# Patient Record
Sex: FEMALE | Race: WHITE | NOT HISPANIC OR LATINO | Employment: OTHER | URBAN - METROPOLITAN AREA
[De-identification: names, ages, dates, MRNs, and addresses within clinical notes are randomized per-mention and may not be internally consistent; named-entity substitution may affect disease eponyms.]

---

## 2017-01-13 ENCOUNTER — HOSPITAL ENCOUNTER (EMERGENCY)
Facility: HOSPITAL | Age: 72
Discharge: HOME | End: 2017-01-13
Attending: EMERGENCY MEDICINE
Payer: MEDICARE

## 2017-01-13 ENCOUNTER — APPOINTMENT (EMERGENCY)
Dept: RADIOLOGY | Facility: HOSPITAL | Age: 72
End: 2017-01-13
Payer: MEDICARE

## 2017-01-13 VITALS
HEART RATE: 92 BPM | OXYGEN SATURATION: 97 % | WEIGHT: 130 LBS | RESPIRATION RATE: 20 BRPM | HEIGHT: 61 IN | TEMPERATURE: 97.8 F | SYSTOLIC BLOOD PRESSURE: 152 MMHG | BODY MASS INDEX: 24.55 KG/M2 | DIASTOLIC BLOOD PRESSURE: 76 MMHG

## 2017-01-13 DIAGNOSIS — S52.502A CLOSED FRACTURE OF DISTAL END OF LEFT RADIUS, UNSPECIFIED FRACTURE MORPHOLOGY, INITIAL ENCOUNTER: Primary | ICD-10-CM

## 2017-01-13 PROCEDURE — 73110 X-RAY EXAM OF WRIST: CPT

## 2017-01-13 PROCEDURE — 99283 EMERGENCY DEPT VISIT LOW MDM: CPT

## 2017-01-13 RX ORDER — OXYCODONE HYDROCHLORIDE AND ACETAMINOPHEN 5; 325 MG/1; MG/1
1 TABLET ORAL EVERY 6 HOURS PRN
Qty: 8 TABLET | Refills: 0 | Status: SHIPPED | OUTPATIENT
Start: 2017-01-13 | End: 2018-07-22

## 2017-01-13 RX ORDER — OXYCODONE HYDROCHLORIDE AND ACETAMINOPHEN 5; 325 MG/1; MG/1
1 TABLET ORAL ONCE
Status: COMPLETED | OUTPATIENT
Start: 2017-01-13 | End: 2017-01-13

## 2017-01-13 RX ORDER — VALSARTAN AND HYDROCHLOROTHIAZIDE 80; 12.5 MG/1; MG/1
1 TABLET, FILM COATED ORAL DAILY
COMMUNITY
End: 2018-07-22

## 2017-01-13 RX ORDER — MULTIVITAMIN WITH IRON
100 TABLET ORAL DAILY
COMMUNITY
End: 2022-01-26

## 2017-01-13 RX ADMIN — OXYCODONE HYDROCHLORIDE AND ACETAMINOPHEN 1 TABLET: 5; 325 TABLET ORAL at 23:15

## 2017-01-16 ENCOUNTER — ALLSCRIPTS OFFICE VISIT (OUTPATIENT)
Dept: OTHER | Facility: OTHER | Age: 72
End: 2017-01-16

## 2017-02-06 ENCOUNTER — ALLSCRIPTS OFFICE VISIT (OUTPATIENT)
Dept: OTHER | Facility: OTHER | Age: 72
End: 2017-02-06

## 2017-02-06 ENCOUNTER — HOSPITAL ENCOUNTER (OUTPATIENT)
Dept: RADIOLOGY | Facility: CLINIC | Age: 72
Discharge: HOME | End: 2017-02-06
Payer: MEDICARE

## 2017-02-06 DIAGNOSIS — S52.502D CLOSED FRACTURE OF LOWER END OF LEFT RADIUS WITH ROUTINE HEALING: ICD-10-CM

## 2017-02-06 DIAGNOSIS — S52.572A OTHER INTRAARTICULAR FRACTURE OF LOWER END OF LEFT RADIUS, INITIAL ENCOUNTER FOR CLOSED FRACTURE: ICD-10-CM

## 2017-02-06 PROCEDURE — 73100 X-RAY EXAM OF WRIST: CPT

## 2017-02-14 ENCOUNTER — APPOINTMENT (OUTPATIENT)
Dept: OCCUPATIONAL THERAPY | Facility: CLINIC | Age: 72
End: 2017-02-14
Payer: MEDICARE

## 2017-02-14 DIAGNOSIS — S52.502D CLOSED FRACTURE OF LOWER END OF LEFT RADIUS WITH ROUTINE HEALING: ICD-10-CM

## 2017-02-14 PROCEDURE — G8985 CARRY GOAL STATUS: HCPCS

## 2017-02-14 PROCEDURE — G8984 CARRY CURRENT STATUS: HCPCS

## 2017-02-14 PROCEDURE — 97165 OT EVAL LOW COMPLEX 30 MIN: CPT

## 2017-02-16 ENCOUNTER — GENERIC CONVERSION - ENCOUNTER (OUTPATIENT)
Dept: OTHER | Facility: OTHER | Age: 72
End: 2017-02-16

## 2017-02-16 ENCOUNTER — APPOINTMENT (OUTPATIENT)
Dept: OCCUPATIONAL THERAPY | Facility: CLINIC | Age: 72
End: 2017-02-16
Payer: MEDICARE

## 2017-02-16 PROCEDURE — 97140 MANUAL THERAPY 1/> REGIONS: CPT

## 2017-02-16 PROCEDURE — 97110 THERAPEUTIC EXERCISES: CPT

## 2017-02-20 ENCOUNTER — APPOINTMENT (OUTPATIENT)
Dept: OCCUPATIONAL THERAPY | Facility: CLINIC | Age: 72
End: 2017-02-20
Payer: MEDICARE

## 2017-02-22 ENCOUNTER — APPOINTMENT (OUTPATIENT)
Dept: OCCUPATIONAL THERAPY | Facility: CLINIC | Age: 72
End: 2017-02-22
Payer: MEDICARE

## 2017-02-27 ENCOUNTER — APPOINTMENT (OUTPATIENT)
Dept: OCCUPATIONAL THERAPY | Facility: CLINIC | Age: 72
End: 2017-02-27
Payer: MEDICARE

## 2017-02-27 PROCEDURE — 97110 THERAPEUTIC EXERCISES: CPT

## 2017-02-27 PROCEDURE — 97140 MANUAL THERAPY 1/> REGIONS: CPT

## 2017-03-01 ENCOUNTER — APPOINTMENT (OUTPATIENT)
Dept: OCCUPATIONAL THERAPY | Facility: CLINIC | Age: 72
End: 2017-03-01
Payer: MEDICARE

## 2017-03-01 PROCEDURE — G8985 CARRY GOAL STATUS: HCPCS

## 2017-03-01 PROCEDURE — G8984 CARRY CURRENT STATUS: HCPCS

## 2017-03-01 PROCEDURE — 97110 THERAPEUTIC EXERCISES: CPT

## 2017-03-01 PROCEDURE — 97140 MANUAL THERAPY 1/> REGIONS: CPT

## 2017-03-06 ENCOUNTER — ALLSCRIPTS OFFICE VISIT (OUTPATIENT)
Dept: OTHER | Facility: OTHER | Age: 72
End: 2017-03-06

## 2017-03-06 ENCOUNTER — APPOINTMENT (OUTPATIENT)
Dept: OCCUPATIONAL THERAPY | Facility: CLINIC | Age: 72
End: 2017-03-06
Payer: MEDICARE

## 2017-03-06 ENCOUNTER — HOSPITAL ENCOUNTER (OUTPATIENT)
Dept: RADIOLOGY | Facility: CLINIC | Age: 72
Discharge: HOME | End: 2017-03-06
Payer: MEDICARE

## 2017-03-06 DIAGNOSIS — S52.502D CLOSED FRACTURE OF LOWER END OF LEFT RADIUS WITH ROUTINE HEALING: ICD-10-CM

## 2017-03-06 PROCEDURE — 97140 MANUAL THERAPY 1/> REGIONS: CPT

## 2017-03-06 PROCEDURE — 97110 THERAPEUTIC EXERCISES: CPT

## 2017-03-06 PROCEDURE — 73100 X-RAY EXAM OF WRIST: CPT

## 2017-03-08 ENCOUNTER — APPOINTMENT (OUTPATIENT)
Dept: OCCUPATIONAL THERAPY | Facility: CLINIC | Age: 72
End: 2017-03-08
Payer: MEDICARE

## 2017-03-08 PROCEDURE — 97110 THERAPEUTIC EXERCISES: CPT

## 2017-03-08 PROCEDURE — 97140 MANUAL THERAPY 1/> REGIONS: CPT

## 2017-03-14 ENCOUNTER — APPOINTMENT (OUTPATIENT)
Dept: OCCUPATIONAL THERAPY | Facility: CLINIC | Age: 72
End: 2017-03-14
Payer: MEDICARE

## 2017-03-16 ENCOUNTER — APPOINTMENT (OUTPATIENT)
Dept: OCCUPATIONAL THERAPY | Facility: CLINIC | Age: 72
End: 2017-03-16
Payer: MEDICARE

## 2017-03-16 PROCEDURE — 97140 MANUAL THERAPY 1/> REGIONS: CPT

## 2017-03-16 PROCEDURE — 97110 THERAPEUTIC EXERCISES: CPT

## 2017-03-20 ENCOUNTER — APPOINTMENT (OUTPATIENT)
Dept: OCCUPATIONAL THERAPY | Facility: CLINIC | Age: 72
End: 2017-03-20
Payer: MEDICARE

## 2017-03-20 PROCEDURE — 97110 THERAPEUTIC EXERCISES: CPT

## 2017-03-20 PROCEDURE — 97140 MANUAL THERAPY 1/> REGIONS: CPT

## 2017-03-22 ENCOUNTER — APPOINTMENT (OUTPATIENT)
Dept: OCCUPATIONAL THERAPY | Facility: CLINIC | Age: 72
End: 2017-03-22
Payer: MEDICARE

## 2017-03-22 PROCEDURE — 97110 THERAPEUTIC EXERCISES: CPT

## 2017-03-22 PROCEDURE — G8985 CARRY GOAL STATUS: HCPCS

## 2017-03-22 PROCEDURE — 97140 MANUAL THERAPY 1/> REGIONS: CPT

## 2017-03-22 PROCEDURE — G8986 CARRY D/C STATUS: HCPCS

## 2017-03-27 ENCOUNTER — APPOINTMENT (OUTPATIENT)
Dept: OCCUPATIONAL THERAPY | Facility: CLINIC | Age: 72
End: 2017-03-27
Payer: MEDICARE

## 2017-03-30 ENCOUNTER — APPOINTMENT (OUTPATIENT)
Dept: OCCUPATIONAL THERAPY | Facility: CLINIC | Age: 72
End: 2017-03-30
Payer: MEDICARE

## 2017-03-31 ENCOUNTER — APPOINTMENT (OUTPATIENT)
Dept: LAB | Facility: HOSPITAL | Age: 72
End: 2017-03-31
Attending: FAMILY MEDICINE
Payer: MEDICARE

## 2017-03-31 ENCOUNTER — TRANSCRIBE ORDERS (OUTPATIENT)
Dept: ADMINISTRATIVE | Facility: HOSPITAL | Age: 72
End: 2017-03-31

## 2017-03-31 DIAGNOSIS — I10 ESSENTIAL HYPERTENSION, MALIGNANT: ICD-10-CM

## 2017-03-31 DIAGNOSIS — N18.30 CHRONIC KIDNEY DISEASE, STAGE III (MODERATE) (HCC): ICD-10-CM

## 2017-03-31 DIAGNOSIS — N18.30 CHRONIC KIDNEY DISEASE, STAGE III (MODERATE) (HCC): Primary | ICD-10-CM

## 2017-03-31 LAB
25(OH)D3 SERPL-MCNC: 29.2 NG/ML (ref 30–100)
ANION GAP SERPL CALCULATED.3IONS-SCNC: 11 MMOL/L (ref 4–13)
BUN SERPL-MCNC: 15 MG/DL (ref 5–25)
CALCIUM SERPL-MCNC: 9.2 MG/DL (ref 8.3–10.1)
CHLORIDE SERPL-SCNC: 106 MMOL/L (ref 100–108)
CO2 SERPL-SCNC: 26 MMOL/L (ref 21–32)
CREAT SERPL-MCNC: 0.75 MG/DL (ref 0.6–1.3)
GFR SERPL CREATININE-BSD FRML MDRD: >60 ML/MIN/1.73SQ M
GLUCOSE P FAST SERPL-MCNC: 98 MG/DL (ref 65–99)
POTASSIUM SERPL-SCNC: 4.1 MMOL/L (ref 3.5–5.3)
SODIUM SERPL-SCNC: 143 MMOL/L (ref 136–145)

## 2017-03-31 PROCEDURE — 36415 COLL VENOUS BLD VENIPUNCTURE: CPT | Performed by: FAMILY MEDICINE

## 2017-03-31 PROCEDURE — 82306 VITAMIN D 25 HYDROXY: CPT

## 2017-03-31 PROCEDURE — 80048 BASIC METABOLIC PNL TOTAL CA: CPT | Performed by: FAMILY MEDICINE

## 2017-04-03 ENCOUNTER — TRANSCRIBE ORDERS (OUTPATIENT)
Dept: ADMINISTRATIVE | Facility: HOSPITAL | Age: 72
End: 2017-04-03

## 2017-04-03 DIAGNOSIS — M80.80XA OTHER OSTEOPOROSIS WITH CURRENT PATHOLOGICAL FRACTURE, INITIAL ENCOUNTER: Primary | ICD-10-CM

## 2017-04-12 ENCOUNTER — HOSPITAL ENCOUNTER (OUTPATIENT)
Dept: RADIOLOGY | Facility: HOSPITAL | Age: 72
Discharge: HOME | End: 2017-04-12
Attending: FAMILY MEDICINE
Payer: MEDICARE

## 2017-04-12 DIAGNOSIS — M80.80XA OTHER OSTEOPOROSIS WITH CURRENT PATHOLOGICAL FRACTURE, INITIAL ENCOUNTER: ICD-10-CM

## 2017-04-12 PROCEDURE — 77080 DXA BONE DENSITY AXIAL: CPT

## 2017-08-02 ENCOUNTER — TRANSCRIBE ORDERS (OUTPATIENT)
Dept: ADMINISTRATIVE | Facility: HOSPITAL | Age: 72
End: 2017-08-02

## 2017-08-02 ENCOUNTER — HOSPITAL ENCOUNTER (OUTPATIENT)
Dept: RADIOLOGY | Facility: HOSPITAL | Age: 72
Discharge: HOME | End: 2017-08-02
Attending: UROLOGY
Payer: MEDICARE

## 2017-08-02 DIAGNOSIS — Z87.442 PERSONAL HISTORY OF URINARY CALCULI: ICD-10-CM

## 2017-08-02 DIAGNOSIS — Z87.442 PERSONAL HISTORY OF URINARY CALCULI: Primary | ICD-10-CM

## 2017-08-02 PROCEDURE — 74000 HB X-RAY EXAM OF ABDOMEN (SINGLE ANTEROPOSTERIOR VIEW): CPT

## 2017-08-16 ENCOUNTER — APPOINTMENT (OUTPATIENT)
Dept: LAB | Facility: HOSPITAL | Age: 72
End: 2017-08-16
Attending: FAMILY MEDICINE
Payer: MEDICARE

## 2017-08-16 ENCOUNTER — TRANSCRIBE ORDERS (OUTPATIENT)
Dept: ADMINISTRATIVE | Facility: HOSPITAL | Age: 72
End: 2017-08-16

## 2017-08-16 DIAGNOSIS — E55.9 VITAMIN D INSUFFICIENCY: ICD-10-CM

## 2017-08-16 DIAGNOSIS — I10 ESSENTIAL HYPERTENSION, MALIGNANT: ICD-10-CM

## 2017-08-16 DIAGNOSIS — E78.00 PURE HYPERCHOLESTEROLEMIA: ICD-10-CM

## 2017-08-16 DIAGNOSIS — E55.9 VITAMIN D INSUFFICIENCY: Primary | ICD-10-CM

## 2017-08-16 LAB
25(OH)D3 SERPL-MCNC: 29.7 NG/ML (ref 30–100)
ALBUMIN SERPL BCP-MCNC: 3.7 G/DL (ref 3.5–5)
ALP SERPL-CCNC: 82 U/L (ref 46–116)
ALT SERPL W P-5'-P-CCNC: 25 U/L (ref 12–78)
ANION GAP SERPL CALCULATED.3IONS-SCNC: 10 MMOL/L (ref 4–13)
AST SERPL W P-5'-P-CCNC: 12 U/L (ref 5–45)
BASOPHILS # BLD AUTO: 0.1 THOUSANDS/ΜL (ref 0–0.1)
BASOPHILS NFR BLD AUTO: 1 % (ref 0–1)
BILIRUB SERPL-MCNC: 0.4 MG/DL (ref 0.2–1)
BUN SERPL-MCNC: 11 MG/DL (ref 5–25)
CALCIUM SERPL-MCNC: 9.3 MG/DL (ref 8.3–10.1)
CHLORIDE SERPL-SCNC: 104 MMOL/L (ref 100–108)
CHOLEST SERPL-MCNC: 342 MG/DL (ref 50–200)
CO2 SERPL-SCNC: 27 MMOL/L (ref 21–32)
CREAT SERPL-MCNC: 0.72 MG/DL (ref 0.6–1.3)
EOSINOPHIL # BLD AUTO: 0.2 THOUSAND/ΜL (ref 0–0.61)
EOSINOPHIL NFR BLD AUTO: 4 % (ref 0–6)
ERYTHROCYTE [DISTWIDTH] IN BLOOD BY AUTOMATED COUNT: 14.3 % (ref 11.6–15.1)
GFR SERPL CREATININE-BSD FRML MDRD: 84 ML/MIN/1.73SQ M
GLUCOSE P FAST SERPL-MCNC: 94 MG/DL (ref 65–99)
HCT VFR BLD AUTO: 43.4 % (ref 37–47)
HDLC SERPL-MCNC: 50 MG/DL (ref 40–60)
HGB BLD-MCNC: 14.4 G/DL (ref 12–16)
LYMPHOCYTES # BLD AUTO: 1.7 THOUSANDS/ΜL (ref 0.6–4.47)
LYMPHOCYTES NFR BLD AUTO: 28 % (ref 14–44)
MCH RBC QN AUTO: 29.9 PG (ref 27–31)
MCHC RBC AUTO-ENTMCNC: 33.1 G/DL (ref 31.4–37.4)
MCV RBC AUTO: 90 FL (ref 82–98)
MONOCYTES # BLD AUTO: 0.4 THOUSAND/ΜL (ref 0.17–1.22)
MONOCYTES NFR BLD AUTO: 7 % (ref 4–12)
NEUTROPHILS # BLD AUTO: 3.5 THOUSANDS/ΜL (ref 1.85–7.62)
NEUTS SEG NFR BLD AUTO: 59 % (ref 43–75)
NRBC BLD AUTO-RTO: 0 /100 WBCS
PLATELET # BLD AUTO: 187 THOUSANDS/UL (ref 130–400)
PMV BLD AUTO: 11.2 FL (ref 8.9–12.7)
POTASSIUM SERPL-SCNC: 4 MMOL/L (ref 3.5–5.3)
PROT SERPL-MCNC: 6.9 G/DL (ref 6.4–8.2)
RBC # BLD AUTO: 4.8 MILLION/UL (ref 4.2–5.4)
SODIUM SERPL-SCNC: 141 MMOL/L (ref 136–145)
TRIGL SERPL-MCNC: 403 MG/DL
WBC # BLD AUTO: 5.9 THOUSAND/UL (ref 4.8–10.8)

## 2017-08-16 PROCEDURE — 36415 COLL VENOUS BLD VENIPUNCTURE: CPT | Performed by: FAMILY MEDICINE

## 2017-08-16 PROCEDURE — 85025 COMPLETE CBC W/AUTO DIFF WBC: CPT | Performed by: FAMILY MEDICINE

## 2017-08-16 PROCEDURE — 80053 COMPREHEN METABOLIC PANEL: CPT | Performed by: FAMILY MEDICINE

## 2017-08-16 PROCEDURE — 82306 VITAMIN D 25 HYDROXY: CPT

## 2017-08-16 PROCEDURE — 80061 LIPID PANEL: CPT

## 2018-01-12 VITALS
SYSTOLIC BLOOD PRESSURE: 114 MMHG | HEIGHT: 61 IN | DIASTOLIC BLOOD PRESSURE: 74 MMHG | HEART RATE: 102 BPM | WEIGHT: 132.5 LBS | BODY MASS INDEX: 25.02 KG/M2

## 2018-07-20 ENCOUNTER — TRANSCRIBE ORDERS (OUTPATIENT)
Dept: ADMINISTRATIVE | Facility: HOSPITAL | Age: 73
End: 2018-07-20

## 2018-07-20 ENCOUNTER — HOSPITAL ENCOUNTER (OUTPATIENT)
Dept: RADIOLOGY | Facility: HOSPITAL | Age: 73
Discharge: HOME | End: 2018-07-20
Attending: UROLOGY
Payer: MEDICARE

## 2018-07-20 DIAGNOSIS — N13.2 HYDRONEPHROSIS WITH RENAL AND URETERAL CALCULOUS OBSTRUCTION: ICD-10-CM

## 2018-07-20 DIAGNOSIS — N20.0 URIC ACID NEPHROLITHIASIS: Primary | ICD-10-CM

## 2018-07-20 DIAGNOSIS — N20.0 URIC ACID NEPHROLITHIASIS: ICD-10-CM

## 2018-07-20 PROCEDURE — 74018 RADEX ABDOMEN 1 VIEW: CPT

## 2018-07-22 ENCOUNTER — HOSPITAL ENCOUNTER (EMERGENCY)
Facility: HOSPITAL | Age: 73
Discharge: HOME | End: 2018-07-22
Attending: EMERGENCY MEDICINE
Payer: MEDICARE

## 2018-07-22 ENCOUNTER — APPOINTMENT (EMERGENCY)
Dept: RADIOLOGY | Facility: HOSPITAL | Age: 73
End: 2018-07-22
Payer: MEDICARE

## 2018-07-22 VITALS
BODY MASS INDEX: 25.7 KG/M2 | OXYGEN SATURATION: 98 % | WEIGHT: 136 LBS | SYSTOLIC BLOOD PRESSURE: 118 MMHG | DIASTOLIC BLOOD PRESSURE: 62 MMHG | HEART RATE: 64 BPM | TEMPERATURE: 98 F | RESPIRATION RATE: 23 BRPM

## 2018-07-22 DIAGNOSIS — R07.9 CHEST PAIN: Primary | ICD-10-CM

## 2018-07-22 LAB
ALBUMIN SERPL BCP-MCNC: 3.9 G/DL (ref 3.5–5)
ALP SERPL-CCNC: 63 U/L (ref 46–116)
ALT SERPL W P-5'-P-CCNC: 36 U/L (ref 12–78)
ANION GAP SERPL CALCULATED.3IONS-SCNC: 8 MMOL/L (ref 4–13)
APTT PPP: 22 SECONDS (ref 24–33)
AST SERPL W P-5'-P-CCNC: 53 U/L (ref 5–45)
ATRIAL RATE: 77 BPM
BASOPHILS # BLD AUTO: 0.11 THOUSANDS/ΜL (ref 0–0.1)
BASOPHILS NFR BLD AUTO: 2 % (ref 0–1)
BILIRUB SERPL-MCNC: 0.4 MG/DL (ref 0.2–1)
BUN SERPL-MCNC: 15 MG/DL (ref 5–25)
CALCIUM SERPL-MCNC: 9.2 MG/DL (ref 8.3–10.1)
CHLORIDE SERPL-SCNC: 103 MMOL/L (ref 100–108)
CO2 SERPL-SCNC: 26 MMOL/L (ref 21–32)
CREAT SERPL-MCNC: 0.92 MG/DL (ref 0.6–1.3)
EOSINOPHIL # BLD AUTO: 0.23 THOUSAND/ΜL (ref 0–0.61)
EOSINOPHIL NFR BLD AUTO: 3 % (ref 0–6)
ERYTHROCYTE [DISTWIDTH] IN BLOOD BY AUTOMATED COUNT: 13.4 % (ref 11.6–15.1)
GFR SERPL CREATININE-BSD FRML MDRD: 62 ML/MIN/1.73SQ M
GLUCOSE SERPL-MCNC: 139 MG/DL (ref 65–140)
HCT VFR BLD AUTO: 43.5 % (ref 34.8–46.1)
HGB BLD-MCNC: 14.2 G/DL (ref 11.5–15.4)
IMM GRANULOCYTES # BLD AUTO: 0.01 THOUSAND/UL (ref 0–0.2)
IMM GRANULOCYTES NFR BLD AUTO: 0 % (ref 0–2)
INR PPP: 0.93 (ref 0.86–1.16)
LYMPHOCYTES # BLD AUTO: 2.25 THOUSANDS/ΜL (ref 0.6–4.47)
LYMPHOCYTES NFR BLD AUTO: 31 % (ref 14–44)
MCH RBC QN AUTO: 30.1 PG (ref 26.8–34.3)
MCHC RBC AUTO-ENTMCNC: 32.6 G/DL (ref 31.4–37.4)
MCV RBC AUTO: 92 FL (ref 82–98)
MONOCYTES # BLD AUTO: 0.54 THOUSAND/ΜL (ref 0.17–1.22)
MONOCYTES NFR BLD AUTO: 7 % (ref 4–12)
NEUTROPHILS # BLD AUTO: 4.2 THOUSANDS/ΜL (ref 1.85–7.62)
NEUTS SEG NFR BLD AUTO: 57 % (ref 43–75)
NRBC BLD AUTO-RTO: 0 /100 WBCS
P AXIS: 58 DEGREES
PLATELET # BLD AUTO: 206 THOUSANDS/UL (ref 149–390)
PMV BLD AUTO: 12.6 FL (ref 8.9–12.7)
POTASSIUM SERPL-SCNC: 4.6 MMOL/L (ref 3.5–5.3)
PR INTERVAL: 172 MS
PROT SERPL-MCNC: 7.5 G/DL (ref 6.4–8.2)
PROTHROMBIN TIME: 9.8 SECONDS (ref 9.4–11.7)
QRS AXIS: 22 DEGREES
QRSD INTERVAL: 78 MS
QT INTERVAL: 406 MS
QTC INTERVAL: 459 MS
RBC # BLD AUTO: 4.72 MILLION/UL (ref 3.81–5.12)
SODIUM SERPL-SCNC: 137 MMOL/L (ref 136–145)
T WAVE AXIS: 57 DEGREES
TROPONIN I SERPL-MCNC: <0.02 NG/ML
TROPONIN I SERPL-MCNC: <0.02 NG/ML
VENTRICULAR RATE: 77 BPM
WBC # BLD AUTO: 7.34 THOUSAND/UL (ref 4.31–10.16)

## 2018-07-22 PROCEDURE — 96374 THER/PROPH/DIAG INJ IV PUSH: CPT

## 2018-07-22 PROCEDURE — 71045 X-RAY EXAM CHEST 1 VIEW: CPT

## 2018-07-22 PROCEDURE — 80053 COMPREHEN METABOLIC PANEL: CPT | Performed by: EMERGENCY MEDICINE

## 2018-07-22 PROCEDURE — 93010 ELECTROCARDIOGRAM REPORT: CPT | Performed by: INTERNAL MEDICINE

## 2018-07-22 PROCEDURE — 84484 ASSAY OF TROPONIN QUANT: CPT | Performed by: EMERGENCY MEDICINE

## 2018-07-22 PROCEDURE — 85025 COMPLETE CBC W/AUTO DIFF WBC: CPT | Performed by: EMERGENCY MEDICINE

## 2018-07-22 PROCEDURE — 99285 EMERGENCY DEPT VISIT HI MDM: CPT

## 2018-07-22 PROCEDURE — 36415 COLL VENOUS BLD VENIPUNCTURE: CPT | Performed by: EMERGENCY MEDICINE

## 2018-07-22 PROCEDURE — 85730 THROMBOPLASTIN TIME PARTIAL: CPT | Performed by: EMERGENCY MEDICINE

## 2018-07-22 PROCEDURE — 85610 PROTHROMBIN TIME: CPT | Performed by: EMERGENCY MEDICINE

## 2018-07-22 PROCEDURE — 93005 ELECTROCARDIOGRAM TRACING: CPT

## 2018-07-22 RX ORDER — SUCRALFATE ORAL 1 G/10ML
1000 SUSPENSION ORAL ONCE
Status: COMPLETED | OUTPATIENT
Start: 2018-07-22 | End: 2018-07-22

## 2018-07-22 RX ORDER — MAGNESIUM HYDROXIDE/ALUMINUM HYDROXICE/SIMETHICONE 120; 1200; 1200 MG/30ML; MG/30ML; MG/30ML
20 SUSPENSION ORAL ONCE
Status: COMPLETED | OUTPATIENT
Start: 2018-07-22 | End: 2018-07-22

## 2018-07-22 RX ADMIN — ALUMINUM HYDROXIDE, MAGNESIUM HYDROXIDE, AND SIMETHICONE 20 ML: 200; 200; 20 SUSPENSION ORAL at 21:13

## 2018-07-22 RX ADMIN — SUCRALFATE 1000 MG: 1 SUSPENSION ORAL at 22:02

## 2018-07-22 RX ADMIN — FAMOTIDINE 20 MG: 10 INJECTION, SOLUTION INTRAVENOUS at 22:02

## 2018-07-22 RX ADMIN — LIDOCAINE HYDROCHLORIDE 15 ML: 20 SOLUTION ORAL; TOPICAL at 21:13

## 2018-07-23 NOTE — ED NOTES
Patient resting in room at this time,with no complaints of pain or discomfort noted  Patient is aware of second troponin drawing to be done  side rails are up  Call bell is in reach       Ryan Ladd RN  07/22/18 1547

## 2018-07-23 NOTE — ED PROVIDER NOTES
History  Chief Complaint   Patient presents with    Chest Pain     States she was seen at [facility] for chest pain and had EKG done and sent to ED for cardiac enzymes  States intermittent chest pain for 4 days, but pain started constantly this afternoon for 5 hours  No pain at present  Pain epigastric and went up left side of neck      Patient sent from urgent care for evaluation of chest pain  Patient has had intermitent pain for 4 days  States epigastric discomfort lasting about a minute going away with swallowing  No dyspnea  Patient states today the epigastric discomfort radiated up the chest to the neck as well  Currently just has epigastric discomfort  History provided by:  Patient   used: No    Chest Pain       Prior to Admission Medications   Prescriptions Last Dose Informant Patient Reported? Taking? ALPRAZolam (XANAX) 0 25 mg tablet Past Week at Unknown time  Yes Yes   Sig: Take 0 5 mg by mouth daily at bedtime as needed for anxiety     Cholecalciferol (VITAMIN D) 2000 UNITS tablet 7/22/2018 at Unknown time  Yes Yes   Sig: Take 2,000 Units by mouth every morning  amLODIPine (NORVASC) 5 mg tablet 7/22/2018 at Unknown time  Yes Yes   Sig: Take 5 mg by mouth every morning  aspirin 81 MG tablet 7/22/2018 at Unknown time  Yes Yes   Sig: Take 81 mg by mouth every morning  fenofibrate (TRIGLIDE) 160 MG tablet 7/22/2018 at Unknown time  Yes Yes   Sig: Take 160 mg by mouth every morning    multivitamin (THERAGRAN) TABS 7/22/2018 at Unknown time  Yes Yes   Sig: Take 1 tablet by mouth every morning  omeprazole (PriLOSEC) 40 MG capsule 7/22/2018 at Unknown time  Yes Yes   Sig: Take 40 mg by mouth daily     pyridoxine (VITAMIN B6) 100 mg tablet Unknown at Unknown time  Yes No   Sig: Take 100 mg by mouth daily      Facility-Administered Medications: None       Past Medical History:   Diagnosis Date    Acid reflux     Anxiety     Chronic kidney disease     chronic stones    H/O hiatal hernia     Hyperlipidemia     Hypertension        Past Surgical History:   Procedure Laterality Date    APPENDECTOMY      BREAST SURGERY Left     excision cyst    CYSTOSCOPY      with stone extraction x 2    CYSTOSCOPY W/ RETROGRADES Left 09/17/2012    stent removal,     CYSTOSCOPY W/ URETEROSCOPY W/ LITHOTRIPSY Left 12/11/2006    Ca Phos 98%    CYSTOSCOPY W/ URETEROSCOPY W/ LITHOTRIPSY Left 08/12/2012    Ca ox di 60%, Ca ox mono 20%    ESOPHAGOGASTRODUODENOSCOPY      EXTRACORPOREAL SHOCK WAVE LITHOTRIPSY Left 4/13/2016    Procedure: LITHROTRIPSY EXTRACORPORAL SHOCKWAVE (ESWL); Surgeon: Lewis Marshall MD;  Location: [facility] MAIN OR;  Service:     IL CYSTO/URETERO W/LITHOTRIPSY &INDWELL STENT INSRT Left 9/1/2016    Procedure: CYSTOSCOPY URETEROSCOPY WITH STONE EXTRACTION , RETROGRADE PYELOGRAM AND INSERTION STENT URETERAL;  Surgeon: Lewis Marshall MD;  Location: [address] [place] [address];  Service: Urology    IL CYSTO/URETERO W/LITHOTRIPSY &INDWELL STENT INSRT Left 8/12/2016    Procedure: CYSTOSCOPY URETEROSCOPY WITH LITHOTRIPSY HOLMIUM LASER, RETROGRADE PYELOGRAM AND INSERTION STENT URETERAL;  Surgeon: Lewis Marshall MD;  Location: [address] [place] [address];  Service: Urology    TUBAL LIGATION      URETERAL STENT PLACEMENT Left 08/31/2012    Narrowing of the left distal ureter for 3cm  Family History   Problem Relation Age of Onset    Stroke Father         TIA    Parkinsonism Father     Cancer Maternal Aunt     Cancer Maternal Uncle     Heart disease Maternal Uncle         Massive MI     I have reviewed and agree with the history as documented  Social History   Substance Use Topics    Smoking status: Never Smoker    Smokeless tobacco: Never Used    Alcohol use Yes      Comment: occassional        Review of Systems   Cardiovascular: Positive for chest pain  All other systems reviewed and are negative        Physical Exam  Physical Exam   Constitutional: She is oriented to person, place, and time  No distress  HENT:   Mouth/Throat: Oropharynx is clear and moist    Eyes: Pupils are equal, round, and reactive to light  Neck: Normal range of motion  Cardiovascular: Normal rate, regular rhythm and intact distal pulses  Pulmonary/Chest: Effort normal and breath sounds normal  No respiratory distress  Abdominal: Soft  There is tenderness  Mild epigastric tenderness   Musculoskeletal: Normal range of motion  Neurological: She is alert and oriented to person, place, and time  Skin: Capillary refill takes less than 2 seconds  She is not diaphoretic  Nursing note and vitals reviewed        Vital Signs  ED Triage Vitals [07/22/18 2048]   Temperature Pulse Respirations Blood Pressure SpO2   98 °F (36 7 °C) 83 20 (!) 187/74 95 %      Temp Source Heart Rate Source Patient Position - Orthostatic VS BP Location FiO2 (%)   Oral Monitor Lying Right arm --      Pain Score       No Pain           Vitals:    07/22/18 2300 07/22/18 2311 07/22/18 2315 07/22/18 2345   BP: 113/61 113/61 117/59 118/62   Pulse: 64 62 66 64   Patient Position - Orthostatic VS:  Lying         Visual Acuity      ED Medications  Medications   aluminum-magnesium hydroxide-simethicone (MYLANTA) 200-200-20 mg/5 mL oral suspension 20 mL (20 mL Oral Given 7/22/18 2113)   lidocaine viscous (XYLOCAINE) 2 % mucosal solution 15 mL (15 mL Swish & Swallow Given 7/22/18 2113)   sucralfate (CARAFATE) oral suspension 1,000 mg (1,000 mg Oral Given 7/22/18 2202)   famotidine (PEPCID) injection 20 mg (20 mg Intravenous Given 7/22/18 2202)       Diagnostic Studies  Results Reviewed     Procedure Component Value Units Date/Time    Troponin I [[id number]]  (Normal) Collected:  07/22/18 2309    Lab Status:  Final result Specimen:  Blood from Arm, Left Updated:  07/22/18 2335     Troponin I <0 02 ng/mL     APTT [[id number]]  (Abnormal) Collected:  07/22/18 2104    Lab Status:  Final result Specimen:  Blood from Arm, Right Updated:  07/22/18 2151     PTT 22 (L) seconds     Protime-INR [[id number]]  (Normal) Collected:  07/22/18 2104    Lab Status:  Final result Specimen:  Blood from Arm, Right Updated:  07/22/18 2151     Protime 9 8 seconds      INR 0 93    Comprehensive metabolic panel [[id number]]  (Abnormal) Collected:  07/22/18 2104    Lab Status:  Final result Specimen:  Blood from Arm, Right Updated:  07/22/18 2143     Sodium 137 mmol/L      Potassium 4 6 mmol/L      Chloride 103 mmol/L      CO2 26 mmol/L      Anion Gap 8 mmol/L      BUN 15 mg/dL      Creatinine 0 92 mg/dL      Glucose 139 mg/dL      Calcium 9 2 mg/dL      AST 53 (H) U/L      ALT 36 U/L      Alkaline Phosphatase 63 U/L      Total Protein 7 5 g/dL      Albumin 3 9 g/dL      Total Bilirubin 0 40 mg/dL      eGFR 62 ml/min/1 73sq m     Narrative:         National Kidney Disease Education Program recommendations are as follows:  GFR calculation is accurate only with a steady state creatinine  Chronic Kidney disease less than 60 ml/min/1 73 sq  meters  Kidney failure less than 15 ml/min/1 73 sq  meters      Troponin I [[id number]]  (Normal) Collected:  07/22/18 2104    Lab Status:  Final result Specimen:  Blood from Arm, Right Updated:  07/22/18 2139     Troponin I <0 02 ng/mL     CBC and differential [[id number]]  (Abnormal) Collected:  07/22/18 2104    Lab Status:  Final result Specimen:  Blood from Arm, Right Updated:  07/22/18 2112     WBC 7 34 Thousand/uL      RBC 4 72 Million/uL      Hemoglobin 14 2 g/dL      Hematocrit 43 5 %      MCV 92 fL      MCH 30 1 pg      MCHC 32 6 g/dL      RDW 13 4 %      MPV 12 6 fL      Platelets 851 Thousands/uL      nRBC 0 /100 WBCs      Neutrophils Relative 57 %      Immat GRANS % 0 %      Lymphocytes Relative 31 %      Monocytes Relative 7 %      Eosinophils Relative 3 %      Basophils Relative 2 (H) %      Neutrophils Absolute 4 20 Thousands/µL      Immature Grans Absolute 0 01 Thousand/uL      Lymphocytes Absolute 2 25 Thousands/µL      Monocytes Absolute 0 54 Thousand/µL      Eosinophils Absolute 0 23 Thousand/µL      Basophils Absolute 0 11 (H) Thousands/µL                  X-ray chest 1 view portable    (Results Pending)              Procedures  Procedures       Phone Contacts  ED Phone Contact    ED Course         HEART Risk Score      Most Recent Value   History  0 Filed at: 07/22/2018 2147   ECG  0 Filed at: 07/22/2018 2147   Age  2 Filed at: 07/22/2018 2147   Risk Factors  1 Filed at: 07/22/2018 2147   Troponin  0 Filed at: 07/22/2018 2147   Heart Score Risk Calculator   History  0 Filed at: 07/22/2018 2147   ECG  0 Filed at: 07/22/2018 2147   Age  2 Filed at: 07/22/2018 2147   Risk Factors  1 Filed at: 07/22/2018 2147   Troponin  0 Filed at: 07/22/2018 2147   HEART Score  3 Filed at: 07/22/2018 2147   HEART Score  3 Filed at: 07/22/2018 2147                            [facility]  Number of Diagnoses or Management Options  Chest pain:   Diagnosis management comments: Pulse ox 98% on RA indicating adequate oxygenation    Patient did not want to stay for further testing but agree to second troponin with outpatient follow up  Chest pain/epigastric resolve din the ER         Amount and/or Complexity of Data Reviewed  Clinical lab tests: ordered and reviewed  Tests in the radiology section of CPT®: ordered and reviewed  Decide to obtain previous medical records or to obtain history from someone other than the patient: yes  Review and summarize past medical records: yes  Independent visualization of images, tracings, or specimens: yes    Patient Progress  Patient progress: improved    CritCare Time    Disposition  Final diagnoses:   Chest pain     Time reflects when diagnosis was documented in both MDM as applicable and the Disposition within this note     Time User Action Codes Description Comment    7/22/2018 11:42 PM Christie Bragg Add [R07 9] Chest pain       ED Disposition     ED Disposition Condition Comment    Discharge  Denis Graver Sine discharge to home/self care     Condition at discharge:  stable        Follow-up Information     Follow up With Specialties Details Why Contact Info Additional [address], [address] [place] [address] In 2 days  [address] [facility]  [address]  [place] [facility]       Darrius Grey MD Cardiology In 3 days  [facility] Emergency Medicine  If symptoms worsen [address] [address]  [phone] [facility], Ryan Roberto, [address] [facility], [address]          Discharge Medication List as of 7/22/2018 11:43 PM      CONTINUE these medications which have NOT CHANGED    Details   ALPRAZolam (XANAX) 0 25 mg tablet Take 0 5 mg by mouth daily at bedtime as needed for anxiety  , Until Discontinued, Historical Med      amLODIPine (NORVASC) 5 mg tablet Take 5 mg by mouth every morning , Until Discontinued, Historical Med      aspirin 81 MG tablet Take 81 mg by mouth every morning , Until Discontinued, Historical Med      Cholecalciferol (VITAMIN D) 2000 UNITS tablet Take 2,000 Units by mouth every morning , Until Discontinued, Historical Med      fenofibrate (TRIGLIDE) 160 MG tablet Take 160 mg by mouth every morning , Until Discontinued, Historical Med      multivitamin (THERAGRAN) TABS Take 1 tablet by mouth every morning , Until Discontinued, Historical Med      omeprazole (PriLOSEC) 40 MG capsule Take 40 mg by mouth daily  , Until Discontinued, Historical Med      pyridoxine (VITAMIN B6) 100 mg tablet Take 100 mg by mouth daily, Until Discontinued, Historical Med           No discharge procedures on file      ED Provider  Electronically Signed by           Jayshree Lui DO  07/23/18 3034

## 2018-07-23 NOTE — DISCHARGE INSTRUCTIONS
Chest Pain   WHAT YOU NEED TO KNOW:   Chest pain can be caused by a range of conditions, from not serious to life-threatening  Chest pain can be a symptom of a digestive problem, such as acid reflux or a stomach ulcer  An anxiety attack or a strong emotion, such as anger, can also cause chest pain  Infection, inflammation, or a fracture in the bones or cartilage in your chest can cause pain or discomfort  Sometimes chest pain or pressure is caused by poor blood flow to your heart (angina)  Chest pain may also be caused by life-threatening conditions such as a heart attack or blood clot in your lungs  DISCHARGE INSTRUCTIONS:   Call 911 if:   · You have any of the following signs of a heart attack:      ¨ Squeezing, pressure, or pain in your chest that lasts longer than 5 minutes or returns    ¨ Discomfort or pain in your back, neck, jaw, stomach, or arm     ¨ Trouble breathing    ¨ Nausea or vomiting    ¨ Lightheadedness or a sudden cold sweat, especially with chest pain or trouble breathing    Seek care immediately if:   · You have chest discomfort that gets worse, even with medicine  · You cough or vomit blood  · Your bowel movements are black or bloody  · You cannot stop vomiting, or it hurts to swallow  Contact your healthcare provider if:   · You have questions or concerns about your condition or care  Medicines:   · Medicines  may be given to treat the cause of your chest pain  Examples include pain medicine, anxiety medicine, or medicines to increase blood flow to your heart  · Do not take certain medicines without asking your healthcare provider first   These include NSAIDs, herbal or vitamin supplements, or hormones (estrogen or progestin)  · Take your medicine as directed  Contact your healthcare provider if you think your medicine is not helping or if you have side effects  Tell him or her if you are allergic to any medicine   Keep a list of the medicines, vitamins, and herbs you take  Include the amounts, and when and why you take them  Bring the list or the pill bottles to follow-up visits  Carry your medicine list with you in case of an emergency  Follow up with your healthcare provider within 72 hours, or as directed: You may need to return for more tests to find the cause of your chest pain  You may be referred to a specialist, such as a cardiologist or gastroenterologist  Write down your questions so you remember to ask them during your visits  Healthy living tips: The following are general healthy guidelines  If your chest pain is caused by a heart problem, your healthcare provider will give you specific guidelines to follow  · Do not smoke  Nicotine and other chemicals in cigarettes and cigars can cause lung and heart damage  Ask your healthcare provider for information if you currently smoke and need help to quit  E-cigarettes or smokeless tobacco still contain nicotine  Talk to your healthcare provider before you use these products  · Eat a variety of healthy, low-fat foods  Healthy foods include fruits, vegetables, whole-grain breads, low-fat dairy products, beans, lean meats, and fish  Ask for more information about a heart healthy diet  · Ask about activity  Your healthcare provider will tell you which activities to limit or avoid  Ask when you can drive, return to work, and have sex  Ask about the best exercise plan for you  · Maintain a healthy weight  Ask your healthcare provider how much you should weigh  Ask him or her to help you create a weight loss plan if you are overweight  © 2017 [employer] Information is for End User's use only and may not be sold, redistributed or otherwise used for commercial purposes  All illustrations and images included in CareNotes® are the copyrighted property of [employer]  or Farhan Velasquez  The above information is an  only   It is not intended as medical advice for individual conditions or treatments  Talk to your doctor, nurse or pharmacist before following any medical regimen to see if it is safe and effective for you

## 2018-07-23 NOTE — ED PROCEDURE NOTE
PROCEDURE  ECG 12 Lead Documentation  Date/Time: 7/22/2018 8:59 PM  Performed by: Naomi Barrios  Authorized by: Naomi Barrios     ECG reviewed by me, the ED Provider: yes    Patient location:  ED  Interpretation:     Interpretation: normal    Rate:     ECG rate:  77    ECG rate assessment: normal    Rhythm:     Rhythm: sinus rhythm    Ectopy:     Ectopy: none    QRS:     QRS axis:  Normal  Conduction:     Conduction: normal    ST segments:     ST segments:  Normal  T waves:     T waves: normal           Clare Rivera DO  07/22/18 2100

## 2018-08-14 ENCOUNTER — APPOINTMENT (EMERGENCY)
Dept: RADIOLOGY | Facility: HOSPITAL | Age: 73
End: 2018-08-14
Payer: MEDICARE

## 2018-08-14 ENCOUNTER — HOSPITAL ENCOUNTER (EMERGENCY)
Facility: HOSPITAL | Age: 73
Discharge: HOME | End: 2018-08-14
Attending: EMERGENCY MEDICINE | Admitting: EMERGENCY MEDICINE
Payer: MEDICARE

## 2018-08-14 VITALS
DIASTOLIC BLOOD PRESSURE: 74 MMHG | OXYGEN SATURATION: 98 % | TEMPERATURE: 97.5 F | SYSTOLIC BLOOD PRESSURE: 156 MMHG | HEART RATE: 82 BPM | RESPIRATION RATE: 16 BRPM

## 2018-08-14 DIAGNOSIS — N20.1 LEFT URETERAL STONE: Primary | ICD-10-CM

## 2018-08-14 LAB
ALBUMIN SERPL BCP-MCNC: 3.9 G/DL (ref 3.5–5)
ALP SERPL-CCNC: 66 U/L (ref 46–116)
ALT SERPL W P-5'-P-CCNC: 30 U/L (ref 12–78)
ANION GAP SERPL CALCULATED.3IONS-SCNC: 7 MMOL/L (ref 4–13)
AST SERPL W P-5'-P-CCNC: 21 U/L (ref 5–45)
BASOPHILS # BLD AUTO: 0.09 THOUSANDS/ΜL (ref 0–0.1)
BASOPHILS NFR BLD AUTO: 1 % (ref 0–1)
BILIRUB SERPL-MCNC: 0.3 MG/DL (ref 0.2–1)
BILIRUB UR QL STRIP: NEGATIVE
BUN SERPL-MCNC: 10 MG/DL (ref 5–25)
CALCIUM SERPL-MCNC: 9.4 MG/DL (ref 8.3–10.1)
CHLORIDE SERPL-SCNC: 105 MMOL/L (ref 100–108)
CLARITY UR: CLEAR
CO2 SERPL-SCNC: 29 MMOL/L (ref 21–32)
COLOR UR: YELLOW
CREAT SERPL-MCNC: 0.83 MG/DL (ref 0.6–1.3)
EOSINOPHIL # BLD AUTO: 0.16 THOUSAND/ΜL (ref 0–0.61)
EOSINOPHIL NFR BLD AUTO: 2 % (ref 0–6)
ERYTHROCYTE [DISTWIDTH] IN BLOOD BY AUTOMATED COUNT: 13.2 % (ref 11.6–15.1)
GFR SERPL CREATININE-BSD FRML MDRD: 70 ML/MIN/1.73SQ M
GLUCOSE SERPL-MCNC: 112 MG/DL (ref 65–140)
GLUCOSE UR STRIP-MCNC: NEGATIVE MG/DL
HCT VFR BLD AUTO: 46 % (ref 34.8–46.1)
HGB BLD-MCNC: 14.9 G/DL (ref 11.5–15.4)
HGB UR QL STRIP.AUTO: NEGATIVE
IMM GRANULOCYTES # BLD AUTO: 0.02 THOUSAND/UL (ref 0–0.2)
IMM GRANULOCYTES NFR BLD AUTO: 0 % (ref 0–2)
KETONES UR STRIP-MCNC: NEGATIVE MG/DL
LEUKOCYTE ESTERASE UR QL STRIP: NEGATIVE
LYMPHOCYTES # BLD AUTO: 1.77 THOUSANDS/ΜL (ref 0.6–4.47)
LYMPHOCYTES NFR BLD AUTO: 24 % (ref 14–44)
MCH RBC QN AUTO: 29.9 PG (ref 26.8–34.3)
MCHC RBC AUTO-ENTMCNC: 32.4 G/DL (ref 31.4–37.4)
MCV RBC AUTO: 92 FL (ref 82–98)
MONOCYTES # BLD AUTO: 0.45 THOUSAND/ΜL (ref 0.17–1.22)
MONOCYTES NFR BLD AUTO: 6 % (ref 4–12)
NEUTROPHILS # BLD AUTO: 4.95 THOUSANDS/ΜL (ref 1.85–7.62)
NEUTS SEG NFR BLD AUTO: 67 % (ref 43–75)
NITRITE UR QL STRIP: NEGATIVE
NRBC BLD AUTO-RTO: 0 /100 WBCS
PH UR STRIP.AUTO: 7.5 [PH] (ref 5–9)
PLATELET # BLD AUTO: 206 THOUSANDS/UL (ref 149–390)
PMV BLD AUTO: 12.4 FL (ref 8.9–12.7)
POTASSIUM SERPL-SCNC: 4.1 MMOL/L (ref 3.5–5.3)
PROT SERPL-MCNC: 7.1 G/DL (ref 6.4–8.2)
PROT UR STRIP-MCNC: NEGATIVE MG/DL
RBC # BLD AUTO: 4.98 MILLION/UL (ref 3.81–5.12)
SODIUM SERPL-SCNC: 141 MMOL/L (ref 136–145)
SP GR UR STRIP.AUTO: 1.01 (ref 1–1.03)
UROBILINOGEN UR QL STRIP.AUTO: 0.2 E.U./DL
WBC # BLD AUTO: 7.44 THOUSAND/UL (ref 4.31–10.16)

## 2018-08-14 PROCEDURE — 99284 EMERGENCY DEPT VISIT MOD MDM: CPT

## 2018-08-14 PROCEDURE — 87086 URINE CULTURE/COLONY COUNT: CPT | Performed by: EMERGENCY MEDICINE

## 2018-08-14 PROCEDURE — 96374 THER/PROPH/DIAG INJ IV PUSH: CPT

## 2018-08-14 PROCEDURE — 85025 COMPLETE CBC W/AUTO DIFF WBC: CPT | Performed by: EMERGENCY MEDICINE

## 2018-08-14 PROCEDURE — 36415 COLL VENOUS BLD VENIPUNCTURE: CPT | Performed by: EMERGENCY MEDICINE

## 2018-08-14 PROCEDURE — 81003 URINALYSIS AUTO W/O SCOPE: CPT | Performed by: EMERGENCY MEDICINE

## 2018-08-14 PROCEDURE — 80053 COMPREHEN METABOLIC PANEL: CPT | Performed by: EMERGENCY MEDICINE

## 2018-08-14 PROCEDURE — 96361 HYDRATE IV INFUSION ADD-ON: CPT

## 2018-08-14 PROCEDURE — 74176 CT ABD & PELVIS W/O CONTRAST: CPT

## 2018-08-14 RX ORDER — NAPROXEN 500 MG/1
500 TABLET ORAL 2 TIMES DAILY WITH MEALS
Qty: 10 TABLET | Refills: 0 | Status: SHIPPED | OUTPATIENT
Start: 2018-08-14 | End: 2019-07-12 | Stop reason: DRUGHIGH

## 2018-08-14 RX ORDER — METOCLOPRAMIDE 10 MG/1
10 TABLET ORAL 4 TIMES DAILY
COMMUNITY
End: 2022-01-26

## 2018-08-14 RX ORDER — KETOROLAC TROMETHAMINE 30 MG/ML
15 INJECTION, SOLUTION INTRAMUSCULAR; INTRAVENOUS ONCE
Status: COMPLETED | OUTPATIENT
Start: 2018-08-14 | End: 2018-08-14

## 2018-08-14 RX ORDER — ATORVASTATIN CALCIUM 20 MG/1
20 TABLET, FILM COATED ORAL
COMMUNITY

## 2018-08-14 RX ADMIN — KETOROLAC TROMETHAMINE 15 MG: 30 INJECTION, SOLUTION INTRAMUSCULAR at 12:31

## 2018-08-14 RX ADMIN — SODIUM CHLORIDE 1000 ML: 0.9 INJECTION, SOLUTION INTRAVENOUS at 11:39

## 2018-08-14 NOTE — DISCHARGE INSTRUCTIONS
Ureteral Stones   WHAT YOU NEED TO KNOW:   A ureteral stone is a stone that forms in the kidney and moves down the ureter and gets stuck there  The ureter is the tube that takes urine from the kidney to the bladder  Stones can form in the urinary system when your urine has high levels of minerals and salts  Urinary stones can be made of uric acid, calcium, phosphate, or oxalate crystals  DISCHARGE INSTRUCTIONS:   Return to the emergency department if:   · You have severe pain that does not improve, even after you take medicine  · You have vomiting that is not relieved by medicine  · You develop a fever  Contact your healthcare provider if:   · You develop a fever  · You have any questions or concerns about your condition or care  Follow up with your healthcare provider as directed: You may need to return for more tests  Write down your questions so you remember to ask them during your visits  Medicines: You may need any of the following:  · NSAIDs , such as ibuprofen, help decrease swelling, pain, and fever  This medicine is available with or without a doctor's order  NSAIDs can cause stomach bleeding or kidney problems in certain people  If you take blood thinner medicine, always ask your healthcare provider if NSAIDs are safe for you  Always read the medicine label and follow directions  · Prescription pain medicine  may help decrease pain or help your ureteral stone pass  Do not wait until the pain is severe before you take pain medicine  · Nausea medicine  may help calm your stomach and prevent vomiting  · Take your medicine as directed  Contact your healthcare provider if you think your medicine is not helping or if you have side effects  Tell him or her if you are allergic to any medicine  Keep a list of the medicines, vitamins, and herbs you take  Include the amounts, and when and why you take them  Bring the list or the pill bottles to follow-up visits   Carry your medicine list with you in case of an emergency  Self-care:   · Drink plenty of liquids  Your healthcare provider may tell you to drink at least 8 to 12 (eight-ounce) cups of liquids each day  This helps flush out the ureteral stones when you urinate  Water is the best liquid to drink  · Strain your urine every time you go to the bathroom  Urinate through a strainer or a piece of thin cloth to catch the stones  Take the stones to your healthcare provider so they can be sent to the lab for tests  This will help your healthcare providers plan the best treatment for you  · Ask your healthcare provider about any nutrition changes you need to make  You may need to limit certain foods such as foods high in sodium (salt), certain protein foods, or foods high in oxalate  After you pass your ureteral stone: Once you have passed your ureteral stone, you may need to do a 24-hour urine test  You may need to save all of your urine for 24 hours  Each time you go to the bathroom, you will urinate into a container  Then you will pour your urine into a larger container that is kept cold  You may be told to write down the time and amount of urine you passed  At the end of 24 hours, the urine is sent to a lab for tests  Results from the test will help your healthcare provider plan ways to prevent more stones from forming  © 2017 [employer] Information is for End User's use only and may not be sold, redistributed or otherwise used for commercial purposes  All illustrations and images included in CareNotes® are the copyrighted property of [employer]  or Farhan Velasquez  The above information is an  only  It is not intended as medical advice for individual conditions or treatments  Talk to your doctor, nurse or pharmacist before following any medical regimen to see if it is safe and effective for you

## 2018-08-14 NOTE — ED PROVIDER NOTES
History  Chief Complaint   Patient presents with    Flank Pain     pt presents to the ed with left sided flank pain, pt states that she has a hx of kidney stones      68 y/o female presents with left sided flank pain non radiating, started yesterday getting progressively worse, no associated diarrhea,nauea,vomitng,dysuria, urgency, frequency, abdominal pain or other symptoms  History of ureteral stones in the past s/p lithotripsy and stent placemet in the past  Dr Javi Thomas is her urologist  The pain is sharp continuous, non radiating currently 5/10 and nothing makes it better or worse  History provided by:  Patient   used: No        Prior to Admission Medications   Prescriptions Last Dose Informant Patient Reported? Taking? ALPRAZolam (XANAX) 0 25 mg tablet   Yes No   Sig: Take 0 5 mg by mouth daily at bedtime as needed for anxiety     Cholecalciferol (VITAMIN D) 2000 UNITS tablet   Yes No   Sig: Take 2,000 Units by mouth every morning  amLODIPine (NORVASC) 5 mg tablet   Yes No   Sig: Take 5 mg by mouth every morning  aspirin 81 MG tablet   Yes No   Sig: Take 81 mg by mouth every morning  atorvastatin (LIPITOR) 20 mg tablet   Yes Yes   Sig: Take 20 mg by mouth daily   co-enzyme Q-10 30 MG capsule   Yes Yes   Sig: Take 30 mg by mouth 3 (three) times a day   fenofibrate (TRIGLIDE) 160 MG tablet   Yes No   Sig: Take 160 mg by mouth every morning  metoclopramide (REGLAN) 10 mg tablet   Yes Yes   Sig: Take 10 mg by mouth 4 (four) times a day   multivitamin (THERAGRAN) TABS   Yes No   Sig: Take 1 tablet by mouth every morning  omeprazole (PriLOSEC) 40 MG capsule   Yes No   Sig: Take 40 mg by mouth daily     pyridoxine (VITAMIN B6) 100 mg tablet   Yes No   Sig: Take 100 mg by mouth daily      Facility-Administered Medications: None       Past Medical History:   Diagnosis Date    Acid reflux     Anxiety     Chronic kidney disease     chronic stones    H/O hiatal hernia     Hyperlipidemia     Hypertension        Past Surgical History:   Procedure Laterality Date    APPENDECTOMY      BREAST SURGERY Left     excision cyst    CYSTOSCOPY      with stone extraction x 2    CYSTOSCOPY W/ RETROGRADES Left 09/17/2012    stent removal,     CYSTOSCOPY W/ URETEROSCOPY W/ LITHOTRIPSY Left 12/11/2006    Ca Phos 98%    CYSTOSCOPY W/ URETEROSCOPY W/ LITHOTRIPSY Left 08/12/2012    Ca ox di 60%, Ca ox mono 20%    ESOPHAGOGASTRODUODENOSCOPY      EXTRACORPOREAL SHOCK WAVE LITHOTRIPSY Left 4/13/2016    Procedure: LITHROTRIPSY EXTRACORPORAL SHOCKWAVE (ESWL); Surgeon: Chandan Hendricks MD;  Location: [facility] MAIN OR;  Service:     NV CYSTO/URETERO W/LITHOTRIPSY &INDWELL STENT INSRT Left 9/1/2016    Procedure: CYSTOSCOPY URETEROSCOPY WITH STONE EXTRACTION , RETROGRADE PYELOGRAM AND INSERTION STENT URETERAL;  Surgeon: Chandan Hendricks MD;  Location: [address] [place] [address];  Service: Urology    NV CYSTO/URETERO W/LITHOTRIPSY &INDWELL STENT INSRT Left 8/12/2016    Procedure: CYSTOSCOPY URETEROSCOPY WITH LITHOTRIPSY HOLMIUM LASER, RETROGRADE PYELOGRAM AND INSERTION STENT URETERAL;  Surgeon: Chandan Hendricks MD;  Location: [address] [place] [address];  Service: Urology    TUBAL LIGATION      URETERAL STENT PLACEMENT Left 08/31/2012    Narrowing of the left distal ureter for 3cm  Family History   Problem Relation Age of Onset    Stroke Father         TIA    Parkinsonism Father     Cancer Maternal Aunt     Cancer Maternal Uncle     Heart disease Maternal Uncle         Massive MI     I have reviewed and agree with the history as documented  Social History   Substance Use Topics    Smoking status: Never Smoker    Smokeless tobacco: Never Used    Alcohol use Yes      Comment: occassional        Review of Systems   All other systems reviewed and are negative  Physical Exam  Physical Exam   Constitutional: She is oriented to person, place, and time  She appears well-developed and well-nourished     HENT: Head: Normocephalic and atraumatic  Eyes: EOM are normal  Pupils are equal, round, and reactive to light  Neck: Normal range of motion  Neck supple  Cardiovascular: Normal rate and regular rhythm  Pulmonary/Chest: Effort normal and breath sounds normal    Abdominal: Soft  Bowel sounds are normal  She exhibits no distension and no mass  There is no tenderness  There is no rebound and no guarding  No hernia  Left sided CVA tenderness,    Musculoskeletal: Normal range of motion  Neurological: She is alert and oriented to person, place, and time  Skin: Skin is warm and dry  Psychiatric: She has a normal mood and affect  Nursing note and vitals reviewed        Vital Signs  ED Triage Vitals   Temperature Pulse Respirations Blood Pressure SpO2   08/14/18 1054 08/14/18 1054 08/14/18 1054 08/14/18 1054 08/14/18 1054   97 5 °F (36 4 °C) 85 18 156/74 97 %      Temp Source Heart Rate Source Patient Position - Orthostatic VS BP Location FiO2 (%)   08/14/18 1054 08/14/18 1054 -- -- --   Tympanic Monitor         Pain Score       08/14/18 1231       1           Vitals:    08/14/18 1054 08/14/18 1355   BP: 156/74    Pulse: 85 82       Visual Acuity      ED Medications  Medications   sodium chloride 0 9 % bolus 1,000 mL (0 mL Intravenous Stopped 8/14/18 1355)   ketorolac (TORADOL) injection 15 mg (15 mg Intravenous Given 8/14/18 1231)       Diagnostic Studies  Results Reviewed     Procedure Component Value Units Date/Time    UA w Reflex to Microscopic [[id number]] Collected:  08/14/18 1138    Lab Status:  Final result Specimen:  Urine from Urine, Clean Catch Updated:  08/14/18 1152     Color, UA Yellow     Clarity, UA Clear     Specific Gravity, UA 1 010     pH, UA 7 5     Leukocytes, UA Negative     Nitrite, UA Negative     Protein, UA Negative mg/dl      Glucose, UA Negative mg/dl      Ketones, UA Negative mg/dl      Urobilinogen, UA 0 2 E U /dl      Bilirubin, UA Negative     Blood, UA Negative    Comprehensive metabolic panel [[id number]] Collected:  08/14/18 1129    Lab Status:  Final result Specimen:  Blood from Arm, Left Updated:  08/14/18 1149     Sodium 141 mmol/L      Potassium 4 1 mmol/L      Chloride 105 mmol/L      CO2 29 mmol/L      Anion Gap 7 mmol/L      BUN 10 mg/dL      Creatinine 0 83 mg/dL      Glucose 112 mg/dL      Calcium 9 4 mg/dL      AST 21 U/L      ALT 30 U/L      Alkaline Phosphatase 66 U/L      Total Protein 7 1 g/dL      Albumin 3 9 g/dL      Total Bilirubin 0 30 mg/dL      eGFR 70 ml/min/1 73sq m     Narrative:         National Kidney Disease Education Program recommendations are as follows:  GFR calculation is accurate only with a steady state creatinine  Chronic Kidney disease less than 60 ml/min/1 73 sq  meters  Kidney failure less than 15 ml/min/1 73 sq  meters  Urine culture [[id number]] Collected:  08/14/18 1138    Lab Status:   In process Specimen:  Urine from Urine, Clean Catch Updated:  08/14/18 1148    CBC and differential [[id number]] Collected:  08/14/18 1129    Lab Status:  Final result Specimen:  Blood from Arm, Left Updated:  08/14/18 1138     WBC 7 44 Thousand/uL      RBC 4 98 Million/uL      Hemoglobin 14 9 g/dL      Hematocrit 46 0 %      MCV 92 fL      MCH 29 9 pg      MCHC 32 4 g/dL      RDW 13 2 %      MPV 12 4 fL      Platelets 649 Thousands/uL      nRBC 0 /100 WBCs      Neutrophils Relative 67 %      Immat GRANS % 0 %      Lymphocytes Relative 24 %      Monocytes Relative 6 %      Eosinophils Relative 2 %      Basophils Relative 1 %      Neutrophils Absolute 4 95 Thousands/µL      Immature Grans Absolute 0 02 Thousand/uL      Lymphocytes Absolute 1 77 Thousands/µL      Monocytes Absolute 0 45 Thousand/µL      Eosinophils Absolute 0 16 Thousand/µL      Basophils Absolute 0 09 Thousands/µL                  CT renal stone study abdomen pelvis without contrast   Final Result by Shon Buerger, MD (08/14 1209)      There is mild left hydroureteronephrosis without any obstructing stones seen at the transition from dilated to nondilated portion of the ureter in the pelvic area  This could be residual hydronephrosis from a recently passed stone or might indicate the    presence of a stricture or other type of obstructing lesion  There is a punctate calculus at the lower pole of the left kidney  Large hiatal hernia  Minimal colonic diverticulosis  Osteoarthritis of the hips  Grossly stable small lung nodule right base             Workstation performed: [id number]                    Procedures  Procedures       Phone Contacts  ED Phone Contact    ED Course                               MDM  Number of Diagnoses or Management Options  Left ureteral stone:   Diagnosis management comments: Patient evaluated with labs, CT abdomen/pelvis and urinalysis results reviewed and discussed with patient  Urology Dr Yasmeen Gomez evaluated patient in the ED  Will set up outpatient US renal and appointment follow up with Urology in clinic  Patient verbalized understanding of plan and instructions had no further questions at the time of discharge         Amount and/or Complexity of Data Reviewed  Clinical lab tests: ordered and reviewed  Tests in the radiology section of CPT®: ordered and reviewed  Tests in the medicine section of CPT®: ordered and reviewed    Patient Progress  Patient progress: stable    CritCare Time    Disposition  Final diagnoses:   Left ureteral stone     Time reflects when diagnosis was documented in both MDM as applicable and the Disposition within this note     Time User Action Codes Description Comment    8/14/2018 12:53 PM Anepu, [place] Jodie Add [N13 2] Ureteral stone with hydronephrosis     8/14/2018 12:53 PM Anepu, Analy Remove [N13 2] Ureteral stone with hydronephrosis     8/14/2018 12:53 PM Anepu, Kee Jodie Add [N20 1] Left ureteral stone       ED Disposition     ED Disposition Condition Comment    Discharge  Fidel Brandenburg Sine discharge to home/self care     Condition at discharge: Stable        Follow-up Information     Follow up With Specialties Details Why Contact Info Additional Information    Myah Levine DO Family Medicine Schedule an appointment as soon as possible for a visit  [address] [facility]  [phone] [address] [facility] Emergency Department Emergency Medicine  If symptoms worsen [address] [facility] ED, [facility], [address]    Callie Almendarez MD Urology Schedule an appointment as soon as possible for a visit  [address]  [facility]  [phone]             Discharge Medication List as of 8/14/2018  1:45 PM      START taking these medications    Details   naproxen (NAPROSYN) 500 mg tablet Take 1 tablet (500 mg total) by mouth 2 (two) times a day with meals for 5 days, Starting Tue 8/14/2018, Until Sun 8/19/2018, Print         CONTINUE these medications which have NOT CHANGED    Details   atorvastatin (LIPITOR) 20 mg tablet Take 20 mg by mouth daily, Historical Med      co-enzyme Q-10 30 MG capsule Take 30 mg by mouth 3 (three) times a day, Historical Med      metoclopramide (REGLAN) 10 mg tablet Take 10 mg by mouth 4 (four) times a day, Historical Med      ALPRAZolam (XANAX) 0 25 mg tablet Take 0 5 mg by mouth daily at bedtime as needed for anxiety  , Until Discontinued, Historical Med      amLODIPine (NORVASC) 5 mg tablet Take 5 mg by mouth every morning , Until Discontinued, Historical Med      aspirin 81 MG tablet Take 81 mg by mouth every morning , Until Discontinued, Historical Med      Cholecalciferol (VITAMIN D) 2000 UNITS tablet Take 2,000 Units by mouth every morning , Until Discontinued, Historical Med      fenofibrate (TRIGLIDE) 160 MG tablet Take 160 mg by mouth every morning , Until Discontinued, Historical Med      multivitamin (THERAGRAN) TABS Take 1 tablet by mouth every morning , Until Discontinued, Historical Med      omeprazole (PriLOSEC) 40 MG capsule Take 40 mg by mouth daily  , Until Discontinued, Historical Med      pyridoxine (VITAMIN B6) 100 mg tablet Take 100 mg by mouth daily, Until Discontinued, Historical Med           No discharge procedures on file      ED Provider  Electronically Signed by           Savana Xie DO  08/14/18 6928

## 2018-08-14 NOTE — CONSULTS
Consultation - [facility]  Chaitanya Michele 67 y o  female MRN: [id number]  Unit/Bed#: ED 03 Encounter: [id number]    Requesting Physician: Niya Gonzales DO    Assessment/Plan:    Hydronephrosis  Left hydroureteronephrosis without obstructing stone noted on CT 8/14/18  Prior left ureteroscopy and stone basketing of two distal calculi 9/1/16 and holmium laser of stone on 8/12/16  Prior narrowing of the left distal ureter for 3 cm noted on stent placement 8/31/12  Hx of multiple left sided stones and urologic procedures  Creatinine 0 83 and UA neg for blood or infection  Currently left-sided pain improved and patient wanting to be discharged home  · Okay for discharge home  · Will arrange for renal ultrasound tomorrow and follow up on 08/16/18 for resolution of hydronephrosis and discomfort  · If hydronephrosis and discomfort persists may need cystoscopy, retrograde, ureteroscopy, and stent placement on 8/17/18  · Case dw PA and images reviewed  HISTORY OF PRESENT ILLNESS:    Chaitanya Michele is a 67y o  year old female who we are asked to see for left sided pain  Pt developed non radiating left sided flank pain that started around 5:00 a m  this morning patient notes the pain at one point was very intense causing her to have slight nauseousness but did not vomit  She took of codeine pain pill at home for improvement in her discomfort  Upon presentation to the emergency department a CT was performed indicating left-sided hydroureteronephrosis without stone being present  Patient currently lying in bed in no acute discomfort  She notes her pain is still slightly there but much improved  Denies any urinary issues, or signs of infection  Patient is very anxious about wanting to be discharged home  PAST UROLOGIC HISTORY:  Left renal stones on potassium citrate  Multiple left ureteral procedures and stents  Prior left ureteral narrowing       PAST MEDICAL HISTORY:  Past Medical History:   Diagnosis Date    Acid reflux     Anxiety     Chronic kidney disease     chronic stones    H/O hiatal hernia     Hyperlipidemia     Hypertension        PAST SURGICAL HISTORY:  Past Surgical History:   Procedure Laterality Date    APPENDECTOMY      BREAST SURGERY Left     excision cyst    CYSTOSCOPY      with stone extraction x 2    CYSTOSCOPY W/ RETROGRADES Left 09/17/2012    stent removal,     CYSTOSCOPY W/ URETEROSCOPY W/ LITHOTRIPSY Left 12/11/2006    Ca Phos 98%    CYSTOSCOPY W/ URETEROSCOPY W/ LITHOTRIPSY Left 08/12/2012    Ca ox di 60%, Ca ox mono 20%    ESOPHAGOGASTRODUODENOSCOPY      EXTRACORPOREAL SHOCK WAVE LITHOTRIPSY Left 4/13/2016    Procedure: LITHROTRIPSY EXTRACORPORAL SHOCKWAVE (ESWL); Surgeon: Aurelia Mak MD;  Location: [facility] MAIN OR;  Service:     GA CYSTO/URETERO W/LITHOTRIPSY &INDWELL STENT INSRT Left 9/1/2016    Procedure: CYSTOSCOPY URETEROSCOPY WITH STONE EXTRACTION , RETROGRADE PYELOGRAM AND INSERTION STENT URETERAL;  Surgeon: Aurelia Mak MD;  Location: [address] [place] [address];  Service: Urology    GA CYSTO/URETERO W/LITHOTRIPSY &INDWELL STENT INSRT Left 8/12/2016    Procedure: CYSTOSCOPY URETEROSCOPY WITH LITHOTRIPSY HOLMIUM LASER, RETROGRADE PYELOGRAM AND INSERTION STENT URETERAL;  Surgeon: Aurelia Mak MD;  Location: [address] [place] [address];  Service: Urology    TUBAL LIGATION      URETERAL STENT PLACEMENT Left 08/31/2012    Narrowing of the left distal ureter for 3cm  ALLERGIES:  No Known Allergies    SOCIAL HISTORY:  History   Alcohol Use    Yes     Comment: occassional     History   Drug Use No     History   Smoking Status    Never Smoker   Smokeless Tobacco    Never Used       FAMILY HISTORY:  Family History   Problem Relation Age of Onset    Stroke Father         TIA    Parkinsonism Father     Cancer Maternal Aunt     Cancer Maternal Uncle     Heart disease Maternal Uncle         Massive MI       MEDICATIONS:  No current facility-administered medications for this encounter  Current Outpatient Prescriptions:     atorvastatin (LIPITOR) 20 mg tablet, Take 20 mg by mouth daily, Disp: , Rfl:     co-enzyme Q-10 30 MG capsule, Take 30 mg by mouth 3 (three) times a day, Disp: , Rfl:     metoclopramide (REGLAN) 10 mg tablet, Take 10 mg by mouth 4 (four) times a day, Disp: , Rfl:     ALPRAZolam (XANAX) 0 25 mg tablet, Take 0 5 mg by mouth daily at bedtime as needed for anxiety  , Disp: , Rfl:     amLODIPine (NORVASC) 5 mg tablet, Take 5 mg by mouth every morning , Disp: , Rfl:     aspirin 81 MG tablet, Take 81 mg by mouth every morning , Disp: , Rfl:     Cholecalciferol (VITAMIN D) 2000 UNITS tablet, Take 2,000 Units by mouth every morning , Disp: , Rfl:     fenofibrate (TRIGLIDE) 160 MG tablet, Take 160 mg by mouth every morning , Disp: , Rfl:     multivitamin (THERAGRAN) TABS, Take 1 tablet by mouth every morning , Disp: , Rfl:     omeprazole (PriLOSEC) 40 MG capsule, Take 40 mg by mouth daily  , Disp: , Rfl:     pyridoxine (VITAMIN B6) 100 mg tablet, Take 100 mg by mouth daily, Disp: , Rfl:     REVIEW OF SYMPTOMS:  Review of Systems   Constitutional: Negative for chills and fever  Gastrointestinal: Positive for nausea (Slight nausea with intense pain currently resolved)  Genitourinary: Positive for flank pain ( left flank pain or nonradiating currently improved)  Negative for difficulty urinating, dysuria, hematuria and urgency  PHYSICAL EXAM:  Vitals:    08/14/18 1054   BP: 156/74   Pulse: 85   Resp: 18   Temp: 97 5 °F (36 4 °C)   SpO2: 97%     There is no height or weight on file to calculate BMI  Physical Exam   Constitutional: She appears well-developed  Pulmonary/Chest: Effort normal    Abdominal: Soft  She exhibits no distension  There is no tenderness  Musculoskeletal: She exhibits tenderness (Some left-sided tenderness with percussion)  Neurological: She is alert  Skin: Skin is warm and dry  Psychiatric: She has a normal mood and affect     Vitals reviewed  No intake or output data in the 24 hours ending 08/14/18 3869    LAB RESULTS:  Lab Results   Component Value Date    WBC 7 44 08/14/2018    HGB 14 9 08/14/2018    HCT 46 0 08/14/2018    MCV 92 08/14/2018     08/14/2018     Lab Results   Component Value Date    GLUCOSE 112 08/14/2018    CALCIUM 9 4 08/14/2018     08/14/2018    K 4 1 08/14/2018    CO2 29 08/14/2018     08/14/2018    BUN 10 08/14/2018    CREATININE 0 83 08/14/2018     Lab Results   Component Value Date    CALCIUM 9 4 08/14/2018    PHOS 2 3 08/16/2016       OTHER STUDIES:  CT abd/pelvis without CTS 8/14/18  IMPRESSION:     There is mild left hydroureteronephrosis without any obstructing stones seen at the transition from dilated to non dilated portion of the ureter in the pelvic area  This could be residual hydronephrosis from a recently passed stone or might indicate the   presence of a stricture or other type of obstructing lesion      There is a punctate calculus at the lower pole of the left kidney      Large hiatal hernia      Minimal colonic diverticulosis      Osteoarthritis of the hips      Grossly stable small lung nodule right base     KUB 7/20/18  IMPRESSION:     No radiopaque urinary tract calculi  KUB 8/2/17  IMPRESSION:     No renal calculi  Hiatal hernia  Moderate amount of feces in the colon  CT abd/pelvis without CTS 9/28/16  1  There is prominence of the right renal pelvis, more so compared to the prior studies  No obvious calculus is seen on the right  Findings could be related to a recently passed stone, non visualized stone or infection  Depending on the clinical   situation, retrograde study should be considered if clinically appropriate  There also may be some thickening of the walls of the urinary bladder versus under distention  Correlation with urinalysis recommended       2   Large hiatal hernia      Retrograde 9/1/16  Retrograde opacification of the left ureter demonstrates diffuse dilatation without definite filling defects  Mild/moderate hydronephrosis is also noted  A guidewire is advanced into the renal pelvis over which a ureteral stent is placed   successfully  Malena Stanton PA-C  08/14/18    Portions of the record may have been created with voice recognition software   Occasional wrong word or "sound alike" substitutions may have occurred due to the inherent limitations of voice recognition software   Read the chart carefully and recognize, using context, where substitutions have occurred

## 2018-08-15 ENCOUNTER — HOSPITAL ENCOUNTER (EMERGENCY)
Facility: HOSPITAL | Age: 73
Discharge: HOME | End: 2018-08-15
Attending: EMERGENCY MEDICINE | Admitting: EMERGENCY MEDICINE
Payer: MEDICARE

## 2018-08-15 ENCOUNTER — APPOINTMENT (OUTPATIENT)
Dept: RADIOLOGY | Facility: HOSPITAL | Age: 73
End: 2018-08-15
Attending: UROLOGY
Payer: MEDICARE

## 2018-08-15 ENCOUNTER — APPOINTMENT (EMERGENCY)
Dept: RADIOLOGY | Facility: HOSPITAL | Age: 73
End: 2018-08-15
Payer: MEDICARE

## 2018-08-15 VITALS
BODY MASS INDEX: 25.7 KG/M2 | TEMPERATURE: 98.2 F | SYSTOLIC BLOOD PRESSURE: 122 MMHG | RESPIRATION RATE: 16 BRPM | DIASTOLIC BLOOD PRESSURE: 60 MMHG | HEART RATE: 78 BPM | WEIGHT: 136 LBS | OXYGEN SATURATION: 99 %

## 2018-08-15 DIAGNOSIS — R10.13 EPIGASTRIC ABDOMINAL PAIN: Primary | ICD-10-CM

## 2018-08-15 DIAGNOSIS — K44.9 HIATAL HERNIA: ICD-10-CM

## 2018-08-15 LAB
ALBUMIN SERPL BCP-MCNC: 4.2 G/DL (ref 3.5–5)
ALP SERPL-CCNC: 62 U/L (ref 46–116)
ALT SERPL W P-5'-P-CCNC: 28 U/L (ref 12–78)
ANION GAP SERPL CALCULATED.3IONS-SCNC: 9 MMOL/L (ref 4–13)
AST SERPL W P-5'-P-CCNC: 25 U/L (ref 5–45)
BACTERIA UR CULT: NORMAL
BASOPHILS # BLD AUTO: 0.06 THOUSANDS/ΜL (ref 0–0.1)
BASOPHILS NFR BLD AUTO: 1 % (ref 0–1)
BILIRUB SERPL-MCNC: 0.5 MG/DL (ref 0.2–1)
BUN SERPL-MCNC: 17 MG/DL (ref 5–25)
CALCIUM SERPL-MCNC: 9.1 MG/DL (ref 8.3–10.1)
CHLORIDE SERPL-SCNC: 104 MMOL/L (ref 100–108)
CO2 SERPL-SCNC: 25 MMOL/L (ref 21–32)
CREAT SERPL-MCNC: 0.93 MG/DL (ref 0.6–1.3)
EOSINOPHIL # BLD AUTO: 0.06 THOUSAND/ΜL (ref 0–0.61)
EOSINOPHIL NFR BLD AUTO: 1 % (ref 0–6)
ERYTHROCYTE [DISTWIDTH] IN BLOOD BY AUTOMATED COUNT: 13.2 % (ref 11.6–15.1)
GFR SERPL CREATININE-BSD FRML MDRD: 61 ML/MIN/1.73SQ M
GLUCOSE SERPL-MCNC: 156 MG/DL (ref 65–140)
HCT VFR BLD AUTO: 41.9 % (ref 34.8–46.1)
HGB BLD-MCNC: 13.5 G/DL (ref 11.5–15.4)
IMM GRANULOCYTES # BLD AUTO: 0.03 THOUSAND/UL (ref 0–0.2)
IMM GRANULOCYTES NFR BLD AUTO: 0 % (ref 0–2)
LIPASE SERPL-CCNC: 226 U/L (ref 73–393)
LYMPHOCYTES # BLD AUTO: 1.27 THOUSANDS/ΜL (ref 0.6–4.47)
LYMPHOCYTES NFR BLD AUTO: 12 % (ref 14–44)
MCH RBC QN AUTO: 29.9 PG (ref 26.8–34.3)
MCHC RBC AUTO-ENTMCNC: 32.2 G/DL (ref 31.4–37.4)
MCV RBC AUTO: 93 FL (ref 82–98)
MONOCYTES # BLD AUTO: 0.5 THOUSAND/ΜL (ref 0.17–1.22)
MONOCYTES NFR BLD AUTO: 5 % (ref 4–12)
NEUTROPHILS # BLD AUTO: 8.77 THOUSANDS/ΜL (ref 1.85–7.62)
NEUTS SEG NFR BLD AUTO: 81 % (ref 43–75)
NRBC BLD AUTO-RTO: 0 /100 WBCS
PLATELET # BLD AUTO: 213 THOUSANDS/UL (ref 149–390)
PMV BLD AUTO: 12.5 FL (ref 8.9–12.7)
POTASSIUM SERPL-SCNC: 4.1 MMOL/L (ref 3.5–5.3)
PROT SERPL-MCNC: 7 G/DL (ref 6.4–8.2)
RBC # BLD AUTO: 4.51 MILLION/UL (ref 3.81–5.12)
SODIUM SERPL-SCNC: 138 MMOL/L (ref 136–145)
TROPONIN I SERPL-MCNC: <0.02 NG/ML
TROPONIN I SERPL-MCNC: <0.02 NG/ML
WBC # BLD AUTO: 10.69 THOUSAND/UL (ref 4.31–10.16)

## 2018-08-15 PROCEDURE — 93005 ELECTROCARDIOGRAM TRACING: CPT

## 2018-08-15 PROCEDURE — 99285 EMERGENCY DEPT VISIT HI MDM: CPT

## 2018-08-15 PROCEDURE — 96374 THER/PROPH/DIAG INJ IV PUSH: CPT

## 2018-08-15 PROCEDURE — 74177 CT ABD & PELVIS W/CONTRAST: CPT

## 2018-08-15 PROCEDURE — 96361 HYDRATE IV INFUSION ADD-ON: CPT

## 2018-08-15 PROCEDURE — 96375 TX/PRO/DX INJ NEW DRUG ADDON: CPT

## 2018-08-15 PROCEDURE — 84484 ASSAY OF TROPONIN QUANT: CPT | Performed by: EMERGENCY MEDICINE

## 2018-08-15 PROCEDURE — 36415 COLL VENOUS BLD VENIPUNCTURE: CPT

## 2018-08-15 PROCEDURE — 80053 COMPREHEN METABOLIC PANEL: CPT | Performed by: EMERGENCY MEDICINE

## 2018-08-15 PROCEDURE — 85025 COMPLETE CBC W/AUTO DIFF WBC: CPT | Performed by: EMERGENCY MEDICINE

## 2018-08-15 PROCEDURE — 83690 ASSAY OF LIPASE: CPT | Performed by: EMERGENCY MEDICINE

## 2018-08-15 RX ORDER — ONDANSETRON 2 MG/ML
4 INJECTION INTRAMUSCULAR; INTRAVENOUS ONCE
Status: COMPLETED | OUTPATIENT
Start: 2018-08-15 | End: 2018-08-15

## 2018-08-15 RX ADMIN — ONDANSETRON 4 MG: 2 INJECTION INTRAMUSCULAR; INTRAVENOUS at 01:33

## 2018-08-15 RX ADMIN — IOHEXOL 100 ML: 350 INJECTION, SOLUTION INTRAVENOUS at 04:17

## 2018-08-15 RX ADMIN — FAMOTIDINE 20 MG: 10 INJECTION, SOLUTION INTRAVENOUS at 03:19

## 2018-08-15 RX ADMIN — SODIUM CHLORIDE 500 ML: 0.9 INJECTION, SOLUTION INTRAVENOUS at 01:33

## 2018-08-15 NOTE — ED PROVIDER NOTES
History  Chief Complaint   Patient presents with    Abdominal Pain     Started at 7:30 tonight,  states the pain usually comes and goes but it hasnt gone since it started today, states the pain does not radiate    Nausea     started about 7:30 last night    Diarrhea     small amount     50-year-old white female seen earlier today and evaluated for potential kidney stones returns tonight with complaints of epigastric abdominal pain  Patient has a history of hiatal hernia and recently had a medication change  No chest pain, no shortness of breath, some nausea but no vomiting or diarrhea  Pain is intermittent, nonradiating and non reproducible  No history of gallbladder disease or pancreatitis  Patient states she has no pain at this time  Patient is scheduled to see Cardiology and GI in the next couple weeks  History provided by:  Patient  Abdominal Pain   Pain location:  Epigastric  Pain quality: burning and gnawing    Pain radiates to:  Does not radiate  Pain severity:  Moderate  Onset quality:  Gradual  Duration:  5 hours  Timing:  Intermittent  Progression:  Resolved  Chronicity:  Recurrent  Context: previous surgery    Relieved by:  None tried  Worsened by:  Nothing  Ineffective treatments:  None tried  Associated symptoms: diarrhea and nausea    Associated symptoms: no chills, no cough, no fever, no hematuria, no shortness of breath and no vomiting    Risk factors: no alcohol abuse and not obese    Nausea   The primary symptoms include abdominal pain, nausea and diarrhea  Primary symptoms do not include fever or vomiting  The illness does not include chills  Diarrhea   Associated symptoms: abdominal pain    Associated symptoms: no chills, no fever and no vomiting        Prior to Admission Medications   Prescriptions Last Dose Informant Patient Reported? Taking?    ALPRAZolam (XANAX) 0 25 mg tablet   Yes Yes   Sig: Take 0 5 mg by mouth daily at bedtime as needed for anxiety     Cholecalciferol (VITAMIN D) 2000 UNITS tablet   Yes Yes   Sig: Take 2,000 Units by mouth every morning  amLODIPine (NORVASC) 5 mg tablet   Yes Yes   Sig: Take 5 mg by mouth every morning  aspirin 81 MG tablet   Yes Yes   Sig: Take 81 mg by mouth every morning  atorvastatin (LIPITOR) 20 mg tablet   Yes Yes   Sig: Take 20 mg by mouth daily   co-enzyme Q-10 30 MG capsule   Yes Yes   Sig: Take 30 mg by mouth 3 (three) times a day   fenofibrate (TRIGLIDE) 160 MG tablet   Yes Yes   Sig: Take 160 mg by mouth every morning  metoclopramide (REGLAN) 10 mg tablet   Yes Yes   Sig: Take 10 mg by mouth 4 (four) times a day   multivitamin (THERAGRAN) TABS   Yes Yes   Sig: Take 1 tablet by mouth every morning  naproxen (NAPROSYN) 500 mg tablet   No Yes   Sig: Take 1 tablet (500 mg total) by mouth 2 (two) times a day with meals for 5 days   omeprazole (PriLOSEC) 40 MG capsule   Yes Yes   Sig: Take 40 mg by mouth daily  pyridoxine (VITAMIN B6) 100 mg tablet   Yes Yes   Sig: Take 100 mg by mouth daily      Facility-Administered Medications: None       Past Medical History:   Diagnosis Date    Acid reflux     Anxiety     Chronic kidney disease     chronic stones    H/O hiatal hernia     Hyperlipidemia     Hypertension        Past Surgical History:   Procedure Laterality Date    APPENDECTOMY      BREAST SURGERY Left     excision cyst    CYSTOSCOPY      with stone extraction x 2    CYSTOSCOPY W/ RETROGRADES Left 09/17/2012    stent removal,     CYSTOSCOPY W/ URETEROSCOPY W/ LITHOTRIPSY Left 12/11/2006    Ca Phos 98%    CYSTOSCOPY W/ URETEROSCOPY W/ LITHOTRIPSY Left 08/12/2012    Ca ox di 60%, Ca ox mono 20%    ESOPHAGOGASTRODUODENOSCOPY      EXTRACORPOREAL SHOCK WAVE LITHOTRIPSY Left 4/13/2016    Procedure: José Manuel Cook EXTRACORPORAL SHOCKWAVE (ESWL);   Surgeon: Constance Key MD;  Location: [facility] MAIN OR;  Service:     GA CYSTO/URETERO W/LITHOTRIPSY &INDWELL STENT INSRT Left 9/1/2016    Procedure: CYSTOSCOPY URETEROSCOPY WITH STONE EXTRACTION , RETROGRADE PYELOGRAM AND INSERTION STENT URETERAL;  Surgeon: Castillo Branch MD;  Location: [address] [place] [address];  Service: Urology    NH CYSTO/URETERO W/LITHOTRIPSY &INDWELL STENT INSRT Left 8/12/2016    Procedure: CYSTOSCOPY URETEROSCOPY WITH LITHOTRIPSY HOLMIUM LASER, RETROGRADE PYELOGRAM AND INSERTION STENT URETERAL;  Surgeon: Castillo Branch MD;  Location: [address] [place] [address];  Service: Urology    TUBAL LIGATION      URETERAL STENT PLACEMENT Left 08/31/2012    Narrowing of the left distal ureter for 3cm  Family History   Problem Relation Age of Onset    Stroke Father         TIA    Parkinsonism Father     Cancer Maternal Aunt     Cancer Maternal Uncle     Heart disease Maternal Uncle         Massive MI     I have reviewed and agree with the history as documented  Social History   Substance Use Topics    Smoking status: Never Smoker    Smokeless tobacco: Never Used    Alcohol use Yes      Comment: occassional        Review of Systems   Constitutional: Negative  Negative for chills and fever  HENT: Negative  Respiratory: Negative for cough, shortness of breath, wheezing and stridor  Cardiovascular: Negative  Gastrointestinal: Positive for abdominal pain, diarrhea and nausea  Negative for vomiting  Genitourinary: Negative  Negative for hematuria  Musculoskeletal: Negative  Skin: Negative  Neurological: Negative  Hematological: Negative  Psychiatric/Behavioral: Negative  All other systems reviewed and are negative  Physical Exam  Physical Exam   Constitutional: She is oriented to person, place, and time  She appears well-developed and well-nourished  HENT:   Head: Normocephalic and atraumatic  Right Ear: External ear normal    Left Ear: External ear normal    Nose: Nose normal    Mouth/Throat: Oropharynx is clear and moist    Eyes: Conjunctivae and EOM are normal    Neck: Normal range of motion  Neck supple     Cardiovascular: Normal rate, regular rhythm, normal heart sounds and intact distal pulses  Pulmonary/Chest: Effort normal and breath sounds normal    Abdominal: Soft  Bowel sounds are normal    Musculoskeletal: Normal range of motion  Neurological: She is alert and oriented to person, place, and time  Skin: Skin is warm and dry  Capillary refill takes less than 2 seconds  Psychiatric: She has a normal mood and affect  Her behavior is normal  Judgment and thought content normal    Nursing note and vitals reviewed        Vital Signs  ED Triage Vitals [08/15/18 0126]   Temperature Pulse Respirations Blood Pressure SpO2   97 5 °F (36 4 °C) 73 16 106/55 95 %      Temp Source Heart Rate Source Patient Position - Orthostatic VS BP Location FiO2 (%)   Tympanic Monitor Lying Left arm --      Pain Score       8           Vitals:    08/15/18 0126 08/15/18 0451   BP: 106/55 122/60   Pulse: 73 78   Patient Position - Orthostatic VS: Lying Lying       Visual Acuity      ED Medications  Medications   ondansetron (ZOFRAN) injection 4 mg (4 mg Intravenous Given 8/15/18 0133)   sodium chloride 0 9 % bolus 500 mL (0 mL Intravenous Stopped 8/15/18 0245)   famotidine (PEPCID) injection 20 mg (20 mg Intravenous Given 8/15/18 0319)   iohexol (OMNIPAQUE) 350 MG/ML injection (SINGLE-DOSE) 100 mL (100 mL Intravenous Given 8/15/18 0417)       Diagnostic Studies  Results Reviewed     Procedure Component Value Units Date/Time    Troponin I [[id number]]  (Normal) Collected:  08/15/18 0440    Lab Status:  Final result Specimen:  Blood from Arm, Left Updated:  08/15/18 0505     Troponin I <0 02 ng/mL     Troponin I [[id number]]  (Normal) Collected:  08/15/18 0132    Lab Status:  Final result Specimen:  Blood from Arm, Left Updated:  08/15/18 0201     Troponin I <0 02 ng/mL     Comprehensive metabolic panel [[id number]]  (Abnormal) Collected:  08/15/18 0132    Lab Status:  Final result Specimen:  Blood from Arm, Left Updated:  08/15/18 0158     Sodium 138 mmol/L Potassium 4 1 mmol/L      Chloride 104 mmol/L      CO2 25 mmol/L      Anion Gap 9 mmol/L      BUN 17 mg/dL      Creatinine 0 93 mg/dL      Glucose 156 (H) mg/dL      Calcium 9 1 mg/dL      AST 25 U/L      ALT 28 U/L      Alkaline Phosphatase 62 U/L      Total Protein 7 0 g/dL      Albumin 4 2 g/dL      Total Bilirubin 0 50 mg/dL      eGFR 61 ml/min/1 73sq m     Narrative:         National Kidney Disease Education Program recommendations are as follows:  GFR calculation is accurate only with a steady state creatinine  Chronic Kidney disease less than 60 ml/min/1 73 sq  meters  Kidney failure less than 15 ml/min/1 73 sq  meters  Lipase [[id number]]  (Normal) Collected:  08/15/18 0132    Lab Status:  Final result Specimen:  Blood from Arm, Left Updated:  08/15/18 0158     Lipase 226 u/L     CBC and differential [[id number]]  (Abnormal) Collected:  08/15/18 0132    Lab Status:  Final result Specimen:  Blood from Arm, Left Updated:  08/15/18 0141     WBC 10 69 (H) Thousand/uL      RBC 4 51 Million/uL      Hemoglobin 13 5 g/dL      Hematocrit 41 9 %      MCV 93 fL      MCH 29 9 pg      MCHC 32 2 g/dL      RDW 13 2 %      MPV 12 5 fL      Platelets 968 Thousands/uL      nRBC 0 /100 WBCs      Neutrophils Relative 81 (H) %      Immat GRANS % 0 %      Lymphocytes Relative 12 (L) %      Monocytes Relative 5 %      Eosinophils Relative 1 %      Basophils Relative 1 %      Neutrophils Absolute 8 77 (H) Thousands/µL      Immature Grans Absolute 0 03 Thousand/uL      Lymphocytes Absolute 1 27 Thousands/µL      Monocytes Absolute 0 50 Thousand/µL      Eosinophils Absolute 0 06 Thousand/µL      Basophils Absolute 0 06 Thousands/µL                  CT abdomen pelvis with contrast   Final Result by Jacoby Webb MD (08/15 0451)      1  Low-density area within the uterus measuring 3 3 x 2 3 x 2 8 cm which may reflect an abnormally thickened endometrium  Endometrial carcinoma is not excluded  Correlate with pelvic ultrasound     2   No significant change in mild dilatation of the left renal pelvis and mild hydroureter  3   Large hiatal hernia  4   Diverticulosis without evidence of diverticulitis  Workstation performed: [id number]                    Procedures  ECG 12 Lead Documentation  Date/Time: 8/15/2018 1:33 AM  Performed by: Raymond Barton  Authorized by: Raymond Barton     ECG reviewed by me, the ED Provider: yes    Patient location:  ED  Previous ECG:     Comparison to cardiac monitor: Yes (SR 70)    Interpretation:     Interpretation: normal    Rate:     ECG rate:  70    ECG rate assessment: normal    Rhythm:     Rhythm: sinus rhythm    Ectopy:     Ectopy: none    QRS:     QRS axis:  Normal    QRS intervals:  Normal  Conduction:     Conduction: normal    ST segments:     ST segments:  Normal  T waves:     T waves: normal             Phone Contacts  ED Phone Contact    ED Course                               MDM  CritCare Time    Disposition  Final diagnoses:   Epigastric abdominal pain   Hiatal hernia     Time reflects when diagnosis was documented in both MDM as applicable and the Disposition within this note     Time User Action Codes Description Comment    8/15/2018  5:04 AM Yvonnbernabe Alexandres Add [R10 13] Epigastric abdominal pain     8/15/2018  5:05 AM Ernesto Brownlee Add [K44 9] Hiatal hernia       ED Disposition     ED Disposition Condition Comment    Discharge  Latoya Mins Sine discharge to home/self care  Condition at discharge: Stable        Follow-up Information     Follow up With Specialties Details Why Contact Wilder Sheth DO Family Medicine Schedule an appointment as soon as possible for a visit in 2 days  [address]  [address]  [phone]            Patient's Medications   Discharge Prescriptions    No medications on file     No discharge procedures on file      ED Provider  Electronically Signed by           Mary Jo Chong MD  08/15/18 9272

## 2018-08-15 NOTE — ED NOTES
Client finished drinking     [facility], [address] [facility]  08/15/18 8443

## 2018-08-15 NOTE — ED NOTES
Client cleared for discharge  MD at bedside  Client currently denies pain and nausea     Selma Pérez RN  08/15/18 1310

## 2018-08-15 NOTE — DISCHARGE INSTRUCTIONS
Abdominal Pain   WHAT YOU NEED TO KNOW:   Abdominal pain can be dull, achy, or sharp  You may have pain in one area of your abdomen, or in your entire abdomen  Your pain may be caused by a condition such as constipation, food sensitivity or poisoning, infection, or a blockage  Abdominal pain can also be from a hernia, appendicitis, or an ulcer  Liver, gallbladder, or kidney conditions can also cause abdominal pain  The cause of your abdominal pain may be unknown  DISCHARGE INSTRUCTIONS:   Return to the emergency department if:   · You have new chest pain or shortness of breath  · You have pulsing pain in your upper abdomen or lower back that suddenly becomes constant  · Your pain is in the right lower abdominal area and worsens with movement  · You have a fever over 100 4°F (38°C) or shaking chills  · You are vomiting and cannot keep food or liquids down  · Your pain does not improve or gets worse over the next 8 to 12 hours  · You see blood in your vomit or bowel movements, or they look black and tarry  · Your skin or the whites of your eyes turn yellow  · You are a woman and have a large amount of vaginal bleeding that is not your monthly period  Contact your healthcare provider if:   · You have pain in your lower back  · You are a man and have pain in your testicles  · You have pain when you urinate  · You have questions or concerns about your condition or care  Follow up with your healthcare provider within 24 hours or as directed:  Write down your questions so you remember to ask them during your visits  Medicines:   · Medicines  may be given to calm your stomach and prevent vomiting or to decrease pain  Ask how to take pain medicine safely  · Take your medicine as directed  Contact your healthcare provider if you think your medicine is not helping or if you have side effects  Tell him of her if you are allergic to any medicine   Keep a list of the medicines, vitamins, and herbs you take  Include the amounts, and when and why you take them  Bring the list or the pill bottles to follow-up visits  Carry your medicine list with you in case of an emergency  © 2017 [employer]  Information is for End User's use only and may not be sold, redistributed or otherwise used for commercial purposes  All illustrations and images included in CareNotes® are the copyrighted property of A D A M , Inc  or Farhan Velasquez  The above information is an  only  It is not intended as medical advice for individual conditions or treatments  Talk to your doctor, nurse or pharmacist before following any medical regimen to see if it is safe and effective for you  Epigastric Pain   WHAT YOU NEED TO KNOW:   Epigastric pain is felt in the middle of the upper abdomen, between the ribs and the bellybutton  The pain may be mild or severe  Pain may spread from or to another part of your body  Epigastric pain may be a sign of a serious health problem that needs to be treated  DISCHARGE INSTRUCTIONS:   Call 911 for any of the following:   · You have any of the following signs of a heart attack:      ¨ Squeezing, pressure, or pain in your chest that lasts longer than 5 minutes or returns    ¨ Discomfort or pain in your back, neck, jaw, stomach, or arm     ¨ Trouble breathing    ¨ Nausea or vomiting    ¨ Lightheadedness or a sudden cold sweat, especially with chest pain or trouble breathing    · You have severe pain that radiates to your jaw or back  Return to the emergency department if:   · You have severe pain that starts suddenly and quickly gets worse  · You cannot have a bowel movement and are vomiting  · You vomit or cough up blood  · You see blood in your urine or bowel movement  · You feel drowsy and your breathing is slower than usual   Contact your healthcare provider if:   · You have a fever or chills      · You have yellowing of your skin or the whites of your eyes  · You vomit often or several times in a row  · You lose weight without trying  · You have symptoms for longer than 2 weeks  · You have questions or concerns about your condition or care  Medicines:   · Medicines  may be given to treat pain or stop vomiting  You may also need medicines to reduce or control stomach acid, or treat an infection  · Take your medicine as directed  Contact your healthcare provider if you think your medicine is not helping or if you have side effects  Tell him of her if you are allergic to any medicine  Keep a list of the medicines, vitamins, and herbs you take  Include the amounts, and when and why you take them  Bring the list or the pill bottles to follow-up visits  Carry your medicine list with you in case of an emergency  Follow up with your healthcare provider as directed:  Write down your questions so you remember to ask them during your visits  Manage your symptoms:   · Keep a record of your symptoms  Include when the pain starts, how long it lasts, and if it is sharp or dull  Also include any foods you ate or activities you did before the pain started  Keep track of anything that helped the pain  · Eat a variety of healthy foods  Healthy foods include fruits, vegetables, whole-grain breads, low-fat dairy products, beans, lean meats, and fish  Ask if you need to be on a special diet  Certain foods may cause your pain, such as alcohol or foods that are high in fat  You may need to eat smaller meals and to eat more often than usual     · Drink liquids as directed  Ask how much liquid to drink each day and which liquids are best for you  Do not have drinks that contain alcohol or caffeine  © 2017 [address] [facility] Information is for End User's use only and may not be sold, redistributed or otherwise used for commercial purposes   All illustrations and images included in CareNotes® are the copyrighted property of ANDREW CURRY Mag[employer]  or Farhan Velasquez  The above information is an  only  It is not intended as medical advice for individual conditions or treatments  Talk to your doctor, nurse or pharmacist before following any medical regimen to see if it is safe and effective for you  Hiatal Hernia   WHAT YOU NEED TO KNOW:   A hiatal hernia is a condition that causes part of your stomach to bulge through the hiatus (small opening) in your diaphragm  The part of the stomach may move up and down, or it may get trapped above the diaphragm  DISCHARGE INSTRUCTIONS:   Return to the emergency department if:   · You have severe abdominal pain  · You try to vomit but nothing comes out (retching)  · You have severe chest pain and sudden trouble breathing  · Your bowel movements are black or bloody  · Your vomit looks like coffee grounds or has blood in it  Contact your healthcare provider if:   · Your symptoms are getting worse  · You have nausea, and you are vomiting  · You are losing weight without trying  · You have questions or concerns about your condition or care  Medicines:   · Medicines  may be given to relieve heartburn symptoms  These medicines help to decrease or block stomach acid  You may also be given medicines that help to tighten the esophageal sphincter  · Take your medicine as directed  Contact your healthcare provider if you think your medicine is not helping or if you have side effects  Tell him or her if you are allergic to any medicine  Keep a list of the medicines, vitamins, and herbs you take  Include the amounts, and when and why you take them  Bring the list or the pill bottles to follow-up visits  Carry your medicine list with you in case of an emergency  Follow up with your healthcare provider as directed:  Write down your questions so you remember to ask them during your visits  Self care:   · Avoid foods that make your symptoms worse    These may include spicy foods, fruit juices, alcohol, caffeine, chocolate, and mint  · Eat several small meals during the day  Small meals give your stomach less food to digest     · Avoid lying down and bending forward after you eat  Do not eat meals 2 to 3 hours before bedtime  This decreases your risk for reflux  · Maintain a healthy weight  If you are overweight, weight loss may help relieve your symptoms  · Sleep with your head elevated  at least 6 inches  · Do not smoke  Smoking can increase your symptoms of heartburn  © 2017 26[address] [facility] Information is for End User's use only and may not be sold, redistributed or otherwise used for commercial purposes  All illustrations and images included in CareNotes® are the copyrighted property of A D A M , Inc  or Farhan Velasquez  The above information is an  only  It is not intended as medical advice for individual conditions or treatments  Talk to your doctor, nurse or pharmacist before following any medical regimen to see if it is safe and effective for you  Abdominal Pain   WHAT YOU NEED TO KNOW:   Abdominal pain can be dull, achy, or sharp  You may have pain in one area of your abdomen, or in your entire abdomen  Your pain may be caused by a condition such as constipation, food sensitivity or poisoning, infection, or a blockage  Abdominal pain can also be from a hernia, appendicitis, or an ulcer  Liver, gallbladder, or kidney conditions can also cause abdominal pain  The cause of your abdominal pain may be unknown  DISCHARGE INSTRUCTIONS:   Return to the emergency department if:   · You have new chest pain or shortness of breath  · You have pulsing pain in your upper abdomen or lower back that suddenly becomes constant  · Your pain is in the right lower abdominal area and worsens with movement  · You have a fever over 100 4°F (38°C) or shaking chills       · You are vomiting and cannot keep food or liquids down      · Your pain does not improve or gets worse over the next 8 to 12 hours  · You see blood in your vomit or bowel movements, or they look black and tarry  · Your skin or the whites of your eyes turn yellow  · You are a woman and have a large amount of vaginal bleeding that is not your monthly period  Contact your healthcare provider if:   · You have pain in your lower back  · You are a man and have pain in your testicles  · You have pain when you urinate  · You have questions or concerns about your condition or care  Follow up with your healthcare provider within 24 hours or as directed:  Write down your questions so you remember to ask them during your visits  Medicines:   · Medicines  may be given to calm your stomach and prevent vomiting or to decrease pain  Ask how to take pain medicine safely  · Take your medicine as directed  Contact your healthcare provider if you think your medicine is not helping or if you have side effects  Tell him of her if you are allergic to any medicine  Keep a list of the medicines, vitamins, and herbs you take  Include the amounts, and when and why you take them  Bring the list or the pill bottles to follow-up visits  Carry your medicine list with you in case of an emergency  © 2017 [address] [employer]  Information is for End User's use only and may not be sold, redistributed or otherwise used for commercial purposes  All illustrations and images included in CareNotes® are the copyrighted property of [employer]  or Farhan Velasquez  The above information is an  only  It is not intended as medical advice for individual conditions or treatments  Talk to your doctor, nurse or pharmacist before following any medical regimen to see if it is safe and effective for you            Gastroesophageal Reflux Disease   WHAT YOU NEED TO KNOW:   Gastroesophageal reflux occurs when acid and food in the stomach back up into the esophagus  Gastroesophageal reflux disease (GERD) is reflux that occurs more than twice a week for a few weeks  It usually causes heartburn and other symptoms  GERD can cause other health problems over time if it is not treated  DISCHARGE INSTRUCTIONS:   Return to the emergency department if:   · You feel full and cannot burp or vomit  · You have severe chest pain and sudden trouble breathing  · Your bowel movements are black, bloody, or tarry-looking  · Your vomit looks like coffee grounds or has blood in it  Contact your healthcare provider if:   · You vomit large amounts, or you vomit often  · You have trouble breathing after you vomit  · You have trouble swallowing, or pain with swallowing  · You are losing weight without trying  · Your symptoms get worse or do not improve with treatment  · You have questions or concerns about your condition or care  Medicines:   · Medicines  are used to decrease stomach acid  Medicine may also be used to help your lower esophageal sphincter and stomach contract (tighten) more  · Take your medicine as directed  Contact your healthcare provider if you think your medicine is not helping or if you have side effects  Tell him of her if you are allergic to any medicine  Keep a list of the medicines, vitamins, and herbs you take  Include the amounts, and when and why you take them  Bring the list or the pill bottles to follow-up visits  Carry your medicine list with you in case of an emergency  Manage GERD:   · Do not have foods or drinks that may increase heartburn  These include chocolate, peppermint, fried or fatty foods, drinks that contain caffeine, or carbonated drinks (soda)  Other foods include spicy foods, onions, tomatoes, and tomato-based foods  Do not have foods or drinks that can irritate your esophagus, such as citrus fruits, juices, and alcohol  · Do not eat large meals    When you eat a lot of food at one time, your stomach needs more acid to digest it  Eat 6 small meals each day instead of 3 large ones, and eat slowly  Do not eat meals 2 to 3 hours before bedtime  · Elevate the head of your bed  Place 6-inch blocks under the head of your bed frame  You may also use more than one pillow under your head and shoulders while you sleep  · Maintain a healthy weight  If you are overweight, weight loss may help relieve symptoms of GERD  · Do not smoke  Smoking weakens the lower esophageal sphincter and increases the risk of GERD  Ask your healthcare provider for information if you currently smoke and need help to quit  E-cigarettes or smokeless tobacco still contain nicotine  Talk to your healthcare provider before you use these products  · Do not wear clothing that is tight around your waist   Tight clothing can put pressure on your stomach and cause or worsen GERD symptoms  Follow up with your healthcare provider as directed:  Write down your questions so you remember to ask them during your visits  © 2017 [address] [employer]  Information is for End User's use only and may not be sold, redistributed or otherwise used for commercial purposes  All illustrations and images included in CareNotes® are the copyrighted property of [employer]  or Farhan Velasquez  The above information is an  only  It is not intended as medical advice for individual conditions or treatments  Talk to your doctor, nurse or pharmacist before following any medical regimen to see if it is safe and effective for you

## 2018-08-16 LAB
ATRIAL RATE: 70 BPM
P AXIS: 43 DEGREES
PR INTERVAL: 162 MS
QRS AXIS: 36 DEGREES
QRSD INTERVAL: 80 MS
QT INTERVAL: 412 MS
QTC INTERVAL: 444 MS
T WAVE AXIS: 94 DEGREES
VENTRICULAR RATE: 70 BPM

## 2018-08-16 PROCEDURE — 93010 ELECTROCARDIOGRAM REPORT: CPT | Performed by: INTERNAL MEDICINE

## 2018-08-21 ENCOUNTER — HOSPITAL ENCOUNTER (OUTPATIENT)
Dept: RADIOLOGY | Facility: HOSPITAL | Age: 73
Discharge: HOME | End: 2018-08-21
Attending: UROLOGY
Payer: MEDICARE

## 2018-08-21 DIAGNOSIS — N13.2 HYDRONEPHROSIS WITH RENAL AND URETERAL CALCULOUS OBSTRUCTION: ICD-10-CM

## 2018-08-21 PROCEDURE — 76770 US EXAM ABDO BACK WALL COMP: CPT

## 2018-09-27 ENCOUNTER — TRANSCRIBE ORDERS (OUTPATIENT)
Dept: ADMINISTRATIVE | Facility: HOSPITAL | Age: 73
End: 2018-09-27

## 2018-09-27 ENCOUNTER — APPOINTMENT (OUTPATIENT)
Dept: LAB | Facility: HOSPITAL | Age: 73
End: 2018-09-27
Attending: FAMILY MEDICINE
Payer: MEDICARE

## 2018-09-27 DIAGNOSIS — Z78.9 AMERICAN DIABETES ASSOCIATION (ADA) 1100 CALORIE DIET: ICD-10-CM

## 2018-09-27 DIAGNOSIS — N18.30 CHRONIC KIDNEY DISEASE, STAGE III (MODERATE) (HCC): ICD-10-CM

## 2018-09-27 DIAGNOSIS — E55.9 AVITAMINOSIS D: ICD-10-CM

## 2018-09-27 DIAGNOSIS — E55.9 AVITAMINOSIS D: Primary | ICD-10-CM

## 2018-09-27 LAB
25(OH)D3 SERPL-MCNC: 34.2 NG/ML (ref 30–100)
ALT SERPL W P-5'-P-CCNC: 74 U/L (ref 12–78)
ANION GAP SERPL CALCULATED.3IONS-SCNC: 9 MMOL/L (ref 4–13)
BUN SERPL-MCNC: 12 MG/DL (ref 5–25)
CALCIUM SERPL-MCNC: 9 MG/DL (ref 8.3–10.1)
CHLORIDE SERPL-SCNC: 103 MMOL/L (ref 100–108)
CHOLEST SERPL-MCNC: 253 MG/DL (ref 50–200)
CO2 SERPL-SCNC: 26 MMOL/L (ref 21–32)
CREAT SERPL-MCNC: 0.76 MG/DL (ref 0.6–1.3)
GFR SERPL CREATININE-BSD FRML MDRD: 78 ML/MIN/1.73SQ M
GLUCOSE P FAST SERPL-MCNC: 105 MG/DL (ref 65–99)
HDLC SERPL-MCNC: 68 MG/DL (ref 40–60)
LDLC SERPL CALC-MCNC: 157 MG/DL (ref 0–100)
NONHDLC SERPL-MCNC: 185 MG/DL
POTASSIUM SERPL-SCNC: 4.1 MMOL/L (ref 3.5–5.3)
SODIUM SERPL-SCNC: 138 MMOL/L (ref 136–145)
TRIGL SERPL-MCNC: 139 MG/DL

## 2018-09-27 PROCEDURE — 80061 LIPID PANEL: CPT | Performed by: FAMILY MEDICINE

## 2018-09-27 PROCEDURE — 80048 BASIC METABOLIC PNL TOTAL CA: CPT

## 2018-09-27 PROCEDURE — 82306 VITAMIN D 25 HYDROXY: CPT

## 2018-09-27 PROCEDURE — 84460 ALANINE AMINO (ALT) (SGPT): CPT

## 2018-09-27 PROCEDURE — 36415 COLL VENOUS BLD VENIPUNCTURE: CPT

## 2019-02-26 ENCOUNTER — TRANSCRIBE ORDERS (OUTPATIENT)
Dept: ADMINISTRATIVE | Facility: HOSPITAL | Age: 74
End: 2019-02-26

## 2019-02-26 DIAGNOSIS — N20.0 KIDNEY STONE: Primary | ICD-10-CM

## 2019-02-27 ENCOUNTER — HOSPITAL ENCOUNTER (OUTPATIENT)
Dept: RADIOLOGY | Facility: HOSPITAL | Age: 74
Discharge: HOME | End: 2019-02-27
Attending: UROLOGY
Payer: MEDICARE

## 2019-02-27 DIAGNOSIS — N20.0 KIDNEY STONE: ICD-10-CM

## 2019-02-27 PROCEDURE — 76770 US EXAM ABDO BACK WALL COMP: CPT

## 2019-04-24 ENCOUNTER — TRANSCRIBE ORDERS (OUTPATIENT)
Dept: ADMINISTRATIVE | Facility: HOSPITAL | Age: 74
End: 2019-04-24

## 2019-04-24 ENCOUNTER — APPOINTMENT (OUTPATIENT)
Dept: LAB | Facility: HOSPITAL | Age: 74
End: 2019-04-24
Attending: FAMILY MEDICINE
Payer: MEDICARE

## 2019-04-24 DIAGNOSIS — Z13.820 OSTEOPOROSIS SCREENING: Primary | ICD-10-CM

## 2019-04-24 DIAGNOSIS — R73.01 IMPAIRED FASTING GLUCOSE: ICD-10-CM

## 2019-04-24 DIAGNOSIS — I10 ESSENTIAL HYPERTENSION, MALIGNANT: Primary | ICD-10-CM

## 2019-04-24 LAB
ALBUMIN SERPL BCP-MCNC: 3.7 G/DL (ref 3.5–5)
ALP SERPL-CCNC: 75 U/L (ref 46–116)
ALT SERPL W P-5'-P-CCNC: 38 U/L (ref 12–78)
ANION GAP SERPL CALCULATED.3IONS-SCNC: 10 MMOL/L (ref 4–13)
AST SERPL W P-5'-P-CCNC: 25 U/L (ref 5–45)
BILIRUB SERPL-MCNC: 0.4 MG/DL (ref 0.2–1)
BUN SERPL-MCNC: 12 MG/DL (ref 5–25)
CALCIUM SERPL-MCNC: 9 MG/DL (ref 8.3–10.1)
CHLORIDE SERPL-SCNC: 104 MMOL/L (ref 100–108)
CO2 SERPL-SCNC: 26 MMOL/L (ref 21–32)
CREAT SERPL-MCNC: 0.81 MG/DL (ref 0.6–1.3)
EST. AVERAGE GLUCOSE BLD GHB EST-MCNC: 117 MG/DL
GFR SERPL CREATININE-BSD FRML MDRD: 72 ML/MIN/1.73SQ M
GLUCOSE P FAST SERPL-MCNC: 98 MG/DL (ref 65–99)
HBA1C MFR BLD: 5.7 % (ref 4.2–6.3)
POTASSIUM SERPL-SCNC: 3.9 MMOL/L (ref 3.5–5.3)
PROT SERPL-MCNC: 6.9 G/DL (ref 6.4–8.2)
SODIUM SERPL-SCNC: 140 MMOL/L (ref 136–145)

## 2019-04-24 PROCEDURE — 80053 COMPREHEN METABOLIC PANEL: CPT | Performed by: FAMILY MEDICINE

## 2019-04-24 PROCEDURE — 83036 HEMOGLOBIN GLYCOSYLATED A1C: CPT | Performed by: FAMILY MEDICINE

## 2019-04-24 PROCEDURE — 36415 COLL VENOUS BLD VENIPUNCTURE: CPT | Performed by: FAMILY MEDICINE

## 2019-05-20 ENCOUNTER — HOSPITAL ENCOUNTER (OUTPATIENT)
Dept: RADIOLOGY | Facility: HOSPITAL | Age: 74
Discharge: HOME | End: 2019-05-20
Attending: FAMILY MEDICINE
Payer: MEDICARE

## 2019-05-20 DIAGNOSIS — Z13.820 OSTEOPOROSIS SCREENING: ICD-10-CM

## 2019-05-20 PROCEDURE — 77080 DXA BONE DENSITY AXIAL: CPT

## 2019-07-12 ENCOUNTER — OFFICE VISIT (OUTPATIENT)
Dept: OBGYN CLINIC | Facility: CLINIC | Age: 74
End: 2019-07-12
Payer: MEDICARE

## 2019-07-12 ENCOUNTER — APPOINTMENT (OUTPATIENT)
Dept: RADIOLOGY | Facility: CLINIC | Age: 74
End: 2019-07-12
Payer: MEDICARE

## 2019-07-12 VITALS
HEART RATE: 65 BPM | HEIGHT: 61 IN | WEIGHT: 136.8 LBS | DIASTOLIC BLOOD PRESSURE: 73 MMHG | SYSTOLIC BLOOD PRESSURE: 134 MMHG | BODY MASS INDEX: 25.83 KG/M2

## 2019-07-12 DIAGNOSIS — M75.52 ACUTE BURSITIS OF LEFT SHOULDER: Primary | ICD-10-CM

## 2019-07-12 DIAGNOSIS — M25.512 LEFT SHOULDER PAIN, UNSPECIFIED CHRONICITY: ICD-10-CM

## 2019-07-12 DIAGNOSIS — M75.22 BICEPS TENDONITIS ON LEFT: ICD-10-CM

## 2019-07-12 PROBLEM — S52.502A CLOSED FRACTURE OF LEFT DISTAL RADIUS: Status: ACTIVE | Noted: 2017-01-16

## 2019-07-12 PROBLEM — I10 BENIGN ESSENTIAL HYPERTENSION: Status: ACTIVE | Noted: 2017-01-16

## 2019-07-12 PROCEDURE — 73030 X-RAY EXAM OF SHOULDER: CPT

## 2019-07-12 PROCEDURE — 99214 OFFICE O/P EST MOD 30 MIN: CPT | Performed by: ORTHOPAEDIC SURGERY

## 2019-07-12 RX ORDER — NAPROXEN 500 MG/1
500 TABLET ORAL 2 TIMES DAILY WITH MEALS
Qty: 30 TABLET | Refills: 0 | Status: SHIPPED | OUTPATIENT
Start: 2019-07-12 | End: 2022-01-26

## 2019-07-12 NOTE — PROGRESS NOTES
Assessment/Plan:  1  Acute bursitis of left shoulder  Ambulatory referral to Physical Therapy    naproxen (NAPROSYN) 500 mg tablet   2  Biceps tendonitis on left  Ambulatory referral to Physical Therapy    naproxen (NAPROSYN) 500 mg tablet   3  Left shoulder pain, unspecified chronicity  XR shoulder 2+ vw left    Ambulatory referral to Physical Therapy    naproxen (NAPROSYN) 500 mg tablet     Loetta Aschoff is a pleasant 27-year-old female presenting today for 1 week of atraumatic left shoulder pain  After reviewing her images, history, and physical exam, we believe that she symptomatic of subacromial bursitis and bicipital tendinitis  We explained these findings to her  Since she is getting better, we will continue with conservative treatment  We have given her a 2 week course of Naprosyn to take twice a day with food  Additionally, we have referred her to physical therapy for range of motion and strengthening exercises  We will plan to see her back in 2-3 months for clinical re-evaluation  All of her questions were addressed today  Subjective:  Left shoulder pain    Patient ID: Olivia Tavera is a 75 y o  female  Loetta Aschoff is a very pleasant 27-year-old right-hand-dominant female presenting today for evaluation of 1 week of atraumatic left shoulder pain  She reports that she had difficulty reaching overhead, reaching behind her back, and reaching to the side due to pain in the left arm  She locates the pain just lateral and distal to the shoulder  She is also significantly tender over this area  She denies any history of injury or surgery to the left shoulder  She denies any neck pain or paresthesias in the left upper extremity  She has tried Advil which has helped slightly  She has never had any cortisone injections or done physical therapy for the left shoulder  Review of Systems   Constitutional: Negative  HENT: Negative  Negative for trouble swallowing  Eyes: Negative  Respiratory: Negative  Cardiovascular: Negative  Gastrointestinal: Negative  Endocrine: Negative  Genitourinary: Negative  Musculoskeletal: Positive for myalgias  Skin: Negative  Allergic/Immunologic: Negative  Neurological: Negative  Hematological: Negative  Psychiatric/Behavioral: Negative  Past Medical History:   Diagnosis Date    Acid reflux     Anxiety     Chronic kidney disease     chronic stones    H/O hiatal hernia     Hyperlipidemia     Hypertension        Past Surgical History:   Procedure Laterality Date    APPENDECTOMY      BREAST SURGERY Left     excision cyst    CYSTOSCOPY      with stone extraction x 2    CYSTOSCOPY W/ RETROGRADES Left 09/17/2012    stent removal,     CYSTOSCOPY W/ URETEROSCOPY W/ LITHOTRIPSY Left 12/11/2006    Ca Phos 98%    CYSTOSCOPY W/ URETEROSCOPY W/ LITHOTRIPSY Left 08/12/2012    Ca ox di 60%, Ca ox mono 20%    ESOPHAGOGASTRODUODENOSCOPY      EXTRACORPOREAL SHOCK WAVE LITHOTRIPSY Left 4/13/2016    Procedure: LITHROTRIPSY EXTRACORPORAL SHOCKWAVE (ESWL); Surgeon: Lee Estrella MD;  Location: [facility] MAIN OR;  Service:     AR CYSTO/URETERO W/LITHOTRIPSY &INDWELL STENT INSRT Left 9/1/2016    Procedure: CYSTOSCOPY URETEROSCOPY WITH STONE EXTRACTION , RETROGRADE PYELOGRAM AND INSERTION STENT URETERAL;  Surgeon: Lee Estrella MD;  Location: [address] [place] [address];  Service: Urology    AR CYSTO/URETERO W/LITHOTRIPSY &INDWELL STENT INSRT Left 8/12/2016    Procedure: CYSTOSCOPY URETEROSCOPY WITH LITHOTRIPSY HOLMIUM LASER, RETROGRADE PYELOGRAM AND INSERTION STENT URETERAL;  Surgeon: Lee Estrella MD;  Location: [address] [place] [address];  Service: Urology    TUBAL LIGATION      URETERAL STENT PLACEMENT Left 08/31/2012    Narrowing of the left distal ureter for 3cm           Family History   Problem Relation Age of Onset    Stroke Father         TIA    Parkinsonism Father     Cancer Maternal Aunt     Cancer Maternal Uncle     Heart disease Maternal Uncle Massive MI       Social History     Occupational History    Not on file   Tobacco Use    Smoking status: Never Smoker    Smokeless tobacco: Never Used   Substance and Sexual Activity    Alcohol use: Yes     Comment: occassional    Drug use: No    Sexual activity: Not on file         Current Outpatient Medications:     ALPRAZolam (XANAX) 0 25 mg tablet, Take 0 5 mg by mouth daily at bedtime as needed for anxiety  , Disp: , Rfl:     amLODIPine (NORVASC) 5 mg tablet, Take 5 mg by mouth every morning , Disp: , Rfl:     aspirin 81 MG tablet, Take 81 mg by mouth every morning , Disp: , Rfl:     atorvastatin (LIPITOR) 20 mg tablet, Take 20 mg by mouth daily, Disp: , Rfl:     Cholecalciferol (VITAMIN D) 2000 UNITS tablet, Take 2,000 Units by mouth every morning , Disp: , Rfl:     co-enzyme Q-10 30 MG capsule, Take 30 mg by mouth 3 (three) times a day, Disp: , Rfl:     fenofibrate (TRIGLIDE) 160 MG tablet, Take 160 mg by mouth every morning , Disp: , Rfl:     metoclopramide (REGLAN) 10 mg tablet, Take 10 mg by mouth 4 (four) times a day, Disp: , Rfl:     multivitamin (THERAGRAN) TABS, Take 1 tablet by mouth every morning , Disp: , Rfl:     omeprazole (PriLOSEC) 40 MG capsule, Take 40 mg by mouth daily  , Disp: , Rfl:     pyridoxine (VITAMIN B6) 100 mg tablet, Take 100 mg by mouth daily, Disp: , Rfl:     naproxen (NAPROSYN) 500 mg tablet, Take 1 tablet (500 mg total) by mouth 2 (two) times a day with meals, Disp: 30 tablet, Rfl: 0    No Known Allergies    Objective:  Vitals:    07/12/19 1348   BP: 134/73   Pulse: 65       Body mass index is 25 85 kg/m²  Left Shoulder Exam     Tenderness   The patient is experiencing tenderness in the biceps tendon (Tender deltoid)      Range of Motion   Active abduction: 150   Passive abduction: 160   External rotation: 90   Forward flexion: 170   Internal rotation 0 degrees: Lumbar   Internal rotation 90 degrees: 90     Muscle Strength   Abduction: 5/5   Internal rotation: 5/5   External rotation: 5/5   Supraspinatus: 5/5   Subscapularis: 5/5   Biceps: 5/5     Tests   Apprehension: negative  Brower test: positive  Cross arm: negative  Impingement: positive  Drop arm: negative  Sulcus: absent    Other   Erythema: absent  Scars: absent (Birth todd left shoulder)  Sensation: normal  Pulse: present     Comments:  Positive Neer's and Brower impingement sign  Scapulothoracic compensation with range of motion exercises  5/5 strength with rotator cuff testing, but with discomfort  Tender bicipital tendon  Positive speed's and Yergason's tests  Negative empty can and subscapularis lift-off  Normal sensation light touch left upper extremity  2+ radial pulse            Physical Exam   Constitutional: She is oriented to person, place, and time  She appears well-developed and well-nourished  Body mass index is 25 85 kg/m²  HENT:   Head: Normocephalic and atraumatic  Eyes: EOM are normal    Neck: Normal range of motion  Cardiovascular: Intact distal pulses  Pulmonary/Chest: Effort normal    Musculoskeletal:   See ortho exam   Neurological: She is alert and oriented to person, place, and time  Skin: Skin is warm and dry  Psychiatric: She has a normal mood and affect  Her behavior is normal  Judgment and thought content normal    Nursing note and vitals reviewed  I have personally reviewed pertinent films in PACS of the x-rays taken today of the left shoulder which demonstrate no significant degenerative changes of the [facility] or glenohumeral joint  There is a small bone island in the head of the humerus    There is no fracture, dislocation, calcification, or lytic or blastic lesion

## 2019-10-14 ENCOUNTER — OFFICE VISIT (OUTPATIENT)
Dept: OBGYN CLINIC | Facility: CLINIC | Age: 74
End: 2019-10-14
Payer: MEDICARE

## 2019-10-14 VITALS
SYSTOLIC BLOOD PRESSURE: 127 MMHG | DIASTOLIC BLOOD PRESSURE: 80 MMHG | HEART RATE: 87 BPM | HEIGHT: 61 IN | WEIGHT: 139.2 LBS | BODY MASS INDEX: 26.28 KG/M2

## 2019-10-14 DIAGNOSIS — M25.512 LEFT SHOULDER PAIN, UNSPECIFIED CHRONICITY: ICD-10-CM

## 2019-10-14 DIAGNOSIS — M75.42 IMPINGEMENT SYNDROME OF LEFT SHOULDER: Primary | ICD-10-CM

## 2019-10-14 DIAGNOSIS — M75.82 ROTATOR CUFF TENDONITIS, LEFT: ICD-10-CM

## 2019-10-14 PROCEDURE — 99214 OFFICE O/P EST MOD 30 MIN: CPT | Performed by: ORTHOPAEDIC SURGERY

## 2019-10-14 PROCEDURE — 20610 DRAIN/INJ JOINT/BURSA W/O US: CPT | Performed by: ORTHOPAEDIC SURGERY

## 2019-10-14 RX ORDER — DEXAMETHASONE SODIUM PHOSPHATE 100 MG/10ML
40 INJECTION INTRAMUSCULAR; INTRAVENOUS
Status: COMPLETED | OUTPATIENT
Start: 2019-10-14 | End: 2019-10-14

## 2019-10-14 RX ORDER — OLMESARTAN MEDOXOMIL AND HYDROCHLOROTHIAZIDE 40/12.5 40; 12.5 MG/1; MG/1
1 TABLET ORAL EVERY MORNING
Refills: 0 | COMMUNITY
Start: 2019-10-11 | End: 2022-02-01 | Stop reason: ALTCHOICE

## 2019-10-14 RX ORDER — LIDOCAINE HYDROCHLORIDE 10 MG/ML
4 INJECTION, SOLUTION INFILTRATION; PERINEURAL
Status: COMPLETED | OUTPATIENT
Start: 2019-10-14 | End: 2019-10-14

## 2019-10-14 RX ORDER — POTASSIUM CITRATE 10 MEQ/1
10 TABLET, EXTENDED RELEASE ORAL 2 TIMES DAILY
Refills: 11 | COMMUNITY
Start: 2019-09-27 | End: 2022-02-01 | Stop reason: ALTCHOICE

## 2019-10-14 RX ORDER — ALENDRONATE SODIUM 70 MG/1
70 TABLET ORAL WEEKLY
Refills: 0 | COMMUNITY
Start: 2019-10-09 | End: 2022-04-05

## 2019-10-14 RX ADMIN — LIDOCAINE HYDROCHLORIDE 4 ML: 10 INJECTION, SOLUTION INFILTRATION; PERINEURAL at 11:16

## 2019-10-14 RX ADMIN — DEXAMETHASONE SODIUM PHOSPHATE 40 MG: 100 INJECTION INTRAMUSCULAR; INTRAVENOUS at 11:16

## 2019-10-14 NOTE — PATIENT INSTRUCTIONS
Exercises for Shoulder Flexion and Extension   AMBULATORY CARE:   Shoulder flexion and extension exercises  work the muscles in your upper back  Contact your healthcare provider if:   · You have sharp or worsening pain during exercise or at rest     · You have questions or concerns about your shoulder exercises  Before you exercise:  Warm up and stretch before you exercise  Walk or ride a stationary bike for 5 to 10 minutes to help you warm up  Stretching helps increase range of motion  It may also decrease muscle soreness and help prevent another injury  Your healthcare provider will tell you which of the following stretches to do:  · Crossover arm stretch:  Relax your shoulders  Hold your upper arm with the opposite hand  Pull your arm across your chest until you feel a stretch  Hold the stretch for 30 seconds  Return to the starting position  · Shoulder flexion stretch:  Stand facing a wall  Slowly walk your fingers up the wall until you feel a stretch  Hold for 30 seconds  Return to the starting position  · Sleeper stretch:  Lie on your side on a firm, flat surface with your injured arm tucked under you  Place your head on a pillow for comfort  Bend the elbow of your injured arm 90°  Use your arm that is not injured to slowly push your injured arm down  Stop when you feel a stretch at the back of your injured shoulder  Hold for 30 seconds  Slowly return to the starting position  How to exercise with a weight:  Your healthcare provider will tell you how much weight to exercise with  · Shoulder extension:  Lie on a hard table on your stomach  Let your arms hang off the side  Hold a weight in both hands with your palms facing toward your body  Keep your arms straight and slowly raise your arms parallel to the floor in a "Y" shape  Stop when your arms are level with your body  Hold for as long as directed  Slowly return to the starting position       · Shoulder flexion:  Stand and hold a weight in the hand of your injured shoulder  Keep your arm straight and slowly raise your arm over your head as far as you can without pain  Do not raise your arm over your head unless your healthcare provider says it is okay  Do not let your shoulder shrug  Hold this position for as many seconds as directed  Slowly return to the starting position  How to exercise with an exercise band:   · Shoulder extension:  Wrap the exercise band around a heavy, stable object  The band should be level with your chest  Stand and hold each end of the band in both hands  Step back and extend your arms straight  Squeeze your shoulder blades together and pull your arms back and down  Hold for as long as directed  Slowly return to the starting position  · Shoulder flexion:  Wrap the exercise band around a heavy, stable object near your foot  Grab the band with the hand of your injured shoulder  Keep your arm straight  Slowly pull the band up and past your head as far as you can without pain  Do not raise your arm over your head unless your healthcare provider says it is okay  Do not let your shoulder shrug  Hold this position for as many seconds as directed  Slowly return to the starting position  Follow up with your physical therapist as directed:  Write down your questions so you remember to ask them during your visits  © 2017 [address] [facility] Information is for End User's use only and may not be sold, redistributed or otherwise used for commercial purposes  All illustrations and images included in CareNotes® are the copyrighted property of A D A M [employer]  or Farhan Velasquez  The above information is an  only  It is not intended as medical advice for individual conditions or treatments  Talk to your doctor, nurse or pharmacist before following any medical regimen to see if it is safe and effective for you    Exercises for Shoulder Abduction and Adduction   AMBULATORY CARE:   Shoulder abduction and adduction exercises  work the muscles at the back of your shoulder and your upper back  Before you exercise:  Warm up and stretch before you exercise  Walk or ride a stationary bike for 5 to 10 minutes to help you warm up  Stretching helps increase range of motion  It may also decrease muscle soreness and help prevent another injury  Your healthcare provider will tell you which of the following stretches to do:  · Crossover arm stretch:  Relax your shoulders  Hold your upper arm with the opposite hand  Pull your arm across your chest until you feel a stretch  Hold the stretch for 30 seconds  Return to the starting position  · Shoulder flexion stretch:  Stand facing a wall  Slowly walk your fingers up the wall until you feel a stretch  Hold the stretch for 30 seconds  Return to the starting position  · Sleeper stretch:  Lie on your injured side on a firm, flat surface  Bend the elbow of your injured arm 90° with your hand facing up  Use your arm that is not injured to slowly push your injured arm down  Stop when you feel a stretch at the back of your injured shoulder  Hold the stretch for 30 seconds  Slowly return to the starting position  Contact your healthcare provider if:   · You have sharp or worsening pain during exercise or at rest     · You have questions or concerns about your shoulder exercises  How to exercise with a weight:  Your healthcare provider will tell you how much weight to use  · Shoulder abduction:  Stand and hold a weight in your hand with your palm facing your body  Slowly raise your arm to the side with your thumb pointing up  Then raise your arm over your head as far as you can without pain  Hold this position for as long as directed  Do not raise your arm over your head unless your healthcare provider says it is okay  · Shoulder adduction:  Lie on your back on a firm surface  Extend your arm out to a "T " Bend your elbow so your forearm in the air   Hold a weight in your hand  Slowly raise your arm toward the ceiling and straighten your elbow  Hold this position for as long as directed  Slowly return to the starting position  How to exercise with an exercise band:   · Shoulder abduction:  Wrap the exercise band around a heavy, stable object near your foot  Grab the band with the hand of your injured shoulder  Keep your arm straight  Slowly raise your arm to the side with your thumb pointing up  Then, slowly pull the band over your head as far as you can without pain  Do not raise your arm over your head unless your healthcare provider says it is okay  Do not let your shoulder shrug  Hold this position for as long as directed  Slowly return to the starting position  · Shoulder adduction:  Wrap the exercise band around a heavy, stable object  Stand and face away from where the band is anchored  Hold each end of the band in both hands with your elbows bent  Your elbows should not be behind your body  Keep your arms parallel to the floor and slowly straighten your elbows  Hold this position for as long as directed  Slowly return to the starting position  Follow up with your physical therapist as directed:  Write down your questions so you remember to ask them at your visits  © 2017 [address] [facility] Information is for End User's use only and may not be sold, redistributed or otherwise used for commercial purposes  All illustrations and images included in CareNotes® are the copyrighted property of [employer]  or Farhan Velasquez  The above information is an  only  It is not intended as medical advice for individual conditions or treatments  Talk to your doctor, nurse or pharmacist before following any medical regimen to see if it is safe and effective for you    Exercises for Internal and External Shoulder Rotation   AMBULATORY CARE:   Muscles worked during internal and external shoulder exercises:  Exercises for internal shoulder rotation work the muscles in your chest and front of your shoulder  Exercises for external shoulder rotation work the muscles in the back of your shoulder and upper back  Contact your healthcare provider if:   · You have sharp or worsening pain during exercise or at rest     · You have questions or concerns about your shoulder exercises  Before you exercise:  Warm up and stretch before you exercise  Walk or ride a stationary bike for 5 to 10 minutes to help you warm up  Stretching helps increase range of motion  It may also decrease muscle soreness and help prevent another injury  Your healthcare provider will tell you which of the following stretches to do:  · Crossover arm stretch:  Relax your shoulders  Hold your upper arm with the opposite hand  Pull your arm across your chest until you feel a stretch  Hold the stretch for 30 seconds  Return to the starting position  · Shoulder flexion stretch:  Stand facing a wall  Slowly walk your fingers up the wall until you feel a stretch  Hold the stretch for 30 seconds  Return to the starting position  · Sleeper stretch:  Lie on your injured side on a firm, flat surface  Bend the elbow of your injured arm 90° with your hand facing up  Use your arm that is not injured to slowly push your injured arm down  Stop when you feel a stretch at the back of your injured shoulder  Hold the stretch for 30 seconds  Slowly return to the starting position  How to exercise with a weight:  Your healthcare provider will tell you how much weight to exercise with  · Shoulder internal rotation:  Sit in a chair  Place a rolled up towel between your elbow and your side  Bend your elbow to 90°  Gently squeeze the towel with your elbow to prevent it from falling out  Hold the weight with your thumb pointing up  Slowly move the weight across your chest  Stop when your hand reaches your opposite arm  Hold this position for as many seconds as directed  Slowly return to the starting position  · Shoulder external rotation:  Lie on your side with your injured shoulder facing up  Bend your elbow 90°  Place a rolled up towel between your elbow and your side  Hold a weight in your hand  Gently squeeze the towel with your elbow to prevent it from falling out  Slowly rotate your arm outward, but keep your elbow bent  Stop when you feel a stretch  Hold this position for 30 seconds or as directed  Slowly return to the starting position  How to exercise with an exercise band:   · Shoulder internal rotation:  Tie one end of the exercise band to a heavy, secure object  Sit in a chair  Place a rolled up towel between your elbow and your side  Bend your elbow to 90°  Gently squeeze the towel with your elbow to prevent it from falling out  Slowly pull the band across your chest  Stop when your hand reaches your opposite arm  Hold this position for as many seconds as directed  Slowly return to the starting position  · Shoulder external rotation:  Hold one end of the exercise band on the side that is not injured  Place a rolled up towel between your elbow and your side  Bend your elbow 90°  Squeeze the towel with your elbow  Grab the end of the band and slowly turn your arm outward, but keep your elbow bent  Stop when you feel a stretch  Hold this position for 30 seconds or as directed  Slowly return to the starting position  Follow up with your physical therapist as directed:  Write down your questions so you remember to ask them at your visits  © 2017 [address] [employer] Information is for End User's use only and may not be sold, redistributed or otherwise used for commercial purposes  All illustrations and images included in CareNotes® are the copyrighted property of A [employer] M [employer]  or Farhan Velasquez  The above information is an  only  It is not intended as medical advice for individual conditions or treatments   Talk to your doctor, nurse or pharmacist before following any medical regimen to see if it is safe and effective for you

## 2019-10-14 NOTE — PROGRESS NOTES
Assessment/Plan:  1  Impingement syndrome of left shoulder     2  Rotator cuff tendonitis, left     3  Left shoulder pain, unspecified chronicity         Scribe Attestation    I,:   Antonina Price am acting as a scribe while in the presence of the attending physician :        I,:   Jaswinder Jones MD personally performed the services described in this documentation    as scribed in my presence :          Анна's physical examination today demonstrates signs and symptoms consistent with rotator cuff tendinitis and impingement syndrome her left shoulder  As she has failed to find lasting relief with anti-inflammatories and a home exercise program in the past, I found it reasonable to offer her a corticosteroid injection for pain relief  The injection was administered in the office today and tolerated well  Post injection instructions were provided  I did also provide her with a home exercise program and my  did provide instruction for the exercises here in the office today  Should she fail to find improvement from these interventions over the next six weeks, she will present to the office and we will consider ordering an MRI to evaluate for rotator cuff tear  Otherwise, we will see her back on an as-needed basis      Large joint arthrocentesis: L subacromial bursa  Date/Time: 10/14/2019 11:16 AM  Consent given by: patient  Site marked: site marked  Timeout: Immediately prior to procedure a time out was called to verify the correct patient, procedure, equipment, support staff and site/side marked as required   Supporting Documentation  Indications: pain   Procedure Details  Location: shoulder - L subacromial bursa  Preparation: Patient was prepped and draped in the usual sterile fashion  Needle size: 22 G  Ultrasound guidance: no  Approach: posterior  Medications administered: 4 mL lidocaine 1 %; 40 mg dexamethasone 100 mg/10 mL    Patient tolerance: patient tolerated the procedure well with no immediate complications  Dressing:  Sterile dressing applied            Subjective:   Nick Dumont is a 75 y o  female who presents to the office today for follow-up evaluation for activity related left shoulder pain  She was evaluated and treated for this by Dr Ulises Penny in July of 2019  He prescribed her a course of naproxen  She does state that she finished course of medication without relief of her symptoms  She also states that she did participate in three weeks of a self-directed home exercise program without relief of her symptoms  She has continued to suffer with a persistent and activity related aching pain about the lateral aspect of her left shoulder  She rates her pain as a 10/10 at worst   She denies any significant pain at rest but does experience pain with any movement of her arm  She finds getting dressed and driving particularly troublesome  She denies any acute injury or trauma  She denies any distal paresthesias of the upper extremity  She does state that she is interested in an injection today in the office  Review of Systems   Constitutional: Positive for activity change  Negative for chills, fever and unexpected weight change  HENT: Negative for hearing loss, nosebleeds and sore throat  Eyes: Negative for pain, redness and visual disturbance  Respiratory: Negative for cough, shortness of breath and wheezing  Cardiovascular: Negative for chest pain, palpitations and leg swelling  Gastrointestinal: Negative for abdominal pain, nausea and vomiting  Endocrine: Negative for polydipsia and polyuria  Genitourinary: Negative for dysuria and hematuria  Musculoskeletal: Positive for myalgias  Negative for arthralgias and joint swelling  See HPI   Skin: Negative for rash and wound  Neurological: Negative for dizziness, numbness and headaches  Psychiatric/Behavioral: Negative for decreased concentration and suicidal ideas  The patient is not nervous/anxious  Past Medical History:   Diagnosis Date    Acid reflux     Anxiety     Chronic kidney disease     chronic stones    Fractures     H/O hiatal hernia     Hyperlipidemia     Hypertension        Past Surgical History:   Procedure Laterality Date    APPENDECTOMY      BREAST SURGERY Left     excision cyst    CYSTOSCOPY      with stone extraction x 2    CYSTOSCOPY W/ RETROGRADES Left 09/17/2012    stent removal,     CYSTOSCOPY W/ URETEROSCOPY W/ LITHOTRIPSY Left 12/11/2006    Ca Phos 98%    CYSTOSCOPY W/ URETEROSCOPY W/ LITHOTRIPSY Left 08/12/2012    Ca ox di 60%, Ca ox mono 20%    ESOPHAGOGASTRODUODENOSCOPY      EXTRACORPOREAL SHOCK WAVE LITHOTRIPSY Left 4/13/2016    Procedure: LITHROTRIPSY EXTRACORPORAL SHOCKWAVE (ESWL); Surgeon: Marisela Dominique MD;  Location: [facility] MAIN OR;  Service:     IN CYSTO/URETERO W/LITHOTRIPSY &INDWELL STENT INSRT Left 9/1/2016    Procedure: CYSTOSCOPY URETEROSCOPY WITH STONE EXTRACTION , RETROGRADE PYELOGRAM AND INSERTION STENT URETERAL;  Surgeon: Marisela Dominique MD;  Location: [address] [place] [address];  Service: Urology    IN CYSTO/URETERO W/LITHOTRIPSY &INDWELL STENT INSRT Left 8/12/2016    Procedure: CYSTOSCOPY URETEROSCOPY WITH LITHOTRIPSY HOLMIUM LASER, RETROGRADE PYELOGRAM AND INSERTION STENT URETERAL;  Surgeon: Marisela Dominique MD;  Location: [address] [place] [address];  Service: Urology    TUBAL LIGATION      URETERAL STENT PLACEMENT Left 08/31/2012    Narrowing of the left distal ureter for 3cm           Family History   Problem Relation Age of Onset    Stroke Father         TIA    Parkinsonism Father     Cancer Maternal Aunt     Cancer Maternal Uncle     Heart disease Maternal Uncle         Massive MI    No Known Problems Mother     No Known Problems Sister     No Known Problems Brother     No Known Problems Paternal Aunt     No Known Problems Paternal Uncle     No Known Problems Maternal Grandmother     No Known Problems Maternal Grandfather     No Known Problems Paternal Grandmother     No Known Problems Paternal Grandfather        Social History     Occupational History    Not on file   Tobacco Use    Smoking status: Never Smoker    Smokeless tobacco: Never Used   Substance and Sexual Activity    Alcohol use: Yes     Comment: occassional    Drug use: No    Sexual activity: Not on file         Current Outpatient Medications:     ALPRAZolam (XANAX) 0 25 mg tablet, Take 0 5 mg by mouth daily at bedtime as needed for anxiety  , Disp: , Rfl:     amLODIPine (NORVASC) 5 mg tablet, Take 5 mg by mouth every morning , Disp: , Rfl:     aspirin 81 MG tablet, Take 81 mg by mouth every morning , Disp: , Rfl:     atorvastatin (LIPITOR) 20 mg tablet, Take 20 mg by mouth daily, Disp: , Rfl:     Cholecalciferol (VITAMIN D) 2000 UNITS tablet, Take 2,000 Units by mouth every morning , Disp: , Rfl:     co-enzyme Q-10 30 MG capsule, Take 30 mg by mouth 3 (three) times a day, Disp: , Rfl:     metoclopramide (REGLAN) 10 mg tablet, Take 10 mg by mouth 4 (four) times a day, Disp: , Rfl:     multivitamin (THERAGRAN) TABS, Take 1 tablet by mouth every morning , Disp: , Rfl:     omeprazole (PriLOSEC) 40 MG capsule, Take 40 mg by mouth daily  , Disp: , Rfl:     pyridoxine (VITAMIN B6) 100 mg tablet, Take 100 mg by mouth daily, Disp: , Rfl:     alendronate (FOSAMAX) 70 mg tablet, Take 70 mg by mouth once a week, Disp: , Rfl: 0    fenofibrate (TRIGLIDE) 160 MG tablet, Take 160 mg by mouth every morning , Disp: , Rfl:     naproxen (NAPROSYN) 500 mg tablet, Take 1 tablet (500 mg total) by mouth 2 (two) times a day with meals (Patient not taking: Reported on 10/14/2019), Disp: 30 tablet, Rfl: 0    olmesartan-hydrochlorothiazide (BENICAR HCT) 40-12 5 MG per tablet, Take 1 tablet by mouth every morning, Disp: , Rfl: 0    potassium citrate (UROCIT-K) 10 mEq, Take 10 mEq by mouth 2 (two) times a day, Disp: , Rfl: 11    No Known Allergies    Objective:  Vitals:    10/14/19 1029   BP: 127/80 Pulse: 87       Left Shoulder Exam     Tenderness   The patient is experiencing no tenderness  Range of Motion   Active abduction: 160   External rotation: 50   Forward flexion: 160   Internal rotation 0 degrees: L3     Muscle Strength   Abduction: 5/5   Internal rotation: 5/5   External rotation: 5/5   Supraspinatus: 4/5   Subscapularis: 5/5     Tests   Brower test: positive  Impingement: negative  Drop arm: positive    Other   Erythema: absent  Scars: absent  Sensation: normal  Pulse: present     Comments:  Empty Can: Positive  Lateral Lazaro: Positive  Speed's: Positive            Physical Exam   Constitutional: She is oriented to person, place, and time  She appears well-developed and well-nourished  HENT:   Head: Normocephalic and atraumatic  Eyes: Pupils are equal, round, and reactive to light  Conjunctivae are normal    Neck: Normal range of motion  Neck supple  Cardiovascular: Normal rate and intact distal pulses  Pulmonary/Chest: Effort normal  No respiratory distress  Musculoskeletal:   As noted in HPI   Neurological: She is alert and oriented to person, place, and time  Skin: Skin is warm and dry  Psychiatric: She has a normal mood and affect  Her behavior is normal    Nursing note and vitals reviewed  I have personally reviewed pertinent films in PACS and my interpretation is as follows:  X-ray of the left shoulder obtained on 07/12/2019 shows no acute fracture, dislocation, lytic or blastic lesion  There is mild arthritic change noted to the acromioclavicular joint  There is a bone island noted in the humeral head

## 2020-02-20 ENCOUNTER — HOSPITAL ENCOUNTER (OUTPATIENT)
Dept: RADIOLOGY | Facility: HOSPITAL | Age: 75
Discharge: HOME | End: 2020-02-20
Attending: UROLOGY
Payer: MEDICARE

## 2020-02-20 ENCOUNTER — APPOINTMENT (OUTPATIENT)
Dept: LAB | Facility: HOSPITAL | Age: 75
End: 2020-02-20
Attending: FAMILY MEDICINE
Payer: MEDICARE

## 2020-02-20 ENCOUNTER — TRANSCRIBE ORDERS (OUTPATIENT)
Dept: ADMINISTRATIVE | Facility: HOSPITAL | Age: 75
End: 2020-02-20

## 2020-02-20 DIAGNOSIS — N20.0 RENAL CALCULUS: ICD-10-CM

## 2020-02-20 DIAGNOSIS — I10 ESSENTIAL HYPERTENSION, MALIGNANT: Primary | ICD-10-CM

## 2020-02-20 DIAGNOSIS — E78.00 PURE HYPERCHOLESTEROLEMIA: ICD-10-CM

## 2020-02-20 DIAGNOSIS — N20.0 RENAL CALCULUS: Primary | ICD-10-CM

## 2020-02-20 DIAGNOSIS — I10 ESSENTIAL HYPERTENSION, MALIGNANT: ICD-10-CM

## 2020-02-20 LAB
ALBUMIN SERPL BCP-MCNC: 4 G/DL (ref 3.5–5)
ALP SERPL-CCNC: 74 U/L (ref 46–116)
ALT SERPL W P-5'-P-CCNC: 40 U/L (ref 12–78)
ANION GAP SERPL CALCULATED.3IONS-SCNC: 7 MMOL/L (ref 4–13)
AST SERPL W P-5'-P-CCNC: 24 U/L (ref 5–45)
BILIRUB SERPL-MCNC: 0.6 MG/DL (ref 0.2–1)
BUN SERPL-MCNC: 8 MG/DL (ref 5–25)
CALCIUM SERPL-MCNC: 9.2 MG/DL (ref 8.3–10.1)
CHLORIDE SERPL-SCNC: 106 MMOL/L (ref 100–108)
CHOLEST SERPL-MCNC: 202 MG/DL (ref 50–200)
CO2 SERPL-SCNC: 27 MMOL/L (ref 21–32)
CREAT SERPL-MCNC: 0.78 MG/DL (ref 0.6–1.3)
GFR SERPL CREATININE-BSD FRML MDRD: 75 ML/MIN/1.73SQ M
GLUCOSE P FAST SERPL-MCNC: 111 MG/DL (ref 65–99)
HDLC SERPL-MCNC: 69 MG/DL
LDLC SERPL CALC-MCNC: 98 MG/DL (ref 0–100)
NONHDLC SERPL-MCNC: 133 MG/DL
POTASSIUM SERPL-SCNC: 3.9 MMOL/L (ref 3.5–5.3)
PROT SERPL-MCNC: 7.1 G/DL (ref 6.4–8.2)
SODIUM SERPL-SCNC: 140 MMOL/L (ref 136–145)
TRIGL SERPL-MCNC: 175 MG/DL

## 2020-02-20 PROCEDURE — 80061 LIPID PANEL: CPT

## 2020-02-20 PROCEDURE — 74018 RADEX ABDOMEN 1 VIEW: CPT

## 2020-02-20 PROCEDURE — 36415 COLL VENOUS BLD VENIPUNCTURE: CPT | Performed by: FAMILY MEDICINE

## 2020-02-20 PROCEDURE — 80053 COMPREHEN METABOLIC PANEL: CPT | Performed by: FAMILY MEDICINE

## 2020-08-04 ENCOUNTER — TRANSCRIBE ORDERS (OUTPATIENT)
Dept: ADMINISTRATIVE | Facility: HOSPITAL | Age: 75
End: 2020-08-04

## 2020-08-04 DIAGNOSIS — Z12.31 SCREENING MAMMOGRAM, ENCOUNTER FOR: Primary | ICD-10-CM

## 2020-08-04 DIAGNOSIS — I10 ESSENTIAL HYPERTENSION, MALIGNANT: ICD-10-CM

## 2020-08-04 DIAGNOSIS — Z11.59 SCREENING EXAMINATION FOR POLIOMYELITIS: ICD-10-CM

## 2020-08-04 DIAGNOSIS — Z13.9 SCREENING FOR UNSPECIFIED CONDITION: ICD-10-CM

## 2020-08-04 DIAGNOSIS — E55.9 VITAMIN D DEFICIENCY: ICD-10-CM

## 2020-08-04 DIAGNOSIS — N18.2 CHRONIC KIDNEY DISEASE, STAGE II (MILD): ICD-10-CM

## 2020-08-17 ENCOUNTER — APPOINTMENT (OUTPATIENT)
Dept: LAB | Facility: HOSPITAL | Age: 75
End: 2020-08-17
Attending: FAMILY MEDICINE
Payer: MEDICARE

## 2020-08-17 DIAGNOSIS — Z13.9 SCREENING FOR UNSPECIFIED CONDITION: ICD-10-CM

## 2020-08-17 DIAGNOSIS — Z11.59 SCREENING EXAMINATION FOR POLIOMYELITIS: ICD-10-CM

## 2020-08-17 DIAGNOSIS — N18.2 CHRONIC KIDNEY DISEASE, STAGE II (MILD): ICD-10-CM

## 2020-08-17 DIAGNOSIS — E55.9 VITAMIN D DEFICIENCY: ICD-10-CM

## 2020-08-17 DIAGNOSIS — I10 ESSENTIAL HYPERTENSION, MALIGNANT: ICD-10-CM

## 2020-08-17 DIAGNOSIS — Z12.31 SCREENING MAMMOGRAM, ENCOUNTER FOR: ICD-10-CM

## 2020-08-17 LAB
25(OH)D3 SERPL-MCNC: 50.6 NG/ML (ref 30–100)
ALBUMIN SERPL BCP-MCNC: 3.9 G/DL (ref 3.5–5)
ALP SERPL-CCNC: 75 U/L (ref 46–116)
ALT SERPL W P-5'-P-CCNC: 38 U/L (ref 12–78)
ANION GAP SERPL CALCULATED.3IONS-SCNC: 7 MMOL/L (ref 4–13)
AST SERPL W P-5'-P-CCNC: 25 U/L (ref 5–45)
BASOPHILS # BLD AUTO: 0.09 THOUSANDS/ΜL (ref 0–0.1)
BASOPHILS NFR BLD AUTO: 2 % (ref 0–1)
BILIRUB SERPL-MCNC: 0.7 MG/DL (ref 0.2–1)
BUN SERPL-MCNC: 9 MG/DL (ref 5–25)
CALCIUM SERPL-MCNC: 8.8 MG/DL (ref 8.3–10.1)
CHLORIDE SERPL-SCNC: 105 MMOL/L (ref 100–108)
CO2 SERPL-SCNC: 30 MMOL/L (ref 21–32)
CREAT SERPL-MCNC: 0.97 MG/DL (ref 0.6–1.3)
EOSINOPHIL # BLD AUTO: 0.19 THOUSAND/ΜL (ref 0–0.61)
EOSINOPHIL NFR BLD AUTO: 4 % (ref 0–6)
ERYTHROCYTE [DISTWIDTH] IN BLOOD BY AUTOMATED COUNT: 13.2 % (ref 11.6–15.1)
GFR SERPL CREATININE-BSD FRML MDRD: 57 ML/MIN/1.73SQ M
GLUCOSE P FAST SERPL-MCNC: 105 MG/DL (ref 65–99)
HCT VFR BLD AUTO: 46.6 % (ref 34.8–46.1)
HCV AB SER QL: NORMAL
HGB BLD-MCNC: 15 G/DL (ref 11.5–15.4)
IMM GRANULOCYTES # BLD AUTO: 0.01 THOUSAND/UL (ref 0–0.2)
IMM GRANULOCYTES NFR BLD AUTO: 0 % (ref 0–2)
LYMPHOCYTES # BLD AUTO: 1.45 THOUSANDS/ΜL (ref 0.6–4.47)
LYMPHOCYTES NFR BLD AUTO: 27 % (ref 14–44)
MCH RBC QN AUTO: 30.9 PG (ref 26.8–34.3)
MCHC RBC AUTO-ENTMCNC: 32.2 G/DL (ref 31.4–37.4)
MCV RBC AUTO: 96 FL (ref 82–98)
MONOCYTES # BLD AUTO: 0.51 THOUSAND/ΜL (ref 0.17–1.22)
MONOCYTES NFR BLD AUTO: 10 % (ref 4–12)
NEUTROPHILS # BLD AUTO: 3.05 THOUSANDS/ΜL (ref 1.85–7.62)
NEUTS SEG NFR BLD AUTO: 57 % (ref 43–75)
NRBC BLD AUTO-RTO: 0 /100 WBCS
PLATELET # BLD AUTO: 177 THOUSANDS/UL (ref 149–390)
PMV BLD AUTO: 12.8 FL (ref 8.9–12.7)
POTASSIUM SERPL-SCNC: 4.2 MMOL/L (ref 3.5–5.3)
PROT SERPL-MCNC: 6.9 G/DL (ref 6.4–8.2)
RBC # BLD AUTO: 4.85 MILLION/UL (ref 3.81–5.12)
SODIUM SERPL-SCNC: 142 MMOL/L (ref 136–145)
WBC # BLD AUTO: 5.3 THOUSAND/UL (ref 4.31–10.16)

## 2020-08-17 PROCEDURE — 80053 COMPREHEN METABOLIC PANEL: CPT

## 2020-08-17 PROCEDURE — 36415 COLL VENOUS BLD VENIPUNCTURE: CPT

## 2020-08-17 PROCEDURE — 85025 COMPLETE CBC W/AUTO DIFF WBC: CPT

## 2020-08-17 PROCEDURE — 86803 HEPATITIS C AB TEST: CPT

## 2020-08-17 PROCEDURE — 82306 VITAMIN D 25 HYDROXY: CPT

## 2020-09-25 ENCOUNTER — HOSPITAL ENCOUNTER (EMERGENCY)
Facility: HOSPITAL | Age: 75
Discharge: HOME | End: 2020-09-25
Attending: EMERGENCY MEDICINE | Admitting: EMERGENCY MEDICINE
Payer: MEDICARE

## 2020-09-25 ENCOUNTER — APPOINTMENT (EMERGENCY)
Dept: RADIOLOGY | Facility: HOSPITAL | Age: 75
End: 2020-09-25
Payer: MEDICARE

## 2020-09-25 VITALS
DIASTOLIC BLOOD PRESSURE: 59 MMHG | SYSTOLIC BLOOD PRESSURE: 120 MMHG | RESPIRATION RATE: 18 BRPM | BODY MASS INDEX: 24.37 KG/M2 | HEART RATE: 71 BPM | WEIGHT: 129 LBS | TEMPERATURE: 98 F | OXYGEN SATURATION: 97 %

## 2020-09-25 DIAGNOSIS — N23 RENAL COLIC: Primary | ICD-10-CM

## 2020-09-25 LAB
ALBUMIN SERPL BCP-MCNC: 4 G/DL (ref 3.5–5)
ALP SERPL-CCNC: 78 U/L (ref 46–116)
ALT SERPL W P-5'-P-CCNC: 45 U/L (ref 12–78)
ANION GAP SERPL CALCULATED.3IONS-SCNC: 8 MMOL/L (ref 4–13)
AST SERPL W P-5'-P-CCNC: 28 U/L (ref 5–45)
BACTERIA UR QL AUTO: ABNORMAL /HPF
BASOPHILS # BLD AUTO: 0.1 THOUSANDS/ΜL (ref 0–0.1)
BASOPHILS NFR BLD AUTO: 1 % (ref 0–1)
BILIRUB SERPL-MCNC: 0.4 MG/DL (ref 0.2–1)
BILIRUB UR QL STRIP: NEGATIVE
BUN SERPL-MCNC: 12 MG/DL (ref 5–25)
CALCIUM SERPL-MCNC: 9.3 MG/DL (ref 8.3–10.1)
CHLORIDE SERPL-SCNC: 105 MMOL/L (ref 100–108)
CLARITY UR: CLEAR
CO2 SERPL-SCNC: 27 MMOL/L (ref 21–32)
COLOR UR: YELLOW
CREAT SERPL-MCNC: 0.94 MG/DL (ref 0.6–1.3)
EOSINOPHIL # BLD AUTO: 0.03 THOUSAND/ΜL (ref 0–0.61)
EOSINOPHIL NFR BLD AUTO: 0 % (ref 0–6)
ERYTHROCYTE [DISTWIDTH] IN BLOOD BY AUTOMATED COUNT: 13 % (ref 11.6–15.1)
GFR SERPL CREATININE-BSD FRML MDRD: 59 ML/MIN/1.73SQ M
GLUCOSE SERPL-MCNC: 122 MG/DL (ref 65–140)
GLUCOSE UR STRIP-MCNC: NEGATIVE MG/DL
HCT VFR BLD AUTO: 42.6 % (ref 34.8–46.1)
HGB BLD-MCNC: 13.9 G/DL (ref 11.5–15.4)
HGB UR QL STRIP.AUTO: NEGATIVE
HYALINE CASTS #/AREA URNS LPF: ABNORMAL /LPF
IMM GRANULOCYTES # BLD AUTO: 0.02 THOUSAND/UL (ref 0–0.2)
IMM GRANULOCYTES NFR BLD AUTO: 0 % (ref 0–2)
KETONES UR STRIP-MCNC: ABNORMAL MG/DL
LEUKOCYTE ESTERASE UR QL STRIP: ABNORMAL
LYMPHOCYTES # BLD AUTO: 1.14 THOUSANDS/ΜL (ref 0.6–4.47)
LYMPHOCYTES NFR BLD AUTO: 13 % (ref 14–44)
MCH RBC QN AUTO: 31.2 PG (ref 26.8–34.3)
MCHC RBC AUTO-ENTMCNC: 32.6 G/DL (ref 31.4–37.4)
MCV RBC AUTO: 96 FL (ref 82–98)
MONOCYTES # BLD AUTO: 0.6 THOUSAND/ΜL (ref 0.17–1.22)
MONOCYTES NFR BLD AUTO: 7 % (ref 4–12)
MUCOUS THREADS UR QL AUTO: ABNORMAL
NEUTROPHILS # BLD AUTO: 7.05 THOUSANDS/ΜL (ref 1.85–7.62)
NEUTS SEG NFR BLD AUTO: 79 % (ref 43–75)
NITRITE UR QL STRIP: NEGATIVE
NON-SQ EPI CELLS URNS QL MICRO: ABNORMAL /HPF
NRBC BLD AUTO-RTO: 0 /100 WBCS
PH UR STRIP.AUTO: 7.5 [PH]
PLATELET # BLD AUTO: 174 THOUSANDS/UL (ref 149–390)
PMV BLD AUTO: 13.1 FL (ref 8.9–12.7)
POTASSIUM SERPL-SCNC: 4.2 MMOL/L (ref 3.5–5.3)
PROT SERPL-MCNC: 6.9 G/DL (ref 6.4–8.2)
PROT UR STRIP-MCNC: ABNORMAL MG/DL
RBC # BLD AUTO: 4.46 MILLION/UL (ref 3.81–5.12)
RBC #/AREA URNS AUTO: ABNORMAL /HPF
SODIUM SERPL-SCNC: 140 MMOL/L (ref 136–145)
SP GR UR STRIP.AUTO: 1.01 (ref 1–1.03)
UROBILINOGEN UR QL STRIP.AUTO: 0.2 E.U./DL
WBC # BLD AUTO: 8.94 THOUSAND/UL (ref 4.31–10.16)
WBC #/AREA URNS AUTO: ABNORMAL /HPF

## 2020-09-25 PROCEDURE — 87086 URINE CULTURE/COLONY COUNT: CPT | Performed by: PHYSICIAN ASSISTANT

## 2020-09-25 PROCEDURE — 99284 EMERGENCY DEPT VISIT MOD MDM: CPT

## 2020-09-25 PROCEDURE — 81001 URINALYSIS AUTO W/SCOPE: CPT | Performed by: PHYSICIAN ASSISTANT

## 2020-09-25 PROCEDURE — 96376 TX/PRO/DX INJ SAME DRUG ADON: CPT

## 2020-09-25 PROCEDURE — 74176 CT ABD & PELVIS W/O CONTRAST: CPT

## 2020-09-25 PROCEDURE — 96361 HYDRATE IV INFUSION ADD-ON: CPT

## 2020-09-25 PROCEDURE — 85025 COMPLETE CBC W/AUTO DIFF WBC: CPT | Performed by: PHYSICIAN ASSISTANT

## 2020-09-25 PROCEDURE — 99285 EMERGENCY DEPT VISIT HI MDM: CPT | Performed by: PHYSICIAN ASSISTANT

## 2020-09-25 PROCEDURE — 96374 THER/PROPH/DIAG INJ IV PUSH: CPT

## 2020-09-25 PROCEDURE — 80053 COMPREHEN METABOLIC PANEL: CPT | Performed by: PHYSICIAN ASSISTANT

## 2020-09-25 PROCEDURE — 96375 TX/PRO/DX INJ NEW DRUG ADDON: CPT

## 2020-09-25 PROCEDURE — 36415 COLL VENOUS BLD VENIPUNCTURE: CPT | Performed by: PHYSICIAN ASSISTANT

## 2020-09-25 PROCEDURE — G1004 CDSM NDSC: HCPCS

## 2020-09-25 RX ORDER — CHLORAL HYDRATE 500 MG
1000 CAPSULE ORAL 3 TIMES DAILY
COMMUNITY
End: 2022-01-26

## 2020-09-25 RX ORDER — ONDANSETRON 2 MG/ML
4 INJECTION INTRAMUSCULAR; INTRAVENOUS ONCE
Status: COMPLETED | OUTPATIENT
Start: 2020-09-25 | End: 2020-09-25

## 2020-09-25 RX ORDER — OXYCODONE HYDROCHLORIDE AND ACETAMINOPHEN 5; 325 MG/1; MG/1
1 TABLET ORAL EVERY 8 HOURS PRN
Qty: 10 TABLET | Refills: 0 | Status: SHIPPED | OUTPATIENT
Start: 2020-09-25 | End: 2020-09-28

## 2020-09-25 RX ADMIN — SODIUM CHLORIDE 1000 ML: 0.9 INJECTION, SOLUTION INTRAVENOUS at 15:52

## 2020-09-25 RX ADMIN — MORPHINE SULFATE 2 MG: 2 INJECTION, SOLUTION INTRAMUSCULAR; INTRAVENOUS at 16:56

## 2020-09-25 RX ADMIN — ONDANSETRON 4 MG: 2 INJECTION INTRAMUSCULAR; INTRAVENOUS at 16:04

## 2020-09-25 RX ADMIN — MORPHINE SULFATE 2 MG: 2 INJECTION, SOLUTION INTRAMUSCULAR; INTRAVENOUS at 15:52

## 2020-09-27 LAB — BACTERIA UR CULT: NORMAL

## 2020-10-12 ENCOUNTER — TRANSCRIBE ORDERS (OUTPATIENT)
Dept: ADMINISTRATIVE | Facility: HOSPITAL | Age: 75
End: 2020-10-12

## 2020-10-12 DIAGNOSIS — N20.1 URETERAL STONE: Primary | ICD-10-CM

## 2020-10-15 ENCOUNTER — HOSPITAL ENCOUNTER (OUTPATIENT)
Dept: RADIOLOGY | Facility: HOSPITAL | Age: 75
Discharge: HOME | End: 2020-10-15
Attending: UROLOGY
Payer: MEDICARE

## 2020-10-15 DIAGNOSIS — N20.1 URETERAL STONE: ICD-10-CM

## 2020-10-15 PROCEDURE — G1004 CDSM NDSC: HCPCS

## 2020-10-15 PROCEDURE — 72192 CT PELVIS W/O DYE: CPT

## 2021-02-25 ENCOUNTER — TRANSCRIBE ORDERS (OUTPATIENT)
Dept: ADMINISTRATIVE | Facility: HOSPITAL | Age: 76
End: 2021-02-25

## 2021-02-25 ENCOUNTER — LAB (OUTPATIENT)
Dept: LAB | Facility: HOSPITAL | Age: 76
End: 2021-02-25
Attending: FAMILY MEDICINE
Payer: MEDICARE

## 2021-02-25 DIAGNOSIS — D50.9 IRON DEFICIENCY ANEMIA, UNSPECIFIED IRON DEFICIENCY ANEMIA TYPE: ICD-10-CM

## 2021-02-25 DIAGNOSIS — N18.30 STAGE 3 CHRONIC KIDNEY DISEASE, UNSPECIFIED WHETHER STAGE 3A OR 3B CKD (HCC): ICD-10-CM

## 2021-02-25 DIAGNOSIS — R73.01 IMPAIRED FASTING GLUCOSE: ICD-10-CM

## 2021-02-25 DIAGNOSIS — Z13.29 SCREENING FOR THYROID DISORDER: ICD-10-CM

## 2021-02-25 DIAGNOSIS — Z13.29 SCREENING FOR THYROID DISORDER: Primary | ICD-10-CM

## 2021-02-25 LAB
25(OH)D3 SERPL-MCNC: 51 NG/ML (ref 30–100)
ALBUMIN SERPL BCP-MCNC: 3.7 G/DL (ref 3.5–5)
ALP SERPL-CCNC: 70 U/L (ref 46–116)
ALT SERPL W P-5'-P-CCNC: 41 U/L (ref 12–78)
ANION GAP SERPL CALCULATED.3IONS-SCNC: 7 MMOL/L (ref 4–13)
AST SERPL W P-5'-P-CCNC: 24 U/L (ref 5–45)
BILIRUB SERPL-MCNC: 0.6 MG/DL (ref 0.2–1)
BUN SERPL-MCNC: 10 MG/DL (ref 5–25)
CALCIUM SERPL-MCNC: 9.1 MG/DL (ref 8.3–10.1)
CHLORIDE SERPL-SCNC: 106 MMOL/L (ref 100–108)
CHOLEST SERPL-MCNC: 174 MG/DL (ref 50–200)
CO2 SERPL-SCNC: 29 MMOL/L (ref 21–32)
CREAT SERPL-MCNC: 0.88 MG/DL (ref 0.6–1.3)
ERYTHROCYTE [DISTWIDTH] IN BLOOD BY AUTOMATED COUNT: 12.9 % (ref 11.6–15.1)
EST. AVERAGE GLUCOSE BLD GHB EST-MCNC: 108 MG/DL
GFR SERPL CREATININE-BSD FRML MDRD: 64 ML/MIN/1.73SQ M
GLUCOSE P FAST SERPL-MCNC: 102 MG/DL (ref 65–99)
HBA1C MFR BLD: 5.4 %
HCT VFR BLD AUTO: 46.7 % (ref 34.8–46.1)
HDLC SERPL-MCNC: 62 MG/DL
HGB BLD-MCNC: 14.4 G/DL (ref 11.5–15.4)
IRON SERPL-MCNC: 137 UG/DL (ref 50–170)
LDLC SERPL CALC-MCNC: 79 MG/DL (ref 0–100)
MCH RBC QN AUTO: 29.6 PG (ref 26.8–34.3)
MCHC RBC AUTO-ENTMCNC: 30.8 G/DL (ref 31.4–37.4)
MCV RBC AUTO: 96 FL (ref 82–98)
NONHDLC SERPL-MCNC: 112 MG/DL
PLATELET # BLD AUTO: 189 THOUSANDS/UL (ref 149–390)
PMV BLD AUTO: 12.4 FL (ref 8.9–12.7)
POTASSIUM SERPL-SCNC: 4.2 MMOL/L (ref 3.5–5.3)
PROT SERPL-MCNC: 7 G/DL (ref 6.4–8.2)
RBC # BLD AUTO: 4.87 MILLION/UL (ref 3.81–5.12)
SODIUM SERPL-SCNC: 142 MMOL/L (ref 136–145)
TRIGL SERPL-MCNC: 167 MG/DL
TSH SERPL DL<=0.05 MIU/L-ACNC: 2.22 UIU/ML (ref 0.36–3.74)
WBC # BLD AUTO: 5.02 THOUSAND/UL (ref 4.31–10.16)

## 2021-02-25 PROCEDURE — 83036 HEMOGLOBIN GLYCOSYLATED A1C: CPT | Performed by: FAMILY MEDICINE

## 2021-02-25 PROCEDURE — 80053 COMPREHEN METABOLIC PANEL: CPT | Performed by: FAMILY MEDICINE

## 2021-02-25 PROCEDURE — 36415 COLL VENOUS BLD VENIPUNCTURE: CPT | Performed by: FAMILY MEDICINE

## 2021-02-25 PROCEDURE — 82306 VITAMIN D 25 HYDROXY: CPT

## 2021-02-25 PROCEDURE — 83540 ASSAY OF IRON: CPT

## 2021-02-25 PROCEDURE — 80061 LIPID PANEL: CPT

## 2021-02-25 PROCEDURE — 84443 ASSAY THYROID STIM HORMONE: CPT

## 2021-02-25 PROCEDURE — 85027 COMPLETE CBC AUTOMATED: CPT

## 2021-06-01 ENCOUNTER — TRANSCRIBE ORDERS (OUTPATIENT)
Dept: ADMINISTRATIVE | Facility: HOSPITAL | Age: 76
End: 2021-06-01

## 2021-06-01 DIAGNOSIS — Z13.820 SPECIAL SCREENING FOR OSTEOPOROSIS: Primary | ICD-10-CM

## 2021-06-03 ENCOUNTER — HOSPITAL ENCOUNTER (OUTPATIENT)
Dept: RADIOLOGY | Facility: HOSPITAL | Age: 76
Discharge: HOME | End: 2021-06-03
Attending: FAMILY MEDICINE
Payer: MEDICARE

## 2021-06-03 DIAGNOSIS — Z13.820 SPECIAL SCREENING FOR OSTEOPOROSIS: ICD-10-CM

## 2021-06-03 PROCEDURE — 77080 DXA BONE DENSITY AXIAL: CPT

## 2021-07-23 ENCOUNTER — APPOINTMENT (EMERGENCY)
Dept: RADIOLOGY | Facility: HOSPITAL | Age: 76
End: 2021-07-23
Payer: MEDICARE

## 2021-07-23 ENCOUNTER — HOSPITAL ENCOUNTER (EMERGENCY)
Facility: HOSPITAL | Age: 76
Discharge: HOME | End: 2021-07-23
Attending: EMERGENCY MEDICINE | Admitting: EMERGENCY MEDICINE
Payer: MEDICARE

## 2021-07-23 VITALS
TEMPERATURE: 97.3 F | RESPIRATION RATE: 18 BRPM | SYSTOLIC BLOOD PRESSURE: 141 MMHG | WEIGHT: 126.2 LBS | BODY MASS INDEX: 23.85 KG/M2 | OXYGEN SATURATION: 95 % | DIASTOLIC BLOOD PRESSURE: 78 MMHG | HEART RATE: 78 BPM

## 2021-07-23 DIAGNOSIS — R10.9 FLANK PAIN: Primary | ICD-10-CM

## 2021-07-23 LAB
ALBUMIN SERPL BCP-MCNC: 4.1 G/DL (ref 3.5–5)
ALP SERPL-CCNC: 77 U/L (ref 46–116)
ALT SERPL W P-5'-P-CCNC: 32 U/L (ref 12–78)
ANION GAP SERPL CALCULATED.3IONS-SCNC: 11 MMOL/L (ref 4–13)
AST SERPL W P-5'-P-CCNC: 23 U/L (ref 5–45)
BACTERIA UR QL AUTO: ABNORMAL /HPF
BASOPHILS # BLD AUTO: 0.06 THOUSANDS/ΜL (ref 0–0.1)
BASOPHILS NFR BLD AUTO: 1 % (ref 0–1)
BILIRUB SERPL-MCNC: 0.54 MG/DL (ref 0.2–1)
BILIRUB UR QL STRIP: NEGATIVE
BUN SERPL-MCNC: 10 MG/DL (ref 5–25)
CALCIUM SERPL-MCNC: 9.1 MG/DL (ref 8.3–10.1)
CHLORIDE SERPL-SCNC: 105 MMOL/L (ref 100–108)
CLARITY UR: ABNORMAL
CO2 SERPL-SCNC: 26 MMOL/L (ref 21–32)
COLOR UR: YELLOW
CREAT SERPL-MCNC: 0.99 MG/DL (ref 0.6–1.3)
EOSINOPHIL # BLD AUTO: 0.04 THOUSAND/ΜL (ref 0–0.61)
EOSINOPHIL NFR BLD AUTO: 1 % (ref 0–6)
ERYTHROCYTE [DISTWIDTH] IN BLOOD BY AUTOMATED COUNT: 13 % (ref 11.6–15.1)
GFR SERPL CREATININE-BSD FRML MDRD: 56 ML/MIN/1.73SQ M
GLUCOSE SERPL-MCNC: 113 MG/DL (ref 65–140)
GLUCOSE UR STRIP-MCNC: NEGATIVE MG/DL
HCT VFR BLD AUTO: 47.3 % (ref 34.8–46.1)
HGB BLD-MCNC: 15.2 G/DL (ref 11.5–15.4)
HGB UR QL STRIP.AUTO: NEGATIVE
IMM GRANULOCYTES # BLD AUTO: 0.02 THOUSAND/UL (ref 0–0.2)
IMM GRANULOCYTES NFR BLD AUTO: 0 % (ref 0–2)
KETONES UR STRIP-MCNC: ABNORMAL MG/DL
LEUKOCYTE ESTERASE UR QL STRIP: ABNORMAL
LYMPHOCYTES # BLD AUTO: 1.32 THOUSANDS/ΜL (ref 0.6–4.47)
LYMPHOCYTES NFR BLD AUTO: 16 % (ref 14–44)
MAGNESIUM SERPL-MCNC: 2.2 MG/DL (ref 1.6–2.6)
MCH RBC QN AUTO: 30.1 PG (ref 26.8–34.3)
MCHC RBC AUTO-ENTMCNC: 32.1 G/DL (ref 31.4–37.4)
MCV RBC AUTO: 94 FL (ref 82–98)
MONOCYTES # BLD AUTO: 0.56 THOUSAND/ΜL (ref 0.17–1.22)
MONOCYTES NFR BLD AUTO: 7 % (ref 4–12)
NEUTROPHILS # BLD AUTO: 6.28 THOUSANDS/ΜL (ref 1.85–7.62)
NEUTS SEG NFR BLD AUTO: 75 % (ref 43–75)
NITRITE UR QL STRIP: NEGATIVE
NON-SQ EPI CELLS URNS QL MICRO: ABNORMAL /HPF
NRBC BLD AUTO-RTO: 0 /100 WBCS
PH UR STRIP.AUTO: 7.5 [PH]
PLATELET # BLD AUTO: 204 THOUSANDS/UL (ref 149–390)
PMV BLD AUTO: 12 FL (ref 8.9–12.7)
POTASSIUM SERPL-SCNC: 4.4 MMOL/L (ref 3.5–5.3)
PROT SERPL-MCNC: 7.3 G/DL (ref 6.4–8.2)
PROT UR STRIP-MCNC: NEGATIVE MG/DL
RBC # BLD AUTO: 5.05 MILLION/UL (ref 3.81–5.12)
RBC #/AREA URNS AUTO: ABNORMAL /HPF
SODIUM SERPL-SCNC: 142 MMOL/L (ref 136–145)
SP GR UR STRIP.AUTO: 1.02 (ref 1–1.03)
UROBILINOGEN UR QL STRIP.AUTO: 0.2 E.U./DL
WBC # BLD AUTO: 8.28 THOUSAND/UL (ref 4.31–10.16)
WBC #/AREA URNS AUTO: ABNORMAL /HPF

## 2021-07-23 PROCEDURE — 81001 URINALYSIS AUTO W/SCOPE: CPT | Performed by: EMERGENCY MEDICINE

## 2021-07-23 PROCEDURE — 99284 EMERGENCY DEPT VISIT MOD MDM: CPT | Performed by: EMERGENCY MEDICINE

## 2021-07-23 PROCEDURE — 80053 COMPREHEN METABOLIC PANEL: CPT | Performed by: EMERGENCY MEDICINE

## 2021-07-23 PROCEDURE — 83735 ASSAY OF MAGNESIUM: CPT | Performed by: EMERGENCY MEDICINE

## 2021-07-23 PROCEDURE — 74176 CT ABD & PELVIS W/O CONTRAST: CPT

## 2021-07-23 PROCEDURE — 85025 COMPLETE CBC W/AUTO DIFF WBC: CPT | Performed by: EMERGENCY MEDICINE

## 2021-07-23 PROCEDURE — 96360 HYDRATION IV INFUSION INIT: CPT

## 2021-07-23 PROCEDURE — 36415 COLL VENOUS BLD VENIPUNCTURE: CPT | Performed by: EMERGENCY MEDICINE

## 2021-07-23 PROCEDURE — G1004 CDSM NDSC: HCPCS

## 2021-07-23 PROCEDURE — 99284 EMERGENCY DEPT VISIT MOD MDM: CPT

## 2021-07-23 PROCEDURE — 96361 HYDRATE IV INFUSION ADD-ON: CPT

## 2021-07-23 RX ORDER — PREDNISONE 20 MG/1
40 TABLET ORAL DAILY
Qty: 10 TABLET | Refills: 0 | Status: SHIPPED | OUTPATIENT
Start: 2021-07-23 | End: 2021-07-28

## 2021-07-23 RX ADMIN — SODIUM CHLORIDE 1000 ML: 0.9 INJECTION, SOLUTION INTRAVENOUS at 11:50

## 2021-07-23 NOTE — ED PROVIDER NOTES
History  Chief Complaint   Patient presents with    Flank Pain     Co L flank pain foe few days, hx of kidney stone  Called Dr Liliam Barraza office, adviced to come to ED  History of ureteral stones  no trauma  History provided by:  Patient   used: No    Flank Pain  Pain location:  L flank  Pain quality: sharp    Pain radiates to:  Does not radiate  Pain severity:  Mild  Onset quality:  Sudden  Duration:  2 days  Timing:  Constant  Chronicity:  New  Relieved by:  Nothing  Worsened by:  Nothing  Ineffective treatments:  None tried  Associated symptoms: no chest pain, no constipation, no cough, no diarrhea, no dysuria, no hematuria, no nausea and no vomiting        Prior to Admission Medications   Prescriptions Last Dose Informant Patient Reported? Taking? ALPRAZolam (XANAX) 0 25 mg tablet   Yes No   Sig: Take by mouth daily at bedtime as needed for anxiety    Cholecalciferol (VITAMIN D) 2000 UNITS tablet   Yes No   Sig: Take 3,000 Units by mouth every morning    Famotidine (PEPCID PO) 7/23/2021 at Unknown time  Yes Yes   Sig: Take by mouth every other day   MELATONIN PO 7/22/2021 at Unknown time  Yes Yes   Sig: Take by mouth as needed   Multiple Vitamins-Minerals (CENTRUM SILVER PO) 7/23/2021 at Unknown time  Yes Yes   Sig: Take by mouth daily   Omega-3 Fatty Acids (fish oil) 1,000 mg   Yes No   Sig: Take 1,000 mg by mouth 3 (three) times a day   alendronate (FOSAMAX) 70 mg tablet   Yes No   Sig: Take 70 mg by mouth once a week   amLODIPine (NORVASC) 5 mg tablet 7/23/2021 at Unknown time  Yes Yes   Sig: Take 5 mg by mouth every morning  aspirin 81 MG tablet 7/23/2021 at Unknown time  Yes Yes   Sig: Take 81 mg by mouth every morning     atorvastatin (LIPITOR) 20 mg tablet 7/22/2021 at Unknown time  Yes Yes   Sig: Take 20 mg by mouth daily   co-enzyme Q-10 30 MG capsule   Yes No   Sig: Take 200 mg by mouth daily    fenofibrate (TRIGLIDE) 160 MG tablet   Yes No   Sig: Take 160 mg by mouth every morning  metoclopramide (REGLAN) 10 mg tablet   Yes No   Sig: Take 10 mg by mouth 4 (four) times a day   multivitamin (THERAGRAN) TABS   Yes No   Sig: Take 1 tablet by mouth every morning  naproxen (NAPROSYN) 500 mg tablet   No No   Sig: Take 1 tablet (500 mg total) by mouth 2 (two) times a day with meals   Patient not taking: Reported on 10/14/2019   olmesartan-hydrochlorothiazide (BENICAR HCT) 40-12 5 MG per tablet   Yes No   Sig: Take 1 tablet by mouth every morning   omeprazole (PriLOSEC) 40 MG capsule 7/23/2021 at Unknown time  Yes Yes   Sig: Take 40 mg by mouth every other day    potassium citrate (UROCIT-K) 10 mEq   Yes No   Sig: Take 10 mEq by mouth 2 (two) times a day   pyridoxine (VITAMIN B6) 100 mg tablet   Yes No   Sig: Take 100 mg by mouth daily      Facility-Administered Medications: None       Past Medical History:   Diagnosis Date    Acid reflux     Anxiety     Chronic kidney disease     chronic stones    Fractures     H/O hiatal hernia     Hyperlipidemia     Hypertension        Past Surgical History:   Procedure Laterality Date    APPENDECTOMY      BREAST SURGERY Left     excision cyst    CYSTOSCOPY      with stone extraction x 2    CYSTOSCOPY W/ RETROGRADES Left 09/17/2012    stent removal,     CYSTOSCOPY W/ URETEROSCOPY W/ LITHOTRIPSY Left 12/11/2006    Ca Phos 98%    CYSTOSCOPY W/ URETEROSCOPY W/ LITHOTRIPSY Left 08/12/2012    Ca ox di 60%, Ca ox mono 20%    ESOPHAGOGASTRODUODENOSCOPY      EXTRACORPOREAL SHOCK WAVE LITHOTRIPSY Left 4/13/2016    Procedure: Terrie Tyler EXTRACORPORAL SHOCKWAVE (ESWL);   Surgeon: Akash Aranda MD;  Location: [facility] MAIN OR;  Service:     PA CYSTO/URETERO W/LITHOTRIPSY &INDWELL STENT INSRT Left 9/1/2016    Procedure: CYSTOSCOPY URETEROSCOPY WITH STONE EXTRACTION , RETROGRADE PYELOGRAM AND INSERTION STENT URETERAL;  Surgeon: Akash Aranda MD;  Location: [address] [place] [address];  Service: Urology    PA CYSTO/URETERO W/LITHOTRIPSY &INDWELL [place] [address] INSRT Left 8/12/2016    Procedure: CYSTOSCOPY URETEROSCOPY WITH LITHOTRIPSY HOLMIUM LASER, RETROGRADE PYELOGRAM AND INSERTION STENT URETERAL;  Surgeon: Lewis Marshall MD;  Location: [address] [place] [address];  Service: Urology    TUBAL LIGATION      URETERAL STENT PLACEMENT Left 08/31/2012    Narrowing of the left distal ureter for 3cm  Family History   Problem Relation Age of Onset    Stroke Father         TIA    Parkinsonism Father     Cancer Maternal Aunt     Cancer Maternal Uncle     Heart disease Maternal Uncle         Massive MI    No Known Problems Mother     No Known Problems Sister     No Known Problems Brother     No Known Problems Paternal Aunt     No Known Problems Paternal Uncle     No Known Problems Maternal Grandmother     No Known Problems Maternal Grandfather     No Known Problems Paternal Grandmother     No Known Problems Paternal Grandfather      I have reviewed and agree with the history as documented  E-Cigarette/Vaping    E-Cigarette Use Never User      E-Cigarette/Vaping Substances     Social History     Tobacco Use    Smoking status: Never Smoker    Smokeless tobacco: Never Used   Vaping Use    Vaping Use: Never used   Substance Use Topics    Alcohol use: Yes     Comment: occassional    Drug use: No       Review of Systems   Constitutional: Negative  HENT: Negative  Eyes: Negative  Respiratory: Negative  Negative for cough  Cardiovascular: Negative  Negative for chest pain  Gastrointestinal: Negative  Negative for constipation, diarrhea, nausea and vomiting  Endocrine: Negative  Genitourinary: Positive for flank pain  Negative for dysuria and hematuria  Skin: Negative  Allergic/Immunologic: Negative  Neurological: Negative  Hematological: Negative  Psychiatric/Behavioral: Negative  All other systems reviewed and are negative  Physical Exam  Physical Exam  Constitutional:       Appearance: Normal appearance     HENT:      Head: Normocephalic and atraumatic  Nose: Nose normal       Mouth/Throat:      Mouth: Mucous membranes are moist    Eyes:      Extraocular Movements: Extraocular movements intact  Pupils: Pupils are equal, round, and reactive to light  Cardiovascular:      Rate and Rhythm: Normal rate and regular rhythm  Pulmonary:      Effort: Pulmonary effort is normal       Breath sounds: Normal breath sounds  Abdominal:      General: Abdomen is flat  Bowel sounds are normal  There is no distension  Palpations: Abdomen is soft  There is no mass  Tenderness: There is no abdominal tenderness  There is no right CVA tenderness, left CVA tenderness, guarding or rebound  Hernia: No hernia is present  Comments: No CVA tenderness noted  Musculoskeletal:         General: Normal range of motion  Cervical back: Normal range of motion and neck supple  Skin:     General: Skin is warm  Capillary Refill: Capillary refill takes less than 2 seconds  Neurological:      General: No focal deficit present  Mental Status: She is alert and oriented to person, place, and time  Mental status is at baseline  Psychiatric:         Mood and Affect: Mood normal          Thought Content:  Thought content normal          Vital Signs  ED Triage Vitals [07/23/21 1138]   Temperature Pulse Respirations Blood Pressure SpO2   (!) 97 3 °F (36 3 °C) 98 18 155/92 97 %      Temp Source Heart Rate Source Patient Position - Orthostatic VS BP Location FiO2 (%)   Tympanic Monitor Sitting Right arm --      Pain Score       3           Vitals:    07/23/21 1138 07/23/21 1415   BP: 155/92 141/78   Pulse: 98 78   Patient Position - Orthostatic VS: Sitting Sitting         Visual Acuity      ED Medications  Medications   sodium chloride 0 9 % bolus 1,000 mL (0 mL Intravenous Stopped 7/23/21 1403)       Diagnostic Studies  Results Reviewed     Procedure Component Value Units Date/Time    Urine Microscopic [[id number]]  (Abnormal) Collected: 07/23/21 1343    Lab Status: Final result Specimen: Urine, Clean Catch Updated: 07/23/21 1355     RBC, UA None Seen /hpf      WBC, UA 10-20 /hpf      Epithelial Cells None Seen /hpf      Bacteria, UA Moderate /hpf     UA (URINE) with reflex to Scope [[id number]]  (Abnormal) Collected: 07/23/21 1343    Lab Status: Final result Specimen: Urine, Clean Catch Updated: 07/23/21 1347     Color, UA Yellow     Clarity, UA Slightly Cloudy     Specific [place], UA 1 020     pH, UA 7 5     Leukocytes, UA Small     Nitrite, UA Negative     Protein, UA Negative mg/dl      Glucose, UA Negative mg/dl      Ketones, UA 15 (1+) mg/dl      Urobilinogen, UA 0 2 E U /dl      Bilirubin, UA Negative     Blood, UA Negative    Comprehensive metabolic panel [[id number]] Collected: 07/23/21 1149    Lab Status: Final result Specimen: Blood from Arm, Left Updated: 07/23/21 1215     Sodium 142 mmol/L      Potassium 4 4 mmol/L      Chloride 105 mmol/L      CO2 26 mmol/L      ANION GAP 11 mmol/L      BUN 10 mg/dL      Creatinine 0 99 mg/dL      Glucose 113 mg/dL      Calcium 9 1 mg/dL      AST 23 U/L      ALT 32 U/L      Alkaline Phosphatase 77 U/L      Total Protein 7 3 g/dL      Albumin 4 1 g/dL      Total Bilirubin 0 54 mg/dL      eGFR 56 ml/min/1 73sq m     Narrative:      Roxanna guidelines for Chronic Kidney Disease (CKD):     Stage 1 with normal or high GFR (GFR > 90 mL/min/1 73 square meters)    Stage 2 Mild CKD (GFR = 60-89 mL/min/1 73 square meters)    Stage 3A Moderate CKD (GFR = 45-59 mL/min/1 73 square meters)    Stage 3B Moderate CKD (GFR = 30-44 mL/min/1 73 square meters)    Stage 4 Severe CKD (GFR = 15-29 mL/min/1 73 square meters)    Stage 5 End Stage CKD (GFR <15 mL/min/1 73 square meters)  Note: GFR calculation is accurate only with a steady state creatinine    Magnesium [[id number]]  (Normal) Collected: 07/23/21 1149    Lab Status: Final result Specimen: Blood from Arm, Left Updated: 07/23/21 1215     Magnesium 2 2 mg/dL     CBC and differential [[id number]]  (Abnormal) Collected: 07/23/21 1149    Lab Status: Final result Specimen: Blood from Arm, Left Updated: 07/23/21 1200     WBC 8 28 Thousand/uL      RBC 5 05 Million/uL      Hemoglobin 15 2 g/dL      Hematocrit 47 3 %      MCV 94 fL      MCH 30 1 pg      MCHC 32 1 g/dL      RDW 13 0 %      MPV 12 0 fL      Platelets 903 Thousands/uL      nRBC 0 /100 WBCs      Neutrophils Relative 75 %      Immat GRANS % 0 %      Lymphocytes Relative 16 %      Monocytes Relative 7 %      Eosinophils Relative 1 %      Basophils Relative 1 %      Neutrophils Absolute 6 28 Thousands/µL      Immature Grans Absolute 0 02 Thousand/uL      Lymphocytes Absolute 1 32 Thousands/µL      Monocytes Absolute 0 56 Thousand/µL      Eosinophils Absolute 0 04 Thousand/µL      Basophils Absolute 0 06 Thousands/µL                  CT renal stone study abdomen pelvis wo contrast   Final Result by Sarah Montejo DO (07/23 1239)   1  Nonobstructing 1 mm bilateral renal calyceal calculi  No obstructing renal calyceal or ureteric calculi are identified  2   Partially intrathoracic stomach, incompletely evaluated  3   Colonic diverticulosis        Workstation performed: [id number]                    Procedures  Procedures         ED Course                                           MDM  Number of Diagnoses or Management Options     Amount and/or Complexity of Data Reviewed  Clinical lab tests: ordered and reviewed  Tests in the radiology section of CPT®: ordered and reviewed  Tests in the medicine section of CPT®: ordered and reviewed    Patient Progress  Patient progress: stable      Disposition  Final diagnoses:   Flank pain     Time reflects when diagnosis was documented in both MDM as applicable and the Disposition within this note     Time User Action Codes Description Comment    7/23/2021  1:55 PM Madeline Wei Add [R10 9] Flank pain       ED Disposition     ED Disposition Condition Date/Time Comment    Discharge Stable Fri Jul 23, 2021  1:55 PM Latoya Mins Sine discharge to home/self care              Follow-up Information     Follow up With Specialties Details Why Contact Info Additional Information    Tosha Sheth DO Family Medicine Schedule an appointment as soon as possible for a visit   [address] [facility]  [phone]       [address] [facility] Emergency Department Emergency Medicine  If symptoms worsen [address] [facility] [address]  [address] [place] [address] Emergency Department, [place], [place], [address]          Discharge Medication List as of 7/23/2021  1:56 PM      START taking these medications    Details   predniSONE 20 mg tablet Take 2 tablets (40 mg total) by mouth daily for 5 days, Starting Fri 7/23/2021, Until Wed 7/28/2021, Print         CONTINUE these medications which have NOT CHANGED    Details   amLODIPine (NORVASC) 5 mg tablet Take 5 mg by mouth every morning , Until Discontinued, Historical Med      aspirin 81 MG tablet Take 81 mg by mouth every morning , Until Discontinued, Historical Med      atorvastatin (LIPITOR) 20 mg tablet Take 20 mg by mouth daily, Historical Med      Famotidine (PEPCID PO) Take by mouth every other day, Historical Med      MELATONIN PO Take by mouth as needed, Historical Med      Multiple Vitamins-Minerals (CENTRUM SILVER PO) Take by mouth daily, Historical Med      omeprazole (PriLOSEC) 40 MG capsule Take 40 mg by mouth every other day , Historical Med      alendronate (FOSAMAX) 70 mg tablet Take 70 mg by mouth once a week, Starting Wed 10/9/2019, Historical Med      ALPRAZolam (XANAX) 0 25 mg tablet Take by mouth daily at bedtime as needed for anxiety , Historical Med      Cholecalciferol (VITAMIN D) 2000 UNITS tablet Take 3,000 Units by mouth every morning , Historical Med      co-enzyme Q-10 30 MG capsule Take 200 mg by mouth daily , Historical Med      fenofibrate (TRIGLIDE) 160 MG tablet Take 160 mg by mouth every morning , Until Discontinued, Historical Med      metoclopramide (REGLAN) 10 mg tablet Take 10 mg by mouth 4 (four) times a day, Historical Med      multivitamin (THERAGRAN) TABS Take 1 tablet by mouth every morning , Until Discontinued, Historical Med      naproxen (NAPROSYN) 500 mg tablet Take 1 tablet (500 mg total) by mouth 2 (two) times a day with meals, Starting Fri 7/12/2019, Normal      olmesartan-hydrochlorothiazide (BENICAR HCT) 40-12 5 MG per tablet Take 1 tablet by mouth every morning, Starting Fri 10/11/2019, Historical Med      Omega-3 Fatty Acids (fish oil) 1,000 mg Take 1,000 mg by mouth 3 (three) times a day, Historical Med      potassium citrate (UROCIT-K) 10 mEq Take 10 mEq by mouth 2 (two) times a day, Starting Fri 9/27/2019, Historical Med      pyridoxine (VITAMIN B6) 100 mg tablet Take 100 mg by mouth daily, Until Discontinued, Historical Med           No discharge procedures on file      PDMP Review     None          ED Provider  Electronically Signed by           Kwasi Bone,   07/23/21 2482

## 2021-08-13 ENCOUNTER — APPOINTMENT (OUTPATIENT)
Dept: LAB | Facility: HOSPITAL | Age: 76
End: 2021-08-13
Attending: FAMILY MEDICINE
Payer: MEDICARE

## 2021-11-10 ENCOUNTER — OFFICE VISIT (OUTPATIENT)
Dept: OBGYN CLINIC | Facility: CLINIC | Age: 76
End: 2021-11-10
Payer: MEDICARE

## 2021-11-10 VITALS
DIASTOLIC BLOOD PRESSURE: 82 MMHG | TEMPERATURE: 98.6 F | BODY MASS INDEX: 23.81 KG/M2 | WEIGHT: 126 LBS | HEART RATE: 94 BPM | SYSTOLIC BLOOD PRESSURE: 145 MMHG

## 2021-11-10 DIAGNOSIS — M16.12 PRIMARY OSTEOARTHRITIS OF ONE HIP, LEFT: ICD-10-CM

## 2021-11-10 DIAGNOSIS — M87.052 AVASCULAR NECROSIS OF BONE OF LEFT HIP (HCC): Primary | ICD-10-CM

## 2021-11-10 PROCEDURE — 99214 OFFICE O/P EST MOD 30 MIN: CPT | Performed by: ORTHOPAEDIC SURGERY

## 2021-11-10 RX ORDER — OLMESARTAN MEDOXOMIL 40 MG/1
40 TABLET ORAL DAILY
COMMUNITY
Start: 2021-08-04 | End: 2022-04-05

## 2021-11-18 ENCOUNTER — APPOINTMENT (EMERGENCY)
Dept: RADIOLOGY | Facility: HOSPITAL | Age: 76
End: 2021-11-18
Payer: MEDICARE

## 2021-11-18 ENCOUNTER — HOSPITAL ENCOUNTER (EMERGENCY)
Facility: HOSPITAL | Age: 76
Discharge: HOME | End: 2021-11-18
Attending: EMERGENCY MEDICINE
Payer: MEDICARE

## 2021-11-18 VITALS
DIASTOLIC BLOOD PRESSURE: 64 MMHG | SYSTOLIC BLOOD PRESSURE: 130 MMHG | HEART RATE: 75 BPM | TEMPERATURE: 98 F | RESPIRATION RATE: 18 BRPM | OXYGEN SATURATION: 98 %

## 2021-11-18 DIAGNOSIS — N12 PYELONEPHRITIS: Primary | ICD-10-CM

## 2021-11-18 DIAGNOSIS — R10.9 LEFT FLANK PAIN: ICD-10-CM

## 2021-11-18 LAB
ALBUMIN SERPL BCP-MCNC: 4.1 G/DL (ref 3.5–5)
ALP SERPL-CCNC: 74 U/L (ref 46–116)
ALT SERPL W P-5'-P-CCNC: 33 U/L (ref 12–78)
ANION GAP SERPL CALCULATED.3IONS-SCNC: 11 MMOL/L (ref 4–13)
AST SERPL W P-5'-P-CCNC: 28 U/L (ref 5–45)
BACTERIA UR QL AUTO: ABNORMAL /HPF
BASOPHILS # BLD AUTO: 0.07 THOUSANDS/ΜL (ref 0–0.1)
BASOPHILS NFR BLD AUTO: 1 % (ref 0–1)
BILIRUB SERPL-MCNC: 0.62 MG/DL (ref 0.2–1)
BILIRUB UR QL STRIP: NEGATIVE
BUN SERPL-MCNC: 9 MG/DL (ref 5–25)
CALCIUM SERPL-MCNC: 9.3 MG/DL (ref 8.3–10.1)
CHLORIDE SERPL-SCNC: 107 MMOL/L (ref 100–108)
CLARITY UR: ABNORMAL
CO2 SERPL-SCNC: 26 MMOL/L (ref 21–32)
COLOR UR: YELLOW
CREAT SERPL-MCNC: 0.86 MG/DL (ref 0.6–1.3)
EOSINOPHIL # BLD AUTO: 0.03 THOUSAND/ΜL (ref 0–0.61)
EOSINOPHIL NFR BLD AUTO: 0 % (ref 0–6)
ERYTHROCYTE [DISTWIDTH] IN BLOOD BY AUTOMATED COUNT: 12.9 % (ref 11.6–15.1)
GFR SERPL CREATININE-BSD FRML MDRD: 65 ML/MIN/1.73SQ M
GLUCOSE SERPL-MCNC: 125 MG/DL (ref 65–140)
GLUCOSE UR STRIP-MCNC: NEGATIVE MG/DL
HCT VFR BLD AUTO: 48.1 % (ref 34.8–46.1)
HGB BLD-MCNC: 15 G/DL (ref 11.5–15.4)
HGB UR QL STRIP.AUTO: NEGATIVE
IMM GRANULOCYTES # BLD AUTO: 0.02 THOUSAND/UL (ref 0–0.2)
IMM GRANULOCYTES NFR BLD AUTO: 0 % (ref 0–2)
KETONES UR STRIP-MCNC: ABNORMAL MG/DL
LEUKOCYTE ESTERASE UR QL STRIP: ABNORMAL
LYMPHOCYTES # BLD AUTO: 0.95 THOUSANDS/ΜL (ref 0.6–4.47)
LYMPHOCYTES NFR BLD AUTO: 9 % (ref 14–44)
MCH RBC QN AUTO: 29.8 PG (ref 26.8–34.3)
MCHC RBC AUTO-ENTMCNC: 31.2 G/DL (ref 31.4–37.4)
MCV RBC AUTO: 96 FL (ref 82–98)
MONOCYTES # BLD AUTO: 0.51 THOUSAND/ΜL (ref 0.17–1.22)
MONOCYTES NFR BLD AUTO: 5 % (ref 4–12)
NEUTROPHILS # BLD AUTO: 8.53 THOUSANDS/ΜL (ref 1.85–7.62)
NEUTS SEG NFR BLD AUTO: 85 % (ref 43–75)
NITRITE UR QL STRIP: NEGATIVE
NON-SQ EPI CELLS URNS QL MICRO: ABNORMAL /HPF
NRBC BLD AUTO-RTO: 0 /100 WBCS
PH UR STRIP.AUTO: 7 [PH]
PLATELET # BLD AUTO: 213 THOUSANDS/UL (ref 149–390)
PMV BLD AUTO: 13 FL (ref 8.9–12.7)
POTASSIUM SERPL-SCNC: 4.4 MMOL/L (ref 3.5–5.3)
PROT SERPL-MCNC: 6.9 G/DL (ref 6.4–8.2)
PROT UR STRIP-MCNC: ABNORMAL MG/DL
RBC # BLD AUTO: 5.03 MILLION/UL (ref 3.81–5.12)
RBC #/AREA URNS AUTO: ABNORMAL /HPF
SODIUM SERPL-SCNC: 144 MMOL/L (ref 136–145)
SP GR UR STRIP.AUTO: 1.01 (ref 1–1.03)
UROBILINOGEN UR QL STRIP.AUTO: 0.2 E.U./DL
WBC # BLD AUTO: 10.11 THOUSAND/UL (ref 4.31–10.16)
WBC #/AREA URNS AUTO: ABNORMAL /HPF

## 2021-11-18 PROCEDURE — 99284 EMERGENCY DEPT VISIT MOD MDM: CPT

## 2021-11-18 PROCEDURE — 81001 URINALYSIS AUTO W/SCOPE: CPT | Performed by: EMERGENCY MEDICINE

## 2021-11-18 PROCEDURE — 85025 COMPLETE CBC W/AUTO DIFF WBC: CPT | Performed by: EMERGENCY MEDICINE

## 2021-11-18 PROCEDURE — 74176 CT ABD & PELVIS W/O CONTRAST: CPT

## 2021-11-18 PROCEDURE — 96375 TX/PRO/DX INJ NEW DRUG ADDON: CPT

## 2021-11-18 PROCEDURE — 36415 COLL VENOUS BLD VENIPUNCTURE: CPT | Performed by: EMERGENCY MEDICINE

## 2021-11-18 PROCEDURE — 99284 EMERGENCY DEPT VISIT MOD MDM: CPT | Performed by: EMERGENCY MEDICINE

## 2021-11-18 PROCEDURE — 80053 COMPREHEN METABOLIC PANEL: CPT | Performed by: EMERGENCY MEDICINE

## 2021-11-18 PROCEDURE — G1004 CDSM NDSC: HCPCS

## 2021-11-18 PROCEDURE — 96374 THER/PROPH/DIAG INJ IV PUSH: CPT

## 2021-11-18 RX ORDER — CEPHALEXIN 500 MG/1
500 CAPSULE ORAL ONCE
Status: COMPLETED | OUTPATIENT
Start: 2021-11-18 | End: 2021-11-18

## 2021-11-18 RX ORDER — ONDANSETRON 2 MG/ML
4 INJECTION INTRAMUSCULAR; INTRAVENOUS ONCE
Status: COMPLETED | OUTPATIENT
Start: 2021-11-18 | End: 2021-11-18

## 2021-11-18 RX ORDER — KETOROLAC TROMETHAMINE 30 MG/ML
15 INJECTION, SOLUTION INTRAMUSCULAR; INTRAVENOUS ONCE
Status: COMPLETED | OUTPATIENT
Start: 2021-11-18 | End: 2021-11-18

## 2021-11-18 RX ORDER — CEPHALEXIN 500 MG/1
500 CAPSULE ORAL EVERY 12 HOURS SCHEDULED
Qty: 20 CAPSULE | Refills: 0 | Status: SHIPPED | OUTPATIENT
Start: 2021-11-18 | End: 2021-11-28

## 2021-11-18 RX ADMIN — CEPHALEXIN 500 MG: 500 CAPSULE ORAL at 11:31

## 2021-11-18 RX ADMIN — ONDANSETRON 4 MG: 2 INJECTION INTRAMUSCULAR; INTRAVENOUS at 09:22

## 2021-11-18 RX ADMIN — KETOROLAC TROMETHAMINE 15 MG: 30 INJECTION, SOLUTION INTRAMUSCULAR at 09:22

## 2021-12-09 ENCOUNTER — OFFICE VISIT (OUTPATIENT)
Dept: OBGYN CLINIC | Facility: CLINIC | Age: 76
End: 2021-12-09
Payer: MEDICARE

## 2021-12-09 ENCOUNTER — APPOINTMENT (OUTPATIENT)
Dept: LAB | Facility: HOSPITAL | Age: 76
End: 2021-12-09
Attending: FAMILY MEDICINE
Payer: MEDICARE

## 2021-12-09 VITALS
SYSTOLIC BLOOD PRESSURE: 130 MMHG | BODY MASS INDEX: 23.03 KG/M2 | HEIGHT: 61 IN | WEIGHT: 122 LBS | HEART RATE: 90 BPM | DIASTOLIC BLOOD PRESSURE: 82 MMHG

## 2021-12-09 DIAGNOSIS — I10 ESSENTIAL HYPERTENSION, MALIGNANT: ICD-10-CM

## 2021-12-09 DIAGNOSIS — M70.62 GREATER TROCHANTERIC BURSITIS OF LEFT HIP: Primary | ICD-10-CM

## 2021-12-09 DIAGNOSIS — Z13.29 SCREENING FOR THYROID DISORDER: ICD-10-CM

## 2021-12-09 DIAGNOSIS — E55.9 AVITAMINOSIS D: ICD-10-CM

## 2021-12-09 DIAGNOSIS — M87.052 AVASCULAR NECROSIS OF BONE OF LEFT HIP (HCC): ICD-10-CM

## 2021-12-09 DIAGNOSIS — M25.559 HIP PAIN: ICD-10-CM

## 2021-12-09 DIAGNOSIS — M16.12 PRIMARY OSTEOARTHRITIS OF ONE HIP, LEFT: ICD-10-CM

## 2021-12-09 LAB
25(OH)D3 SERPL-MCNC: 48.6 NG/ML (ref 30–100)
ALBUMIN SERPL BCP-MCNC: 4 G/DL (ref 3.5–5)
ALP SERPL-CCNC: 67 U/L (ref 46–116)
ALT SERPL W P-5'-P-CCNC: 42 U/L (ref 12–78)
ANION GAP SERPL CALCULATED.3IONS-SCNC: 8 MMOL/L (ref 4–13)
AST SERPL W P-5'-P-CCNC: 27 U/L (ref 5–45)
BILIRUB SERPL-MCNC: 0.45 MG/DL (ref 0.2–1)
BUN SERPL-MCNC: 10 MG/DL (ref 5–25)
CALCIUM SERPL-MCNC: 9.3 MG/DL (ref 8.3–10.1)
CHLORIDE SERPL-SCNC: 107 MMOL/L (ref 100–108)
CHOLEST SERPL-MCNC: 187 MG/DL
CO2 SERPL-SCNC: 27 MMOL/L (ref 21–32)
CREAT SERPL-MCNC: 1.02 MG/DL (ref 0.6–1.3)
GFR SERPL CREATININE-BSD FRML MDRD: 53 ML/MIN/1.73SQ M
GLUCOSE SERPL-MCNC: 146 MG/DL (ref 65–140)
HDLC SERPL-MCNC: 73 MG/DL
LDLC SERPL CALC-MCNC: 83 MG/DL (ref 0–100)
NONHDLC SERPL-MCNC: 114 MG/DL
POTASSIUM SERPL-SCNC: 4.1 MMOL/L (ref 3.5–5.3)
PROT SERPL-MCNC: 6.7 G/DL (ref 6.4–8.2)
SODIUM SERPL-SCNC: 142 MMOL/L (ref 136–145)
TRIGL SERPL-MCNC: 157 MG/DL
TSH SERPL DL<=0.05 MIU/L-ACNC: 1.46 UIU/ML (ref 0.36–3.74)

## 2021-12-09 PROCEDURE — 82306 VITAMIN D 25 HYDROXY: CPT

## 2021-12-09 PROCEDURE — 80053 COMPREHEN METABOLIC PANEL: CPT

## 2021-12-09 PROCEDURE — 80061 LIPID PANEL: CPT

## 2021-12-09 PROCEDURE — 99214 OFFICE O/P EST MOD 30 MIN: CPT | Performed by: ORTHOPAEDIC SURGERY

## 2021-12-09 PROCEDURE — 84443 ASSAY THYROID STIM HORMONE: CPT

## 2021-12-09 PROCEDURE — 36415 COLL VENOUS BLD VENIPUNCTURE: CPT

## 2021-12-14 ENCOUNTER — EVALUATION (OUTPATIENT)
Dept: PHYSICAL THERAPY | Facility: CLINIC | Age: 76
End: 2021-12-14
Payer: MEDICARE

## 2021-12-14 DIAGNOSIS — M70.62 GREATER TROCHANTERIC BURSITIS OF LEFT HIP: ICD-10-CM

## 2021-12-14 DIAGNOSIS — M54.16 LUMBAR RADICULOPATHY: ICD-10-CM

## 2021-12-14 DIAGNOSIS — M25.559 HIP PAIN: Primary | ICD-10-CM

## 2021-12-14 PROCEDURE — 97161 PT EVAL LOW COMPLEX 20 MIN: CPT

## 2021-12-16 ENCOUNTER — OFFICE VISIT (OUTPATIENT)
Dept: PHYSICAL THERAPY | Facility: CLINIC | Age: 76
End: 2021-12-16
Payer: MEDICARE

## 2021-12-16 DIAGNOSIS — M54.16 LUMBAR RADICULOPATHY: ICD-10-CM

## 2021-12-16 DIAGNOSIS — M70.62 GREATER TROCHANTERIC BURSITIS OF LEFT HIP: Primary | ICD-10-CM

## 2021-12-16 DIAGNOSIS — M25.559 HIP PAIN: ICD-10-CM

## 2021-12-16 PROCEDURE — 97112 NEUROMUSCULAR REEDUCATION: CPT

## 2021-12-16 PROCEDURE — 97110 THERAPEUTIC EXERCISES: CPT

## 2021-12-19 ENCOUNTER — HOSPITAL ENCOUNTER (EMERGENCY)
Facility: HOSPITAL | Age: 76
Discharge: HOME | End: 2021-12-19
Attending: EMERGENCY MEDICINE | Admitting: EMERGENCY MEDICINE
Payer: MEDICARE

## 2021-12-19 ENCOUNTER — APPOINTMENT (EMERGENCY)
Dept: RADIOLOGY | Facility: HOSPITAL | Age: 76
End: 2021-12-19
Payer: MEDICARE

## 2021-12-19 VITALS
OXYGEN SATURATION: 93 % | RESPIRATION RATE: 18 BRPM | WEIGHT: 122.8 LBS | HEART RATE: 90 BPM | SYSTOLIC BLOOD PRESSURE: 135 MMHG | BODY MASS INDEX: 23.2 KG/M2 | TEMPERATURE: 97.4 F | DIASTOLIC BLOOD PRESSURE: 70 MMHG

## 2021-12-19 DIAGNOSIS — R87.9 ABNORMAL TISSUE IN UTERUS: ICD-10-CM

## 2021-12-19 DIAGNOSIS — M54.50 ACUTE LOW BACK PAIN: Primary | ICD-10-CM

## 2021-12-19 DIAGNOSIS — M51.36 DEGENERATIVE DISC DISEASE, LUMBAR: ICD-10-CM

## 2021-12-19 LAB
ALBUMIN SERPL BCP-MCNC: 4 G/DL (ref 3.5–5)
ALP SERPL-CCNC: 67 U/L (ref 46–116)
ALT SERPL W P-5'-P-CCNC: 39 U/L (ref 12–78)
ANION GAP SERPL CALCULATED.3IONS-SCNC: 10 MMOL/L (ref 4–13)
AST SERPL W P-5'-P-CCNC: 37 U/L (ref 5–45)
BASOPHILS # BLD AUTO: 0.08 THOUSANDS/ΜL (ref 0–0.1)
BASOPHILS NFR BLD AUTO: 1 % (ref 0–1)
BILIRUB SERPL-MCNC: 0.41 MG/DL (ref 0.2–1)
BILIRUB UR QL STRIP: NEGATIVE
BUN SERPL-MCNC: 14 MG/DL (ref 5–25)
CALCIUM SERPL-MCNC: 8.8 MG/DL (ref 8.3–10.1)
CHLORIDE SERPL-SCNC: 103 MMOL/L (ref 100–108)
CLARITY UR: CLEAR
CO2 SERPL-SCNC: 27 MMOL/L (ref 21–32)
COLOR UR: NORMAL
CREAT SERPL-MCNC: 0.84 MG/DL (ref 0.6–1.3)
EOSINOPHIL # BLD AUTO: 0.11 THOUSAND/ΜL (ref 0–0.61)
EOSINOPHIL NFR BLD AUTO: 2 % (ref 0–6)
ERYTHROCYTE [DISTWIDTH] IN BLOOD BY AUTOMATED COUNT: 13 % (ref 11.6–15.1)
GFR SERPL CREATININE-BSD FRML MDRD: 67 ML/MIN/1.73SQ M
GLUCOSE SERPL-MCNC: 117 MG/DL (ref 65–140)
GLUCOSE UR STRIP-MCNC: NEGATIVE MG/DL
HCT VFR BLD AUTO: 44.5 % (ref 34.8–46.1)
HGB BLD-MCNC: 14.1 G/DL (ref 11.5–15.4)
HGB UR QL STRIP.AUTO: NEGATIVE
IMM GRANULOCYTES # BLD AUTO: 0.01 THOUSAND/UL (ref 0–0.2)
IMM GRANULOCYTES NFR BLD AUTO: 0 % (ref 0–2)
KETONES UR STRIP-MCNC: NEGATIVE MG/DL
LEUKOCYTE ESTERASE UR QL STRIP: NEGATIVE
LYMPHOCYTES # BLD AUTO: 1.15 THOUSANDS/ΜL (ref 0.6–4.47)
LYMPHOCYTES NFR BLD AUTO: 20 % (ref 14–44)
MAGNESIUM SERPL-MCNC: 2.3 MG/DL (ref 1.6–2.6)
MCH RBC QN AUTO: 29.9 PG (ref 26.8–34.3)
MCHC RBC AUTO-ENTMCNC: 31.7 G/DL (ref 31.4–37.4)
MCV RBC AUTO: 95 FL (ref 82–98)
MONOCYTES # BLD AUTO: 0.54 THOUSAND/ΜL (ref 0.17–1.22)
MONOCYTES NFR BLD AUTO: 9 % (ref 4–12)
NEUTROPHILS # BLD AUTO: 3.95 THOUSANDS/ΜL (ref 1.85–7.62)
NEUTS SEG NFR BLD AUTO: 68 % (ref 43–75)
NITRITE UR QL STRIP: NEGATIVE
NRBC BLD AUTO-RTO: 0 /100 WBCS
PH UR STRIP.AUTO: 7 [PH]
PLATELET # BLD AUTO: 174 THOUSANDS/UL (ref 149–390)
PMV BLD AUTO: 12.1 FL (ref 8.9–12.7)
POTASSIUM SERPL-SCNC: 4.2 MMOL/L (ref 3.5–5.3)
PROT SERPL-MCNC: 6.9 G/DL (ref 6.4–8.2)
PROT UR STRIP-MCNC: NEGATIVE MG/DL
RBC # BLD AUTO: 4.71 MILLION/UL (ref 3.81–5.12)
SODIUM SERPL-SCNC: 140 MMOL/L (ref 136–145)
SP GR UR STRIP.AUTO: 1.01 (ref 1–1.03)
UROBILINOGEN UR QL STRIP.AUTO: 0.2 E.U./DL
WBC # BLD AUTO: 5.84 THOUSAND/UL (ref 4.31–10.16)

## 2021-12-19 PROCEDURE — 74177 CT ABD & PELVIS W/CONTRAST: CPT

## 2021-12-19 PROCEDURE — 36415 COLL VENOUS BLD VENIPUNCTURE: CPT | Performed by: EMERGENCY MEDICINE

## 2021-12-19 PROCEDURE — 80053 COMPREHEN METABOLIC PANEL: CPT | Performed by: EMERGENCY MEDICINE

## 2021-12-19 PROCEDURE — 99285 EMERGENCY DEPT VISIT HI MDM: CPT | Performed by: EMERGENCY MEDICINE

## 2021-12-19 PROCEDURE — 96374 THER/PROPH/DIAG INJ IV PUSH: CPT

## 2021-12-19 PROCEDURE — 85025 COMPLETE CBC W/AUTO DIFF WBC: CPT | Performed by: EMERGENCY MEDICINE

## 2021-12-19 PROCEDURE — 83735 ASSAY OF MAGNESIUM: CPT | Performed by: EMERGENCY MEDICINE

## 2021-12-19 PROCEDURE — 99284 EMERGENCY DEPT VISIT MOD MDM: CPT

## 2021-12-19 PROCEDURE — 96375 TX/PRO/DX INJ NEW DRUG ADDON: CPT

## 2021-12-19 PROCEDURE — 81003 URINALYSIS AUTO W/O SCOPE: CPT | Performed by: EMERGENCY MEDICINE

## 2021-12-19 PROCEDURE — G1004 CDSM NDSC: HCPCS

## 2021-12-19 PROCEDURE — 96361 HYDRATE IV INFUSION ADD-ON: CPT

## 2021-12-19 RX ORDER — ACETAMINOPHEN 325 MG/1
650 TABLET ORAL ONCE
Status: COMPLETED | OUTPATIENT
Start: 2021-12-19 | End: 2021-12-19

## 2021-12-19 RX ORDER — MORPHINE SULFATE 4 MG/ML
4 INJECTION, SOLUTION INTRAMUSCULAR; INTRAVENOUS ONCE
Status: COMPLETED | OUTPATIENT
Start: 2021-12-19 | End: 2021-12-19

## 2021-12-19 RX ORDER — ONDANSETRON 2 MG/ML
4 INJECTION INTRAMUSCULAR; INTRAVENOUS ONCE
Status: COMPLETED | OUTPATIENT
Start: 2021-12-19 | End: 2021-12-19

## 2021-12-19 RX ORDER — TRAMADOL HYDROCHLORIDE 50 MG/1
50 TABLET ORAL ONCE
Status: COMPLETED | OUTPATIENT
Start: 2021-12-19 | End: 2021-12-19

## 2021-12-19 RX ORDER — PREDNISONE 50 MG/1
50 TABLET ORAL DAILY
Qty: 5 TABLET | Refills: 0 | Status: SHIPPED | OUTPATIENT
Start: 2021-12-19 | End: 2021-12-24

## 2021-12-19 RX ORDER — TRAMADOL HYDROCHLORIDE 50 MG/1
50 TABLET ORAL EVERY 8 HOURS PRN
Qty: 15 TABLET | Refills: 0 | Status: SHIPPED | OUTPATIENT
Start: 2021-12-19 | End: 2021-12-29

## 2021-12-19 RX ORDER — SENNOSIDES 8.6 MG
650 CAPSULE ORAL EVERY 8 HOURS PRN
Qty: 30 TABLET | Refills: 0 | Status: SHIPPED | OUTPATIENT
Start: 2021-12-19 | End: 2022-05-03 | Stop reason: HOSPADM

## 2021-12-19 RX ADMIN — ACETAMINOPHEN 650 MG: 325 TABLET, FILM COATED ORAL at 22:40

## 2021-12-19 RX ADMIN — PREDNISONE 50 MG: 20 TABLET ORAL at 22:40

## 2021-12-19 RX ADMIN — MORPHINE SULFATE 4 MG: 4 INJECTION INTRAVENOUS at 19:06

## 2021-12-19 RX ADMIN — SODIUM CHLORIDE 500 ML: 0.9 INJECTION, SOLUTION INTRAVENOUS at 19:10

## 2021-12-19 RX ADMIN — ONDANSETRON 4 MG: 2 INJECTION INTRAMUSCULAR; INTRAVENOUS at 19:04

## 2021-12-19 RX ADMIN — IOHEXOL 100 ML: 350 INJECTION, SOLUTION INTRAVENOUS at 20:26

## 2021-12-19 RX ADMIN — TRAMADOL HYDROCHLORIDE 50 MG: 50 TABLET, FILM COATED ORAL at 22:40

## 2021-12-20 ENCOUNTER — TELEPHONE (OUTPATIENT)
Dept: OBGYN CLINIC | Facility: HOSPITAL | Age: 76
End: 2021-12-20

## 2021-12-21 ENCOUNTER — APPOINTMENT (OUTPATIENT)
Dept: PHYSICAL THERAPY | Facility: CLINIC | Age: 76
End: 2021-12-21
Payer: MEDICARE

## 2021-12-21 ENCOUNTER — TELEPHONE (OUTPATIENT)
Dept: PHYSICAL THERAPY | Facility: OTHER | Age: 76
End: 2021-12-21

## 2021-12-23 ENCOUNTER — APPOINTMENT (OUTPATIENT)
Dept: PHYSICAL THERAPY | Facility: CLINIC | Age: 76
End: 2021-12-23
Payer: MEDICARE

## 2021-12-27 ENCOUNTER — TELEPHONE (OUTPATIENT)
Dept: OBGYN CLINIC | Facility: MEDICAL CENTER | Age: 76
End: 2021-12-27

## 2021-12-27 ENCOUNTER — APPOINTMENT (OUTPATIENT)
Dept: PHYSICAL THERAPY | Facility: CLINIC | Age: 76
End: 2021-12-27
Payer: MEDICARE

## 2021-12-27 DIAGNOSIS — M54.16 RADICULOPATHY, LUMBAR REGION: Primary | ICD-10-CM

## 2021-12-27 DIAGNOSIS — G89.29 CHRONIC BILATERAL LOW BACK PAIN WITHOUT SCIATICA: ICD-10-CM

## 2021-12-27 DIAGNOSIS — M54.50 CHRONIC BILATERAL LOW BACK PAIN WITHOUT SCIATICA: ICD-10-CM

## 2021-12-27 DIAGNOSIS — M46.1 SACROILIITIS (HCC): ICD-10-CM

## 2021-12-27 RX ORDER — TRAMADOL HYDROCHLORIDE 50 MG/1
50 TABLET ORAL EVERY 6 HOURS PRN
Qty: 20 TABLET | Refills: 0 | Status: SHIPPED | OUTPATIENT
Start: 2021-12-27 | End: 2022-03-03

## 2021-12-27 RX ORDER — NALOXONE HYDROCHLORIDE 4 MG/.1ML
SPRAY NASAL
Qty: 1 EACH | Refills: 1 | Status: SHIPPED | OUTPATIENT
Start: 2021-12-27 | End: 2022-01-26

## 2021-12-28 ENCOUNTER — APPOINTMENT (OUTPATIENT)
Dept: PHYSICAL THERAPY | Facility: CLINIC | Age: 76
End: 2021-12-28
Payer: MEDICARE

## 2021-12-29 ENCOUNTER — TELEPHONE (OUTPATIENT)
Dept: PHYSICAL THERAPY | Facility: OTHER | Age: 76
End: 2021-12-29

## 2021-12-30 ENCOUNTER — APPOINTMENT (OUTPATIENT)
Dept: PHYSICAL THERAPY | Facility: CLINIC | Age: 76
End: 2021-12-30
Payer: MEDICARE

## 2021-12-30 ENCOUNTER — OFFICE VISIT (OUTPATIENT)
Dept: OBGYN CLINIC | Facility: CLINIC | Age: 76
End: 2021-12-30
Payer: MEDICARE

## 2021-12-30 VITALS — BODY MASS INDEX: 22.28 KG/M2 | WEIGHT: 118 LBS | HEIGHT: 61 IN

## 2021-12-30 DIAGNOSIS — M87.052 AVASCULAR NECROSIS OF BONE OF LEFT HIP (HCC): ICD-10-CM

## 2021-12-30 DIAGNOSIS — M46.1 SACROILIITIS (HCC): Primary | ICD-10-CM

## 2021-12-30 PROCEDURE — 99213 OFFICE O/P EST LOW 20 MIN: CPT | Performed by: ORTHOPAEDIC SURGERY

## 2022-01-05 ENCOUNTER — TELEPHONE (OUTPATIENT)
Dept: OBGYN CLINIC | Facility: HOSPITAL | Age: 77
End: 2022-01-05

## 2022-01-05 NOTE — TELEPHONE ENCOUNTER
I called and LMOM to advise that I faxed in her pain management script for her to number provided  It did confirm that it went through successfully

## 2022-01-05 NOTE — TELEPHONE ENCOUNTER
Patient is calling to request that the referral to pain management be sent to Dr Uma Nation at [facility] at [phone]

## 2022-01-08 ENCOUNTER — HOSPITAL ENCOUNTER (EMERGENCY)
Facility: HOSPITAL | Age: 77
Discharge: HOME | End: 2022-01-08
Attending: EMERGENCY MEDICINE
Payer: MEDICARE

## 2022-01-08 ENCOUNTER — APPOINTMENT (EMERGENCY)
Dept: RADIOLOGY | Facility: HOSPITAL | Age: 77
End: 2022-01-08
Payer: MEDICARE

## 2022-01-08 VITALS
TEMPERATURE: 97.3 F | DIASTOLIC BLOOD PRESSURE: 71 MMHG | RESPIRATION RATE: 20 BRPM | SYSTOLIC BLOOD PRESSURE: 123 MMHG | HEART RATE: 84 BPM | OXYGEN SATURATION: 96 %

## 2022-01-08 DIAGNOSIS — R10.13 EPIGASTRIC PAIN: Primary | ICD-10-CM

## 2022-01-08 DIAGNOSIS — K21.9 GERD (GASTROESOPHAGEAL REFLUX DISEASE): ICD-10-CM

## 2022-01-08 LAB
ALBUMIN SERPL BCP-MCNC: 4.3 G/DL (ref 3.5–5)
ALP SERPL-CCNC: 69 U/L (ref 46–116)
ALT SERPL W P-5'-P-CCNC: 34 U/L (ref 12–78)
ANION GAP SERPL CALCULATED.3IONS-SCNC: 10 MMOL/L (ref 4–13)
AST SERPL W P-5'-P-CCNC: 19 U/L (ref 5–45)
BASOPHILS # BLD AUTO: 0.07 THOUSANDS/ΜL (ref 0–0.1)
BASOPHILS NFR BLD AUTO: 1 % (ref 0–1)
BILIRUB SERPL-MCNC: 0.58 MG/DL (ref 0.2–1)
BUN SERPL-MCNC: 15 MG/DL (ref 5–25)
CALCIUM SERPL-MCNC: 9 MG/DL (ref 8.3–10.1)
CARDIAC TROPONIN I PNL SERPL HS: 4 NG/L
CHLORIDE SERPL-SCNC: 106 MMOL/L (ref 100–108)
CO2 SERPL-SCNC: 28 MMOL/L (ref 21–32)
CREAT SERPL-MCNC: 0.82 MG/DL (ref 0.6–1.3)
EOSINOPHIL # BLD AUTO: 0.11 THOUSAND/ΜL (ref 0–0.61)
EOSINOPHIL NFR BLD AUTO: 2 % (ref 0–6)
ERYTHROCYTE [DISTWIDTH] IN BLOOD BY AUTOMATED COUNT: 13.3 % (ref 11.6–15.1)
GFR SERPL CREATININE-BSD FRML MDRD: 69 ML/MIN/1.73SQ M
GLUCOSE SERPL-MCNC: 118 MG/DL (ref 65–140)
HCT VFR BLD AUTO: 52 % (ref 34.8–46.1)
HGB BLD-MCNC: 16.2 G/DL (ref 11.5–15.4)
IMM GRANULOCYTES # BLD AUTO: 0.01 THOUSAND/UL (ref 0–0.2)
IMM GRANULOCYTES NFR BLD AUTO: 0 % (ref 0–2)
LIPASE SERPL-CCNC: 110 U/L (ref 73–393)
LYMPHOCYTES # BLD AUTO: 0.92 THOUSANDS/ΜL (ref 0.6–4.47)
LYMPHOCYTES NFR BLD AUTO: 15 % (ref 14–44)
MCH RBC QN AUTO: 29.7 PG (ref 26.8–34.3)
MCHC RBC AUTO-ENTMCNC: 31.2 G/DL (ref 31.4–37.4)
MCV RBC AUTO: 95 FL (ref 82–98)
MONOCYTES # BLD AUTO: 0.42 THOUSAND/ΜL (ref 0.17–1.22)
MONOCYTES NFR BLD AUTO: 7 % (ref 4–12)
NEUTROPHILS # BLD AUTO: 4.77 THOUSANDS/ΜL (ref 1.85–7.62)
NEUTS SEG NFR BLD AUTO: 75 % (ref 43–75)
NRBC BLD AUTO-RTO: 0 /100 WBCS
PLATELET # BLD AUTO: 195 THOUSANDS/UL (ref 149–390)
PMV BLD AUTO: 12.9 FL (ref 8.9–12.7)
POTASSIUM SERPL-SCNC: 4 MMOL/L (ref 3.5–5.3)
PROT SERPL-MCNC: 7.2 G/DL (ref 6.4–8.2)
RBC # BLD AUTO: 5.45 MILLION/UL (ref 3.81–5.12)
SODIUM SERPL-SCNC: 144 MMOL/L (ref 136–145)
WBC # BLD AUTO: 6.3 THOUSAND/UL (ref 4.31–10.16)

## 2022-01-08 PROCEDURE — 96375 TX/PRO/DX INJ NEW DRUG ADDON: CPT

## 2022-01-08 PROCEDURE — 36415 COLL VENOUS BLD VENIPUNCTURE: CPT | Performed by: EMERGENCY MEDICINE

## 2022-01-08 PROCEDURE — 71045 X-RAY EXAM CHEST 1 VIEW: CPT

## 2022-01-08 PROCEDURE — 93005 ELECTROCARDIOGRAM TRACING: CPT

## 2022-01-08 PROCEDURE — 80053 COMPREHEN METABOLIC PANEL: CPT | Performed by: EMERGENCY MEDICINE

## 2022-01-08 PROCEDURE — 99285 EMERGENCY DEPT VISIT HI MDM: CPT | Performed by: EMERGENCY MEDICINE

## 2022-01-08 PROCEDURE — 83690 ASSAY OF LIPASE: CPT | Performed by: EMERGENCY MEDICINE

## 2022-01-08 PROCEDURE — 84484 ASSAY OF TROPONIN QUANT: CPT | Performed by: EMERGENCY MEDICINE

## 2022-01-08 PROCEDURE — 99285 EMERGENCY DEPT VISIT HI MDM: CPT

## 2022-01-08 PROCEDURE — 96374 THER/PROPH/DIAG INJ IV PUSH: CPT

## 2022-01-08 PROCEDURE — 85025 COMPLETE CBC W/AUTO DIFF WBC: CPT | Performed by: EMERGENCY MEDICINE

## 2022-01-08 RX ORDER — OMEPRAZOLE 20 MG/1
20 CAPSULE, DELAYED RELEASE ORAL DAILY
Qty: 30 CAPSULE | Refills: 0 | Status: SHIPPED | OUTPATIENT
Start: 2022-01-08 | End: 2022-02-10 | Stop reason: SDUPTHER

## 2022-01-08 RX ORDER — ONDANSETRON 2 MG/ML
4 INJECTION INTRAMUSCULAR; INTRAVENOUS ONCE
Status: COMPLETED | OUTPATIENT
Start: 2022-01-08 | End: 2022-01-08

## 2022-01-08 RX ADMIN — FAMOTIDINE 20 MG: 10 INJECTION INTRAVENOUS at 12:33

## 2022-01-08 RX ADMIN — ONDANSETRON 4 MG: 2 INJECTION INTRAMUSCULAR; INTRAVENOUS at 12:33

## 2022-01-08 NOTE — ED PROVIDER NOTES
History  Chief Complaint   Patient presents with    Chest Pain     started with epigasric pain this am, no shortness of breath     Patient in ER with complaint of epigastric pain that began this morning upon waking up at 7a  She denies vomiting  She describes abdominal pain as a mild discomfort which has since resolved  She has not taken any medication for pain relief  Patient with anorexia and nausea for greater than 2 weeks  At her last ER visit, patient's CT scan showed some endometrial abnormalities and she was advised to get an outpatient ultrasound which she is yet to schedule  She has a prior medical history of hypertension, hyperlipidemia, CKD, anxiety, acid reflux  History provided by:  Patient   used: No    Abdominal Pain  Pain location:  Epigastric  Pain radiates to:  Does not radiate  Pain severity:  Mild  Onset quality:  Sudden  Timing:  Intermittent  Progression:  Partially resolved  Chronicity:  New  Relieved by:  None tried  Worsened by:  Nothing  Ineffective treatments:  None tried  Associated symptoms: anorexia and nausea    Associated symptoms: no chills, no cough, no diarrhea, no dysuria, no fever, no hematuria, no shortness of breath and no vomiting    Nausea:     Severity:  Moderate    Onset quality:  Sudden    Timing:  Intermittent    Progression:  Waxing and waning      Prior to Admission Medications   Prescriptions Last Dose Informant Patient Reported? Taking?    ALPRAZolam (XANAX) 0 25 mg tablet   Yes No   Sig: Take by mouth daily at bedtime as needed for anxiety    Cholecalciferol (VITAMIN D) 2000 UNITS tablet   Yes No   Sig: Take 3,000 Units by mouth every morning    Famotidine (PEPCID PO)   Yes No   Sig: Take by mouth every other day   MELATONIN PO   Yes No   Sig: Take by mouth as needed   Multiple Vitamins-Minerals (CENTRUM SILVER PO)   Yes No   Sig: Take by mouth daily   Omega-3 Fatty Acids (fish oil) 1,000 mg   Yes No   Sig: Take 1,000 mg by mouth 3 (three) times a day   acetaminophen (TYLENOL) 650 mg CR tablet Past Week at Unknown time  No Yes   Sig: Take 1 tablet (650 mg total) by mouth every 8 (eight) hours as needed for mild pain   alendronate (FOSAMAX) 70 mg tablet   Yes No   Sig: Take 70 mg by mouth once a week   amLODIPine (NORVASC) 5 mg tablet   Yes No   Sig: Take 5 mg by mouth every morning  aspirin 81 MG tablet   Yes No   Sig: Take 81 mg by mouth every morning  atorvastatin (LIPITOR) 20 mg tablet   Yes No   Sig: Take 20 mg by mouth daily   co-enzyme Q-10 30 MG capsule   Yes No   Sig: Take 200 mg by mouth daily    fenofibrate (TRIGLIDE) 160 MG tablet   Yes No   Sig: Take 160 mg by mouth every morning  metoclopramide (REGLAN) 10 mg tablet   Yes No   Sig: Take 10 mg by mouth 4 (four) times a day   multivitamin (THERAGRAN) TABS   Yes No   Sig: Take 1 tablet by mouth every morning  naloxone (NARCAN) 4 mg/0 1 mL nasal spray   No No   Sig: Administer 1 spray into a nostril  If no response after 2-3 minutes, give another dose in the other nostril using a new spray     naproxen (NAPROSYN) 500 mg tablet   No No   Sig: Take 1 tablet (500 mg total) by mouth 2 (two) times a day with meals   Patient not taking: Reported on 10/14/2019   olmesartan (BENICAR) 40 mg tablet   Yes No   Sig: Take 40 mg by mouth daily   olmesartan-hydrochlorothiazide (BENICAR HCT) 40-12 5 MG per tablet   Yes No   Sig: Take 1 tablet by mouth every morning   omeprazole (PriLOSEC) 40 MG capsule   Yes No   Sig: Take 40 mg by mouth every other day    potassium citrate (UROCIT-K) 10 mEq   Yes No   Sig: Take 10 mEq by mouth 2 (two) times a day   pyridoxine (VITAMIN B6) 100 mg tablet   Yes No   Sig: Take 100 mg by mouth daily   traMADol (Ultram) 50 mg tablet   No No   Sig: Take 1 tablet (50 mg total) by mouth every 6 (six) hours as needed for moderate pain      Facility-Administered Medications: None       Past Medical History:   Diagnosis Date    Acid reflux     Anxiety     Chronic kidney disease     chronic stones    Fractures     H/O hiatal hernia     Hyperlipidemia     Hypertension        Past Surgical History:   Procedure Laterality Date    APPENDECTOMY      BREAST SURGERY Left     excision cyst    CYSTOSCOPY      with stone extraction x 2    CYSTOSCOPY W/ RETROGRADES Left 09/17/2012    stent removal,     CYSTOSCOPY W/ URETEROSCOPY W/ LITHOTRIPSY Left 12/11/2006    Ca Phos 98%    CYSTOSCOPY W/ URETEROSCOPY W/ LITHOTRIPSY Left 08/12/2012    Ca ox di 60%, Ca ox mono 20%    ESOPHAGOGASTRODUODENOSCOPY      EXTRACORPOREAL SHOCK WAVE LITHOTRIPSY Left 4/13/2016    Procedure: LITHROTRIPSY EXTRACORPORAL SHOCKWAVE (ESWL); Surgeon: Anna Skaggs MD;  Location: [facility] MAIN OR;  Service:     SC CYSTO/URETERO W/LITHOTRIPSY &INDWELL STENT INSRT Left 9/1/2016    Procedure: CYSTOSCOPY URETEROSCOPY WITH STONE EXTRACTION , RETROGRADE PYELOGRAM AND INSERTION STENT URETERAL;  Surgeon: Anna Skaggs MD;  Location: [address] [place] [address];  Service: Urology    SC CYSTO/URETERO W/LITHOTRIPSY &INDWELL STENT INSRT Left 8/12/2016    Procedure: CYSTOSCOPY URETEROSCOPY WITH LITHOTRIPSY HOLMIUM LASER, RETROGRADE PYELOGRAM AND INSERTION STENT URETERAL;  Surgeon: Anna Skaggs MD;  Location: [address] [place] [address];  Service: Urology    TUBAL LIGATION      URETERAL STENT PLACEMENT Left 08/31/2012    Narrowing of the left distal ureter for 3cm           Family History   Problem Relation Age of Onset    Stroke Father         TIA    Parkinsonism Father     Cancer Maternal Aunt     Cancer Maternal Uncle     Heart disease Maternal Uncle         Massive MI    No Known Problems Mother     No Known Problems Sister     No Known Problems Brother     No Known Problems Paternal Aunt     No Known Problems Paternal Uncle     No Known Problems Maternal Grandmother     No Known Problems Maternal Grandfather     No Known Problems Paternal Grandmother     No Known Problems Paternal Grandfather      I have reviewed and agree with the history as documented  E-Cigarette/Vaping    E-Cigarette Use Never User      E-Cigarette/Vaping Substances     Social History     Tobacco Use    Smoking status: Never Smoker    Smokeless tobacco: Never Used   Vaping Use    Vaping Use: Never used   Substance Use Topics    Alcohol use: Yes     Comment: occassional    Drug use: No       Review of Systems   Constitutional: Negative for chills and fever  Respiratory: Negative for cough, chest tightness and shortness of breath  Gastrointestinal: Positive for abdominal pain, anorexia and nausea  Negative for diarrhea and vomiting  Genitourinary: Negative for dysuria, frequency, hematuria and urgency  Musculoskeletal: Negative for back pain, neck pain and neck stiffness  All other systems reviewed and are negative  Physical Exam  Physical Exam  Vitals and nursing note reviewed  Constitutional:       General: She is not in acute distress  Appearance: She is well-developed  She is not diaphoretic  HENT:      Head: Normocephalic and atraumatic  Eyes:      Conjunctiva/sclera: Conjunctivae normal       Pupils: Pupils are equal, round, and reactive to light  Cardiovascular:      Rate and Rhythm: Normal rate and regular rhythm  Heart sounds: Normal heart sounds  No murmur heard  Pulmonary:      Effort: Pulmonary effort is normal  No respiratory distress  Breath sounds: Normal breath sounds  Abdominal:      General: Bowel sounds are normal  There is no distension  Palpations: Abdomen is soft  Tenderness: There is no abdominal tenderness  Musculoskeletal:         General: No deformity  Normal range of motion  Cervical back: Normal range of motion and neck supple  Skin:     General: Skin is warm and dry  Capillary Refill: Capillary refill takes less than 2 seconds  Coloration: Skin is not pale  Findings: No rash  Neurological:      General: No focal deficit present        Mental Status: She is alert and oriented to person, place, and time  Cranial Nerves: No cranial nerve deficit  Psychiatric:         Mood and Affect: Mood is anxious           Behavior: Behavior normal          Vital Signs  ED Triage Vitals   Temperature Pulse Respirations Blood Pressure SpO2   01/08/22 1049 01/08/22 1049 01/08/22 1049 01/08/22 1049 01/08/22 1049   (!) 97 °F (36 1 °C) 86 18 135/74 98 %      Temp Source Heart Rate Source Patient Position - Orthostatic VS BP Location FiO2 (%)   01/08/22 1049 01/08/22 1049 01/08/22 1049 01/08/22 1049 --   Tympanic Monitor Lying Right arm       Pain Score       01/08/22 1111       3           Vitals:    01/08/22 1111 01/08/22 1130 01/08/22 1330 01/08/22 1400   BP: 124/78 142/77 124/76 123/71   Pulse: 89 80 74 84   Patient Position - Orthostatic VS: Sitting Sitting Sitting          Visual Acuity      ED Medications  Medications   famotidine (PEPCID) injection 20 mg (20 mg Intravenous Given 1/8/22 1233)   ondansetron (ZOFRAN) injection 4 mg (4 mg Intravenous Given 1/8/22 1233)       Diagnostic Studies  Results Reviewed     Procedure Component Value Units Date/Time    HS Troponin 0hr (reflex protocol) [[id number]]  (Normal) Collected: 01/08/22 1219    Lab Status: Final result Specimen: Blood from Arm, Left Updated: 01/08/22 1257     hs TnI 0hr 4 ng/L     Comprehensive metabolic panel [[id number]] Collected: 01/08/22 1219    Lab Status: Final result Specimen: Blood from Arm, Left Updated: 01/08/22 1246     Sodium 144 mmol/L      Potassium 4 0 mmol/L      Chloride 106 mmol/L      CO2 28 mmol/L      ANION GAP 10 mmol/L      BUN 15 mg/dL      Creatinine 0 82 mg/dL      Glucose 118 mg/dL      Calcium 9 0 mg/dL      AST 19 U/L      ALT 34 U/L      Alkaline Phosphatase 69 U/L      Total Protein 7 2 g/dL      Albumin 4 3 g/dL      Total Bilirubin 0 58 mg/dL      eGFR 69 ml/min/1 73sq m     Narrative:      [place] guidelines for Chronic Kidney Disease (CKD):   Stage 1 with normal or high GFR (GFR > 90 mL/min/1 73 square meters)    Stage 2 Mild CKD (GFR = 60-89 mL/min/1 73 square meters)    Stage 3A Moderate CKD (GFR = 45-59 mL/min/1 73 square meters)    Stage 3B Moderate CKD (GFR = 30-44 mL/min/1 73 square meters)    Stage 4 Severe CKD (GFR = 15-29 mL/min/1 73 square meters)    Stage 5 End Stage CKD (GFR <15 mL/min/1 73 square meters)  Note: GFR calculation is accurate only with a steady state creatinine    Lipase [[id number]]  (Normal) Collected: 01/08/22 1219    Lab Status: Final result Specimen: Blood from Arm, Left Updated: 01/08/22 1246     Lipase 110 u/L     CBC and differential [[id number]]  (Abnormal) Collected: 01/08/22 1219    Lab Status: Final result Specimen: Blood from Arm, Left Updated: 01/08/22 1224     WBC 6 30 Thousand/uL      RBC 5 45 Million/uL      Hemoglobin 16 2 g/dL      Hematocrit 52 0 %      MCV 95 fL      MCH 29 7 pg      MCHC 31 2 g/dL      RDW 13 3 %      MPV 12 9 fL      Platelets 651 Thousands/uL      nRBC 0 /100 WBCs      Neutrophils Relative 75 %      Immat GRANS % 0 %      Lymphocytes Relative 15 %      Monocytes Relative 7 %      Eosinophils Relative 2 %      Basophils Relative 1 %      Neutrophils Absolute 4 77 Thousands/µL      Immature Grans Absolute 0 01 Thousand/uL      Lymphocytes Absolute 0 92 Thousands/µL      Monocytes Absolute 0 42 Thousand/µL      Eosinophils Absolute 0 11 Thousand/µL      Basophils Absolute 0 07 Thousands/µL                  XR chest 1 view portable   ED Interpretation by Siri Rhoades DO (01/08 2711)   nad      Final Result by Renea Primrose, MD (01/08 8929)      No acute cardiopulmonary disease  Hiatal hernia                    Workstation performed: [id number]                    Procedures  ECG 12 Lead Documentation Only    Date/Time: 1/8/2022 10:46 AM  Performed by: Siri Rhoades DO  Authorized by: Siri Rhoades DO     Indications / Diagnosis:  Epigastric pain  ECG reviewed by me, the ED Provider: yes    Patient location:  ED  Previous ECG:     Previous ECG:  Compared to current    Similarity:  No change    Comparison to cardiac monitor: Yes    Interpretation:     Interpretation: non-specific    Rate:     ECG rate:  93bpm    ECG rate assessment: normal    Rhythm:     Rhythm: sinus rhythm    Ectopy:     Ectopy: none    QRS:     QRS axis:  Normal  Conduction:     Conduction: normal    ST segments:     ST segments:  Non-specific  T waves:     T waves: non-specific               ED Course             HEART Risk Score      Most Recent Value   Heart Score Risk Calculator    History 0 Filed at: 01/08/2022 1350   ECG 0 Filed at: 01/08/2022 1350   Age 2 Filed at: 01/08/2022 1350   Risk Factors 1 Filed at: 01/08/2022 1350   Troponin 0 Filed at: 01/08/2022 1350   HEART Score 3 Filed at: 01/08/2022 1350                        SBIRT 20yo+      Most Recent Value   SBIRT (23 yo +)    In order to provide better care to our patients, we are screening all of our patients for alcohol and drug use  Would it be okay to ask you these screening questions? Yes Filed at: 01/08/2022 1133   Initial Alcohol Screen: US AUDIT-C     1  How often do you have a drink containing alcohol? 3 Filed at: 01/08/2022 1133   2  How many drinks containing alcohol do you have on a typical day you are drinking? 0 Filed at: 01/08/2022 1133   3a  Male UNDER 65: How often do you have five or more drinks on one occasion? 0 Filed at: 01/08/2022 1133   3b  FEMALE Any Age, or MALE 65+: How often do you have 4 or more drinks on one occassion? 0 Filed at: 01/08/2022 1133   Audit-C Score 3 Filed at: 01/08/2022 1133   KEYANA: How many times in the past year have you    Used an illegal drug or used a prescription medication for non-medical reasons?  Never Filed at: 01/08/2022 1133                    MDM  Number of Diagnoses or Management Options  Epigastric pain: new and requires workup  GERD (gastroesophageal reflux disease): new and requires workup  Diagnosis management comments: Patient re-evaluated  Symptoms have completely resolved  I suspect the patient's symptoms are secondary to GERD  HEART score is 3  She admits that anorexia is secondary to anxiety, as she is concerned about her chronic back pain and is yet to get an appointment with pain management  I reviewed patient's recent CT  Patient will follow-up with gastroenterology for endoscopy (hx of hiatal hernia) and gynecology of for further evaluation of endometrial abnormalities  Patient will also schedule ultrasound as ordered  Patient return to emergency room for worsening symptoms  Will discharge to home with a script for Prilosec as she is currently taking Pepcid  Amount and/or Complexity of Data Reviewed  Clinical lab tests: ordered and reviewed  Tests in the radiology section of CPT®: reviewed    Risk of Complications, Morbidity, and/or Mortality  Presenting problems: high  Diagnostic procedures: high  Management options: high    Patient Progress  Patient progress: improved      Disposition  Final diagnoses:   Epigastric pain   GERD (gastroesophageal reflux disease)     Time reflects when diagnosis was documented in both MDM as applicable and the Disposition within this note     Time User Action Codes Description Comment    1/8/2022  1:53 PM Mamadou Pro [R10 13] Epigastric pain     1/8/2022  1:53 PM Mamadou Pro [K21 9] GERD (gastroesophageal reflux disease)       ED Disposition     ED Disposition Condition Date/Time Comment    Discharge Stable Sat Jan 8, 2022  1:53 PM Rickey Leahy discharge to home/self care              Follow-up Information     Follow up With Specialties Details Why Contact Info Additional Information    Sophy Gold DO Family Medicine Schedule an appointment as soon as possible for a visit in 2 days for follow up [address] [address]  [phone]       [facility] Gastroenterology Schedule an appointment as soon as possible for a visit in 2 days for follow up for epigastric pain [address] [email or web address] [address]  [address] [place] [phone] [address] [facility] [address] [place], [address] [employer] [address] [facility] [place], [address]    Janee Pollack MD Obstetrics and Gynecology, Obstetrics, Gynecology Schedule an appointment as soon as possible for a visit in 2 days for follow up [address] [facility]  [phone]             Discharge Medication List as of 1/8/2022  1:56 PM      START taking these medications    Details   !! omeprazole (PriLOSEC) 20 mg delayed release capsule Take 1 capsule (20 mg total) by mouth daily, Starting Sat 1/8/2022, Normal       !! - Potential duplicate medications found  Please discuss with provider        CONTINUE these medications which have NOT CHANGED    Details   acetaminophen (TYLENOL) 650 mg CR tablet Take 1 tablet (650 mg total) by mouth every 8 (eight) hours as needed for mild pain, Starting Sun 12/19/2021, Normal      alendronate (FOSAMAX) 70 mg tablet Take 70 mg by mouth once a week, Starting Wed 10/9/2019, Historical Med      ALPRAZolam (XANAX) 0 25 mg tablet Take by mouth daily at bedtime as needed for anxiety , Historical Med      amLODIPine (NORVASC) 5 mg tablet Take 5 mg by mouth every morning , Until Discontinued, Historical Med      aspirin 81 MG tablet Take 81 mg by mouth every morning , Until Discontinued, Historical Med      atorvastatin (LIPITOR) 20 mg tablet Take 20 mg by mouth daily, Historical Med      Cholecalciferol (VITAMIN D) 2000 UNITS tablet Take 3,000 Units by mouth every morning , Historical Med      co-enzyme Q-10 30 MG capsule Take 200 mg by mouth daily , Historical Med      Famotidine (PEPCID PO) Take by mouth every other day, Historical Med      fenofibrate (TRIGLIDE) 160 MG tablet Take 160 mg by mouth every morning , Until Discontinued, Historical Med      MELATONIN PO Take by mouth as needed, Historical Med      metoclopramide (REGLAN) 10 mg tablet Take 10 mg by mouth 4 (four) times a day, Historical Med      Multiple Vitamins-Minerals (CENTRUM SILVER PO) Take by mouth daily, Historical Med      multivitamin (THERAGRAN) TABS Take 1 tablet by mouth every morning , Until Discontinued, Historical Med      naloxone (NARCAN) 4 mg/0 1 mL nasal spray Administer 1 spray into a nostril  If no response after 2-3 minutes, give another dose in the other nostril using a new spray , Normal      naproxen (NAPROSYN) 500 mg tablet Take 1 tablet (500 mg total) by mouth 2 (two) times a day with meals, Starting Fri 7/12/2019, Normal      olmesartan (BENICAR) 40 mg tablet Take 40 mg by mouth daily, Starting Wed 8/4/2021, Historical Med      olmesartan-hydrochlorothiazide (BENICAR HCT) 40-12 5 MG per tablet Take 1 tablet by mouth every morning, Starting Fri 10/11/2019, Historical Med      Omega-3 Fatty Acids (fish oil) 1,000 mg Take 1,000 mg by mouth 3 (three) times a day, Historical Med      !! omeprazole (PriLOSEC) 40 MG capsule Take 40 mg by mouth every other day , Historical Med      potassium citrate (UROCIT-K) 10 mEq Take 10 mEq by mouth 2 (two) times a day, Starting Fri 9/27/2019, Historical Med      pyridoxine (VITAMIN B6) 100 mg tablet Take 100 mg by mouth daily, Until Discontinued, Historical Med      traMADol (Ultram) 50 mg tablet Take 1 tablet (50 mg total) by mouth every 6 (six) hours as needed for moderate pain, Starting Mon 12/27/2021, Normal       !! - Potential duplicate medications found  Please discuss with provider  No discharge procedures on file      PDMP Review       Value Time User    PDMP Reviewed  Yes 12/27/2021  4:04 PM Madelaine Wright PA-C          ED Provider  Electronically Signed by           Corazon Reyes DO  01/10/22 0308

## 2022-01-10 LAB
ATRIAL RATE: 93 BPM
P AXIS: 27 DEGREES
PR INTERVAL: 182 MS
QRS AXIS: 36 DEGREES
QRSD INTERVAL: 72 MS
QT INTERVAL: 378 MS
QTC INTERVAL: 469 MS
T WAVE AXIS: 120 DEGREES
VENTRICULAR RATE: 93 BPM

## 2022-01-10 PROCEDURE — 93010 ELECTROCARDIOGRAM REPORT: CPT | Performed by: INTERNAL MEDICINE

## 2022-01-20 ENCOUNTER — TELEPHONE (OUTPATIENT)
Dept: OBGYN CLINIC | Facility: CLINIC | Age: 77
End: 2022-01-20

## 2022-01-24 ENCOUNTER — APPOINTMENT (EMERGENCY)
Dept: RADIOLOGY | Facility: HOSPITAL | Age: 77
End: 2022-01-24
Payer: MEDICARE

## 2022-01-24 ENCOUNTER — TELEPHONE (OUTPATIENT)
Dept: EMERGENCY DEPT | Facility: HOSPITAL | Age: 77
End: 2022-01-24

## 2022-01-24 ENCOUNTER — HOSPITAL ENCOUNTER (OUTPATIENT)
Dept: RADIOLOGY | Facility: HOSPITAL | Age: 77
Discharge: HOME | End: 2022-01-24
Payer: MEDICARE

## 2022-01-24 ENCOUNTER — HOSPITAL ENCOUNTER (EMERGENCY)
Facility: HOSPITAL | Age: 77
Discharge: HOME | End: 2022-01-24
Attending: EMERGENCY MEDICINE | Admitting: EMERGENCY MEDICINE
Payer: MEDICARE

## 2022-01-24 VITALS
OXYGEN SATURATION: 96 % | RESPIRATION RATE: 18 BRPM | SYSTOLIC BLOOD PRESSURE: 143 MMHG | WEIGHT: 113 LBS | HEART RATE: 78 BPM | DIASTOLIC BLOOD PRESSURE: 81 MMHG | TEMPERATURE: 98 F | BODY MASS INDEX: 21.34 KG/M2 | HEIGHT: 61 IN

## 2022-01-24 DIAGNOSIS — R11.2 NAUSEA AND VOMITING: Primary | ICD-10-CM

## 2022-01-24 DIAGNOSIS — R93.89 THICKENED ENDOMETRIUM: ICD-10-CM

## 2022-01-24 DIAGNOSIS — Z78.0 POST-MENOPAUSAL: ICD-10-CM

## 2022-01-24 LAB
ALBUMIN SERPL BCP-MCNC: 3.5 G/DL (ref 3.5–5)
ALP SERPL-CCNC: 63 U/L (ref 46–116)
ALT SERPL W P-5'-P-CCNC: 28 U/L (ref 12–78)
ANION GAP SERPL CALCULATED.3IONS-SCNC: 10 MMOL/L (ref 4–13)
AST SERPL W P-5'-P-CCNC: 22 U/L (ref 5–45)
BASOPHILS # BLD AUTO: 0.07 THOUSANDS/ΜL (ref 0–0.1)
BASOPHILS NFR BLD AUTO: 1 % (ref 0–1)
BILIRUB SERPL-MCNC: 0.47 MG/DL (ref 0.2–1)
BUN SERPL-MCNC: 10 MG/DL (ref 5–25)
CALCIUM SERPL-MCNC: 8.6 MG/DL (ref 8.3–10.1)
CHLORIDE SERPL-SCNC: 108 MMOL/L (ref 100–108)
CO2 SERPL-SCNC: 26 MMOL/L (ref 21–32)
CREAT SERPL-MCNC: 0.81 MG/DL (ref 0.6–1.3)
EOSINOPHIL # BLD AUTO: 0.09 THOUSAND/ΜL (ref 0–0.61)
EOSINOPHIL NFR BLD AUTO: 2 % (ref 0–6)
ERYTHROCYTE [DISTWIDTH] IN BLOOD BY AUTOMATED COUNT: 13.4 % (ref 11.6–15.1)
GFR SERPL CREATININE-BSD FRML MDRD: 70 ML/MIN/1.73SQ M
GLUCOSE SERPL-MCNC: 117 MG/DL (ref 65–140)
HCT VFR BLD AUTO: 45.4 % (ref 34.8–46.1)
HGB BLD-MCNC: 14.6 G/DL (ref 11.5–15.4)
IMM GRANULOCYTES # BLD AUTO: 0.03 THOUSAND/UL (ref 0–0.2)
IMM GRANULOCYTES NFR BLD AUTO: 1 % (ref 0–2)
LIPASE SERPL-CCNC: 83 U/L (ref 73–393)
LYMPHOCYTES # BLD AUTO: 0.98 THOUSANDS/ΜL (ref 0.6–4.47)
LYMPHOCYTES NFR BLD AUTO: 19 % (ref 14–44)
MCH RBC QN AUTO: 30.3 PG (ref 26.8–34.3)
MCHC RBC AUTO-ENTMCNC: 32.2 G/DL (ref 31.4–37.4)
MCV RBC AUTO: 94 FL (ref 82–98)
MONOCYTES # BLD AUTO: 0.56 THOUSAND/ΜL (ref 0.17–1.22)
MONOCYTES NFR BLD AUTO: 11 % (ref 4–12)
NEUTROPHILS # BLD AUTO: 3.48 THOUSANDS/ΜL (ref 1.85–7.62)
NEUTS SEG NFR BLD AUTO: 66 % (ref 43–75)
NRBC BLD AUTO-RTO: 0 /100 WBCS
PLATELET # BLD AUTO: 178 THOUSANDS/UL (ref 149–390)
PMV BLD AUTO: 12.4 FL (ref 8.9–12.7)
POTASSIUM SERPL-SCNC: 4.3 MMOL/L (ref 3.5–5.3)
PROT SERPL-MCNC: 6.3 G/DL (ref 6.4–8.2)
RBC # BLD AUTO: 4.82 MILLION/UL (ref 3.81–5.12)
SARS-COV-2 RNA RESP QL NAA+PROBE: NEGATIVE
SODIUM SERPL-SCNC: 144 MMOL/L (ref 136–145)
WBC # BLD AUTO: 5.21 THOUSAND/UL (ref 4.31–10.16)

## 2022-01-24 PROCEDURE — U0005 INFEC AGEN DETEC AMPLI PROBE: HCPCS | Performed by: EMERGENCY MEDICINE

## 2022-01-24 PROCEDURE — 96375 TX/PRO/DX INJ NEW DRUG ADDON: CPT

## 2022-01-24 PROCEDURE — 99284 EMERGENCY DEPT VISIT MOD MDM: CPT | Performed by: EMERGENCY MEDICINE

## 2022-01-24 PROCEDURE — 74177 CT ABD & PELVIS W/CONTRAST: CPT

## 2022-01-24 PROCEDURE — 96374 THER/PROPH/DIAG INJ IV PUSH: CPT

## 2022-01-24 PROCEDURE — 36415 COLL VENOUS BLD VENIPUNCTURE: CPT | Performed by: EMERGENCY MEDICINE

## 2022-01-24 PROCEDURE — 83690 ASSAY OF LIPASE: CPT | Performed by: EMERGENCY MEDICINE

## 2022-01-24 PROCEDURE — 96361 HYDRATE IV INFUSION ADD-ON: CPT

## 2022-01-24 PROCEDURE — 85025 COMPLETE CBC W/AUTO DIFF WBC: CPT | Performed by: EMERGENCY MEDICINE

## 2022-01-24 PROCEDURE — 76856 US EXAM PELVIC COMPLETE: CPT

## 2022-01-24 PROCEDURE — U0003 INFECTIOUS AGENT DETECTION BY NUCLEIC ACID (DNA OR RNA); SEVERE ACUTE RESPIRATORY SYNDROME CORONAVIRUS 2 (SARS-COV-2) (CORONAVIRUS DISEASE [COVID-19]), AMPLIFIED PROBE TECHNIQUE, MAKING USE OF HIGH THROUGHPUT TECHNOLOGIES AS DESCRIBED BY CMS-2020-01-R: HCPCS | Performed by: EMERGENCY MEDICINE

## 2022-01-24 PROCEDURE — 80053 COMPREHEN METABOLIC PANEL: CPT | Performed by: EMERGENCY MEDICINE

## 2022-01-24 PROCEDURE — G1004 CDSM NDSC: HCPCS

## 2022-01-24 PROCEDURE — 99285 EMERGENCY DEPT VISIT HI MDM: CPT

## 2022-01-24 RX ORDER — ONDANSETRON 4 MG/1
4 TABLET, ORALLY DISINTEGRATING ORAL EVERY 6 HOURS PRN
Qty: 20 TABLET | Refills: 0 | Status: SHIPPED | OUTPATIENT
Start: 2022-01-24 | End: 2022-01-26

## 2022-01-24 RX ORDER — ONDANSETRON 2 MG/ML
4 INJECTION INTRAMUSCULAR; INTRAVENOUS ONCE
Status: COMPLETED | OUTPATIENT
Start: 2022-01-24 | End: 2022-01-24

## 2022-01-24 RX ORDER — FAMOTIDINE 20 MG/1
20 TABLET, FILM COATED ORAL DAILY
Qty: 30 TABLET | Refills: 0 | Status: SHIPPED | OUTPATIENT
Start: 2022-01-24 | End: 2022-03-25

## 2022-01-24 RX ADMIN — FAMOTIDINE 20 MG: 10 INJECTION INTRAVENOUS at 09:07

## 2022-01-24 RX ADMIN — ONDANSETRON 4 MG: 2 INJECTION INTRAMUSCULAR; INTRAVENOUS at 09:06

## 2022-01-24 RX ADMIN — IOHEXOL 100 ML: 350 INJECTION, SOLUTION INTRAVENOUS at 10:23

## 2022-01-24 RX ADMIN — SODIUM CHLORIDE 1000 ML: 0.9 INJECTION, SOLUTION INTRAVENOUS at 09:04

## 2022-01-24 NOTE — LETTER
[address] [place] [address]  [address] [facility] [address]      February 7, 2022    MRN: [id number]     Phone: [phone]     Dear Ms Leahy,    You recently had a(n) Ultrasound performed on 1/24/2022 at  [address] [place] [address] that was requested by Cristiano Weeks Jr  The study was reviewed by a radiologist, which is a physician who specializes in medical imaging  The radiologist issued a report describing his or her findings  In that report there was a finding that the radiologist felt warranted further discussion with your health care provider and that discussion would be beneficial to you  The results were sent to AMY Weeks Jr on 01/26/2022  9:45 AM  We recommend that you contact AMY Weeks Jr at [phone] or set up an appointment to discuss the results of the imaging test  If you have already heard from Cristiano Weeks Jr regarding the results of your study, you can disregard this letter  This letter is not meant to alarm you, but intended to encourage you to follow-up on your results with the provider that sent you for the imaging study  In addition, we have enclosed answers to frequently asked questions by other patients who have also received a letter to review results with their health care provider (see page two)  Thank you for choosing [address] [place] [address] for your medical imaging needs  FREQUENTLY ASKED QUESTIONS    1  Why am I receiving this letter? [address] [place] [address] requires us to notify patients who have findings on imaging exams that may require more testing or follow-up with a health professional within the next 3 months          2  How serious is the finding on the imaging test?  This letter is sent to all patients who may need follow-up or more testing within the next 3 months  Receiving this letter does not necessarily mean you have a life-threatening imaging finding or disease  Recommendations in the radiologists imaging report are general in nature and it is up to your healthcare provider to say whether those recommendations make sense for your situation  You are strongly encouraged to talk to your health care provider about the results and ask whether additional steps need to be taken  3  Where can I get a copy of the final report for my recent radiology exam?  To get a full copy of the report you can access your records online at [email or web address] or please contact [address] [place] [address] Records Department at [phone] Monday through Friday between 8 am and 6 pm          4  What do I need to do now? Please contact your health care provider who requested the imaging study to discuss what further actions (if any) are needed  You may have already heard from (your ordering provider) in regard to this test in which case you can disregard this letter  NOTICE IN ACCORDANCE WITH THE [facility] PATIENT TEST RESULT INFORMATION ACT OF 2018    You are receiving this notice as a result of a determination by your diagnostic imaging service that further discussions of your test results are warranted and would be beneficial to you  The complete results of your test or tests have been or will be sent to the health care practitioner that ordered the test or tests  It is recommended that you contact your health care practitioner to discuss your results as soon as possible

## 2022-01-24 NOTE — DISCHARGE INSTRUCTIONS
Use the prescribed Zofran for nausea, Pepcid for reflux  Continue good oral hydration  Return to emergency department further evaluation symptoms worsen  Follow-up later today for ultrasound of endometrial thickening

## 2022-01-24 NOTE — ED PROVIDER NOTES
History  Chief Complaint   Patient presents with    Weakness - Generalized     2 months, generalized weakness unable to tolerate food    Constipation     unable to eat normally for past two months, bm 3 days unable to describe, no abd pain, hiatal hernia no f/u with surgery or gi     HPI  Patient is a 40-year-old female history of GERD, CKD, hypertension, hyperlipidemia, hiatal hernia presenting for evaluation of epigastric and left upper quadrant pain, generalized weakness, poor p o  Intake  Patient states she has been having symptoms for roughly the past 2 months and that it has worsened for the past 2 weeks  Patient states that she has had no appetite, states component of nausea and epigastric pain after she tries to eat  Patient states that she has been having decreased stool production, most recently had a bowel movement 2 days ago, nonbloody, nonmelanotic  Patient has known history of hiatal hernia and believes that her symptoms are complication of this  Patient was most recently evaluated for similar complaint 12/19 at which point CT was performed demonstrating fluid filled endometrium recommending follow-up pelvic ultrasound  Patient is scheduled to have this performed later in the afternoon today  Patient denies vaginal bleeding, vaginal discharge, urinary symptoms  Patient denies fevers, chills, cough, sore throat, headache, myalgia  Patient states she is fully vaccinated against COVID-19 including a booster  Prior to Admission Medications   Prescriptions Last Dose Informant Patient Reported? Taking?    ALPRAZolam (XANAX) 0 25 mg tablet 1/23/2022 at Unknown time  Yes Yes   Sig: Take by mouth daily at bedtime as needed for anxiety    Cholecalciferol (VITAMIN D) 2000 UNITS tablet   Yes No   Sig: Take 3,000 Units by mouth every morning    Famotidine (PEPCID PO)   Yes No   Sig: Take by mouth every other day   MELATONIN PO   Yes No   Sig: Take by mouth as needed   Multiple Vitamins-Minerals (CENTRUM SILVER PO)   Yes No   Sig: Take by mouth daily   Omega-3 Fatty Acids (fish oil) 1,000 mg   Yes No   Sig: Take 1,000 mg by mouth 3 (three) times a day   acetaminophen (TYLENOL) 650 mg CR tablet   No No   Sig: Take 1 tablet (650 mg total) by mouth every 8 (eight) hours as needed for mild pain   alendronate (FOSAMAX) 70 mg tablet   Yes No   Sig: Take 70 mg by mouth once a week   amLODIPine (NORVASC) 5 mg tablet   Yes No   Sig: Take 5 mg by mouth every morning  aspirin 81 MG tablet   Yes No   Sig: Take 81 mg by mouth every morning  atorvastatin (LIPITOR) 20 mg tablet   Yes No   Sig: Take 20 mg by mouth daily   co-enzyme Q-10 30 MG capsule   Yes No   Sig: Take 200 mg by mouth daily    fenofibrate (TRIGLIDE) 160 MG tablet   Yes No   Sig: Take 160 mg by mouth every morning  metoclopramide (REGLAN) 10 mg tablet   Yes No   Sig: Take 10 mg by mouth 4 (four) times a day   multivitamin (THERAGRAN) TABS   Yes No   Sig: Take 1 tablet by mouth every morning  naloxone (NARCAN) 4 mg/0 1 mL nasal spray   No No   Sig: Administer 1 spray into a nostril  If no response after 2-3 minutes, give another dose in the other nostril using a new spray     naproxen (NAPROSYN) 500 mg tablet   No No   Sig: Take 1 tablet (500 mg total) by mouth 2 (two) times a day with meals   Patient not taking: Reported on 10/14/2019   olmesartan (BENICAR) 40 mg tablet   Yes No   Sig: Take 40 mg by mouth daily   olmesartan-hydrochlorothiazide (BENICAR HCT) 40-12 5 MG per tablet   Yes No   Sig: Take 1 tablet by mouth every morning   omeprazole (PriLOSEC) 20 mg delayed release capsule   No No   Sig: Take 1 capsule (20 mg total) by mouth daily   omeprazole (PriLOSEC) 40 MG capsule   Yes No   Sig: Take 40 mg by mouth every other day    potassium citrate (UROCIT-K) 10 mEq   Yes No   Sig: Take 10 mEq by mouth 2 (two) times a day   pyridoxine (VITAMIN B6) 100 mg tablet   Yes No   Sig: Take 100 mg by mouth daily   traMADol (Ultram) 50 mg tablet 1/22/2022 at Unknown time  No Yes   Sig: Take 1 tablet (50 mg total) by mouth every 6 (six) hours as needed for moderate pain      Facility-Administered Medications: None       Past Medical History:   Diagnosis Date    Acid reflux     Anxiety     Chronic kidney disease     chronic stones    Fractures     H/O hiatal hernia     Hyperlipidemia     Hypertension     Kidney stones        Past Surgical History:   Procedure Laterality Date    APPENDECTOMY      BREAST SURGERY Left     excision cyst    CYSTOSCOPY      with stone extraction x 2    CYSTOSCOPY W/ RETROGRADES Left 09/17/2012    stent removal,     CYSTOSCOPY W/ URETEROSCOPY W/ LITHOTRIPSY Left 12/11/2006    Ca Phos 98%    CYSTOSCOPY W/ URETEROSCOPY W/ LITHOTRIPSY Left 08/12/2012    Ca ox di 60%, Ca ox mono 20%    ESOPHAGOGASTRODUODENOSCOPY      EXTRACORPOREAL SHOCK WAVE LITHOTRIPSY Left 4/13/2016    Procedure: Sal Guerrero EXTRACORPORAL SHOCKWAVE (ESWL); Surgeon: Adriano Branch MD;  Location: [facility] MAIN OR;  Service:     [place] CYSTO/URETERO W/LITHOTRIPSY &INDWELL STENT INSRT Left 9/1/2016    Procedure: CYSTOSCOPY URETEROSCOPY WITH STONE EXTRACTION , RETROGRADE PYELOGRAM AND INSERTION STENT URETERAL;  Surgeon: Adriano Branch MD;  Location: [address] [place] [address];  Service: Urology    [place] CYSTO/URETERO W/LITHOTRIPSY &INDWELL STENT INSRT Left 8/12/2016    Procedure: CYSTOSCOPY URETEROSCOPY WITH LITHOTRIPSY HOLMIUM LASER, RETROGRADE PYELOGRAM AND INSERTION STENT URETERAL;  Surgeon: Adriano Branch MD;  Location: [address] [place] [address];  Service: Urology    TUBAL LIGATION      URETERAL STENT PLACEMENT Left 08/31/2012    Narrowing of the left distal ureter for 3cm           Family History   Problem Relation Age of Onset    Stroke Father         TIA    Parkinsonism Father     Cancer Maternal Aunt     Cancer Maternal Uncle     Heart disease Maternal Uncle         Massive MI    No Known Problems Mother     No Known Problems Sister     No Known Problems Brother     No Known Problems Paternal Aunt     No Known Problems Paternal Uncle     No Known Problems Maternal Grandmother     No Known Problems Maternal Grandfather     No Known Problems Paternal Grandmother     No Known Problems Paternal Grandfather      I have reviewed and agree with the history as documented  E-Cigarette/Vaping    E-Cigarette Use Never User      E-Cigarette/Vaping Substances     Social History     Tobacco Use    Smoking status: Never Smoker    Smokeless tobacco: Never Used   Vaping Use    Vaping Use: Never used   Substance Use Topics    Alcohol use: Yes     Comment: wine almost every night to sleep    Drug use: No       Review of Systems   Constitutional: Positive for appetite change  Negative for chills, fatigue and fever  HENT: Negative for sore throat  Eyes: Negative for visual disturbance  Respiratory: Negative for cough, chest tightness and shortness of breath  Cardiovascular: Negative for chest pain  Gastrointestinal: Positive for abdominal pain, constipation, nausea and vomiting  Negative for abdominal distention and diarrhea  Endocrine: Negative for polydipsia and polyuria  Genitourinary: Negative for dysuria and hematuria  Musculoskeletal: Negative for arthralgias and myalgias  Skin: Negative for color change and rash  Neurological: Negative for dizziness and headaches  Psychiatric/Behavioral: Negative for confusion  All other systems reviewed and are negative  Physical Exam  Physical Exam  Vitals reviewed  Constitutional:       General: She is not in acute distress  Appearance: She is well-developed  She is not diaphoretic  Comments: Well-appearing   HENT:      Head: Normocephalic and atraumatic  Comments: Moist mucous membranes     Right Ear: External ear normal       Left Ear: External ear normal       Nose: Nose normal       Mouth/Throat:      Pharynx: No oropharyngeal exudate     Eyes:      Pupils: Pupils are equal, round, and reactive to light  Cardiovascular:      Rate and Rhythm: Normal rate and regular rhythm  Heart sounds: Normal heart sounds  No murmur heard  No friction rub  No gallop  Comments: Regular rate and rhythm, no murmurs rubs or gallops  Pulmonary:      Effort: Pulmonary effort is normal  No respiratory distress  Breath sounds: Normal breath sounds  No wheezing  Comments: No increased work of breathing  Satting 97% room air indicating adequate oxygenation  Lungs clear to auscultation without wheezes, rales, rhonchi  Chest:      Chest wall: No tenderness  Abdominal:      General: Bowel sounds are normal  There is no distension  Palpations: Abdomen is soft  There is no mass  Tenderness: There is abdominal tenderness  There is no guarding  Comments: Minor epigastric and left upper quadrant tenderness without palpable mass, no rigidity, rebound, guarding   Musculoskeletal:         General: No deformity  Normal range of motion  Cervical back: Normal range of motion  Lymphadenopathy:      Cervical: No cervical adenopathy  Skin:     General: Skin is warm and dry  Capillary Refill: Capillary refill takes less than 2 seconds  Neurological:      Mental Status: She is alert and oriented to person, place, and time           Vital Signs  ED Triage Vitals   Temperature Pulse Respirations Blood Pressure SpO2   01/24/22 0820 01/24/22 0815 01/24/22 0815 01/24/22 0815 01/24/22 0815   98 °F (36 7 °C) 77 18 138/79 97 %      Temp Source Heart Rate Source Patient Position - Orthostatic VS BP Location FiO2 (%)   01/24/22 0820 01/24/22 0815 01/24/22 0815 01/24/22 0815 --   Oral Monitor Lying Left arm       Pain Score       01/24/22 0815       No Pain           Vitals:    01/24/22 0815 01/24/22 1115   BP: 138/79 143/81   Pulse: 77 78   Patient Position - Orthostatic VS: Lying          Visual Acuity      ED Medications  Medications   ondansetron (ZOFRAN) injection 4 mg (4 mg Intravenous Given 1/24/22 3462)   sodium chloride 0 9 % bolus 1,000 mL (0 mL Intravenous Stopped 1/24/22 1141)   famotidine (PEPCID) injection 20 mg (20 mg Intravenous Given 1/24/22 0907)   iohexol (OMNIPAQUE) 350 MG/ML injection (SINGLE-DOSE) 100 mL (100 mL Intravenous Given 1/24/22 1023)       Diagnostic Studies  Results Reviewed     Procedure Component Value Units Date/Time    COVID only - 48 hour TAT [[id number]]  (Normal) Collected: 01/24/22 0915    Lab Status: Final result Specimen: Nares from Nose Updated: 01/24/22 1613     SARS-CoV-2 Negative    Narrative:      FOR PEDIATRIC PATIENTS - copy/paste COVID Guidelines URL to browser: [email or web address]  [email or web address]    SARS-CoV-2 assay is a Nucleic Acid Amplification assay intended for the  qualitative detection of nucleic acid from SARS-CoV-2 in nasopharyngeal  swabs  Results are for the presumptive identification of SARS-CoV-2 RNA  Positive results are indicative of infection with SARS-CoV-2, the virus  causing COVID-19, but do not rule out bacterial infection or co-infection  with other viruses  Laboratories within the [place] and its  territories are required to report all positive results to the appropriate  public health authorities  Negative results do not preclude SARS-CoV-2  infection and should not be used as the sole basis for treatment or other  patient management decisions  Negative results must be combined with  clinical observations, patient history, and epidemiological information  This test has not been FDA cleared or approved  This test has been authorized by FDA under an Emergency Use Authorization  (EUA)  This test is only authorized for the duration of time the  declaration that circumstances exist justifying the authorization of the  emergency use of an in vitro diagnostic tests for detection of SARS-CoV-2  virus and/or diagnosis of COVID-19 infection under section 564(b)(1) of  the Act, 21 U  S C  549TPH-3(G)(6), unless the authorization is terminated  or revoked sooner  The test has been validated but independent review by FDA  and CLIA is pending  Test performed using the [employer] COVID-19 assay: This RT-PCR assay targets three regions of the SARS-CoV-2 genome, ORF1ab, N gene and S gene  Detection of at least two targets is interpreted as SARS-CoV-2 Detected      Comprehensive metabolic panel [[id number]]  (Abnormal) Collected: 01/24/22 0904    Lab Status: Final result Specimen: Blood from Arm, Left Updated: 01/24/22 0953     Sodium 144 mmol/L      Potassium 4 3 mmol/L      Chloride 108 mmol/L      CO2 26 mmol/L      ANION GAP 10 mmol/L      BUN 10 mg/dL      Creatinine 0 81 mg/dL      Glucose 117 mg/dL      Calcium 8 6 mg/dL      AST 22 U/L      ALT 28 U/L      Alkaline Phosphatase 63 U/L      Total Protein 6 3 g/dL      Albumin 3 5 g/dL      Total Bilirubin 0 47 mg/dL      eGFR 70 ml/min/1 73sq m     Narrative:      National Kidney Disease Foundation guidelines for Chronic Kidney Disease (CKD):     Stage 1 with normal or high GFR (GFR > 90 mL/min/1 73 square meters)    Stage 2 Mild CKD (GFR = 60-89 mL/min/1 73 square meters)    Stage 3A Moderate CKD (GFR = 45-59 mL/min/1 73 square meters)    Stage 3B Moderate CKD (GFR = 30-44 mL/min/1 73 square meters)    Stage 4 Severe CKD (GFR = 15-29 mL/min/1 73 square meters)    Stage 5 End Stage CKD (GFR <15 mL/min/1 73 square meters)  Note: GFR calculation is accurate only with a steady state creatinine    Lipase [[id number]]  (Normal) Collected: 01/24/22 0904    Lab Status: Final result Specimen: Blood from Arm, Left Updated: 01/24/22 0953     Lipase 83 u/L     CBC and differential [[id number]] Collected: 01/24/22 0904    Lab Status: Final result Specimen: Blood from Arm, Left Updated: 01/24/22 0917     WBC 5 21 Thousand/uL      RBC 4 82 Million/uL      Hemoglobin 14 6 g/dL      Hematocrit 45 4 %      MCV 94 fL      MCH 30 3 pg      MCHC 32 2 g/dL      RDW 13 4 % MPV 12 4 fL      Platelets 758 Thousands/uL      nRBC 0 /100 WBCs      Neutrophils Relative 66 %      Immat GRANS % 1 %      Lymphocytes Relative 19 %      Monocytes Relative 11 %      Eosinophils Relative 2 %      Basophils Relative 1 %      Neutrophils Absolute 3 48 Thousands/µL      Immature Grans Absolute 0 03 Thousand/uL      Lymphocytes Absolute 0 98 Thousands/µL      Monocytes Absolute 0 56 Thousand/µL      Eosinophils Absolute 0 09 Thousand/µL      Basophils Absolute 0 07 Thousands/µL                  CT abdomen pelvis with contrast   Final Result by Magy Magallon MD (01/24 1053)      1  No acute inflammatory process in the abdomen or pelvis  Stable large hiatal hernia with intrathoracic stomach  2   Stable fluid distention of the endometrium with enhancing tissue along the left margin  Endometrial neoplasm is not excluded  The patient is scheduled for pelvic ultrasound later today  3   New calcified disc extrusion at L1-2 with moderate thecal sac compression  Workstation performed: [id number]                    Procedures  Procedures         ED Course                               SBIRT 22yo+      Most Recent Value   SBIRT (25 yo +)    In order to provide better care to our patients, we are screening all of our patients for alcohol and drug use  Would it be okay to ask you these screening questions? No Filed at: 01/24/2022 0908                    [facility]  Number of Diagnoses or Management Options  Nausea and vomiting  Diagnosis management comments: Belly labs checked and unremarkable  Repeat CT abd/pelv redemonstrating endometrial pathology without significant changes and once again f/u ultrasound, planned for later in the day, recommended  Patient tolerating PO, discussed gastroenterology and surgical follow up for symptomatic relief of large hiatal hernia  Patient discharged with return precautions         Disposition  Final diagnoses:   Nausea and vomiting     Time reflects when diagnosis was documented in both MDM as applicable and the Disposition within this note     Time User Action Codes Description Comment    1/24/2022 11:06 AM Mimi Burkett Add [R11 2] Nausea and vomiting       ED Disposition     ED Disposition Condition Date/Time Comment    Discharge Stable Mon Jan 24, 2022 11:06 AM Dennis Leahy discharge to home/self care  Follow-up Information     Follow up With Specialties Details Why Contact Info Additional Information    [address] [facility] Emergency Department Emergency Medicine  If symptoms worsen [address] [facility]  [phone] [address] [facility] Emergency Department, [facility], [address] [facility] Gastroenterology   [address]  [place] [address] [facility]  [address] [phone] Marjorie [facility], [address] [facility], [address]          Discharge Medication List as of 1/24/2022 11:25 AM      START taking these medications    Details   !! famotidine (PEPCID) 20 mg tablet Take 1 tablet (20 mg total) by mouth daily, Starting Mon 1/24/2022, Normal      ondansetron (Zofran ODT) 4 mg disintegrating tablet Take 1 tablet (4 mg total) by mouth every 6 (six) hours as needed for nausea or vomiting, Starting Mon 1/24/2022, Normal       !! - Potential duplicate medications found  Please discuss with provider        CONTINUE these medications which have NOT CHANGED    Details   acetaminophen (TYLENOL) 650 mg CR tablet Take 1 tablet (650 mg total) by mouth every 8 (eight) hours as needed for mild pain, Starting Sun 12/19/2021, Normal      alendronate (FOSAMAX) 70 mg tablet Take 70 mg by mouth once a week, Starting Wed 10/9/2019, Historical Med      ALPRAZolam (XANAX) 0 25 mg tablet Take by mouth daily at bedtime as needed for anxiety , Historical Med      amLODIPine (NORVASC) 5 mg tablet Take 5 mg by mouth every morning , Until Discontinued, Historical Med      aspirin 81 MG tablet Take 81 mg by mouth every morning , Until Discontinued, Historical Med      atorvastatin (LIPITOR) 20 mg tablet Take 20 mg by mouth daily, Historical Med      Cholecalciferol (VITAMIN D) 2000 UNITS tablet Take 3,000 Units by mouth every morning , Historical Med      co-enzyme Q-10 30 MG capsule Take 200 mg by mouth daily , Historical Med      !! Famotidine (PEPCID PO) Take by mouth every other day, Historical Med      fenofibrate (TRIGLIDE) 160 MG tablet Take 160 mg by mouth every morning , Until Discontinued, Historical Med      MELATONIN PO Take by mouth as needed, Historical Med      metoclopramide (REGLAN) 10 mg tablet Take 10 mg by mouth 4 (four) times a day, Historical Med      Multiple Vitamins-Minerals (CENTRUM SILVER PO) Take by mouth daily, Historical Med      multivitamin (THERAGRAN) TABS Take 1 tablet by mouth every morning , Until Discontinued, Historical Med      naloxone (NARCAN) 4 mg/0 1 mL nasal spray Administer 1 spray into a nostril   If no response after 2-3 minutes, give another dose in the other nostril using a new spray , Normal      naproxen (NAPROSYN) 500 mg tablet Take 1 tablet (500 mg total) by mouth 2 (two) times a day with meals, Starting Fri 7/12/2019, Normal      olmesartan (BENICAR) 40 mg tablet Take 40 mg by mouth daily, Starting Wed 8/4/2021, Historical Med      olmesartan-hydrochlorothiazide (BENICAR HCT) 40-12 5 MG per tablet Take 1 tablet by mouth every morning, Starting Fri 10/11/2019, Historical Med      Omega-3 Fatty Acids (fish oil) 1,000 mg Take 1,000 mg by mouth 3 (three) times a day, Historical Med      !! omeprazole (PriLOSEC) 20 mg delayed release capsule Take 1 capsule (20 mg total) by mouth daily, Starting Sat 1/8/2022, Normal      !! omeprazole (PriLOSEC) 40 MG capsule Take 40 mg by mouth every other day , Historical Med      potassium citrate (UROCIT-K) 10 mEq Take 10 mEq by mouth 2 (two) times a day, Starting Fri 9/27/2019, Historical Med      pyridoxine (VITAMIN B6) 100 mg tablet Take 100 mg by mouth daily, Until Discontinued, Historical Med      traMADol (Ultram) 50 mg tablet Take 1 tablet (50 mg total) by mouth every 6 (six) hours as needed for moderate pain, Starting Mon 12/27/2021, Normal       !! - Potential duplicate medications found  Please discuss with provider  No discharge procedures on file      PDMP Review       Value Time User    PDMP Reviewed  Yes 12/27/2021  4:04 PM Ángela Jackson PA-C          ED Provider  Electronically Signed by           Luis Miguel Seals MD  01/25/22 6456

## 2022-01-26 ENCOUNTER — OFFICE VISIT (OUTPATIENT)
Dept: GASTROENTEROLOGY | Facility: CLINIC | Age: 77
End: 2022-01-26
Payer: MEDICARE

## 2022-01-26 ENCOUNTER — TELEPHONE (OUTPATIENT)
Dept: GASTROENTEROLOGY | Facility: CLINIC | Age: 77
End: 2022-01-26

## 2022-01-26 VITALS
WEIGHT: 115 LBS | SYSTOLIC BLOOD PRESSURE: 156 MMHG | BODY MASS INDEX: 21.71 KG/M2 | DIASTOLIC BLOOD PRESSURE: 87 MMHG | HEART RATE: 103 BPM | HEIGHT: 61 IN

## 2022-01-26 DIAGNOSIS — R68.89 UNINTENTIONAL WEIGHT CHANGE: Primary | ICD-10-CM

## 2022-01-26 DIAGNOSIS — R93.5 ABNORMAL CT SCAN, PELVIS: ICD-10-CM

## 2022-01-26 DIAGNOSIS — R11.0 NAUSEA: ICD-10-CM

## 2022-01-26 DIAGNOSIS — Z12.11 COLON CANCER SCREENING: ICD-10-CM

## 2022-01-26 DIAGNOSIS — R10.13 EPIGASTRIC DISCOMFORT: ICD-10-CM

## 2022-01-26 PROCEDURE — 99204 OFFICE O/P NEW MOD 45 MIN: CPT | Performed by: NURSE PRACTITIONER

## 2022-01-26 NOTE — PATIENT INSTRUCTIONS
Follow anti-reflux diet and measures  Continue boost supplements 3 times a day    Scheduled date of EGD/colonoscopy (as of today): 2/1/22  Physician performing EGD/colonoscopy: Dr Eryn Hui  Location of EGD/colonoscopy: [facility]  Desired bowel prep reviewed with patient: Miralax/Dulcolax  Instructions reviewed with patient by: Maritza  Clearances:  N/A

## 2022-01-26 NOTE — H&P (VIEW-ONLY)
[facility] - Outpatient Consultation  Ana Verdin 67 y o  female MRN: [id number]  Encounter: [id number]          ASSESSMENT AND PLAN:      1  Unintentional weight change  2  Colon cancer screening  Unintentional weight loss may be secondary to malignancy  Patient reports 20 pound weight loss over the last 2 months  Patient also reports decreased appetite  Patient never had a colonoscopy for colon screening     -colonoscopy  Schedule for colonoscopy  Prep and procedure explained to patient in detail  Further recommendations pending results of colonoscopy   -Schedule EGD  Prep and procedure explained to patient in detail  Further recommendations pending results of EGD  -Continue boost supplements 3 times a day  3  Epigastric discomfort  4  Nausea  Patient reports epigastric discomfort associated with nausea  Epigastric discomfort associated with nausea may be secondary to large hiatal hernia, gastritis, esophagitis, GERD, or malignancy  Patient recently discontinued taking a lot of her medication because she thought it would be contributing towards her nausea and epigastric pain  Patient was on Zofran as needed  Patient never had EGD   -May take Zofran as needed for nausea  - EGD;Schedule EGD  Prep and procedure explained to patient in detail  Further recommendations pending results of EGD  -Continue taking Pepcid 20 milligrams every other day alternating with Prilosec 20 milligram     5  Abnormal CT pelvis  CT of abdomen/pelvis showed distention of endometrium with enhancing tissue along the left margin  Endometrial neoplasm is not excluded  -Patient has follow-up appointment with gynecologist next week for further evaluation  Follow-up after EGD and colonoscopy    Patient informed to call office if symptoms get worse   ______________________________________________________________________    HPI:  José Machado is a 71-year-old female who presented to the office today due to nausea associated with epigastric discomfort and decreased appetite, unintentional weight loss and weakness  Patient reported she went to the emergency room several times due to weakness, decreased appetite, and epigastric discomfort  Patient also reported  20 pound weight loss in 2 months  Results of 2 previous CTs reviewed  CT abdomen and pelvis showed no acute inflammatory process in abdomen or pelvis  Stable large hiatal hernia with intrathoracic stomach  Stable fluid distention of endometrium with enhancing tissue along the left margin  Endometrial neoplasm is not excluded  Patient reports she has nausea and epigastric pain she is currently taking Pepcid 20 milligrams every other day alternating with Prilosec 20 milligrams  Patient denies acid reflux, heartburn, vomiting, dysphagia, or lower abdominal pain  Epigastric area is tender with palpation  Patient denies blood in stool, blood from rectal area, or black tarry stool  Patient reports last bowel movement today  Prior to that she went 3 days ago  Patient reports she was able to eat this morning and she felt a little hungrier  Patient is taking boost supplements  Patient reports she recently stopped taking multiple medication thinking that may improve her symptoms  Patient does have a prescription for Zofran as needed for nausea but is currently not taking  No previous EGD or colonoscopy  Patient does not smoke  Patient drinks 1 glass of wine occasionally at night  No family history of gastric or colon cancer  REVIEW OF SYSTEMS:    CONSTITUTIONAL: Denies any fever, chills, rigors, and weight loss  HEENT: No earache or tinnitus  Denies hearing loss or visual disturbances  CARDIOVASCULAR: No chest pain or palpitations  RESPIRATORY: Denies any cough, hemoptysis, shortness of breath or dyspnea on exertion  GASTROINTESTINAL: As noted in the History of Present Illness  GENITOURINARY: No problems with urination   Denies any hematuria or dysuria  NEUROLOGIC: No dizziness or vertigo, denies headaches  MUSCULOSKELETAL: Denies any muscle or joint pain  SKIN: Denies skin rashes or itching  ENDOCRINE: Denies excessive thirst  Denies intolerance to heat or cold  PSYCHOSOCIAL: Denies depression or anxiety  Denies any recent memory loss  Historical Information   Past Medical History:   Diagnosis Date    Acid reflux     Anxiety     Chronic kidney disease     chronic stones    Fractures     GERD (gastroesophageal reflux disease)     H/O hiatal hernia     Hyperlipidemia     Hypertension     Kidney stones      Past Surgical History:   Procedure Laterality Date    APPENDECTOMY      BREAST SURGERY Left     excision cyst    CYSTOSCOPY      with stone extraction x 2    CYSTOSCOPY W/ RETROGRADES Left 09/17/2012    stent removal,     CYSTOSCOPY W/ URETEROSCOPY W/ LITHOTRIPSY Left 12/11/2006    Ca Phos 98%    CYSTOSCOPY W/ URETEROSCOPY W/ LITHOTRIPSY Left 08/12/2012    Ca ox di 60%, Ca ox mono 20%    ESOPHAGOGASTRODUODENOSCOPY      EXTRACORPOREAL SHOCK WAVE LITHOTRIPSY Left 4/13/2016    Procedure: LITHROTRIPSY EXTRACORPORAL SHOCKWAVE (ESWL); Surgeon: Lucia Mercer MD;  Location: [facility] OR;  Service:     SD CYSTO/URETERO W/LITHOTRIPSY &INDWELL STENT INSRT Left 9/1/2016    Procedure: CYSTOSCOPY URETEROSCOPY WITH STONE EXTRACTION , RETROGRADE PYELOGRAM AND INSERTION STENT URETERAL;  Surgeon: Lucia Mercer MD;  Location: [address] [place] [address];  Service: Urology    SD CYSTO/URETERO W/LITHOTRIPSY &INDWELL STENT INSRT Left 8/12/2016    Procedure: CYSTOSCOPY URETEROSCOPY WITH LITHOTRIPSY HOLMIUM LASER, RETROGRADE PYELOGRAM AND INSERTION STENT URETERAL;  Surgeon: Lucia Mercer MD;  Location: [address] [place] [address];  Service: Urology    TUBAL LIGATION      URETERAL STENT PLACEMENT Left 08/31/2012    Narrowing of the left distal ureter for 3cm         Social History   Social History     Substance and Sexual Activity   Alcohol Use Yes Comment: wine almost every night to sleep     Social History     Substance and Sexual Activity   Drug Use No     Social History     Tobacco Use   Smoking Status Never Smoker   Smokeless Tobacco Never Used     Family History   Problem Relation Age of Onset    Stroke Father         TIA    Parkinsonism Father     Cancer Maternal Aunt     Cancer Maternal Uncle     Heart disease Maternal Uncle         Massive MI    No Known Problems Mother     No Known Problems Sister     No Known Problems Brother     No Known Problems Paternal Aunt     No Known Problems Paternal Uncle     No Known Problems Maternal Grandmother     No Known Problems Maternal Grandfather     No Known Problems Paternal Grandmother     No Known Problems Paternal Grandfather        Meds/Allergies       Current Outpatient Medications:     acetaminophen (TYLENOL) 650 mg CR tablet    alendronate (FOSAMAX) 70 mg tablet    ALPRAZolam (XANAX) 0 25 mg tablet    amLODIPine (NORVASC) 5 mg tablet    aspirin 81 MG tablet    atorvastatin (LIPITOR) 20 mg tablet    co-enzyme Q-10 30 MG capsule    famotidine (PEPCID) 20 mg tablet    MELATONIN PO    olmesartan-hydrochlorothiazide (BENICAR HCT) 40-12 5 MG per tablet    omeprazole (PriLOSEC) 20 mg delayed release capsule    potassium citrate (UROCIT-K) 10 mEq    traMADol (Ultram) 50 mg tablet    multivitamin (THERAGRAN) TABS    olmesartan (BENICAR) 40 mg tablet    No Known Allergies        Objective     Blood pressure 156/87, pulse 103, height 5' 1" (1 549 m), weight 52 2 kg (115 lb)  Body mass index is 21 73 kg/m²  PHYSICAL EXAM:      General Appearance:   Alert, cooperative, no distress   HEENT:   Normocephalic, atraumatic, anicteric      Neck:  Supple, symmetrical, trachea midline   Lungs:   Clear to auscultation bilaterally; no rales, rhonchi or wheezing; respirations unlabored    Heart[de-identified]   Regular rate and rhythm; no murmur, rub, or gallop     Abdomen:   Soft, tender to palpation in epigastric area, non-distended; normal bowel sounds; no masses, no organomegaly    Genitalia:   Deferred    Rectal:   Deferred    Extremities:  No cyanosis, clubbing or edema    Pulses:  2+ and symmetric    Skin:  No jaundice, rashes, or lesions    Lymph nodes:  No palpable cervical lymphadenopathy        Lab Results:   No visits with results within 1 Day(s) from this visit  Latest known visit with results is:   Admission on 01/24/2022, Discharged on 01/24/2022   Component Date Value    WBC 01/24/2022 5 21     RBC 01/24/2022 4 82     Hemoglobin 01/24/2022 14 6     Hematocrit 01/24/2022 45 4     MCV 01/24/2022 94     MCH 01/24/2022 30 3     MCHC 01/24/2022 32 2     RDW 01/24/2022 13 4     MPV 01/24/2022 12 4     Platelets 77/23/1681 178     nRBC 01/24/2022 0     Neutrophils Relative 01/24/2022 66     Immat GRANS % 01/24/2022 1     Lymphocytes Relative 01/24/2022 19     Monocytes Relative 01/24/2022 11     Eosinophils Relative 01/24/2022 2     Basophils Relative 01/24/2022 1     Neutrophils Absolute 01/24/2022 3 48     Immature Grans Absolute 01/24/2022 0 03     Lymphocytes Absolute 01/24/2022 0 98     Monocytes Absolute 01/24/2022 0 56     Eosinophils Absolute 01/24/2022 0 09     Basophils Absolute 01/24/2022 0 07     Sodium 01/24/2022 144     Potassium 01/24/2022 4 3     Chloride 01/24/2022 108     CO2 01/24/2022 26     ANION GAP 01/24/2022 10     BUN 01/24/2022 10     Creatinine 01/24/2022 0 81     Glucose 01/24/2022 117     Calcium 01/24/2022 8 6     AST 01/24/2022 22     ALT 01/24/2022 28     Alkaline Phosphatase 01/24/2022 63     Total Protein 01/24/2022 6 3*    Albumin 01/24/2022 3 5     Total Bilirubin 01/24/2022 0 47     eGFR 01/24/2022 70     Lipase 01/24/2022 83     SARS-CoV-2 01/24/2022 Negative          Radiology Results:   XR chest 1 view portable    Result Date: 1/8/2022  Narrative: CHEST INDICATION:   epigastric pain   COMPARISON:  Chest x-ray from July 22, 2018 and recent CT abdomen and pelvis study of December 19, 2021  EXAM PERFORMED/VIEWS:  XR CHEST PORTABLE FINDINGS: Left heart border is partially obscured by the adjacent curvilinear opacity which likely represents an underlying hiatal hernia  No pneumothorax, pleural effusion or consolidation  Mild biapical pleural thickening/scarring  Osseous structures appear within normal limits for patient age  Impression: No acute cardiopulmonary disease  Hiatal hernia  Workstation performed: [id number]     US pelvis transabdominal only    Result Date: 1/26/2022  Narrative: PELVIC ULTRASOUND, COMPLETE INDICATION:  67years old  R93 [id number]: Abnormal findings on diagnostic imaging of other specified body structures Z78 0: Asymptomatic menopausal state  COMPARISON:  Concurrent CT abdomen/pelvis and pelvic ultrasound 8/16/2016 TECHNIQUE:   Transabdominal pelvic ultrasound was performed in sagittal and transverse planes with a curvilinear transducer  Transvaginal imaging was declined  Imaging included volumetric sweeps as well as traditional still imaging technique  FINDINGS: UTERUS: The uterus is anteverted in position, measuring 6 3 x 3 5 x 4 5 cm  Contour and echotexture appear normal    Unchanged hypoechoic intramural mass in the fundus measuring 2 2 cm, consistent with a fibroid  The cervix is poorly seen; grossly unremarkable  ENDOMETRIUM:  Thickened AP caliber of 28 mm  Heterogeneous echotexture  OVARIES/ADNEXA: Ovaries not visualized  No suspicious adnexal mass or loculated collections  There is no free fluid  Impression: Transvaginal imaging was declined  Thickened, heterogeneous endometrium measuring 28 mm  Biopsy is recommended if not previously performed  Ovaries not visualized  No evidence of an adnexal mass  The study was marked in EPIC for significant notification   Workstation performed: [id number]     CT abdomen pelvis with contrast    Result Date: 1/24/2022  Narrative: CT ABDOMEN AND PELVIS WITH IV CONTRAST INDICATION:   Epigastric pain epigastric and LUQ pain and tenderness  COMPARISON:  12/19/2021 TECHNIQUE:  CT examination of the abdomen and pelvis was performed  Axial, sagittal, and coronal 2D reformatted images were created from the source data and submitted for interpretation  Radiation dose length product (DLP) for this visit:  416 91 mGy-cm   This examination, like all CT scans performed in the [facility], was performed utilizing techniques to minimize radiation dose exposure, including the use of iterative  reconstruction and automated exposure control  IV Contrast:  100 mL of iohexol (OMNIPAQUE) Enteric Contrast:  Enteric contrast was not administered  FINDINGS: ABDOMEN LOWER CHEST:  Again seen is a large hiatal hernia with intrathoracic stomach  LIVER/BILIARY TREE:  Unremarkable  GALLBLADDER:  No calcified gallstones  No pericholecystic inflammatory change  SPLEEN:  Unremarkable  PANCREAS:  Unremarkable  ADRENAL GLANDS:  Unremarkable  KIDNEYS/URETERS:  There are right renal peripelvic cysts  There is a tiny cortical cyst on the left  No hydronephrosis  STOMACH AND BOWEL:  There is colonic diverticulosis without evidence of acute diverticulitis  APPENDIX:  No findings to suggest appendicitis  ABDOMINOPELVIC CAVITY:  No ascites  No pneumoperitoneum  No lymphadenopathy  VESSELS:  Unremarkable for patient's age  PELVIS REPRODUCTIVE ORGANS:  Again seen is endometrial distention  There appears to be enhancing tissue along the left margin  URINARY BLADDER:  Unremarkable  ABDOMINAL WALL/INGUINAL REGIONS:  Unremarkable  OSSEOUS STRUCTURES:  No acute fracture or destructive osseous lesion  There is a new calcified disc extrusion at L1-2 resulting in moderate thecal sac compression  Impression: 1  No acute inflammatory process in the abdomen or pelvis  Stable large hiatal hernia with intrathoracic stomach   2   Stable fluid distention of the endometrium with enhancing tissue along the left margin  Endometrial neoplasm is not excluded  The patient is scheduled for pelvic ultrasound later today  3   New calcified disc extrusion at L1-2 with moderate thecal sac compression   Workstation performed: [id number]

## 2022-01-26 NOTE — PROGRESS NOTES
[facility] - Outpatient Consultation  Genaro Patel 67 y o  female MRN: [id number]  Encounter: [id number]          ASSESSMENT AND PLAN:      1  Unintentional weight change  2  Colon cancer screening  Unintentional weight loss may be secondary to malignancy  Patient reports 20 pound weight loss over the last 2 months  Patient also reports decreased appetite  Patient never had a colonoscopy for colon screening     -colonoscopy  Schedule for colonoscopy  Prep and procedure explained to patient in detail  Further recommendations pending results of colonoscopy   -Schedule EGD  Prep and procedure explained to patient in detail  Further recommendations pending results of EGD  -Continue boost supplements 3 times a day  3  Epigastric discomfort  4  Nausea  Patient reports epigastric discomfort associated with nausea  Epigastric discomfort associated with nausea may be secondary to large hiatal hernia, gastritis, esophagitis, GERD, or malignancy  Patient recently discontinued taking a lot of her medication because she thought it would be contributing towards her nausea and epigastric pain  Patient was on Zofran as needed  Patient never had EGD   -May take Zofran as needed for nausea  - EGD;Schedule EGD  Prep and procedure explained to patient in detail  Further recommendations pending results of EGD  -Continue taking Pepcid 20 milligrams every other day alternating with Prilosec 20 milligram     5  Abnormal CT pelvis  CT of abdomen/pelvis showed distention of endometrium with enhancing tissue along the left margin  Endometrial neoplasm is not excluded  -Patient has follow-up appointment with gynecologist next week for further evaluation  Follow-up after EGD and colonoscopy    Patient informed to call office if symptoms get worse   ______________________________________________________________________    HPI:  Carrie Tristan is a 56-year-old female who presented to the office today due to nausea associated with epigastric discomfort and decreased appetite, unintentional weight loss and weakness  Patient reported she went to the emergency room several times due to weakness, decreased appetite, and epigastric discomfort  Patient also reported  20 pound weight loss in 2 months  Results of 2 previous CTs reviewed  CT abdomen and pelvis showed no acute inflammatory process in abdomen or pelvis  Stable large hiatal hernia with intrathoracic stomach  Stable fluid distention of endometrium with enhancing tissue along the left margin  Endometrial neoplasm is not excluded  Patient reports she has nausea and epigastric pain she is currently taking Pepcid 20 milligrams every other day alternating with Prilosec 20 milligrams  Patient denies acid reflux, heartburn, vomiting, dysphagia, or lower abdominal pain  Epigastric area is tender with palpation  Patient denies blood in stool, blood from rectal area, or black tarry stool  Patient reports last bowel movement today  Prior to that she went 3 days ago  Patient reports she was able to eat this morning and she felt a little hungrier  Patient is taking boost supplements  Patient reports she recently stopped taking multiple medication thinking that may improve her symptoms  Patient does have a prescription for Zofran as needed for nausea but is currently not taking  No previous EGD or colonoscopy  Patient does not smoke  Patient drinks 1 glass of wine occasionally at night  No family history of gastric or colon cancer  REVIEW OF SYSTEMS:    CONSTITUTIONAL: Denies any fever, chills, rigors, and weight loss  HEENT: No earache or tinnitus  Denies hearing loss or visual disturbances  CARDIOVASCULAR: No chest pain or palpitations  RESPIRATORY: Denies any cough, hemoptysis, shortness of breath or dyspnea on exertion  GASTROINTESTINAL: As noted in the History of Present Illness  GENITOURINARY: No problems with urination   Denies any hematuria or dysuria  NEUROLOGIC: No dizziness or vertigo, denies headaches  MUSCULOSKELETAL: Denies any muscle or joint pain  SKIN: Denies skin rashes or itching  ENDOCRINE: Denies excessive thirst  Denies intolerance to heat or cold  PSYCHOSOCIAL: Denies depression or anxiety  Denies any recent memory loss  Historical Information   Past Medical History:   Diagnosis Date    Acid reflux     Anxiety     Chronic kidney disease     chronic stones    Fractures     GERD (gastroesophageal reflux disease)     H/O hiatal hernia     Hyperlipidemia     Hypertension     Kidney stones      Past Surgical History:   Procedure Laterality Date    APPENDECTOMY      BREAST SURGERY Left     excision cyst    CYSTOSCOPY      with stone extraction x 2    CYSTOSCOPY W/ RETROGRADES Left 09/17/2012    stent removal,     CYSTOSCOPY W/ URETEROSCOPY W/ LITHOTRIPSY Left 12/11/2006    Ca Phos 98%    CYSTOSCOPY W/ URETEROSCOPY W/ LITHOTRIPSY Left 08/12/2012    Ca ox di 60%, Ca ox mono 20%    ESOPHAGOGASTRODUODENOSCOPY      EXTRACORPOREAL SHOCK WAVE LITHOTRIPSY Left 4/13/2016    Procedure: LITHROTRIPSY EXTRACORPORAL SHOCKWAVE (ESWL); Surgeon: Luis Cote MD;  Location: [facility] OR;  Service:     ID CYSTO/URETERO W/LITHOTRIPSY &INDWELL STENT INSRT Left 9/1/2016    Procedure: CYSTOSCOPY URETEROSCOPY WITH STONE EXTRACTION , RETROGRADE PYELOGRAM AND INSERTION STENT URETERAL;  Surgeon: Luis Cote MD;  Location: [address] [place] [address];  Service: Urology    ID CYSTO/URETERO W/LITHOTRIPSY &INDWELL STENT INSRT Left 8/12/2016    Procedure: CYSTOSCOPY URETEROSCOPY WITH LITHOTRIPSY HOLMIUM LASER, RETROGRADE PYELOGRAM AND INSERTION STENT URETERAL;  Surgeon: Luis Cote MD;  Location: [address] [place] [address];  Service: Urology    TUBAL LIGATION      URETERAL STENT PLACEMENT Left 08/31/2012    Narrowing of the left distal ureter for 3cm         Social History   Social History     Substance and Sexual Activity   Alcohol Use Yes Comment: wine almost every night to sleep     Social History     Substance and Sexual Activity   Drug Use No     Social History     Tobacco Use   Smoking Status Never Smoker   Smokeless Tobacco Never Used     Family History   Problem Relation Age of Onset    Stroke Father         TIA    Parkinsonism Father     Cancer Maternal Aunt     Cancer Maternal Uncle     Heart disease Maternal Uncle         Massive MI    No Known Problems Mother     No Known Problems Sister     No Known Problems Brother     No Known Problems Paternal Aunt     No Known Problems Paternal Uncle     No Known Problems Maternal Grandmother     No Known Problems Maternal Grandfather     No Known Problems Paternal Grandmother     No Known Problems Paternal Grandfather        Meds/Allergies       Current Outpatient Medications:     acetaminophen (TYLENOL) 650 mg CR tablet    alendronate (FOSAMAX) 70 mg tablet    ALPRAZolam (XANAX) 0 25 mg tablet    amLODIPine (NORVASC) 5 mg tablet    aspirin 81 MG tablet    atorvastatin (LIPITOR) 20 mg tablet    co-enzyme Q-10 30 MG capsule    famotidine (PEPCID) 20 mg tablet    MELATONIN PO    olmesartan-hydrochlorothiazide (BENICAR HCT) 40-12 5 MG per tablet    omeprazole (PriLOSEC) 20 mg delayed release capsule    potassium citrate (UROCIT-K) 10 mEq    traMADol (Ultram) 50 mg tablet    multivitamin (THERAGRAN) TABS    olmesartan (BENICAR) 40 mg tablet    No Known Allergies        Objective     Blood pressure 156/87, pulse 103, height 5' 1" (1 549 m), weight 52 2 kg (115 lb)  Body mass index is 21 73 kg/m²  PHYSICAL EXAM:      General Appearance:   Alert, cooperative, no distress   HEENT:   Normocephalic, atraumatic, anicteric      Neck:  Supple, symmetrical, trachea midline   Lungs:   Clear to auscultation bilaterally; no rales, rhonchi or wheezing; respirations unlabored    Heart[de-identified]   Regular rate and rhythm; no murmur, rub, or gallop     Abdomen:   Soft, tender to palpation in epigastric area, non-distended; normal bowel sounds; no masses, no organomegaly    Genitalia:   Deferred    Rectal:   Deferred    Extremities:  No cyanosis, clubbing or edema    Pulses:  2+ and symmetric    Skin:  No jaundice, rashes, or lesions    Lymph nodes:  No palpable cervical lymphadenopathy        Lab Results:   No visits with results within 1 Day(s) from this visit  Latest known visit with results is:   Admission on 01/24/2022, Discharged on 01/24/2022   Component Date Value    WBC 01/24/2022 5 21     RBC 01/24/2022 4 82     Hemoglobin 01/24/2022 14 6     Hematocrit 01/24/2022 45 4     MCV 01/24/2022 94     MCH 01/24/2022 30 3     MCHC 01/24/2022 32 2     RDW 01/24/2022 13 4     MPV 01/24/2022 12 4     Platelets 32/25/2285 178     nRBC 01/24/2022 0     Neutrophils Relative 01/24/2022 66     Immat GRANS % 01/24/2022 1     Lymphocytes Relative 01/24/2022 19     Monocytes Relative 01/24/2022 11     Eosinophils Relative 01/24/2022 2     Basophils Relative 01/24/2022 1     Neutrophils Absolute 01/24/2022 3 48     Immature Grans Absolute 01/24/2022 0 03     Lymphocytes Absolute 01/24/2022 0 98     Monocytes Absolute 01/24/2022 0 56     Eosinophils Absolute 01/24/2022 0 09     Basophils Absolute 01/24/2022 0 07     Sodium 01/24/2022 144     Potassium 01/24/2022 4 3     Chloride 01/24/2022 108     CO2 01/24/2022 26     ANION GAP 01/24/2022 10     BUN 01/24/2022 10     Creatinine 01/24/2022 0 81     Glucose 01/24/2022 117     Calcium 01/24/2022 8 6     AST 01/24/2022 22     ALT 01/24/2022 28     Alkaline Phosphatase 01/24/2022 63     Total Protein 01/24/2022 6 3*    Albumin 01/24/2022 3 5     Total Bilirubin 01/24/2022 0 47     eGFR 01/24/2022 70     Lipase 01/24/2022 83     SARS-CoV-2 01/24/2022 Negative          Radiology Results:   XR chest 1 view portable    Result Date: 1/8/2022  Narrative: CHEST INDICATION:   epigastric pain   COMPARISON:  Chest x-ray from July 22, 2018 and recent CT abdomen and pelvis study of December 19, 2021  EXAM PERFORMED/VIEWS:  XR CHEST PORTABLE FINDINGS: Left heart border is partially obscured by the adjacent curvilinear opacity which likely represents an underlying hiatal hernia  No pneumothorax, pleural effusion or consolidation  Mild biapical pleural thickening/scarring  Osseous structures appear within normal limits for patient age  Impression: No acute cardiopulmonary disease  Hiatal hernia  Workstation performed: [id number]     US pelvis transabdominal only    Result Date: 1/26/2022  Narrative: PELVIC ULTRASOUND, COMPLETE INDICATION:  67years old  R93 89: Abnormal findings on diagnostic imaging of other specified body structures Z78 0: Asymptomatic menopausal state  COMPARISON:  Concurrent CT abdomen/pelvis and pelvic ultrasound 8/16/2016 TECHNIQUE:   Transabdominal pelvic ultrasound was performed in sagittal and transverse planes with a curvilinear transducer  Transvaginal imaging was declined  Imaging included volumetric sweeps as well as traditional still imaging technique  FINDINGS: UTERUS: The uterus is anteverted in position, measuring 6 3 x 3 5 x 4 5 cm  Contour and echotexture appear normal    Unchanged hypoechoic intramural mass in the fundus measuring 2 2 cm, consistent with a fibroid  The cervix is poorly seen; grossly unremarkable  ENDOMETRIUM:  Thickened AP caliber of 28 mm  Heterogeneous echotexture  OVARIES/ADNEXA: Ovaries not visualized  No suspicious adnexal mass or loculated collections  There is no free fluid  Impression: Transvaginal imaging was declined  Thickened, heterogeneous endometrium measuring 28 mm  Biopsy is recommended if not previously performed  Ovaries not visualized  No evidence of an adnexal mass  The study was marked in EPIC for significant notification   Workstation performed: [id number]     CT abdomen pelvis with contrast    Result Date: 1/24/2022  Narrative: CT ABDOMEN AND PELVIS WITH IV CONTRAST INDICATION:   Epigastric pain epigastric and LUQ pain and tenderness  COMPARISON:  12/19/2021 TECHNIQUE:  CT examination of the abdomen and pelvis was performed  Axial, sagittal, and coronal 2D reformatted images were created from the source data and submitted for interpretation  Radiation dose length product (DLP) for this visit:  416 91 mGy-cm   This examination, like all CT scans performed in the [facility], was performed utilizing techniques to minimize radiation dose exposure, including the use of iterative  reconstruction and automated exposure control  IV Contrast:  100 mL of iohexol (OMNIPAQUE) Enteric Contrast:  Enteric contrast was not administered  FINDINGS: ABDOMEN LOWER CHEST:  Again seen is a large hiatal hernia with intrathoracic stomach  LIVER/BILIARY TREE:  Unremarkable  GALLBLADDER:  No calcified gallstones  No pericholecystic inflammatory change  SPLEEN:  Unremarkable  PANCREAS:  Unremarkable  ADRENAL GLANDS:  Unremarkable  KIDNEYS/URETERS:  There are right renal peripelvic cysts  There is a tiny cortical cyst on the left  No hydronephrosis  STOMACH AND BOWEL:  There is colonic diverticulosis without evidence of acute diverticulitis  APPENDIX:  No findings to suggest appendicitis  ABDOMINOPELVIC CAVITY:  No ascites  No pneumoperitoneum  No lymphadenopathy  VESSELS:  Unremarkable for patient's age  PELVIS REPRODUCTIVE ORGANS:  Again seen is endometrial distention  There appears to be enhancing tissue along the left margin  URINARY BLADDER:  Unremarkable  ABDOMINAL WALL/INGUINAL REGIONS:  Unremarkable  OSSEOUS STRUCTURES:  No acute fracture or destructive osseous lesion  There is a new calcified disc extrusion at L1-2 resulting in moderate thecal sac compression  Impression: 1  No acute inflammatory process in the abdomen or pelvis  Stable large hiatal hernia with intrathoracic stomach   2   Stable fluid distention of the endometrium with enhancing tissue along the left margin  Endometrial neoplasm is not excluded  The patient is scheduled for pelvic ultrasound later today  3   New calcified disc extrusion at L1-2 with moderate thecal sac compression   Workstation performed: [id number]

## 2022-02-01 ENCOUNTER — ANESTHESIA EVENT (OUTPATIENT)
Dept: GASTROENTEROLOGY | Facility: AMBULATORY SURGERY CENTER | Age: 77
End: 2022-02-01

## 2022-02-01 ENCOUNTER — ANESTHESIA (OUTPATIENT)
Dept: GASTROENTEROLOGY | Facility: AMBULATORY SURGERY CENTER | Age: 77
End: 2022-02-01

## 2022-02-01 ENCOUNTER — HOSPITAL ENCOUNTER (OUTPATIENT)
Dept: GASTROENTEROLOGY | Facility: AMBULATORY SURGERY CENTER | Age: 77
Discharge: HOME | End: 2022-02-01
Payer: MEDICARE

## 2022-02-01 VITALS
RESPIRATION RATE: 18 BRPM | HEIGHT: 61 IN | SYSTOLIC BLOOD PRESSURE: 110 MMHG | WEIGHT: 114 LBS | BODY MASS INDEX: 21.52 KG/M2 | TEMPERATURE: 97.1 F | DIASTOLIC BLOOD PRESSURE: 71 MMHG | HEART RATE: 76 BPM | OXYGEN SATURATION: 94 %

## 2022-02-01 DIAGNOSIS — R63.4 WEIGHT LOSS: ICD-10-CM

## 2022-02-01 DIAGNOSIS — R11.0 NAUSEA: ICD-10-CM

## 2022-02-01 DIAGNOSIS — K44.9 HIATAL HERNIA: Primary | ICD-10-CM

## 2022-02-01 DIAGNOSIS — R10.13 EPIGASTRIC DISCOMFORT: ICD-10-CM

## 2022-02-01 DIAGNOSIS — R68.89 UNINTENTIONAL WEIGHT CHANGE: ICD-10-CM

## 2022-02-01 DIAGNOSIS — Z12.11 COLON CANCER SCREENING: ICD-10-CM

## 2022-02-01 PROCEDURE — 88313 SPECIAL STAINS GROUP 2: CPT | Performed by: PATHOLOGY

## 2022-02-01 PROCEDURE — 00813 ANES UPR LWR GI NDSC PX: CPT | Performed by: NURSE ANESTHETIST, CERTIFIED REGISTERED

## 2022-02-01 PROCEDURE — 43239 EGD BIOPSY SINGLE/MULTIPLE: CPT | Performed by: INTERNAL MEDICINE

## 2022-02-01 PROCEDURE — 45385 COLONOSCOPY W/LESION REMOVAL: CPT | Performed by: INTERNAL MEDICINE

## 2022-02-01 PROCEDURE — 88305 TISSUE EXAM BY PATHOLOGIST: CPT | Performed by: PATHOLOGY

## 2022-02-01 PROCEDURE — 45380 COLONOSCOPY AND BIOPSY: CPT | Performed by: INTERNAL MEDICINE

## 2022-02-01 PROCEDURE — 99100 ANES PT EXTEME AGE<1 YR&>70: CPT | Performed by: NURSE ANESTHETIST, CERTIFIED REGISTERED

## 2022-02-01 RX ORDER — SODIUM CHLORIDE 9 MG/ML
30 INJECTION, SOLUTION INTRAVENOUS CONTINUOUS
Status: DISCONTINUED | OUTPATIENT
Start: 2022-02-01 | End: 2022-02-05 | Stop reason: HOSPADM

## 2022-02-01 RX ORDER — PROPOFOL 10 MG/ML
INJECTION, EMULSION INTRAVENOUS AS NEEDED
Status: DISCONTINUED | OUTPATIENT
Start: 2022-02-01 | End: 2022-02-01

## 2022-02-01 RX ORDER — LIDOCAINE HYDROCHLORIDE 10 MG/ML
INJECTION, SOLUTION EPIDURAL; INFILTRATION; INTRACAUDAL; PERINEURAL AS NEEDED
Status: DISCONTINUED | OUTPATIENT
Start: 2022-02-01 | End: 2022-02-01

## 2022-02-01 RX ADMIN — PROPOFOL 20 MG: 10 INJECTION, EMULSION INTRAVENOUS at 12:42

## 2022-02-01 RX ADMIN — PROPOFOL 20 MG: 10 INJECTION, EMULSION INTRAVENOUS at 12:59

## 2022-02-01 RX ADMIN — PROPOFOL 50 MG: 10 INJECTION, EMULSION INTRAVENOUS at 12:36

## 2022-02-01 RX ADMIN — PROPOFOL 20 MG: 10 INJECTION, EMULSION INTRAVENOUS at 12:44

## 2022-02-01 RX ADMIN — SODIUM CHLORIDE: 9 INJECTION, SOLUTION INTRAVENOUS at 12:30

## 2022-02-01 RX ADMIN — PROPOFOL 60 MG: 10 INJECTION, EMULSION INTRAVENOUS at 12:47

## 2022-02-01 RX ADMIN — PROPOFOL 200 MG: 10 INJECTION, EMULSION INTRAVENOUS at 12:34

## 2022-02-01 RX ADMIN — LIDOCAINE HYDROCHLORIDE 50 MG: 10 INJECTION, SOLUTION EPIDURAL; INFILTRATION; INTRACAUDAL; PERINEURAL at 12:34

## 2022-02-01 RX ADMIN — PROPOFOL 50 MG: 10 INJECTION, EMULSION INTRAVENOUS at 12:39

## 2022-02-01 NOTE — RESULT ENCOUNTER NOTE
Patient called back informed of US results , states she has a lot of things going on right now canceled her 2/8 appointment and will call back to rescheduled

## 2022-02-01 NOTE — INTERVAL H&P NOTE
H&P reviewed  After examining the patient I find no changes in the patients condition since the H&P had been written      Vitals:    02/01/22 1159   BP: 130/76   Pulse: 101   Resp: 18   Temp: (!) 97 1 °F (36 2 °C)   SpO2: 96%

## 2022-02-01 NOTE — ANESTHESIA PREPROCEDURE EVALUATION
Procedure:  COLONOSCOPY  EGD    Relevant Problems   ANESTHESIA (within normal limits)      CARDIO   (+) Benign essential hypertension   (+) Chest pain   (+) HTN (hypertension)   (+) Hyperlipidemia      GI/HEPATIC   (+) Acid reflux      /RENAL   (+) Calcium nephrolithiasis   (+) Chronic kidney disease      NEURO/PSYCH   (+) Anxiety      PULMONARY (within normal limits)        Physical Exam    Airway    Mallampati score: III  TM Distance: >3 FB  Neck ROM: full     Dental   No notable dental hx     Cardiovascular  Rhythm: regular, Rate: normal, Cardiovascular exam normal    Pulmonary  Pulmonary exam normal Breath sounds clear to auscultation,     Other Findings        Anesthesia Plan  ASA Score- 2     Anesthesia Type- IV sedation with anesthesia with ASA Monitors  Additional Monitors:   Airway Plan:     Comment: Nasal cannula  Plan Factors-Exercise tolerance (METS): >4 METS  Chart reviewed  EKG reviewed  Imaging results reviewed  Existing labs reviewed  Patient summary reviewed  Patient is not a current smoker  Patient not instructed to abstain from smoking on day of procedure  Patient did not smoke on day of surgery  Obstructive sleep apnea risk education given perioperatively  Induction- intravenous  Postoperative Plan-     Informed Consent- Anesthetic plan and risks discussed with patient  I personally reviewed this patient with the CRNA  Discussed and agreed on the Anesthesia Plan with the CRNA  Kartik Zhang

## 2022-02-01 NOTE — DISCHARGE INSTRUCTIONS
Upper Endoscopy and Colonoscopy   WHAT YOU NEED TO KNOW:   An upper endoscopy is also called an upper gastrointestinal (GI) endoscopy, or an esophagogastroduodenoscopy (EGD)  It is a procedure to examine the inside of your esophagus, stomach, and duodenum (first part of the small intestine) with a scope  You may feel bloated, gassy, or have some abdominal discomfort after your procedure  Your throat may be sore for 24 to 36 hours  You may burp or pass gas from air that is still inside your body  A colonoscopy is a procedure to examine the inside of your colon (intestine) with a scope  Polyps or tissue growths may have been removed during your colonoscopy  It is normal to feel bloated and to have some abdominal discomfort  You should be passing gas  If you have hemorrhoids or you had polyps removed, you may have a small amount of bleeding  DISCHARGE INSTRUCTIONS:   Seek care immediately if:   · You have sudden, severe abdominal pain  · You have problems swallowing  · You have a large amount of black, sticky bowel movements or blood in your bowel movements  · You have sudden trouble breathing  · You feel weak, lightheaded, or faint or your heart beats faster than normal for you  Contact your healthcare provider if:   · You have a fever and chills  · You have nausea or are vomiting  · Your abdomen is bloated or feels full and hard  · You have abdominal pain  · You have a large amount of black, sticky bowel movements or blood in your bowel movements  · You have not had a bowel movement for 3 days after your procedure  · You have rash or hives  · You have questions or concerns about your procedure  Activity:   ·       Do not lift, strain, or run for 24 hours after your procedure  ·       Rest after your procedure  You have been given medicine to relax you  Do not drive or make important decisions until the day after your procedure   Return to your normal activity as directed  ·       Relieve gas and discomfort from bloating by lying on your right side with a heating pad on your abdomen  You may need to take short walks to help the gas move out  Eat small meals until bloating is relieved  Follow up with your healthcare provider as directed: Write down your questions so you remember to ask them during your visits  If you take a blood thinner, please review the specific instructions from your endoscopist about when you should resume it  These can be found in the Recommendation and Your Medication list sections of this After Visit Summary  Hiatal Hernia   WHAT YOU NEED TO KNOW:   What is a hiatal hernia? A hiatal hernia is a condition that causes part of your stomach to bulge through the hiatus (small opening) in your diaphragm  The part of the stomach may move up and down, or it may get trapped above the diaphragm  What increases my risk for a hiatal hernia? The exact cause of a hiatal hernia is not known  You may have been born with a large hiatus  The following may increase your risk of a hiatal hernia:  · Obesity    · Older age    · Medical conditions such as diverticulosis or esophagitis    · Previous surgery of the esophagus or stomach or trauma such as from a motor vehicle accident    What are the types of hiatal hernia? · Type I (sliding hiatal hernia): A portion of the stomach slides in and out of the hiatus  This type is the most common and usually causes gastroesophageal reflux disease (GERD)  GERD occurs when the esophageal sphincter does not close properly and causes acid reflux  The esophageal sphincter is the lower muscle of the esophagus  · Type II (paraesophageal hiatal hernia):  Type II hiatal hernia forms when a part of the stomach squeezes through the hiatus and lies next to the esophagus      · Type III (combined):  Type III hiatal hernia is a combination of a sliding and a paraesophageal hiatal hernia  · Type IV (complex paraesophageal hiatal hernia): The whole stomach, the small and large bowels, spleen, pancreas, or liver is pushed up into the chest     What are the signs and symptoms of a hiatal hernia? The most common symptom is heartburn  This usually occurs after meals and spreads to your neck, jaw, or shoulder  You may have no signs or symptoms, or you may have any of the following:  · Abdominal pain, especially in the area just above your navel    · Bitter or acid taste in your mouth    · Trouble swallowing    · Coughing or hoarseness    · Chest pain or shortness of breath that occurs after eating    · Frequent burping or hiccups    · Uncomfortable feeling of fullness after eating    How is a hiatal hernia diagnosed? · An upper GI series test  includes x-rays of your esophagus, stomach, and your small intestines  It is also called a barium swallow test  You will be given barium (a chalky liquid) to drink before the pictures are taken  This liquid helps your stomach and intestines show up better on the x-rays  An upper GI series can show if you have an ulcer, a blocked intestine, or other problems  · An endoscopy  uses a scope to see the inside of your digestive tract  A scope is a long, bendable tube with a light on the end of it  A camera may be hooked to the scope to take pictures  How is a hiatal hernia treated? Treatment depends on the type of hiatal hernia you have and on your symptoms  You may not need any treatment  You may need any of the following:  · Medicines  may be given to relieve heartburn symptoms  These medicines help to decrease or block stomach acid  You may also be given medicines that help to tighten the esophageal sphincter  · Surgery  may be done when medicines cannot control your symptoms, or other problems are present  Your healthcare provider may also suggest surgery depending on the type of hernia you have   Your healthcare provider can put your stomach back into its normal location  He or she may make the hiatus (hole) smaller and anchor your stomach in your abdomen  Fundoplication is a surgery that wraps the upper part of the stomach around the esophageal sphincter to strengthen it  How can I manage my symptoms? The following nutrition and lifestyle changes may be recommended to relieve symptoms of heartburn:     · Avoid foods that make your symptoms worse  These may include spicy foods, fruit juices, alcohol, caffeine, chocolate, and mint  · Eat several small meals during the day  Small meals give your stomach less food to digest     · Avoid lying down and bending forward after you eat  Do not eat meals 2 to 3 hours before bedtime  This decreases your risk for reflux  · Maintain a healthy weight  If you are overweight, weight loss may help relieve your symptoms  · Sleep with your head elevated  at least 6 inches  · Do not smoke  Smoking can increase your symptoms of heartburn  Call your local emergency number (911 in the [address] [facility]) if:   · You have severe chest pain and sudden trouble breathing  When should I seek immediate care? · You have severe abdominal pain  · You try to vomit but nothing comes out (retching)  · Your bowel movements are black or bloody  · Your vomit looks like coffee grounds or has blood in it  When should I call my doctor? · Your symptoms are getting worse  · You have nausea, and you are vomiting  · You are losing weight without trying  · You have questions or concerns about your condition or care  CARE AGREEMENT:   You have the right to help plan your care  Learn about your health condition and how it may be treated  Discuss treatment options with your healthcare providers to decide what care you want to receive  You always have the right to refuse treatment  The above information is an  only  It is not intended as medical advice for individual conditions or treatments   Talk to your doctor, nurse or pharmacist before following any medical regimen to see if it is safe and effective for you  © Copyright [employer] 2021 Information is for End User's use only and may not be sold, redistributed or otherwise used for commercial purposes  All illustrations and images included in CareNotes® are the copyrighted property of A D A M , Inc  or [address]  Gastritis   WHAT YOU NEED TO KNOW:   Gastritis is inflammation or irritation of the lining of your stomach  DISCHARGE INSTRUCTIONS:   Call 911 for any of the following:   · You develop chest pain or shortness of breath  Seek care immediately if:   · You vomit blood  · You have black or bloody bowel movements  · You have severe stomach or back pain  Contact your healthcare provider if:   · You have a fever  · You have new or worsening symptoms, even after treatment  · You have questions or concerns about your condition or care  Medicines:   · Medicines  may be given to help treat a bacterial infection or decrease stomach acid  · Take your medicine as directed  Contact your healthcare provider if you think your medicine is not helping or if you have side effects  Tell him or her if you are allergic to any medicine  Keep a list of the medicines, vitamins, and herbs you take  Include the amounts, and when and why you take them  Bring the list or the pill bottles to follow-up visits  Carry your medicine list with you in case of an emergency  Manage or prevent gastritis:   · Do not smoke  Nicotine and other chemicals in cigarettes and cigars can make your symptoms worse and cause lung damage  Ask your healthcare provider for information if you currently smoke and need help to quit  E-cigarettes or smokeless tobacco still contain nicotine  Talk to your healthcare provider before you use these products  · Do not drink alcohol  Alcohol can prevent healing and make your gastritis worse   Talk to your healthcare provider if you need help to stop drinking  · Do not take NSAIDs or aspirin unless directed  These and similar medicines can cause irritation  If your healthcare provider says it is okay to take NSAIDs, take them with food  · Do not eat foods that cause irritation  Foods such as oranges and salsa can cause burning or pain  Eat a variety of healthy foods  Examples include fruits (not citrus), vegetables, low-fat dairy products, beans, whole-grain breads, and lean meats and fish  Try to eat small meals, and drink water with your meals  Do not eat for at least 3 hours before you go to bed  · Find ways to relax and decrease stress  Stress can increase stomach acid and make gastritis worse  Activities such as yoga, meditation, or listening to music can help you relax  Spend time with friends, or do things you enjoy  Follow up with your healthcare provider as directed: You may need ongoing tests or treatment, or referral to a gastroenterologist  Write down your questions so you remember to ask them during your visits  © Copyright [employer] 2021 Information is for End User's use only and may not be sold, redistributed or otherwise used for commercial purposes  All illustrations and images included in CareNotes® are the copyrighted property of A D A M , Inc  or [address] [place] [address]  The above information is an  only  It is not intended as medical advice for individual conditions or treatments  Talk to your doctor, nurse or pharmacist before following any medical regimen to see if it is safe and effective for you  GERD (Gastroesophageal Reflux Disease)   WHAT YOU NEED TO KNOW:   Gastroesophageal reflux disease (GERD) is reflux that occurs more than twice a week for a few weeks  Reflux means acid and food in the stomach back up into the esophagus  It usually causes heartburn and other symptoms  GERD can cause other health problems over time if it is not treated         DISCHARGE INSTRUCTIONS:   Call your local emergency number (911 in the [address]) if:   · You have severe chest pain and sudden trouble breathing  Seek care immediately if:   · You have trouble breathing after you vomit  · You have trouble swallowing, or pain with swallowing  · Your bowel movements are black, bloody, or tarry-looking  · Your vomit looks like coffee grounds or has blood in it  Call your doctor or gastroenterologist if:   · You feel full and cannot burp or vomit  · You vomit large amounts, or you vomit often  · You are losing weight without trying  · Your symptoms get worse or do not improve with treatment  · You have questions or concerns about your condition or care  Medicines:   · Medicines  are used to decrease stomach acid  Medicine may also be used to help your lower esophageal sphincter and stomach contract (tighten) more  · Take your medicine as directed  Contact your healthcare provider if you think your medicine is not helping or if you have side effects  Tell him or her if you are allergic to any medicine  Keep a list of the medicines, vitamins, and herbs you take  Include the amounts, and when and why you take them  Bring the list or the pill bottles to follow-up visits  Carry your medicine list with you in case of an emergency  Manage GERD:       · Do not have foods or drinks that may increase heartburn  These include chocolate, peppermint, fried or fatty foods, drinks that contain caffeine, or carbonated drinks (soda)  Other foods include spicy foods, onions, tomatoes, and tomato-based foods  Do not have foods or drinks that can irritate your esophagus, such as citrus fruits, juices, and alcohol  · Do not eat large meals  When you eat a lot of food at one time, your stomach needs more acid to digest it  Eat 6 small meals each day instead of 3 large ones, and eat slowly  Do not eat meals 2 to 3 hours before bedtime  · Elevate the head of your bed    Place 6-inch blocks under the head of your bed frame  You may also use more than one pillow under your head and shoulders while you sleep  · Maintain a healthy weight  If you are overweight, weight loss may help relieve symptoms of GERD  · Do not smoke  Smoking weakens the lower esophageal sphincter and increases the risk of GERD  Ask your healthcare provider for information if you currently smoke and need help to quit  E-cigarettes or smokeless tobacco still contain nicotine  Talk to your healthcare provider before you use these products  · Do not wear clothing that is tight around your waist   Tight clothing can put pressure on your stomach and cause or worsen GERD symptoms  Follow up with your doctor or gastroenterologist as directed:  Write down your questions so you remember to ask them during your visits  © Copyright [employer] 2021 Information is for End User's use only and may not be sold, redistributed or otherwise used for commercial purposes  All illustrations and images included in CareNotes® are the copyrighted property of [employer] M , Inc  or [employer]   The above information is an  only  It is not intended as medical advice for individual conditions or treatments  Talk to your doctor, nurse or pharmacist before following any medical regimen to see if it is safe and effective for you  Gastric Polyps   WHAT YOU NEED TO KNOW:   Gastric polyps are growths that form in the lining of your stomach  They are not cancerous, but certain types of polyps can change into cancer  DISCHARGE INSTRUCTIONS:   Follow up with your healthcare provider as directed: You may need more tests or procedures  Write down any questions so you remember to ask them at your visits  Medicines:   · Medicines may  be given if you have an infection caused by H  pylori bacteria  · Take your medicine as directed    Contact your healthcare provider if you think your medicine is not helping or if you have side effects  Tell him or her if you are allergic to any medicine  Keep a list of the medicines, vitamins, and herbs you take  Include the amounts, and when and why you take them  Bring the list or the pill bottles to follow-up visits  Carry your medicine list with you in case of an emergency  Seek care immediately if:   · You have blood in your vomit  · You have dark bowel movements  · You have severe pain in your abdomen that does not go away after you take medicine  Contact your healthcare provider if:   · You have indigestion that does not go away with treatment  · You vomit after meals  · You have questions or concerns about your condition or care  © Copyright [employer] 2021 Information is for End User's use only and may not be sold, redistributed or otherwise used for commercial purposes  All illustrations and images included in CareNotes® are the copyrighted property of A D A M , Inc  or [employer]   The above information is an  only  It is not intended as medical advice for individual conditions or treatments  Talk to your doctor, nurse or pharmacist before following any medical regimen to see if it is safe and effective for you  High Fiber Diet   WHAT YOU NEED TO KNOW:   What is a high-fiber diet? A high-fiber diet includes foods that have a high amount of fiber  Fiber is the part of fruits, vegetables, and grains that is not broken down by your body  Fiber keeps your bowel movements regular  Fiber can also help lower your cholesterol level, control blood sugar in people with diabetes, and relieve constipation  Fiber can also help you control your weight because it helps you feel full faster  Most adults should eat 25 to 35 grams of fiber each day  Talk to your dietitian or healthcare provider about the amount of fiber you need  What foods are good sources of fiber?        · Foods with at least 4 grams of fiber per serving:      ? ? to ½ cup of high-fiber cereal (check the nutrition label on the box)    ? ½ cup of blackberries or raspberries    ? 4 dried prunes    ? 1 cooked artichoke    ? ½ cup of cooked legumes, such as lentils, or red, kidney, and patel beans    · Foods with 1 to 3 grams of fiber per serving:      ? 1 slice of whole-wheat, pumpernickel, or rye bread    ? ½ cup of cooked brown rice    ? 4 whole-wheat crackers    ? 1 cup of oatmeal    ? ½ cup of cereal with 1 to 3 grams of fiber per serving (check the nutrition label on the box)    ? 1 small piece of fruit, such as an apple, banana, pear, kiwi, or orange    ? 3 dates    ? ½ cup of canned apricots, fruit cocktail, peaches, or pears    ? ½ cup of raw or cooked vegetables, such as carrots, cauliflower, cabbage, spinach, squash, or corn    What are some ways that I can increase fiber in my diet? · Choose brown or wild rice instead of white rice  · Use whole wheat flour in recipes instead of white or all-purpose flour  · Add beans and peas to casseroles or soups  · Choose fresh fruit and vegetables with peels or skins on instead of juices  What other guidelines should I follow? · Add fiber to your diet slowly  You may have abdominal discomfort, bloating, and gas if you add fiber to your diet too quickly  · Drink plenty of liquids as you add fiber to your diet  You may have nausea or develop constipation if you do not drink enough water  Ask how much liquid to drink each day and which liquids are best for you  CARE AGREEMENT:   You have the right to help plan your care  Discuss treatment options with your healthcare provider to decide what care you want to receive  You always have the right to refuse treatment  The above information is an  only  It is not intended as medical advice for individual conditions or treatments  Talk to your doctor, nurse or pharmacist before following any medical regimen to see if it is safe and effective for you    © [address] [place] [address] 2021 Information is for End User's use only and may not be sold, redistributed or otherwise used for commercial purposes  All illustrations and images included in CareNotes® are the copyrighted property of [employer] M , Inc  or Vikas Cheng  Hemorrhoids   WHAT YOU NEED TO KNOW:   What are hemorrhoids? Hemorrhoids are swollen blood vessels inside your rectum (internal hemorrhoids) or on your anus (external hemorrhoids)  Sometimes a hemorrhoid may prolapse  This means it extends out of your anus  What increases my risk for hemorrhoids? · Pregnancy or obesity    · Straining or sitting for a long time during bowel movements    · Liver disease    · Weak muscles around the anus caused by older age, rectal surgery, or anal intercourse    · A lack of physical activity    · Chronic diarrhea or constipation    · A low-fiber diet    What are the signs and symptoms of hemorrhoids? · Pain or itching around your anus or inside your rectum    · Swelling or bumps around your anus    · Bright red blood in your bowel movement, on the toilet paper, or in the toilet bowl    · Tissue bulging out of your anus (prolapsed hemorrhoids)    · Incontinence (poor control over urine or bowel movements)    How are hemorrhoids diagnosed? Your healthcare provider will ask about your symptoms, the foods you eat, and your bowel movements  He or she will examine your anus for external hemorrhoids  You may need the following:  · A digital rectal exam  is a test to check for hemorrhoids  Your healthcare provider will put a gloved finger inside your anus to feel for the hemorrhoids  · An anoscopy  is a test that uses a scope (small tube with a light and camera on the end) to look at your hemorrhoids  How are hemorrhoids treated? Treatment will depend on your symptoms  You may need any of the following:  · Medicines  can help decrease pain and swelling, and soften your bowel movement   The medicine may be a pill, pad, cream, or ointment  · Procedures  may be used to shrink or remove your hemorrhoid  Examples include rubber-band ligation, sclerotherapy, and photocoagulation  These procedures may be done in your healthcare provider's office  Ask your healthcare provider for more information about these procedures  · Surgery  may be needed to shrink or remove your hemorrhoids  How can I manage my symptoms? · Apply ice on your anus for 15 to 20 minutes every hour or as directed  Use an ice pack, or put crushed ice in a plastic bag  Cover it with a towel before you apply it to your anus  Ice helps prevent tissue damage and decreases swelling and pain  · Take a sitz bath  Fill a bathtub with 4 to 6 inches of warm water  You may also use a sitz bath pan that fits inside a toilet bowl  Sit in the sitz bath for 15 minutes  Do this 3 times a day, and after each bowel movement  The warm water can help decrease pain and swelling  · Keep your anal area clean  Gently wash the area with warm water daily  Soap may irritate the area  After a bowel movement, wipe with moist towelettes or wet toilet paper  Dry toilet paper can irritate the area  How can I help prevent hemorrhoids? · Do not strain to have a bowel movement  Do not sit on the toilet too long  These actions can increase pressure on the tissues in your rectum and anus  · Drink plenty of liquids  Liquids can help prevent constipation  Ask how much liquid to drink each day and which liquids are best for you  · Eat a variety of high-fiber foods  Examples include fruits, vegetables, and whole grains  Ask your healthcare provider how much fiber you need each day  You may need to take a fiber supplement  · Exercise as directed  Exercise, such as walking, may make it easier to have a bowel movement  Ask your healthcare provider to help you create an exercise plan  · Do not have anal sex    Anal sex can weaken the skin around your rectum and anus  · Avoid heavy lifting  This can cause straining and increase your risk for another hemorrhoid  When should I seek immediate care? · You have severe pain in your rectum or around your anus  · You have severe pain in your abdomen and you are vomiting  · You have bleeding from your anus that soaks through your underwear  When should I contact my healthcare provider? · You have frequent and painful bowel movements  · Your hemorrhoid looks or feels more swollen than usual      · You do not have a bowel movement for 2 days or more  · You see or feel tissue coming through your anus  · You have questions or concerns about your condition or care  CARE AGREEMENT:   You have the right to help plan your care  Learn about your health condition and how it may be treated  Discuss treatment options with your healthcare providers to decide what care you want to receive  You always have the right to refuse treatment  The above information is an  only  It is not intended as medical advice for individual conditions or treatments  Talk to your doctor, nurse or pharmacist before following any medical regimen to see if it is safe and effective for you  © Copyright [employer] 2021 Information is for End User's use only and may not be sold, redistributed or otherwise used for commercial purposes  All illustrations and images included in CareNotes® are the copyrighted property of A D A M , Inc  or [facility] Yue Saenz   Diverticulosis   WHAT YOU NEED TO KNOW:   What is diverticulosis? Diverticulosis is a condition that causes small pockets called diverticula to form in your intestine  These pockets make it difficult for bowel movements to pass through your digestive system  What causes diverticulosis? Diverticula form when muscles have to work hard to move bowel movements through the intestine  The force causes bulges to form at weak areas in the intestine   This may happen if you eat foods that are low in fiber  Fiber helps give your bowel movements more bulk so they are larger and easier to move through your colon  The following may increase your risk of diverticulosis:  · A history of constipation    · Age 36 or older    · Obesity    · Lack of exercise    What are the signs and symptoms of diverticulosis? Diverticulosis usually does not cause any signs or symptoms  It may cause any of the following in some people:  · Pain or discomfort in your lower abdomen    · Abdominal bloating    · Constipation or diarrhea    How is diverticulosis diagnosed? Your healthcare provider will examine you and ask about your bowel movements, diet, and symptoms  He or she will also ask about any medical conditions you have or medicines you take  You may need any of the following:  · Blood tests  may be done to check for signs of inflammation  · A barium enema  is an x-ray of your colon that may show diverticula  A tube is put into your anus, and a liquid called barium is put through the tube  Barium is used so that healthcare providers can see your colon more clearly  · Flexible sigmoidoscopy  is a test to look for any changes in your lower intestines and rectum  It may also show the cause of any bleeding or pain  A soft, bendable tube with a light on the end will be put into your anus  It will then be moved forward into your intestine  · A colonoscopy  is used to look at your whole colon  A scope (long bendable tube with a light on the end) is used to take pictures  This test may show diverticula  · A CT scan , or CAT scan, may show diverticula  You may be given contrast liquid before the scan  Tell the healthcare provider if you have ever had an allergic reaction to contrast liquid  How is diverticulosis managed? The goal of treatment is to manage any symptoms you have and prevent other problems such as diverticulitis   Diverticulitis is swelling or infection of the diverticula  Your healthcare provider may recommend any of the following:  · Eat a variety of high-fiber foods  High-fiber foods help you have regular bowel movements  High-fiber foods include cooked beans, fruits, vegetables, and some cereals  Most adults need 25 to 35 grams of fiber each day  Your healthcare provider may recommend that you have more  Ask your healthcare provider how much fiber you need  Increase fiber slowly  You may have abdominal discomfort, bloating, and gas if you add fiber to your diet too quickly  You may need to take a fiber supplement if you are not getting enough fiber from food  · Medicines  to soften your bowel movements may be given  You may also need medicines to treat symptoms such as bloating and pain  · Drink liquids as directed  You may need to drink 2 to 3 liters (8 to 12 cups) of liquids every day  Ask your healthcare provider how much liquid to drink each day and which liquids are best for you  · Apply heat  on your abdomen for 20 to 30 minutes every 2 hours for as many days as directed  Heat helps decrease pain and muscle spasms  How can I help prevent diverticulitis or other symptoms? The following may help decrease your risk for diverticulitis or symptoms, such as bleeding  Talk to your provider about these or other things you can do to prevent problems that may occur with diverticulosis  · Exercise regularly  Ask your healthcare provider about the best exercise plan for you  Exercise can help you have regular bowel movements  Get 30 minutes of exercise on most days of the week  · Maintain a healthy weight  Ask your healthcare provider what a healthy weight is for you  Ask him or her to help you create a weight loss plan if you are overweight  · Do not smoke  Nicotine and other chemicals in cigarettes increase your risk for diverticulitis  Ask your healthcare provider for information if you currently smoke and need help to quit  E-cigarettes or smokeless tobacco still contain nicotine  Talk to your healthcare provider before you use these products  · Ask your healthcare provider if it is safe to take NSAIDs  NSAIDs may increase your risk of diverticulitis  When should I seek immediate care? · You have severe pain on the left side of your lower abdomen  · Your bowel movements are bright or dark red  When should I call my doctor? · You have a fever and chills  · You feel dizzy or lightheaded  · You have nausea, or you are vomiting  · You have a change in your bowel movements  · You have questions or concerns about your condition or care  CARE AGREEMENT:   You have the right to help plan your care  Learn about your health condition and how it may be treated  Discuss treatment options with your healthcare providers to decide what care you want to receive  You always have the right to refuse treatment  The above information is an  only  It is not intended as medical advice for individual conditions or treatments  Talk to your doctor, nurse or pharmacist before following any medical regimen to see if it is safe and effective for you  © Copyright [employer] 2021 Information is for End User's use only and may not be sold, redistributed or otherwise used for commercial purposes  All illustrations and images included in CareNotes® are the copyrighted property of [employer]  or [address] [place], [place] [address]tatyana   Colorectal Polyps   WHAT YOU NEED TO KNOW:   What are colorectal polyps? Colorectal polyps are small growths of tissue in the lining of the colon and rectum  Most polyps are usually benign (not cancer)  Certain types of polyps, called adenomatous polyps, may turn into cancer  What increases my risk for colorectal polyps? The exact cause of colorectal polyps is unknown   The following may increase your risk:  · Older age    · Foods high in fat and low in fiber    · Family history of polyps    · Intestinal diseases, such as Crohn disease or ulcerative colitis    · Smoking cigarettes or drinking alcohol    · Lack of physical activity, such as exercise    · Obesity    What are the signs and symptoms of colorectal polyps? · Blood in your bowel movement or bleeding from the rectum    · Change in bowel movement habits, such as diarrhea or constipation    · Abdominal pain    What do I need to know about colorectal polyp screening and diagnosis? Screening means you are checked for polyps that may be cancer, even if you do not have signs or symptoms  Screening is recommended starting at age 48 and continuing to age 76 if you are at average risk  Your healthcare provider may suggest screening starting at age 39  Screening may start before you are 45 or continue after you are 75 if your risk is high  Your provider will tell you how often to get screened  Timing depends on the type of screening and if polyps are found  Timing also depends on your age and if you are at increased risk for cancer  Screening may be recommended every 1, 2, 5, or 10 years  Your healthcare provider will need to test polyps to find out if they are cancer  Any of the following may be used to find polyps:  · A digital rectal exam  means your provider will use a finger to check for polyps  · A barium enema  is an x-ray of the colon  A tube is put into your anus, and a liquid called barium is put through the tube  Barium is used so that healthcare providers can see your colon better on the x-ray film  · A virtual colonoscopy  is a CT scan that takes pictures of the inside of your colon and rectum  A small, flexible tube is put into your rectum and air or carbon dioxide (gas) is used to expand your colon  This lets healthcare providers clearly see your colon and any polyps on a monitor  · Colonoscopy or sigmoidoscopy  are procedures to help your provider see the inside of your colon   He or she will use a flexible tube with a small light and camera on the end  During a sigmoidoscopy, your provider will only look at rectum and lower colon  During a colonoscopy, he or she will look at the full length of your colon  A small amount of tissue may be removed from your colon for tests  How are colorectal polyps treated? Small, benign polyps may not need treatment  Your healthcare provider will check the polyp over time to make sure it is not changing  Polyps that are cancer may be removed with one of the following:  · A polypectomy  is a minimally invasive procedure to remove a polyp during a colonoscopy or sigmoidoscopy  Your healthcare provider may need to remove the polyp with a laparoscope  Laparoscopy is done by inserting a small, flexible scope into incisions made on your abdomen  · Surgery  may be needed to remove large or deep polyps that cannot be removed in a polypectomy  Tissues or lymph nodes near a polyp may also be removed  This helps stop cancer from spreading  What can I do to lower my risk for colorectal polyps? · Eat a variety of healthy foods  Healthy foods include fruit, vegetables, whole-grain breads, low-fat dairy products, beans, lean meat, and fish  Ask if you need to be on a special diet  · Maintain a healthy weight  Ask your healthcare provider what a healthy weight is for you  Ask him or her to help with a weight loss plan if you are overweight  · Exercise as directed  Begin to exercise slowly and do more as you get stronger  Talk with your healthcare provider before you start an exercise program          · Limit alcohol  Your risk for polyps increases the more you drink  · Do not smoke  Nicotine and other chemicals in cigarettes and cigars increase your risk for polyps  Ask your healthcare provider for information if you currently smoke and need help to quit  E-cigarettes or smokeless tobacco still contain nicotine   Talk to your healthcare provider before you use these products  Where can I find more information? · [address] ([facility])  [address] , [place] [address]  Phone: [phone]  Web Address: Ines Gonzales  [facility] gov    Call your local emergency number (911 in the [address]) if:   · You have sudden shortness of breath  · You have a fast heart rate, fast breathing, or are too dizzy to stand up  When should I seek immediate care? · You have severe abdominal pain  · You see blood in your bowel movement  When should I call my doctor? · You have a fever  · You have chills, a cough, or feel weak and achy  · You have abdominal pain that does not go away or gets worse after you take medicine  · Your abdomen is swollen  · You are losing weight without trying  · You have questions or concerns about your condition or care  CARE AGREEMENT:   You have the right to help plan your care  Learn about your health condition and how it may be treated  Discuss treatment options with your healthcare providers to decide what care you want to receive  You always have the right to refuse treatment  The above information is an  only  It is not intended as medical advice for individual conditions or treatments  Talk to your doctor, nurse or pharmacist before following any medical regimen to see if it is safe and effective for you  © Copyright [employer] 2021 Information is for End User's use only and may not be sold, redistributed or otherwise used for commercial purposes   All illustrations and images included in CareNotes® are the copyrighted property of [employer]  or [address]

## 2022-02-02 NOTE — RESULT ENCOUNTER NOTE
Please inform patient that their biopsies were benign    Colonoscopy 3 years upper endoscopy 5 years

## 2022-02-08 ENCOUNTER — HOSPITAL ENCOUNTER (OUTPATIENT)
Dept: RADIOLOGY | Facility: HOSPITAL | Age: 77
Discharge: HOME | End: 2022-02-08
Attending: INTERNAL MEDICINE
Payer: MEDICARE

## 2022-02-08 DIAGNOSIS — K44.9 HIATAL HERNIA: ICD-10-CM

## 2022-02-08 DIAGNOSIS — R63.4 WEIGHT LOSS: ICD-10-CM

## 2022-02-08 PROCEDURE — 74240 X-RAY XM UPR GI TRC 1CNTRST: CPT

## 2022-02-09 ENCOUNTER — PROCEDURE VISIT (OUTPATIENT)
Dept: OBGYN CLINIC | Facility: CLINIC | Age: 77
End: 2022-02-09
Payer: MEDICARE

## 2022-02-09 ENCOUNTER — TELEPHONE (OUTPATIENT)
Dept: GASTROENTEROLOGY | Facility: CLINIC | Age: 77
End: 2022-02-09

## 2022-02-09 VITALS — SYSTOLIC BLOOD PRESSURE: 110 MMHG | DIASTOLIC BLOOD PRESSURE: 60 MMHG | BODY MASS INDEX: 21.54 KG/M2 | WEIGHT: 114 LBS

## 2022-02-09 DIAGNOSIS — R93.89 THICKENED ENDOMETRIUM: Primary | ICD-10-CM

## 2022-02-09 PROCEDURE — 58100 BIOPSY OF UTERUS LINING: CPT | Performed by: NURSE PRACTITIONER

## 2022-02-09 PROCEDURE — 99202 OFFICE O/P NEW SF 15 MIN: CPT | Performed by: NURSE PRACTITIONER

## 2022-02-09 RX ORDER — OMEPRAZOLE 40 MG/1
CAPSULE, DELAYED RELEASE ORAL
COMMUNITY
Start: 2022-01-31 | End: 2022-02-10

## 2022-02-09 NOTE — TELEPHONE ENCOUNTER
Spoke with patient regarding the results of her upper GI  She is likely a candidate for surgery  She has a follow-up appointment for tomorrow    Will need esophageal manometry, gastric emptying study and cardiothoracic consultation with ROSARIO MEDRANO

## 2022-02-09 NOTE — ASSESSMENT & PLAN NOTE
Reviewed pelvic ultrasound results again with patient  Recommended endometrial biopsy, reviewed procedure  Pt was informed of need for biopsy prior to appointment  She is ready to proceed with biopsy  Endometrial biopsy attempted, unable to perform due to cervical stenosis  Will review with physician on call  Discussed potential for Cytotec night before procedure and re-attempted in office procedure vs D&C hysteroscopy with physician in [facility] [address] [place] [address]       Will call patient to review after speaking with physician

## 2022-02-09 NOTE — PROGRESS NOTES
Assessment/Plan: Thickened endometrium  Reviewed pelvic ultrasound results again with patient  Recommended endometrial biopsy, reviewed procedure  Pt was informed of need for biopsy prior to appointment  She is ready to proceed with biopsy  Endometrial biopsy attempted, unable to perform due to cervical stenosis  Will review with physician on call  Discussed potential for Cytotec night before procedure and re-attempted in office procedure vs D&C hysteroscopy with physician in [address] [place] [address]  Will call patient to review after speaking with physician     Diagnoses and all orders for this visit:    Thickened endometrium  -     Endometrial biopsy    Other orders  -     omeprazole (PriLOSEC) 40 MG capsule; TAKE ONE BY MOUTH CAPSULE DAILY          Subjective:      Patient ID: Mundo Tucker is a 67 y o  female  Emmanuel Stuart presents as a new patient to discuss pelvic ultrasound results  She had a Pelvic CT done due to hip pain  Was advised follow up with GYN due to thickened endometrium  CT adnomen and pelvis 1/24/2022  "IMPRESSION:     1  No acute inflammatory process in the abdomen or pelvis  Stable large hiatal hernia with intrathoracic stomach      2   Stable fluid distention of the endometrium with enhancing tissue along the left margin  Endometrial neoplasm is not excluded  The patient is scheduled for pelvic ultrasound later today      3  New calcified disc extrusion at L1-2 with moderate thecal sac compression "    She then had pelvic ultrasound but was unable to tolerate transvaginal wand so compeleted abdominal portion 1/24/2022    "IMPRESSION:   Transvaginal imaging was declined  Thickened, heterogeneous endometrium measuring 28 mm  Biopsy is recommended if not previously performed  Ovaries not visualized  No evidence of an adnexal mass "     Menarche age 11, menses monthly, regular  Menopausal since 49   No hx of HRT  Denies any vaginal bleeding since menopause       Denies any pelvic pain or pressure  OB History:   G 0     Last GYN visit: 73-73 pap smears normal, denies any hx of abnormal pap smears in her past        The following portions of the patient's history were reviewed and updated as appropriate: allergies, current medications, past family history, past medical history, past social history, past surgical history and problem list     Review of Systems   All other systems reviewed and are negative  Objective:    /60 (BP Location: Left arm, Patient Position: Sitting, Cuff Size: Large)   Wt 51 7 kg (114 lb)   LMP  (LMP Unknown)   BMI 21 54 kg/m²      Physical Exam  Vitals and nursing note reviewed  Constitutional:       Appearance: She is well-developed  HENT:      Head: Normocephalic and atraumatic  Neck:      Thyroid: No thyromegaly  Cardiovascular:      Rate and Rhythm: Normal rate and regular rhythm  Heart sounds: Normal heart sounds  Pulmonary:      Effort: Pulmonary effort is normal       Breath sounds: Normal breath sounds  Abdominal:      General: Bowel sounds are normal  There is no distension  Palpations: Abdomen is soft  There is no mass  Tenderness: There is no abdominal tenderness  There is no guarding or rebound  Genitourinary:     Labia:         Right: No rash, tenderness, lesion or injury  Left: No rash, tenderness, lesion or injury  Vagina: Normal       Cervix: Normal       Uterus: Normal        Adnexa: Right adnexa normal and left adnexa normal         Right: No mass, tenderness or fullness  Left: No mass, tenderness or fullness  Comments: Vaginal atrophy, bleeding occurred on vaginal tissue with instrumental movement when attempting biopsy    Cervix stenotic  Musculoskeletal:         General: Normal range of motion  Cervical back: Normal range of motion and neck supple  Skin:     General: Skin is warm and dry     Neurological:      Mental Status: She is alert and oriented to person, place, and time    Psychiatric:         Behavior: Behavior normal          Thought Content: Thought content normal          Judgment: Judgment normal            Endometrial biopsy    Date/Time: 2/9/2022 2:30 PM  Performed by: AMY Kruse  Authorized by: AMY Kruse   Universal Protocol:  Consent: Verbal consent obtained  Risks and benefits: risks, benefits and alternatives were discussed  Consent given by: patient  Time out: Immediately prior to procedure a "time out" was called to verify the correct patient, procedure, equipment, support staff and site/side marked as required  Timeout called at: 2/9/2022 2:30 PM   Patient understanding: patient states understanding of the procedure being performed  Patient consent: the patient's understanding of the procedure matches consent given  Procedure consent: procedure consent matches procedure scheduled  Relevant documents: relevant documents present and verified  Test results: test results available and properly labeled  Site marked: the operative site was not marked  Radiology Images displayed and confirmed  If images not available, report reviewed: imaging studies available  Required items: required blood products, implants, devices, and special equipment available  Patient identity confirmed: verbally with patient      Indication:     Indications comment:   Thickened endometrium  Pre-procedure:     Premeds:  Ibuprofen  Procedure:     Procedure: endometrial biopsy with Pipelle      A bivalve speculum was placed in the vagina: yes      Cervix cleaned and prepped: yes      A paracervical block was performed: no      An intracervical block was performed: no      The cervix was dilated: no      Uterus sounded: no      Unable to perform due to: cervical stenosis    Findings:     Uterus size:  6-8 weeks    Cervix: stenotic      Adnexa: normal    Comments:     Procedure comments:  Unable to complete biopsy due to cervical stenosis

## 2022-02-10 ENCOUNTER — OFFICE VISIT (OUTPATIENT)
Dept: GASTROENTEROLOGY | Facility: CLINIC | Age: 77
End: 2022-02-10
Payer: MEDICARE

## 2022-02-10 VITALS
HEART RATE: 103 BPM | WEIGHT: 114 LBS | HEIGHT: 61 IN | DIASTOLIC BLOOD PRESSURE: 64 MMHG | BODY MASS INDEX: 21.52 KG/M2 | SYSTOLIC BLOOD PRESSURE: 93 MMHG

## 2022-02-10 DIAGNOSIS — K21.9 GERD (GASTROESOPHAGEAL REFLUX DISEASE): ICD-10-CM

## 2022-02-10 DIAGNOSIS — K21.9 GASTROESOPHAGEAL REFLUX DISEASE CONCURRENT WITH AND DUE TO PARAESOPHAGEAL HERNIA: Primary | ICD-10-CM

## 2022-02-10 DIAGNOSIS — R10.13 EPIGASTRIC DISCOMFORT: ICD-10-CM

## 2022-02-10 DIAGNOSIS — Z12.11 COLON CANCER SCREENING: ICD-10-CM

## 2022-02-10 DIAGNOSIS — R93.5 ABNORMAL CT SCAN, PELVIS: ICD-10-CM

## 2022-02-10 DIAGNOSIS — Z12.39 ENCOUNTER FOR SCREENING FOR MALIGNANT NEOPLASM OF BREAST, UNSPECIFIED SCREENING MODALITY: ICD-10-CM

## 2022-02-10 DIAGNOSIS — K44.9 GASTROESOPHAGEAL REFLUX DISEASE CONCURRENT WITH AND DUE TO PARAESOPHAGEAL HERNIA: Primary | ICD-10-CM

## 2022-02-10 DIAGNOSIS — R68.89 UNINTENTIONAL WEIGHT CHANGE: ICD-10-CM

## 2022-02-10 PROBLEM — M47.816 LUMBAR SPONDYLOSIS: Status: ACTIVE | Noted: 2022-01-14

## 2022-02-10 PROBLEM — M81.0 OSTEOPOROSIS OF MULTIPLE SITES: Status: ACTIVE | Noted: 2021-11-04

## 2022-02-10 PROBLEM — M76.32 ILIOTIBIAL BAND SYNDROME AFFECTING LEFT LOWER LEG: Status: ACTIVE | Noted: 2021-11-04

## 2022-02-10 PROBLEM — Z79.83: Status: ACTIVE | Noted: 2021-11-04

## 2022-02-10 PROBLEM — M89.8X5 PAIN OF LEFT FEMUR: Status: ACTIVE | Noted: 2021-11-04

## 2022-02-10 PROCEDURE — 99214 OFFICE O/P EST MOD 30 MIN: CPT | Performed by: NURSE PRACTITIONER

## 2022-02-10 RX ORDER — OMEPRAZOLE 20 MG/1
20 CAPSULE, DELAYED RELEASE ORAL DAILY
Qty: 90 CAPSULE | Refills: 2 | Status: SHIPPED | OUTPATIENT
Start: 2022-02-10 | End: 2022-04-05

## 2022-02-10 NOTE — PROGRESS NOTES
[facility] - Outpatient Follow-up Note  Krystin Mckeon 67 y o  female MRN: [id number]  Encounter: [id number]          ASSESSMENT AND PLAN:      1  Gastroesophageal reflux disease concurrent with and due to paraesophageal hernia  2  GERD (gastroesophageal reflux disease)  3  Epigastric discomfort  4  Unintentional weight change  Patient continues to have epigastric discomfort associated with unintentional weight loss  Patient reports that when she eats she feels full  EGD showed moderate hiatal hernia with a paraesophageal component, gastric polyp removed  Biopsies obtained and benign  Upper GI showed large 10 x 8 cm paraesophageal hiatal hernia  Trace spontaneous gastroesophageal reflux  Feeling of fullness may be secondary to paraesophageal hernia  Denies any vomiting or dysphagia  Denies any acid reflux or heartburn  Patient reports intermittent nausea  - Ambulatory Referral to Thoracic Surgery; for further recommendations concerning large paraesophageal hernia   -Continue omeprazole (PriLOSEC) 20 mg delayed release capsule; Take 1 capsule (20 mg total) by mouth every other daily alternating with Pepcid 20 mg every other day  -Continue supplement nutrition with boost 3 times daily   -Continue Zofran as needed for nausea  5  Abnormal CT scan, pelvis  CT of abdomen/pelvis showed distention of endometrium with enhancing tissue along the left margin  Endometrial neoplasm is not excluded  Patient reported that they attempted to do biopsy at gyn physician's office  Patient is being referred to another physician to have endometrial biopsy completed  6  Colon cancer screening  Colon cancer screening up-to-date  7  Breast Cancer screening  Up to date    Follow-up in 4 months     ______________________________________________________________________    SUBJECTIVE:  Dina Augustin is a 19-year-old female who presents to the office today for follow-up after EGD and colonoscopy  Results of EGD and colonoscopy reviewed in results of barium swallow reviewed with patient  Patient continues to have decreased appetite  Weakness  Intermittent nausea and epigastric discomfort  Patient denies vomiting, dysphagia, acid reflux, heartburn  Patient denies blood in stool, blood from rectal area, or black tarry stool  EGD done 2/1 showed moderate hiatal hernia with a paraesophageal component, gastric polyp removed  Biopsies obtained  Colonoscopy done 2/1 showed 5 small colon polyps and left-sided diverticulosis  Biopsies benign  No H pylori  GE junction biopsy negative for goblet cell intestinal metaplasia  Hyperplastic polyps  Transverse colon polyp tubular adenoma  Upper GI showed large 10 x 8 cm paraesophageal hiatal hernia  Trace spontaneous gastroesophageal reflux  Patient reports that she went to have endometrial biopsy done but it was unable to be completed in his repeat and is being referred to another physician to have endometrial biopsy  REVIEW OF SYSTEMS IS OTHERWISE NEGATIVE        Historical Information   Past Medical History:   Diagnosis Date    Acid reflux     Anxiety     Chronic kidney disease     chronic stones    Fractures     GERD (gastroesophageal reflux disease)     H/O hiatal hernia     Hyperlipidemia     Hypertension     Kidney stones     Low back pain     Nausea     at times    Poor appetite     Weight loss, unintentional     Since Nov 2021 20lb weight loss     Past Surgical History:   Procedure Laterality Date    APPENDECTOMY      BREAST SURGERY Left     excision cyst    CYSTOSCOPY      with stone extraction x 2    CYSTOSCOPY W/ RETROGRADES Left 09/17/2012    stent removal,     CYSTOSCOPY W/ URETEROSCOPY W/ LITHOTRIPSY Left 12/11/2006    Ca Phos 98%    CYSTOSCOPY W/ URETEROSCOPY W/ LITHOTRIPSY Left 08/12/2012    Ca ox di 60%, Ca ox mono 20%    ESOPHAGOGASTRODUODENOSCOPY      EXTRACORPOREAL SHOCK WAVE LITHOTRIPSY Left 4/13/2016    Procedure: LITHROTRIPSY EXTRACORPORAL SHOCKWAVE (ESWL); Surgeon: Rolando Arriaga MD;  Location: [facility] MAIN OR;  Service:     WI CYSTO/URETERO W/LITHOTRIPSY &INDWELL STENT INSRT Left 9/1/2016    Procedure: CYSTOSCOPY URETEROSCOPY WITH STONE EXTRACTION , RETROGRADE PYELOGRAM AND INSERTION STENT URETERAL;  Surgeon: Rolando Arriaga MD;  Location: [address] [place] [address];  Service: Urology    WI CYSTO/URETERO W/LITHOTRIPSY &INDWELL STENT INSRT Left 8/12/2016    Procedure: CYSTOSCOPY URETEROSCOPY WITH LITHOTRIPSY HOLMIUM LASER, RETROGRADE PYELOGRAM AND INSERTION STENT URETERAL;  Surgeon: Rolando Arriaga MD;  Location: [address] [place] [address];  Service: Urology    TUBAL LIGATION      URETERAL STENT PLACEMENT Left 08/31/2012    Narrowing of the left distal ureter for 3cm        WISDOM TOOTH EXTRACTION       Social History   Social History     Substance and Sexual Activity   Alcohol Use Yes    Comment: wine almost every night to sleep     Social History     Substance and Sexual Activity   Drug Use No     Social History     Tobacco Use   Smoking Status Never Smoker   Smokeless Tobacco Never Used     Family History   Problem Relation Age of Onset    Stroke Father         TIA    Parkinsonism Father     Cancer Maternal Aunt     Cancer Maternal Uncle     Heart disease Maternal Uncle         Massive MI    No Known Problems Mother     No Known Problems Sister     No Known Problems Brother     No Known Problems Paternal Aunt     No Known Problems Paternal Uncle     No Known Problems Maternal Grandmother     No Known Problems Maternal Grandfather     No Known Problems Paternal Grandmother     No Known Problems Paternal Grandfather        Meds/Allergies       Current Outpatient Medications:     acetaminophen (TYLENOL) 650 mg CR tablet    alendronate (FOSAMAX) 70 mg tablet    ALPRAZolam (XANAX) 0 25 mg tablet    amLODIPine (NORVASC) 5 mg tablet    aspirin 81 MG tablet    atorvastatin (LIPITOR) 20 mg tablet    co-enzyme Q-10 30 MG capsule    famotidine (PEPCID) 20 mg tablet    MELATONIN PO    multivitamin (THERAGRAN) TABS    olmesartan (BENICAR) 40 mg tablet    omeprazole (PriLOSEC) 20 mg delayed release capsule    traMADol (Ultram) 50 mg tablet    Allergies   Allergen Reactions    Magnesium Oxybate [Oxybate] Diarrhea     DIARRHEA           Objective     Blood pressure 93/64, pulse 103, height 5' 1" (1 549 m), weight 51 7 kg (114 lb)  Body mass index is 21 54 kg/m²  PHYSICAL EXAM:      General Appearance:   Alert, cooperative, no distress   HEENT:   Normocephalic, atraumatic, anicteric      Neck:  Supple, symmetrical, trachea midline   Lungs:   Clear to auscultation bilaterally; no rales, rhonchi or wheezing; respirations unlabored    Heart[de-identified]   Regular rate and rhythm; no murmur, rub, or gallop  Abdomen:   Soft, mild tenderness in epigastric area with palpation, non-distended; normal bowel sounds; no masses, no organomegaly    Genitalia:   Deferred    Rectal:   Deferred    Extremities:  No cyanosis, clubbing or edema    Pulses:  2+ and symmetric    Skin:  No jaundice, rashes, or lesions    Lymph nodes:  No palpable cervical lymphadenopathy        Lab Results:   No visits with results within 1 Day(s) from this visit     Latest known visit with results is:   Hospital Outpatient Visit on 02/01/2022   Component Date Value    Case Report 02/01/2022                      Value:Surgical Pathology Report                         Case: [id number]                                   Authorizing Provider:  Graig Spurling, MD           Collected:           02/01/2022 1239              Ordering Location:     [address] [place] [address] Received:            02/01/2022 [address] [place] [address]                                                                  Pathologist:           Gerry Cruz MD                                                                 Specimens:   A) - Duodenum, cold bx duodenum check for celiac disease B) - Stomach, cold bx antrum ulcerative fold pre pyloric                                            C) - Stomach, cold bx antrum check for hpy                                                          D) - Stomach, col;d bx gastric body polyp                                                           E) - Esophagus, cold bx eg junction reflux                                                                                    F) - Rectum, cold snare rectum polyp                                                                G) - Large Intestine, Cecum, cold snare/cold bx  cecal polypsx3                                     H) - Large Intestine, Transverse Colon, cold bx transverse polyp                           Final Diagnosis 02/01/2022                      Value: This result contains rich text formatting which cannot be displayed here   Note 02/01/2022                      Value: This result contains rich text formatting which cannot be displayed here   Additional Information 02/01/2022                      Value: This result contains rich text formatting which cannot be displayed here   Synoptic Checklist 02/01/2022                      Value:                            COLON/RECTUM POLYP FORM - GI - G                                                                                     :    Adenoma(s)      Gross Description 02/01/2022                      Value: This result contains rich text formatting which cannot be displayed here  Radiology Results:   EGD    Addendum Date: 2/1/2022 Addendum:   [facility] [address] [address] [phone] DATE OF SERVICE: 2/01/22 PHYSICIAN(S): Juan Davis MD Proceduralist INDICATION: Nausea, Epigastric discomfort POST-OP DIAGNOSIS: See the impression below  PREPROCEDURE: Informed consent was obtained for the procedure, including sedation    Risks of perforation, hemorrhage, adverse drug reaction and aspiration were discussed  The patient was placed in the left lateral decubitus position  Patient was explained about the risks and benefits of the procedure  Risks including but not limited to bleeding, infection, and perforation were explained in detail  Also explained about less than 100% sensitivity with the exam and other alternatives  DETAILS OF PROCEDURE: Patient was taken to the procedure room where a time out was performed to confirm correct patient and correct procedure  The patient underwent monitored anesthesia care, which was administered by an anesthesia professional  The patient's blood pressure, heart rate, level of consciousness, respirations and oxygen were monitored throughout the procedure  The scope was advanced to the second part of the duodenum  Retroflexion was performed in the fundus  The patient experienced no blood loss  The procedure was not difficult  The patient tolerated the procedure well  There were no apparent complications  ANESTHESIA INFORMATION: ASA: II Anesthesia Type: IV Sedation with Anesthesia MEDICATIONS: No administrations occurring from 1232 to 1245 on 02/01/22 FINDINGS: Performed biopsies in the duodenal bulb and 2nd part of the duodenum Pre-pyloric area protuberant fold soft in nature with small ulceration on it multiple biopsies performed Erythematous mucosa in the antrum; performed cold forceps biopsy 3 mm polyp in the body of the stomach; performed complete piecemeal removal by cold forceps biopsy Regular Z-line; performed cold forceps biopsy Herniation of both GE junction and stomach (type III hiatal hernia) without [facility] lesions present - GE junction 31 cm from the incisors, diaphragmatic impression 37 cm from the incisors   An obvious paraesophageal component, foreshortened tortuous esophagus The remainder of a detailed exam otherwise unremarkable SPECIMENS: ID Type Source Tests Collected by Time Destination 1 : cold bx duodenum check for celiac disease Tissue Duodenum TISSUE EXAM Abdoulaye Augustin MD 2/1/2022 12:39 PM  2 : cold bx antrum ulcerative fold pre pyloric Tissue Stomach TISSUE EXAM Abdoluaye Augustin MD 2/1/2022 12:42 PM  3 : cold bx antrum check for hpy Tissue Stomach TISSUE EXAM Abdoulaye Augustin MD 2/1/2022 12:43 PM  4 : col;d bx gastric body polyp Tissue Stomach TISSUE EXAM Abdoulaye Augustin MD 2/1/2022 12:45 PM  5 : cold bx eg junction reflux Tissue Esophagus TISSUE EXAM Abdoulaye Augustin MD 2/1/2022 12:45 PM  IMPRESSION: Foreshortened esophagus with moderate-sized hiatal hernia with a paraesophageal component, gastric polyp removed, ulcerated fold distal antrum needs follow-up RECOMMENDATION: Await biopsies, upper GI, patient will be call with path results in 2 weeks, schedule follow-up office visit, continue acid suppression   Abdoulaye Augustin MD    Result Date: 2/1/2022  Narrative: [facility] [address] [facility] [address] [phone] DATE OF SERVICE: 2/01/22 PHYSICIAN(S): Abdoulaye Augustin MD Proceduralist INDICATION: Nausea, Epigastric discomfort POST-OP DIAGNOSIS: See the impression below  PREPROCEDURE: Informed consent was obtained for the procedure, including sedation  Risks of perforation, hemorrhage, adverse drug reaction and aspiration were discussed  The patient was placed in the left lateral decubitus position  Patient was explained about the risks and benefits of the procedure  Risks including but not limited to bleeding, infection, and perforation were explained in detail  Also explained about less than 100% sensitivity with the exam and other alternatives  DETAILS OF PROCEDURE: Patient was taken to the procedure room where a time out was performed to confirm correct patient and correct procedure   The patient underwent monitored anesthesia care, which was administered by an anesthesia professional  The patient's blood pressure, heart rate, level of consciousness, respirations and oxygen were monitored throughout the procedure  The scope was advanced to the second part of the duodenum  Retroflexion was performed in the fundus  The patient experienced no blood loss  The procedure was not difficult  The patient tolerated the procedure well  There were no apparent complications  ANESTHESIA INFORMATION: ASA: II Anesthesia Type: IV Sedation with Anesthesia MEDICATIONS: No administrations occurring from 1232 to 1245 on 02/01/22 FINDINGS: Performed biopsies in the duodenal bulb and 2nd part of the duodenum Pre-pyloric area protuberant fold soft in nature with small ulceration on it multiple biopsies performed Erythematous mucosa in the antrum; performed cold forceps biopsy 3 mm polyp in the body of the stomach; performed complete piecemeal removal by cold forceps biopsy Regular Z-line; performed cold forceps biopsy Herniation of both GE junction and stomach (type III hiatal hernia) without Dev Breslow lesions present - GE junction 31 cm from the incisors, diaphragmatic impression 37 cm from the incisors   An obvious paraesophageal component, foreshortened tortuous esophagus The remainder of a detailed exam otherwise unremarkable SPECIMENS: ID Type Source Tests Collected by Time Destination 1 : cold bx duodenum check for celiac disease Tissue Duodenum TISSUE EXAM Juan Davis MD 2/1/2022 12:39 PM  2 : cold bx antrum ulcerative fold pre pyloric Tissue Stomach TISSUE EXAM Juan Davis MD 2/1/2022 12:42 PM  3 : cold bx antrum check for hpy Tissue Stomach TISSUE Jerome Quiroz MD 2/1/2022 12:43 PM  4 : col;d bx gastric body polyp Tissue Stomach TISSUE EXAM Juan Davis MD 2/1/2022 12:45 PM      Impression: Foreshortened esophagus with moderate-sized hiatal hernia with a paraesophageal component, gastric polyp removed, ulcerated fold distal antrum needs follow-up RECOMMENDATION: Await biopsies, upper GI, patient will be call with path results in 2 weeks, schedule follow-up office visit, continue acid suppression   Mary Marquez MD     Colonoscopy    Result Date: 2/1/2022  Narrative: [facility] [address] [facility] [address] [phone] DATE OF SERVICE: 2/01/22 PHYSICIAN(S): Mary Marquez MD Proceduralist INDICATION: Colon cancer screening, Unintentional weight change Colonoscopy performed for a screening indication  POST-OP DIAGNOSIS: See the impression below  HISTORY: Prior colonoscopy: No prior colonoscopy  BOWEL PREPARATION: Miralax/Dulcolax PREPROCEDURE: Informed consent was obtained for the procedure, including sedation  Risks including but not limited to bleeding, infection, perforation, adverse drug reaction and aspiration were explained in detail  Also explained about less than 100% sensitivity with the exam and other alternatives  The patient was placed in the left lateral decubitus position  DETAILS OF PROCEDURE: Patient was taken to the procedure room where a time out was performed to confirm correct patient and correct procedure  The patient underwent monitored anesthesia care, which was administered by an anesthesia professional  The patient's blood pressure, heart rate, level of consciousness, oxygen and respirations were monitored throughout the procedure  A digital rectal exam was performed  The scope was introduced through the anus and advanced to the terminal ileum  Retroflexion was performed in the rectum  The quality of bowel preparation was evaluated using the [facility] Bowel Preparation Scale with scores of: right colon = 2, transverse colon = 2, left colon = 2  The total BBPS score was 6  Bowel prep was adequate  The patient experienced no blood loss  The procedure was not difficult  The patient tolerated the procedure well  There were no apparent complications  Much washing and suctioning required   ANESTHESIA INFORMATION: ASA: II Anesthesia Type: IV Sedation with Anesthesia MEDICATIONS: No administrations occurring from 1232 to 1310 on 02/01/22 FINDINGS: Rectal exam no masses Small, flat, internal hemorrhoids Diverticula in the descending colon and sigmoid colon Three polyps measuring from 1 mm up to 3 mm; performed complete piecemeal removal by cold forceps biopsy; completely removed in piecemeal fashion by cold snare and retrieved specimen 2 mm polyp in the transverse colon; performed complete piecemeal removal by cold forceps biopsy 5 mm polyp in the rectum; completely removed en bloc by cold snare and retrieved specimen The remainder of a detailed exam was within normal limits including the appendix opening and distal terminal ileum EVENTS: Procedure Events Event Event Time ENDO SCOPE OUT TIME 2/1/2022 12:45 PM ENDO CECUM REACHED 2/1/2022 12:55 PM ENDO SCOPE OUT TIME 2/1/2022  1:08 PM SPECIMENS: ID Type Source Tests Collected by Time Destination 1 : cold bx duodenum check for celiac disease Tissue Duodenum TISSUE EXAM Kang Vicente MD 2/1/2022 12:39 PM  2 : cold bx antrum ulcerative fold pre pyloric Tissue Stomach TISSUE EXAM Kang Vicente MD 2/1/2022 12:42 PM  3 : cold bx antrum check for hpy Tissue Stomach TISSUE EXAM Kang Vicente MD 2/1/2022 12:43 PM  4 : col;d bx gastric body polyp Tissue Stomach TISSUE EXAM Kang Vicente MD 2/1/2022 12:45 PM  5 : cold bx eg junction reflux Tissue Esophagus TISSUE EXAM Kang Vicente MD 2/1/2022 12:45 PM  6 : cold snare rectum polyp Tissue Rectum TISSUE EXAM Kang Vicente MD 2/1/2022 12:53 PM  7 : cold snare/cold bx  cecal polypsx3 Tissue Large Intestine, Cecum TISSUE EXAM Kang Vicente MD 2/1/2022 12:59 PM  8 : cold bx transverse polyp Tissue Large Intestine, Transverse Colon TISSUE EXAM Kang Vicente MD 2/1/2022  1:05 PM  EQUIPMENT: Colonoscope -[id number]     Impression: Five small polyps removed, left-sided diverticulosis RECOMMENDATION: Repeat colonoscopy in 3 years due to a personal history of colon polyps    High-fiber diet schedule office visit  Kang Vicente MD    FL upper GI UGI    Result Date: 2/8/2022  Narrative: UPPER GI SERIES  DOUBLE CONTRAST INDICATION:  Hiatal hernia  COMPARISON:  None IMAGES:  25 FLUOROSCOPY TIME:   1 8 MIN TECHNIQUE:  The patient was given effervescent granules and barium  by mouth and images of the esophagus, stomach, and small bowel were obtained  FINDINGS: The esophagus is within normal limits of caliber  Occasional tertiary contractions were observed  Emptying of contrast from the esophagus is prompt  There is no mucosal mass, ulceration or fold thickening identified  The stomach is unremarkable in size  The gastric mucosa is grossly unremarkable  No penetrating ulcers or masses  Contrast empties promptly into the duodenum  The duodenum is within normal limits of caliber  The ligament of Treitz/duodenojejunal junction lies in a normal position  Trace spontaneous gastroesophageal reflux was observed  There is a large 10 x 8 cm paraesophageal hiatal hernia containing the gastric fundus  Impression: Large 10 x 8 cm paraesophageal hiatal hernia  Trace spontaneous gastroesophageal reflux  Workstation performed: [id number]     US pelvis transabdominal only    Result Date: 1/26/2022  Narrative: PELVIC ULTRASOUND, COMPLETE INDICATION:  67years old  R93 [id number]: Abnormal findings on diagnostic imaging of other specified body structures Z78 0: Asymptomatic menopausal state  COMPARISON:  Concurrent CT abdomen/pelvis and pelvic ultrasound 8/16/2016 TECHNIQUE:   Transabdominal pelvic ultrasound was performed in sagittal and transverse planes with a curvilinear transducer  Transvaginal imaging was declined  Imaging included volumetric sweeps as well as traditional still imaging technique  FINDINGS: UTERUS: The uterus is anteverted in position, measuring 6 3 x 3 5 x 4 5 cm  Contour and echotexture appear normal    Unchanged hypoechoic intramural mass in the fundus measuring 2 2 cm, consistent with a fibroid  The cervix is poorly seen; grossly unremarkable   ENDOMETRIUM:  Thickened AP caliber of 28 mm  Heterogeneous echotexture  OVARIES/ADNEXA: Ovaries not visualized  No suspicious adnexal mass or loculated collections  There is no free fluid  Impression: Transvaginal imaging was declined  Thickened, heterogeneous endometrium measuring 28 mm  Biopsy is recommended if not previously performed  Ovaries not visualized  No evidence of an adnexal mass  The study was marked in EPIC for significant notification  Workstation performed: [id number]     CT abdomen pelvis with contrast    Result Date: 1/24/2022  Narrative: CT ABDOMEN AND PELVIS WITH IV CONTRAST INDICATION:   Epigastric pain epigastric and LUQ pain and tenderness  COMPARISON:  12/19/2021 TECHNIQUE:  CT examination of the abdomen and pelvis was performed  Axial, sagittal, and coronal 2D reformatted images were created from the source data and submitted for interpretation  Radiation dose length product (DLP) for this visit:  416 91 mGy-cm   This examination, like all CT scans performed in the [facility], was performed utilizing techniques to minimize radiation dose exposure, including the use of iterative  reconstruction and automated exposure control  IV Contrast:  100 mL of iohexol (OMNIPAQUE) Enteric Contrast:  Enteric contrast was not administered  FINDINGS: ABDOMEN LOWER CHEST:  Again seen is a large hiatal hernia with intrathoracic stomach  LIVER/BILIARY TREE:  Unremarkable  GALLBLADDER:  No calcified gallstones  No pericholecystic inflammatory change  SPLEEN:  Unremarkable  PANCREAS:  Unremarkable  ADRENAL GLANDS:  Unremarkable  KIDNEYS/URETERS:  There are right renal peripelvic cysts  There is a tiny cortical cyst on the left  No hydronephrosis  STOMACH AND BOWEL:  There is colonic diverticulosis without evidence of acute diverticulitis  APPENDIX:  No findings to suggest appendicitis  ABDOMINOPELVIC CAVITY:  No ascites  No pneumoperitoneum  No lymphadenopathy  VESSELS:  Unremarkable for patient's age  PELVIS REPRODUCTIVE ORGANS:  Again seen is endometrial distention  There appears to be enhancing tissue along the left margin  URINARY BLADDER:  Unremarkable  ABDOMINAL WALL/INGUINAL REGIONS:  Unremarkable  OSSEOUS STRUCTURES:  No acute fracture or destructive osseous lesion  There is a new calcified disc extrusion at L1-2 resulting in moderate thecal sac compression  Impression: 1  No acute inflammatory process in the abdomen or pelvis  Stable large hiatal hernia with intrathoracic stomach  2   Stable fluid distention of the endometrium with enhancing tissue along the left margin  Endometrial neoplasm is not excluded  The patient is scheduled for pelvic ultrasound later today  3   New calcified disc extrusion at L1-2 with moderate thecal sac compression   Workstation performed: [id number]

## 2022-02-11 ENCOUNTER — TELEPHONE (OUTPATIENT)
Dept: CARDIAC SURGERY | Facility: CLINIC | Age: 77
End: 2022-02-11

## 2022-02-11 NOTE — TELEPHONE ENCOUNTER
Pt was referred to Dr Sanjeev Morales by Dr Ethel Gudino in [place] for a hiatal hernia   Pt can be reached at [phone]

## 2022-02-15 ENCOUNTER — OFFICE VISIT (OUTPATIENT)
Dept: CARDIAC SURGERY | Facility: CLINIC | Age: 77
End: 2022-02-15
Payer: MEDICARE

## 2022-02-15 VITALS
WEIGHT: 114.86 LBS | RESPIRATION RATE: 16 BRPM | BODY MASS INDEX: 21.69 KG/M2 | HEIGHT: 61 IN | DIASTOLIC BLOOD PRESSURE: 71 MMHG | SYSTOLIC BLOOD PRESSURE: 137 MMHG | HEART RATE: 97 BPM | TEMPERATURE: 97.7 F | OXYGEN SATURATION: 97 %

## 2022-02-15 DIAGNOSIS — K44.9 GASTROESOPHAGEAL REFLUX DISEASE CONCURRENT WITH AND DUE TO PARAESOPHAGEAL HERNIA: ICD-10-CM

## 2022-02-15 DIAGNOSIS — Z87.19 H/O HIATAL HERNIA: Primary | ICD-10-CM

## 2022-02-15 DIAGNOSIS — K21.9 GASTROESOPHAGEAL REFLUX DISEASE CONCURRENT WITH AND DUE TO PARAESOPHAGEAL HERNIA: ICD-10-CM

## 2022-02-15 PROCEDURE — 99205 OFFICE O/P NEW HI 60 MIN: CPT | Performed by: PHYSICIAN ASSISTANT

## 2022-02-15 NOTE — ASSESSMENT & PLAN NOTE
We had a discussion with Ms Leahy regarding her testing, which reveals a large Type III paraesophageal hernia, which is symptomatic regarding symptoms of nausea and vomiting  Her GERD-HRQL score today is a 3  She does not have any significant regurgitation or heartburn  We are recommending esophageal manometry and if she is unable to tolerate that test, we will order a barium swallow motility test  We will schedule manometry today and she will be in touch with us regarding undergoing the study  To repair this hernia, we would perform a laparoscopic paraesophageal hernia repair with fundoplication, and possible Jabari gastroplasty, since 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  With regards to her endometrial lesion, we are recommending for her to be seen by them first and have that treated first, if necessary  Our surgery is not emergent  We will have her return to the office after the testing to make a final treatment recommendation  She is in agreement with the plan

## 2022-02-15 NOTE — PROGRESS NOTES
Thoracic Consult  Assessment/Plan:    H/O hiatal hernia  We had a discussion with Ms Leahy regarding her testing, which reveals a large Type III paraesophageal hernia, which is symptomatic regarding symptoms of nausea and vomiting  Her GERD-HRQL score today is a 3  She does not have any significant regurgitation or heartburn  We are recommending esophageal manometry and if she is unable to tolerate that test, we will order a barium swallow motility test  We will schedule manometry today and she will be in touch with us regarding undergoing the study  To repair this hernia, we would perform a laparoscopic paraesophageal hernia repair with fundoplication, and possible Jabari gastroplasty, since 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  With regards to her endometrial lesion, we are recommending for her to be seen by them first and have that treated first, if necessary  Our surgery is not emergent  We will have her return to the office after the testing to make a final treatment recommendation  She is in agreement with the plan  Diagnoses and all orders for this visit:    H/O hiatal hernia    Gastroesophageal reflux disease concurrent with and due to paraesophageal hernia  -     Ambulatory Referral to Thoracic Surgery  -     Esophageal manometry; Future    Other orders  -     citric acid-potassium citrate (POLYCITRA K) 1,100-334 mg/5 mL solution; Take 10 mEq by mouth 1 tablet by mouth twice daily with meals             Thoracic History   Diagnosis: Paraesophageal Hernia    Procedures/Surgeries:    Pathology:    Adjuvant Therapy:          Patient ID: Krystian Guerrero is a 67 y o  female  HPI    Ms Jeremias Garces is a 68 yo female with a history of CKD, HTN, GERD, HLD and anxiety who was referred by Dr Mary Marquez for a hiatal hernia   She underwent a barium swallow on 2/8/22, which revealed occasional tertiary contractions, emptying of esophagus is normal, and trace spontaneous gastroesophageal reflux, and a large 10 x 8 cm paraesophageal hernia  EGD from 2/1/22 revealed a type III hiatal hernia without Johnanna Salon lesions  GE junction is 31 cm from incisors and a foreshortened tortuous esophagus  All biopsies were negative for malignancy and H  Pylori  On discussion, she is a lifetime non smoker  She is on Prilosec 40 mg daily, as well as pepcid  She has lost 20 lbs over the past 2 months secondary to a decreased appetite  If she does not take the prilosec, her chest/ epigastric pain increases  No particular foods she has to stay away from  She is not eating anything solid  She is just eating soft foods  Her appetite has decreased significantly over the past month, likely secondary to anxiety  She is being treated by a gynecologist, for a uterine lesion seen on imaging  She tried to have a biopsy completed, which was unsuccessful  She denies sore throat, dysphagia, sore throat, regurgitation, or early satiety  She does have nausea, vomiting and epigastric pain       The following portions of the patient's history were reviewed and updated as appropriate: allergies, current medications, past family history, past medical history, past social history, past surgical history and problem list     Past Medical History:   Diagnosis Date    Acid reflux     Anxiety     Chronic kidney disease     chronic stones    Fractures     GERD (gastroesophageal reflux disease)     H/O hiatal hernia     Hyperlipidemia     Hypertension     Kidney stones     Low back pain     Nausea     at times    Poor appetite     Weight loss, unintentional     Since Nov 2021 20lb weight loss      Past Surgical History:   Procedure Laterality Date    APPENDECTOMY      BREAST SURGERY Left     excision cyst    CYSTOSCOPY      with stone extraction x 2    CYSTOSCOPY W/ RETROGRADES Left 09/17/2012    stent removal,     CYSTOSCOPY W/ URETEROSCOPY W/ LITHOTRIPSY Left 12/11/2006    Ca Phos 98%    CYSTOSCOPY W/ URETEROSCOPY W/ LITHOTRIPSY Left 08/12/2012    Ca ox di 60%, Ca ox mono 20%    ESOPHAGOGASTRODUODENOSCOPY      EXTRACORPOREAL SHOCK WAVE LITHOTRIPSY Left 4/13/2016    Procedure: LITHROTRIPSY EXTRACORPORAL SHOCKWAVE (ESWL); Surgeon: Main Sims MD;  Location: [facility] MAIN OR;  Service:     NM CYSTO/URETERO W/LITHOTRIPSY &INDWELL STENT INSRT Left 9/1/2016    Procedure: CYSTOSCOPY URETEROSCOPY WITH STONE EXTRACTION , RETROGRADE PYELOGRAM AND INSERTION STENT URETERAL;  Surgeon: Main Sims MD;  Location: [address] [place] [address];  Service: Urology    NM CYSTO/URETERO W/LITHOTRIPSY &INDWELL STENT INSRT Left 8/12/2016    Procedure: CYSTOSCOPY URETEROSCOPY WITH LITHOTRIPSY HOLMIUM LASER, RETROGRADE PYELOGRAM AND INSERTION STENT URETERAL;  Surgeon: Main Sims MD;  Location: [address] [place] [address];  Service: Urology    TUBAL LIGATION      URETERAL STENT PLACEMENT Left 08/31/2012    Narrowing of the left distal ureter for 3cm        WISDOM TOOTH EXTRACTION        Family History   Problem Relation Age of Onset    Stroke Father         TIA    Parkinsonism Father     Cancer Maternal Aunt     Cancer Maternal Uncle     Heart disease Maternal Uncle         Massive MI    No Known Problems Mother     No Known Problems Sister     No Known Problems Brother     No Known Problems Paternal Aunt     No Known Problems Paternal Uncle     No Known Problems Maternal Grandmother     No Known Problems Maternal Grandfather     No Known Problems Paternal Grandmother     No Known Problems Paternal Grandfather       Social History     Socioeconomic History    Marital status:      Spouse name: Not on file    Number of children: Not on file    Years of education: Not on file    Highest education level: Not on file   Occupational History    Not on file   Tobacco Use    Smoking status: Never Smoker    Smokeless tobacco: Never Used   Vaping Use    Vaping Use: Never used   Substance and Sexual Activity    Alcohol use: Yes     Comment: wine almost every night to sleep    Drug use: No    Sexual activity: Yes   Other Topics Concern    Not on file   Social History Narrative    Not on file     Social Determinants of Health     Financial Resource Strain: Not on file   Food Insecurity: Not on file   Transportation Needs: Not on file   Physical Activity: Not on file   Stress: Not on file   Social Connections: Not on file   Intimate Partner Violence: Not on file   Housing Stability: Not on file        Allergies   Allergen Reactions    Magnesium Oxybate [Oxybate] Diarrhea     DIARRHEA     Current Outpatient Medications on File Prior to Visit   Medication Sig Dispense Refill    alendronate (FOSAMAX) 70 mg tablet Take 70 mg by mouth once a week  0    ALPRAZolam (XANAX) 0 25 mg tablet Take by mouth daily at bedtime as needed for anxiety       amLODIPine (NORVASC) 5 mg tablet Take 5 mg by mouth every morning   atorvastatin (LIPITOR) 20 mg tablet Take 10 mg by mouth daily        citric acid-potassium citrate (POLYCITRA K) 1,100-334 mg/5 mL solution Take 10 mEq by mouth 1 tablet by mouth twice daily with meals      famotidine (PEPCID) 20 mg tablet Take 1 tablet (20 mg total) by mouth daily (Patient taking differently: Take 20 mg by mouth daily Half hour prior to breakfast ) 30 tablet 0    MELATONIN PO Take by mouth as needed      multivitamin (THERAGRAN) TABS Take 1 tablet by mouth every morning        olmesartan (BENICAR) 40 mg tablet Take 40 mg by mouth daily        omeprazole (PriLOSEC) 20 mg delayed release capsule Take 1 capsule (20 mg total) by mouth daily (Patient taking differently: Take 40 mg by mouth daily  ) 90 capsule 2    acetaminophen (TYLENOL) 650 mg CR tablet Take 1 tablet (650 mg total) by mouth every 8 (eight) hours as needed for mild pain (Patient not taking: Reported on 2/15/2022 ) 30 tablet 0    aspirin 81 MG tablet Take 81 mg by mouth every morning   (Patient not taking: Reported on 2/15/2022 )      co-enzyme Q-10 30 MG capsule Take 200 mg by mouth daily  (Patient not taking: Reported on 2/15/2022 )      traMADol (Ultram) 50 mg tablet Take 1 tablet (50 mg total) by mouth every 6 (six) hours as needed for moderate pain 20 tablet 0     No current facility-administered medications on file prior to visit  Review of Systems   Constitutional: Positive for appetite change (decreased appetite  ) and unexpected weight change (20 lbs in 2 months  )  Negative for chills and fever  HENT: Negative for sore throat, trouble swallowing and voice change  Respiratory: Negative for cough and shortness of breath  Cardiovascular: Positive for chest pain (not heartburn or burning, just pain  )  Gastrointestinal: Positive for abdominal pain (epigastric pain  ), nausea and vomiting  Negative for abdominal distention and diarrhea  Musculoskeletal: Positive for arthralgias (left hip  )  Negative for back pain (had shots  ) and neck pain  Neurological: Positive for weakness and headaches  Negative for dizziness, syncope and light-headedness  Psychiatric/Behavioral: Negative for agitation, behavioral problems and confusion  The patient is nervous/anxious  All other systems reviewed and are negative  Objective:   Physical Exam  Vitals reviewed  Constitutional:       General: She is not in acute distress  Appearance: Normal appearance  She is well-developed  She is not diaphoretic  HENT:      Head: Normocephalic and atraumatic  Mouth/Throat:      Comments: Masked   Eyes:      General: No scleral icterus  Extraocular Movements: Extraocular movements intact  Neck:      Trachea: No tracheal deviation  Cardiovascular:      Rate and Rhythm: Normal rate and regular rhythm  Pulses: Normal pulses  Heart sounds: Normal heart sounds  No murmur heard  Pulmonary:      Effort: Pulmonary effort is normal  No respiratory distress  Breath sounds: Normal breath sounds  No wheezing     Abdominal:      General: Bowel sounds are normal  There is no distension  Palpations: Abdomen is soft  Tenderness: There is no abdominal tenderness  Musculoskeletal:         General: Normal range of motion  Cervical back: Normal range of motion and neck supple  Right lower leg: No edema  Left lower leg: No edema  Skin:     General: Skin is warm and dry  Neurological:      Mental Status: She is alert and oriented to person, place, and time  Cranial Nerves: No cranial nerve deficit  Psychiatric:         Mood and Affect: Mood normal          Behavior: Behavior normal          Thought Content:  Thought content normal      /71 (BP Location: Left arm, Patient Position: Sitting, Cuff Size: Standard)   Pulse 97   Temp 97 7 °F (36 5 °C) (Temporal)   Resp 16   Ht 5' 1" (1 549 m)   Wt 52 1 kg (114 lb 13 8 oz)   LMP  (LMP Unknown)   SpO2 97%   BMI 21 70 kg/m²

## 2022-02-18 ENCOUNTER — TELEPHONE (OUTPATIENT)
Dept: GASTROENTEROLOGY | Facility: CLINIC | Age: 77
End: 2022-02-18

## 2022-02-18 NOTE — TELEPHONE ENCOUNTER
SPOKE WITH PT, REPORTS WEAKNESS,  POOR APPETITE AND INTAKE SINCE FLIP 2/1/22, HAS BEEN CONSTIPATED, ONLY PASSING SMALL AMT BLACK STOOL EVERY COUPLE OF DAYS, ABDOMINAL DISCOMFORT ONLY WHEN TRYING TO HAVE A BM

## 2022-02-18 NOTE — TELEPHONE ENCOUNTER
Would recommend treating patient's constipation as this could be contributing to poor appetite  Should take miralax OTC 17 g up to twice daily for bowel movements, increase water intake  If appetite is poor should try to at least drink 2 ensure or boost daily for calories rather than solid meals  Due to black stools I would recommend checking a CBC due to this and weakness to rule out any GI bleeding  I have ordered this

## 2022-02-19 ENCOUNTER — APPOINTMENT (OUTPATIENT)
Dept: LAB | Facility: HOSPITAL | Age: 77
End: 2022-02-19
Payer: MEDICARE

## 2022-02-19 DIAGNOSIS — K92.1 BLACK STOOL: ICD-10-CM

## 2022-02-19 LAB
ERYTHROCYTE [DISTWIDTH] IN BLOOD BY AUTOMATED COUNT: 13.6 % (ref 11.6–15.1)
HCT VFR BLD AUTO: 48.2 % (ref 34.8–46.1)
HGB BLD-MCNC: 15.4 G/DL (ref 11.5–15.4)
MCH RBC QN AUTO: 30.8 PG (ref 26.8–34.3)
MCHC RBC AUTO-ENTMCNC: 32 G/DL (ref 31.4–37.4)
MCV RBC AUTO: 96 FL (ref 82–98)
PLATELET # BLD AUTO: 206 THOUSANDS/UL (ref 149–390)
PMV BLD AUTO: 13.1 FL (ref 8.9–12.7)
RBC # BLD AUTO: 5 MILLION/UL (ref 3.81–5.12)
WBC # BLD AUTO: 7.37 THOUSAND/UL (ref 4.31–10.16)

## 2022-02-19 PROCEDURE — 36415 COLL VENOUS BLD VENIPUNCTURE: CPT

## 2022-02-19 PROCEDURE — 85027 COMPLETE CBC AUTOMATED: CPT

## 2022-02-21 ENCOUNTER — TELEPHONE (OUTPATIENT)
Dept: GASTROENTEROLOGY | Facility: CLINIC | Age: 77
End: 2022-02-21

## 2022-02-21 NOTE — TELEPHONE ENCOUNTER
Patient called  She states she is very weak and still having black stools  She is having lower abdominal pain that comes and goes  Pain level 4/10  She is drinking 2-3 boosts daily  Takes miralax 2x a day  She states she is nervous wreck  Please call patient to discuss   Thank you

## 2022-02-21 NOTE — TELEPHONE ENCOUNTER
Patient would like a call back about her Black stools, I gave her the results but she is very concerned about the black stools

## 2022-02-21 NOTE — TELEPHONE ENCOUNTER
Spoke to patient she feels a bit stronger now currently eating an english muffin  She's had a poor appetite because she is worried about the dark stool she has been having which has been occurring since 2 weeks prior to EGD/Colonoscopy  She had CBC last week with Hgb 15 4  Currently on Prilosec alternating with Pepcid every other day  Reports she's been taking pepto bismol occasionally, last dose 3-4 days ago  Discussed how this can lead to dark colored stool  Reassurance provided

## 2022-02-22 NOTE — TELEPHONE ENCOUNTER
Called patient back  She's avoiding Pepto bismol  Continues to report black stool, anxiety, and poor appetite which she attributes to her anxiety  Denies and abdominal pain, nausea/vomiting  She is now having multiple stools a day on Miralax BID and rectal leakage with flatulence  Advised her to decrease to once a day dosing and she can start fiber supplementation daily instead  Continue with Prilosec, pepcid  Please make follow up appointment to be seen in the office

## 2022-02-22 NOTE — TELEPHONE ENCOUNTER
Called and left a message to see how patient was feeling  Let her know that she may need office appointment sooner than June if still having issues

## 2022-02-28 ENCOUNTER — TELEPHONE (OUTPATIENT)
Dept: OBGYN CLINIC | Facility: HOSPITAL | Age: 77
End: 2022-02-28

## 2022-02-28 NOTE — TELEPHONE ENCOUNTER
Dr Jen Mckinley    [phone]    Patient is requesting an appt with you for her left hip pain  She is having 10/10 pain,  She states that at the last appt it ended on bad terms  She would really like to see you again for hip replacement  She does not wish to see pain management  Please  advise

## 2022-02-28 NOTE — TELEPHONE ENCOUNTER
Absolutely, she can come see us at her earliest convenience  We're happy to see that her back problems have improved and are more than willing to help with her hip

## 2022-03-03 ENCOUNTER — TELEPHONE (OUTPATIENT)
Dept: SURGICAL ONCOLOGY | Facility: CLINIC | Age: 77
End: 2022-03-03

## 2022-03-03 ENCOUNTER — APPOINTMENT (OUTPATIENT)
Dept: RADIOLOGY | Facility: CLINIC | Age: 77
End: 2022-03-03
Payer: MEDICARE

## 2022-03-03 ENCOUNTER — OFFICE VISIT (OUTPATIENT)
Dept: OBGYN CLINIC | Facility: CLINIC | Age: 77
End: 2022-03-03
Payer: MEDICARE

## 2022-03-03 VITALS
DIASTOLIC BLOOD PRESSURE: 80 MMHG | HEIGHT: 61 IN | SYSTOLIC BLOOD PRESSURE: 134 MMHG | BODY MASS INDEX: 21.52 KG/M2 | HEART RATE: 100 BPM | WEIGHT: 114 LBS

## 2022-03-03 DIAGNOSIS — Z01.818 PRE-OP EXAM: ICD-10-CM

## 2022-03-03 DIAGNOSIS — Z87.448 HISTORY OF CHRONIC KIDNEY DISEASE: ICD-10-CM

## 2022-03-03 DIAGNOSIS — M25.552 CHRONIC HIP PAIN, LEFT: ICD-10-CM

## 2022-03-03 DIAGNOSIS — G89.29 CHRONIC HIP PAIN, LEFT: ICD-10-CM

## 2022-03-03 DIAGNOSIS — M87.052 AVASCULAR NECROSIS OF BONE OF LEFT HIP (HCC): ICD-10-CM

## 2022-03-03 DIAGNOSIS — Z86.79 HISTORY OF HYPERTENSION: ICD-10-CM

## 2022-03-03 DIAGNOSIS — M87.052 AVASCULAR NECROSIS OF BONE OF LEFT HIP (HCC): Primary | ICD-10-CM

## 2022-03-03 DIAGNOSIS — M16.7 SECONDARY OSTEOARTHRITIS OF HIP: ICD-10-CM

## 2022-03-03 DIAGNOSIS — M70.62 GREATER TROCHANTERIC BURSITIS OF LEFT HIP: ICD-10-CM

## 2022-03-03 PROCEDURE — 73502 X-RAY EXAM HIP UNI 2-3 VIEWS: CPT

## 2022-03-03 PROCEDURE — 99215 OFFICE O/P EST HI 40 MIN: CPT | Performed by: ORTHOPAEDIC SURGERY

## 2022-03-03 RX ORDER — FERROUS SULFATE TAB EC 324 MG (65 MG FE EQUIVALENT) 324 (65 FE) MG
324 TABLET DELAYED RESPONSE ORAL
Qty: 30 TABLET | Refills: 0 | Status: SHIPPED | OUTPATIENT
Start: 2022-03-03 | End: 2022-03-17 | Stop reason: ALTCHOICE

## 2022-03-03 RX ORDER — FOLIC ACID 1 MG/1
1 TABLET ORAL DAILY
Qty: 30 TABLET | Refills: 0 | Status: SHIPPED | OUTPATIENT
Start: 2022-03-03 | End: 2022-03-17 | Stop reason: ALTCHOICE

## 2022-03-03 RX ORDER — TRAMADOL HYDROCHLORIDE 50 MG/1
50 TABLET ORAL EVERY 6 HOURS PRN
Qty: 30 TABLET | Refills: 0 | Status: ON HOLD | OUTPATIENT
Start: 2022-03-03 | End: 2022-05-02

## 2022-03-03 RX ORDER — ASCORBIC ACID 500 MG
500 TABLET ORAL 2 TIMES DAILY
Qty: 60 TABLET | Refills: 0 | Status: SHIPPED | OUTPATIENT
Start: 2022-03-03 | End: 2022-03-17 | Stop reason: ALTCHOICE

## 2022-03-03 RX ORDER — MELATONIN
1000 DAILY
Qty: 30 TABLET | Refills: 0 | Status: SHIPPED | OUTPATIENT
Start: 2022-03-03 | End: 2022-03-17 | Stop reason: ALTCHOICE

## 2022-03-03 RX ORDER — ZINC SULFATE 50(220)MG
220 CAPSULE ORAL DAILY
Qty: 30 CAPSULE | Refills: 0 | Status: SHIPPED | OUTPATIENT
Start: 2022-03-03 | End: 2022-03-17 | Stop reason: ALTCHOICE

## 2022-03-03 NOTE — TELEPHONE ENCOUNTER
Latia Garcia called in to discuss/schedule her procedure with Dr Puckett  Best call back number is [phone]

## 2022-03-03 NOTE — PROGRESS NOTES
Assessment/Plan:  1  Avascular necrosis of bone of left hip (HCC)  XR hip/pelv 2-3 vws left if performed    Case request operating room: ARTHROPLASTY HIP TOTAL ANTERIOR - Left    Comprehensive metabolic panel    Hemoglobin A1C W/EAG Estimation    CBC and differential    If Symptomatic, order: UA w Reflex to Microscopic w Reflex to Culture    Protime-INR    APTT    MRSA culture    Ambulatory referral to Cardiology    Ambulatory referral to [facility]    Ambulatory referral to Physical Therapy    EKG 12 lead    XR chest pa & lateral    Case request operating room: ARTHROPLASTY HIP TOTAL ANTERIOR - Left    folic acid (KP Folic Acid) 1 mg tablet    ferrous sulfate 324 (65 Fe) mg    ascorbic acid (VITAMIN C) 500 MG tablet    cholecalciferol (VITAMIN D3) 1,000 units tablet    zinc sulfate (ZINCATE) 220 mg capsule    traMADol (Ultram) 50 mg tablet   2  Secondary osteoarthritis of hip  Case request operating room: ARTHROPLASTY HIP TOTAL ANTERIOR - Left    Comprehensive metabolic panel    Hemoglobin A1C W/EAG Estimation    CBC and differential    If Symptomatic, order: UA w Reflex to Microscopic w Reflex to Culture    Protime-INR    APTT    MRSA culture    Ambulatory referral to Cardiology    Ambulatory referral to [facility]    Ambulatory referral to Physical Therapy    EKG 12 lead    XR chest pa & lateral    Case request operating room: ARTHROPLASTY HIP TOTAL ANTERIOR - Left    traMADol (Ultram) 50 mg tablet   3  Greater trochanteric bursitis of left hip     4  Chronic hip pain, left  Comprehensive metabolic panel    Hemoglobin A1C W/EAG Estimation    CBC and differential    If Symptomatic, order: UA w Reflex to Microscopic w Reflex to Culture    Protime-INR    APTT    MRSA culture    Ambulatory referral to Cardiology    Ambulatory referral to [facility]    Ambulatory referral to Physical Therapy    EKG 12 lead    XR chest pa & lateral   5  History of hypertension  Ambulatory referral to Cardiology   6  History of chronic kidney disease  Ambulatory referral to [facility]   7  Pre-op exam  Ambulatory referral to Cardiology    Ambulatory referral to [facility]    Ambulatory referral to Physical Therapy    folic acid ( Folic Acid) 1 mg tablet    ferrous sulfate 324 (65 Fe) mg    ascorbic acid (VITAMIN C) 500 MG tablet    cholecalciferol (VITAMIN D3) 1,000 units tablet    zinc sulfate (ZINCATE) 220 mg capsule     Scribe Attestation    I,:  Jaren Collado am acting as a scribe while in the presence of the attending physician :       I,:  Danica Grant DO personally performed the services described in this documentation    as scribed in my presence :         Cassi Nelson is a very pleasant 66 y/o female who presents to the office today for a follow up evaluation of her left hip osteoarthritis, avascular necrosis, and greater trochanteric bursitis  Patient and I discussed the believe most her pain is stemming from the underlying arthritis and avascular necrosis  We did discuss surgical intervention of a left total hip arthroplasty with the anterior approach  In regards to her bursitis this can be treated with Voltaren gel which she may purchase over-the-counter  Patient and I engaged in a long discussion in regards to left anterior total hip arthroplasty  Patient denies any history of diabetes, gastric bleeding, peptic ulcers, MRSA, history of pulmonary embolism, or malignancy  She does not take turmeric  She does have a history of hypertension and chronic kidney disease  She does also have a history of a hernia that is being treated by gastroenterology  She is scheduled to undergo an endoscopy for possible surgery  I did discuss with her that any surgery for hernia would have to be done prior to total hip arthroplasty  Patient was in good understanding of this and aware that this may delay her surgery  The pre rocío and postoperative expectations were discussed here in the office today   The risks and benefits of undergoing left anterior total hip arthroplasty were discussed at length and consents were signed and placed in the chart  Please see risk discussion below  Risk of the surgery are inclusive of but not limited to bleeding, infection, nerve injury, blood clot, worsening of symptoms, not achieving the anticipated results, persistent stiffness, weakness and the need for additional surgery  The patient verbally stated they understood those risks and would like to proceed with the surgery  Patient will require PCP clearance as well as cardiac clearance prior to surgery  She is a good candidate for outpatient physical therapy  We did discuss that this is imperative postoperatively in order to assist in her recovery and that she should make any of her lower back conditions known to the therapist   She was in good understanding of this  Patient is inquiring about pain medication prior to surgery  I did prescribe her a small amount of tramadol which she should use sparingly  We also discussed that the use of tramadol prior to surgery could make it harder to control her pain postoperatively  She was in good understanding of this  All her questions were answered in the office today  She will be seen 2 weeks after surgery for re-evaluation  Subjective:  Left hip pain    Patient ID: Lisbeth Frank is a 67 y o  female  ROSEANN  Jeremi Isha is a 67 year female presents the office today for follow-up evaluation of her left hip  Patient does have a known history of left hip arthritis, avascular necrosis, and greater trochanteric bursitis  She states in the past she has done physical therapy for the bursitis but this greatly irritated her lumbar spine  Since her last visit in December of 2021 she has had a series of injections with Dr Gloria clay which has greatly improved her lower back pain  Today she notes lateral-sided hip pain which is constant nature  She reports the pain is worse with motion and ambulation  She reports she has been very anxious about her left hip pain  Due to this she has been losing weight having GI upset as she is very nervous that she may require surgery for her left hip  She also states she has an abdominal hernia and due to this she is scheduled to undergo endoscopy in the next few days and possible surgery in the future  She denies any history of PE, blood clot, MRSA, malignancy, peptic ulcer, gastric bleed, or diabetes  She does have history of hypertension and chronic kidney disease  Review of Systems   Constitutional: Negative for chills, fever and unexpected weight change  HENT: Negative for hearing loss, nosebleeds and sore throat  Eyes: Negative for pain, redness and visual disturbance  Respiratory: Negative for cough, shortness of breath and wheezing  Cardiovascular: Negative for chest pain, palpitations and leg swelling  Gastrointestinal: Negative for abdominal pain, nausea and vomiting  Endocrine: Negative for polydipsia and polyuria  Genitourinary: Negative for dyspareunia and hematuria  Musculoskeletal: Positive for arthralgias and myalgias  Negative for joint swelling  Skin: Negative for rash and wound  Neurological: Negative for dizziness, numbness and headaches  Psychiatric/Behavioral: Negative for decreased concentration and suicidal ideas  The patient is not nervous/anxious            Past Medical History:   Diagnosis Date    Acid reflux     Anxiety     Chronic kidney disease     chronic stones    Fractures     GERD (gastroesophageal reflux disease)     H/O hiatal hernia     Hyperlipidemia     Hypertension     Kidney stones     Low back pain     Nausea     at times    Poor appetite     Weight loss, unintentional     Since Nov 2021 20lb weight loss       Past Surgical History:   Procedure Laterality Date    APPENDECTOMY      BREAST SURGERY Left     excision cyst    CYSTOSCOPY      with stone extraction x 2    CYSTOSCOPY W/ RETROGRADES Left 09/17/2012    stent removal,     CYSTOSCOPY W/ URETEROSCOPY W/ LITHOTRIPSY Left 12/11/2006    Ca Phos 98%    CYSTOSCOPY W/ URETEROSCOPY W/ LITHOTRIPSY Left 08/12/2012    Ca ox di 60%, Ca ox mono 20%    ESOPHAGOGASTRODUODENOSCOPY      EXTRACORPOREAL SHOCK WAVE LITHOTRIPSY Left 4/13/2016    Procedure: LITHROTRIPSY EXTRACORPORAL SHOCKWAVE (ESWL); Surgeon: Emmett Connor MD;  Location: [facility] MAIN OR;  Service:     NE CYSTO/URETERO W/LITHOTRIPSY &INDWELL STENT INSRT Left 9/1/2016    Procedure: CYSTOSCOPY URETEROSCOPY WITH STONE EXTRACTION , RETROGRADE PYELOGRAM AND INSERTION STENT URETERAL;  Surgeon: Emmett Connor MD;  Location: [address] [place] [address];  Service: Urology    NE CYSTO/URETERO W/LITHOTRIPSY &INDWELL STENT INSRT Left 8/12/2016    Procedure: CYSTOSCOPY URETEROSCOPY WITH LITHOTRIPSY HOLMIUM LASER, RETROGRADE PYELOGRAM AND INSERTION STENT URETERAL;  Surgeon: Emmett Connor MD;  Location: [address] [place] [address];  Service: Urology    TUBAL LIGATION      URETERAL STENT PLACEMENT Left 08/31/2012    Narrowing of the left distal ureter for 3cm   WISDOM TOOTH EXTRACTION         Family History   Problem Relation Age of Onset    Stroke Father         TIA    Parkinsonism Father     Cancer Maternal Aunt     Cancer Maternal Uncle     Heart disease Maternal Uncle         Massive MI    No Known Problems Mother     No Known Problems Sister     No Known Problems Brother     No Known Problems Paternal Aunt     No Known Problems Paternal Uncle     No Known Problems Maternal Grandmother     No Known Problems Maternal Grandfather     No Known Problems Paternal Grandmother     No Known Problems Paternal Grandfather        Social History     Occupational History    Not on file   Tobacco Use    Smoking status: Never Smoker    Smokeless tobacco: Never Used   Vaping Use    Vaping Use: Never used   Substance and Sexual Activity    Alcohol use: Yes     Comment: wine almost every night to sleep    Drug use:  No  Sexual activity: Yes         Current Outpatient Medications:     acetaminophen (TYLENOL) 650 mg CR tablet, Take 1 tablet (650 mg total) by mouth every 8 (eight) hours as needed for mild pain (Patient not taking: Reported on 2/15/2022 ), Disp: 30 tablet, Rfl: 0    alendronate (FOSAMAX) 70 mg tablet, Take 70 mg by mouth once a week, Disp: , Rfl: 0    ALPRAZolam (XANAX) 0 25 mg tablet, Take by mouth daily at bedtime as needed for anxiety , Disp: , Rfl:     amLODIPine (NORVASC) 5 mg tablet, Take 5 mg by mouth every morning , Disp: , Rfl:     ascorbic acid (VITAMIN C) 500 MG tablet, Take 1 tablet (500 mg total) by mouth 2 (two) times a day, Disp: 60 tablet, Rfl: 0    aspirin 81 MG tablet, Take 81 mg by mouth every morning   (Patient not taking: Reported on 2/15/2022 ), Disp: , Rfl:     atorvastatin (LIPITOR) 20 mg tablet, Take 10 mg by mouth daily  , Disp: , Rfl:     cholecalciferol (VITAMIN D3) 1,000 units tablet, Take 1 tablet (1,000 Units total) by mouth daily, Disp: 30 tablet, Rfl: 0    citric acid-potassium citrate (POLYCITRA K) 1,100-334 mg/5 mL solution, Take 10 mEq by mouth 1 tablet by mouth twice daily with meals, Disp: , Rfl:     co-enzyme Q-10 30 MG capsule, Take 200 mg by mouth daily  (Patient not taking: Reported on 2/15/2022 ), Disp: , Rfl:     famotidine (PEPCID) 20 mg tablet, Take 1 tablet (20 mg total) by mouth daily (Patient taking differently: Take 20 mg by mouth daily Half hour prior to breakfast ), Disp: 30 tablet, Rfl: 0    ferrous sulfate 324 (65 Fe) mg, Take 1 tablet (324 mg total) by mouth daily before breakfast, Disp: 30 tablet, Rfl: 0    folic acid (KP Folic Acid) 1 mg tablet, Take 1 tablet (1 mg total) by mouth daily, Disp: 30 tablet, Rfl: 0    MELATONIN PO, Take by mouth as needed, Disp: , Rfl:     multivitamin (THERAGRAN) TABS, Take 1 tablet by mouth every morning  , Disp: , Rfl:     olmesartan (BENICAR) 40 mg tablet, Take 40 mg by mouth daily  , Disp: , Rfl:    omeprazole (PriLOSEC) 20 mg delayed release capsule, Take 1 capsule (20 mg total) by mouth daily (Patient taking differently: Take 40 mg by mouth daily  ), Disp: 90 capsule, Rfl: 2    traMADol (Ultram) 50 mg tablet, Take 1 tablet (50 mg total) by mouth every 6 (six) hours as needed for moderate pain, Disp: 30 tablet, Rfl: 0    zinc sulfate (ZINCATE) 220 mg capsule, Take 1 capsule (220 mg total) by mouth daily, Disp: 30 capsule, Rfl: 0    Allergies   Allergen Reactions    Magnesium Oxybate [Oxybate] Diarrhea     DIARRHEA       Objective:  Vitals:    03/03/22 1023   BP: 134/80   Pulse: 100       Body mass index is 21 54 kg/m²  Left Hip Exam     Tenderness   Left hip tenderness location: proximal IT band tenderness     Range of Motion   Abduction: 45   Adduction: 25   Extension: 0   Flexion: 120   External rotation: 40   Internal rotation: 10     Other   Erythema: absent  Scars: absent  Sensation: normal  Pulse: present    Comments:  Anterior skin is clean, dry, intact   No overhanging panus    Positive stinchfield test   Positive FADDIR  Pain is worse with lateral hip motions and at the extremes are motions   Minimal IT band tenderness at rest              Physical Exam  Vitals reviewed  Constitutional:       Appearance: She is well-developed  HENT:      Head: Normocephalic and atraumatic  Eyes:      General: No scleral icterus  Conjunctiva/sclera: Conjunctivae normal    Cardiovascular:      Rate and Rhythm: Normal rate  Pulses: Normal pulses  Pulmonary:      Effort: Pulmonary effort is normal  No respiratory distress  Musculoskeletal:      Cervical back: Normal range of motion and neck supple  Comments: As noted in HPI   Skin:     General: Skin is warm and dry  Neurological:      Mental Status: She is alert and oriented to person, place, and time  Psychiatric:         Behavior: Behavior normal          I have personally reviewed pertinent films in PACS      MRI of the left femur obtained on 11/4/21 was reviewed again and demonstrate proximal lateral femoral cortical thickening, benign in nature  Avascular necrosis of the left hip joint with severe osteoarthritis noted  X-rays of the left hip obtained on 03/03/2022 demonstrate severe end-stage osteoarthritis with avascular necrosis noted  No fracture, lytic or blastic lesion appreciated

## 2022-03-07 ENCOUNTER — TELEPHONE (OUTPATIENT)
Dept: OBGYN CLINIC | Facility: CLINIC | Age: 77
End: 2022-03-07

## 2022-03-07 NOTE — TELEPHONE ENCOUNTER
Vianey Lezama was inquiring when she is to start taking the vitamins that she picked up from pharmacy  I explained that her surgery date has tentatively been scheduled for May 2, and she is to start these 30 days prior to that date  Surgery date is tentative as she does have another appointment/consent with a GI specialist in a few weeks  Depending on how that appointment goes, we may need adjust her surgery date with Dr Kyle Borja

## 2022-03-09 NOTE — PATIENT INSTRUCTIONS
Hysteroscopy   WHAT YOU SHOULD KNOW:   A hysteroscopy is a procedure to look at the inside of your uterus  Other procedures may also be done during the hysteroscopy  CARE AGREEMENT:   You have the right to help plan your care  Learn about your health condition and how it may be treated  Discuss treatment options with your caregivers to decide what care you want to receive  You always have the right to refuse treatment  RISKS:   You may have abdominal cramping  You may bleed more than expected or get an infection  The procedure may scar your uterus and cause problems later  You may have a reaction to the medicines used to fill your uterus during the hysteroscopy  Your uterus, bowel, or bladder may be injured during the procedure  Without this procedure, caregivers may not be able to find the cause of your health problems  GETTING READY:   The week before your procedure:   · Write down the correct date, time, and location of your procedure  · Arrange a ride home  Ask a family member or friend to drive you home after your surgery or procedure  Do not drive yourself home  · Ask your caregiver if you need to stop using aspirin or any other prescribed or over-the-counter medicine before your procedure or surgery  · Bring your medicine bottles or a list of your medicines when you see your caregiver  Tell your caregiver if you are allergic to any medicine  Tell your caregiver if you use any herbs, food supplements, or over-the-counter medicine  · You may need blood tests before your procedure  Talk to your caregiver about these or other tests you may need  Write down the date, time, and location for each test   The night before your procedure:   · You may be given medicine to help you sleep  · Ask caregivers about directions for eating and drinking  The day of your procedure:   · Ask your caregiver before taking any medicine on the day of your procedure   These medicines include insulin, diabetic pills, high blood pressure pills, or heart pills  Bring a list of all the medicines you take, or your pill bottles, with you to the hospital     · You or a close family member will be asked to sign a legal document called a consent form  It gives caregivers permission to do the procedure or surgery  It also explains the problems that may happen, and your choices  Make sure all your questions are answered before you sign this form  · Caregivers may insert an intravenous tube (IV) into your vein  A vein in the arm is usually chosen  Through the IV tube, you may be given liquids and medicine  · An anesthesiologist will talk to you before your surgery  You may need medicine to keep you asleep or numb an area of your body during surgery  Tell caregivers if you or anyone in your family has had a problem with anesthesia in the past   TREATMENT:   What will happen: Your legs will be put up in stirrups  An instrument called a hysteroscope will be inserted into your vagina  Carbon dioxide gas or a liquid will be put inside the uterus to open it  This allows caregivers to see the uterus more closely  Instruments may be used to remove polyps, fibroids, an IUD, or check for other problems if needed  A tissue sample may also be taken and sent to a lab for tests  After your procedure: You will be taken to a room to rest until you are fully awake  Caregivers will monitor you closely for any problems  Do not get out of bed until your caregiver says it is okay  When your caregiver sees that you are okay, you will be able to go home or be taken to your hospital room  A sanitary pad will be placed between your legs  A caregiver may check the pad soon after the procedure to watch for bleeding  You may feel faint, lightheaded, dizzy, or nauseated after your hysteroscopy  CONTACT A CAREGIVER IF:   · You cannot make it to your procedure  · You have a fever  · You get a cold or the flu      · You have questions or concerns about your procedure  SEEK CARE IMMEDIATELY IF:   · Your symptoms get worse  © 2014 [employer] is for End User's use only and may not be sold, redistributed or otherwise used for commercial purposes  All illustrations and images included in CareNotes® are the copyrighted property of [employer]  or Farhan Velasquez  The above information is an  only  It is not intended as medical advice for individual conditions or treatments  Talk to your doctor, nurse or pharmacist before following any medical regimen to see if it is safe and effective for you

## 2022-03-09 NOTE — H&P (VIEW-ONLY)
Assessment/Plan:  Paola High is a 67 y o  G0 with thickened endometrial stripe who presents for discussion of management options     1  Thickened endometrium  misoprostol (CYTOTEC) 100 mcg tablet   2  Preoperative exam for gynecologic surgery  misoprostol (CYTOTEC) 100 mcg tablet    ECG with rhythm strip   3  Sacroiliitis ([facility] [address] )     4  Continuous opioid dependence ([facility] [address] )          · Extensively reviewed risks and benefits of hysteroscopy, dilation and curettage, possible hysteroscopic polypectomy with Symphion  Will need misoprostol prior to procedure (rx sent)  · Reviewed procedure in detail and risks including but not limited to VTE/stroke/cardiac arrest, bleeding that may require transfusion, infection, injury to surrounding structures including blood vessels, nerves, bowel or bladder that may require additional procedures or surgeries  Reviewed possibility of diagnostic laparoscopy or open incision if any unanticipated difficulty or complications  · Surgical consent reviewed and signed today  · Provided surgical booklet  · Reviewed need to fast after midnight (or at least 8 hours) prior to surgery  · Reviewed need for ride after surgery given under anesthesia for surgery  · All questions answered to the best of my ability  Subjective:    Patient ID: Paola High is a 67 y o  female  HPI  Had imaging done during hospitalization for hip pain, had CT dale that showed thickened endometrial stripe  Us with thickened endometrial stripe  Referred to gyn  Failed endo bx 02/22  Menopause in 50s  No vaginal bleeding   No pelvic pain    Hx tubes tied (reports no abdominal scars from that surgery, but doesn't recall being told about Essure device? ? )  Open appendectomy    The following portions of the patient's history were reviewed and updated as appropriate:   She  has a past medical history of Acid reflux, Anxiety, Chronic kidney disease, Fractures, GERD (gastroesophageal reflux disease), H/O hiatal hernia, Hyperlipidemia, Hypertension, Kidney stones, Low back pain, Nausea, Poor appetite, and Weight loss, unintentional   She   Patient Active Problem List    Diagnosis Date Noted    Sacroiliitis ([facility] [address] ) 03/17/2022    Continuous opioid dependence ([facility] [address] ) 03/17/2022    H/O hiatal hernia     Thickened endometrium 02/09/2022    Weight loss 02/01/2022    Unintentional weight change 01/26/2022    Encounter for screening for malignant neoplasm of breast 01/26/2022    Epigastric discomfort 01/26/2022    Nausea 01/26/2022    Abnormal CT scan, pelvis 01/26/2022    Lumbar spondylosis 01/14/2022    Long term use of alendronate therapy (Fosamax) 11/04/2021    Osteoporosis of multiple sites 11/04/2021    Pain of left femur 11/04/2021    Iliotibial band syndrome affecting left lower leg 11/04/2021    Benign essential hypertension 01/16/2017    Closed fracture of left distal radius 01/16/2017    Right flank pain 08/21/2016    Calcium nephrolithiasis 08/21/2016    Hyperlipidemia     Chronic kidney disease     GERD (gastroesophageal reflux disease)     Anxiety     Diarrhea 08/18/2016    Mass of uterus - possible fibroid 08/18/2016    Pyelonephritis 08/15/2016    Chest pain 07/17/2016    HTN (hypertension) 07/17/2016     She  has a past surgical history that includes Appendectomy; Cystoscopy w/ ureteroscopy w/ lithotripsy (Left, 12/11/2006); Cystoscopy w/ ureteroscopy w/ lithotripsy (Left, 08/12/2012); Ureteral stent placement (Left, 08/31/2012); Cystoscopy (Left, 09/17/2012); Cystoscopy; Tubal ligation; Breast surgery (Left); Esophagogastroduodenoscopy; pr cysto/uretero w/lithotripsy &indwell stent insrt (Left, 9/1/2016); pr cysto/uretero w/lithotripsy &indwell stent insrt (Left, 8/12/2016); Extracorporeal shock wave lithotripsy (Left, 4/13/2016); and [place] tooth extraction  Her family history includes Cancer in her maternal aunt and maternal uncle; Heart disease in her maternal uncle;  No Known Problems in her brother, maternal grandfather, maternal grandmother, mother, paternal aunt, paternal grandfather, paternal grandmother, paternal uncle, and sister; Parkinsonism in her father; Stroke in her father  She  reports that she has never smoked  She has never used smokeless tobacco  She reports current alcohol use  She reports that she does not use drugs  Current Outpatient Medications   Medication Sig Dispense Refill    alendronate (FOSAMAX) 70 mg tablet Take 70 mg by mouth once a week  0    ALPRAZolam (XANAX) 0 25 mg tablet Take by mouth daily at bedtime as needed for anxiety       amLODIPine (NORVASC) 5 mg tablet Take 5 mg by mouth every morning        atorvastatin (LIPITOR) 20 mg tablet Take 10 mg by mouth daily        famotidine (PEPCID) 20 mg tablet Take 1 tablet (20 mg total) by mouth daily (Patient taking differently: Take 20 mg by mouth daily Half hour prior to breakfast ) 30 tablet 0    MELATONIN PO Take by mouth as needed      multivitamin (THERAGRAN) TABS Take 1 tablet by mouth every morning        olmesartan (BENICAR) 40 mg tablet Take 40 mg by mouth daily        omeprazole (PriLOSEC) 20 mg delayed release capsule Take 1 capsule (20 mg total) by mouth daily (Patient taking differently: Take 40 mg by mouth daily  ) 90 capsule 2    traMADol (Ultram) 50 mg tablet Take 1 tablet (50 mg total) by mouth every 6 (six) hours as needed for moderate pain 30 tablet 0    acetaminophen (TYLENOL) 650 mg CR tablet Take 1 tablet (650 mg total) by mouth every 8 (eight) hours as needed for mild pain (Patient not taking: Reported on 2/15/2022 ) 30 tablet 0    citric acid-potassium citrate (POLYCITRA K) 1,100-334 mg/5 mL solution Take 10 mEq by mouth 1 tablet by mouth twice daily with meals (Patient not taking: Reported on 3/17/2022 )      co-enzyme Q-10 30 MG capsule Take 200 mg by mouth daily  (Patient not taking: Reported on 2/15/2022 )      misoprostol (CYTOTEC) 100 mcg tablet Take 2 tablets (200 mcg total) by mouth 1 (one) time for 1 dose Place in vagina the night before surgery 2 tablet 0     No current facility-administered medications for this visit  Current Outpatient Medications on File Prior to Visit   Medication Sig    alendronate (FOSAMAX) 70 mg tablet Take 70 mg by mouth once a week    ALPRAZolam (XANAX) 0 25 mg tablet Take by mouth daily at bedtime as needed for anxiety     amLODIPine (NORVASC) 5 mg tablet Take 5 mg by mouth every morning   atorvastatin (LIPITOR) 20 mg tablet Take 10 mg by mouth daily      famotidine (PEPCID) 20 mg tablet Take 1 tablet (20 mg total) by mouth daily (Patient taking differently: Take 20 mg by mouth daily Half hour prior to breakfast )    MELATONIN PO Take by mouth as needed    multivitamin (THERAGRAN) TABS Take 1 tablet by mouth every morning      olmesartan (BENICAR) 40 mg tablet Take 40 mg by mouth daily      omeprazole (PriLOSEC) 20 mg delayed release capsule Take 1 capsule (20 mg total) by mouth daily (Patient taking differently: Take 40 mg by mouth daily  )    traMADol (Ultram) 50 mg tablet Take 1 tablet (50 mg total) by mouth every 6 (six) hours as needed for moderate pain    [DISCONTINUED] ascorbic acid (VITAMIN C) 500 MG tablet Take 1 tablet (500 mg total) by mouth 2 (two) times a day    [DISCONTINUED] zinc sulfate (ZINCATE) 220 mg capsule Take 1 capsule (220 mg total) by mouth daily    acetaminophen (TYLENOL) 650 mg CR tablet Take 1 tablet (650 mg total) by mouth every 8 (eight) hours as needed for mild pain (Patient not taking: Reported on 2/15/2022 )    citric acid-potassium citrate (POLYCITRA K) 1,100-334 mg/5 mL solution Take 10 mEq by mouth 1 tablet by mouth twice daily with meals (Patient not taking: Reported on 3/17/2022 )    co-enzyme Q-10 30 MG capsule Take 200 mg by mouth daily  (Patient not taking: Reported on 2/15/2022 )    [DISCONTINUED] aspirin 81 MG tablet Take 81 mg by mouth every morning   (Patient not taking: Reported on 2/15/2022 )  [DISCONTINUED] cholecalciferol (VITAMIN D3) 1,000 units tablet Take 1 tablet (1,000 Units total) by mouth daily (Patient not taking: Reported on 3/17/2022 )    [DISCONTINUED] ferrous sulfate 324 (65 Fe) mg Take 1 tablet (324 mg total) by mouth daily before breakfast (Patient not taking: Reported on 3/17/2022 )    [DISCONTINUED] folic acid (KP Folic Acid) 1 mg tablet Take 1 tablet (1 mg total) by mouth daily (Patient not taking: Reported on 3/17/2022 )     No current facility-administered medications on file prior to visit  She is allergic to magnesium oxybate [oxybate]       Review of Systems   Constitutional: Negative for unexpected weight change  HENT: Negative for hearing loss  Eyes: Negative for visual disturbance  Respiratory: Negative for shortness of breath  Cardiovascular: Negative for chest pain  Gastrointestinal: Positive for constipation  Negative for abdominal pain  Genitourinary: Negative for vaginal bleeding and vaginal pain  Skin: Negative for rash  Neurological: Negative for seizures and headaches  Objective:      /74 (BP Location: Left arm, Patient Position: Sitting, Cuff Size: Standard)   Ht 5' 1" (1 549 m)   Wt 50 8 kg (112 lb)   LMP  (LMP Unknown)   BMI 21 16 kg/m²    Physical Exam  Constitutional:       Appearance: Normal appearance  She is normal weight  HENT:      Head: Normocephalic and atraumatic  Eyes:      Extraocular Movements: Extraocular movements intact  Conjunctiva/sclera: Conjunctivae normal    Cardiovascular:      Rate and Rhythm: Normal rate and regular rhythm  Heart sounds: Normal heart sounds  Pulmonary:      Effort: Pulmonary effort is normal       Breath sounds: Normal breath sounds  Abdominal:      General: Abdomen is flat  Palpations: Abdomen is soft  Genitourinary:     Comments: Def  Musculoskeletal:         General: Normal range of motion  Cervical back: Normal range of motion     Skin:     General: Skin is warm  Neurological:      General: No focal deficit present  Study Result (1/24/22)    Narrative & Impression   PELVIC ULTRASOUND, COMPLETE     INDICATION:  67years old  R93 89: Abnormal findings on diagnostic imaging of other specified body structures  Z78 0: Asymptomatic menopausal state      COMPARISON:  Concurrent CT abdomen/pelvis and pelvic ultrasound 8/16/2016     TECHNIQUE:   Transabdominal pelvic ultrasound was performed in sagittal and transverse planes with a curvilinear transducer  Transvaginal imaging was declined  Imaging included volumetric sweeps as well as traditional still imaging technique      FINDINGS:     UTERUS:  The uterus is anteverted in position, measuring 6 3 x 3 5 x 4 5 cm  Contour and echotexture appear normal    Unchanged hypoechoic intramural mass in the fundus measuring 2 2 cm, consistent with a fibroid  The cervix is poorly seen; grossly unremarkable      ENDOMETRIUM:    Thickened AP caliber of 28 mm  Heterogeneous echotexture      OVARIES/ADNEXA:     Ovaries not visualized      No suspicious adnexal mass or loculated collections  There is no free fluid      IMPRESSION:     Transvaginal imaging was declined      Thickened, heterogeneous endometrium measuring 28 mm  Biopsy is recommended if not previously performed      Ovaries not visualized    No evidence of an adnexal mass      The study was marked in [facility] for significant notification      Workstation performed: [id number]

## 2022-03-09 NOTE — PROGRESS NOTES
Assessment/Plan:  Sallie Hooper is a 67 y o  G0 with thickened endometrial stripe who presents for discussion of management options     1  Thickened endometrium  misoprostol (CYTOTEC) 100 mcg tablet   2  Preoperative exam for gynecologic surgery  misoprostol (CYTOTEC) 100 mcg tablet    ECG with rhythm strip   3  Sacroiliitis ([facility] [address] )     4  Continuous opioid dependence ([facility] [address] )          · Extensively reviewed risks and benefits of hysteroscopy, dilation and curettage, possible hysteroscopic polypectomy with Symphion  Will need misoprostol prior to procedure (rx sent)  · Reviewed procedure in detail and risks including but not limited to VTE/stroke/cardiac arrest, bleeding that may require transfusion, infection, injury to surrounding structures including blood vessels, nerves, bowel or bladder that may require additional procedures or surgeries  Reviewed possibility of diagnostic laparoscopy or open incision if any unanticipated difficulty or complications  · Surgical consent reviewed and signed today  · Provided surgical booklet  · Reviewed need to fast after midnight (or at least 8 hours) prior to surgery  · Reviewed need for ride after surgery given under anesthesia for surgery  · All questions answered to the best of my ability  Subjective:    Patient ID: Sallie Hooper is a 67 y o  female  HPI  Had imaging done during hospitalization for hip pain, had CT dale that showed thickened endometrial stripe  Us with thickened endometrial stripe  Referred to gyn  Failed endo bx 02/22  Menopause in 50s  No vaginal bleeding   No pelvic pain    Hx tubes tied (reports no abdominal scars from that surgery, but doesn't recall being told about Essure device? ? )  Open appendectomy    The following portions of the patient's history were reviewed and updated as appropriate:   She  has a past medical history of Acid reflux, Anxiety, Chronic kidney disease, Fractures, GERD (gastroesophageal reflux disease), H/O hiatal hernia, Hyperlipidemia, Hypertension, Kidney stones, Low back pain, Nausea, Poor appetite, and Weight loss, unintentional   She   Patient Active Problem List    Diagnosis Date Noted    Sacroiliitis ([facility] [address] ) 03/17/2022    Continuous opioid dependence ([facility] [address] ) 03/17/2022    H/O hiatal hernia     Thickened endometrium 02/09/2022    Weight loss 02/01/2022    Unintentional weight change 01/26/2022    Encounter for screening for malignant neoplasm of breast 01/26/2022    Epigastric discomfort 01/26/2022    Nausea 01/26/2022    Abnormal CT scan, pelvis 01/26/2022    Lumbar spondylosis 01/14/2022    Long term use of alendronate therapy (Fosamax) 11/04/2021    Osteoporosis of multiple sites 11/04/2021    Pain of left femur 11/04/2021    Iliotibial band syndrome affecting left lower leg 11/04/2021    Benign essential hypertension 01/16/2017    Closed fracture of left distal radius 01/16/2017    Right flank pain 08/21/2016    Calcium nephrolithiasis 08/21/2016    Hyperlipidemia     Chronic kidney disease     GERD (gastroesophageal reflux disease)     Anxiety     Diarrhea 08/18/2016    Mass of uterus - possible fibroid 08/18/2016    Pyelonephritis 08/15/2016    Chest pain 07/17/2016    HTN (hypertension) 07/17/2016     She  has a past surgical history that includes Appendectomy; Cystoscopy w/ ureteroscopy w/ lithotripsy (Left, 12/11/2006); Cystoscopy w/ ureteroscopy w/ lithotripsy (Left, 08/12/2012); Ureteral stent placement (Left, 08/31/2012); Cystoscopy (Left, 09/17/2012); Cystoscopy; Tubal ligation; Breast surgery (Left); Esophagogastroduodenoscopy; pr cysto/uretero w/lithotripsy &indwell stent insrt (Left, 9/1/2016); pr cysto/uretero w/lithotripsy &indwell stent insrt (Left, 8/12/2016); Extracorporeal shock wave lithotripsy (Left, 4/13/2016); and [place] tooth extraction  Her family history includes Cancer in her maternal aunt and maternal uncle; Heart disease in her maternal uncle;  No Known Problems in her brother, maternal grandfather, maternal grandmother, mother, paternal aunt, paternal grandfather, paternal grandmother, paternal uncle, and sister; Parkinsonism in her father; Stroke in her father  She  reports that she has never smoked  She has never used smokeless tobacco  She reports current alcohol use  She reports that she does not use drugs  Current Outpatient Medications   Medication Sig Dispense Refill    alendronate (FOSAMAX) 70 mg tablet Take 70 mg by mouth once a week  0    ALPRAZolam (XANAX) 0 25 mg tablet Take by mouth daily at bedtime as needed for anxiety       amLODIPine (NORVASC) 5 mg tablet Take 5 mg by mouth every morning        atorvastatin (LIPITOR) 20 mg tablet Take 10 mg by mouth daily        famotidine (PEPCID) 20 mg tablet Take 1 tablet (20 mg total) by mouth daily (Patient taking differently: Take 20 mg by mouth daily Half hour prior to breakfast ) 30 tablet 0    MELATONIN PO Take by mouth as needed      multivitamin (THERAGRAN) TABS Take 1 tablet by mouth every morning        olmesartan (BENICAR) 40 mg tablet Take 40 mg by mouth daily        omeprazole (PriLOSEC) 20 mg delayed release capsule Take 1 capsule (20 mg total) by mouth daily (Patient taking differently: Take 40 mg by mouth daily  ) 90 capsule 2    traMADol (Ultram) 50 mg tablet Take 1 tablet (50 mg total) by mouth every 6 (six) hours as needed for moderate pain 30 tablet 0    acetaminophen (TYLENOL) 650 mg CR tablet Take 1 tablet (650 mg total) by mouth every 8 (eight) hours as needed for mild pain (Patient not taking: Reported on 2/15/2022 ) 30 tablet 0    citric acid-potassium citrate (POLYCITRA K) 1,100-334 mg/5 mL solution Take 10 mEq by mouth 1 tablet by mouth twice daily with meals (Patient not taking: Reported on 3/17/2022 )      co-enzyme Q-10 30 MG capsule Take 200 mg by mouth daily  (Patient not taking: Reported on 2/15/2022 )      misoprostol (CYTOTEC) 100 mcg tablet Take 2 tablets (200 mcg total) by mouth 1 (one) time for 1 dose Place in vagina the night before surgery 2 tablet 0     No current facility-administered medications for this visit  Current Outpatient Medications on File Prior to Visit   Medication Sig    alendronate (FOSAMAX) 70 mg tablet Take 70 mg by mouth once a week    ALPRAZolam (XANAX) 0 25 mg tablet Take by mouth daily at bedtime as needed for anxiety     amLODIPine (NORVASC) 5 mg tablet Take 5 mg by mouth every morning   atorvastatin (LIPITOR) 20 mg tablet Take 10 mg by mouth daily      famotidine (PEPCID) 20 mg tablet Take 1 tablet (20 mg total) by mouth daily (Patient taking differently: Take 20 mg by mouth daily Half hour prior to breakfast )    MELATONIN PO Take by mouth as needed    multivitamin (THERAGRAN) TABS Take 1 tablet by mouth every morning      olmesartan (BENICAR) 40 mg tablet Take 40 mg by mouth daily      omeprazole (PriLOSEC) 20 mg delayed release capsule Take 1 capsule (20 mg total) by mouth daily (Patient taking differently: Take 40 mg by mouth daily  )    traMADol (Ultram) 50 mg tablet Take 1 tablet (50 mg total) by mouth every 6 (six) hours as needed for moderate pain    [DISCONTINUED] ascorbic acid (VITAMIN C) 500 MG tablet Take 1 tablet (500 mg total) by mouth 2 (two) times a day    [DISCONTINUED] zinc sulfate (ZINCATE) 220 mg capsule Take 1 capsule (220 mg total) by mouth daily    acetaminophen (TYLENOL) 650 mg CR tablet Take 1 tablet (650 mg total) by mouth every 8 (eight) hours as needed for mild pain (Patient not taking: Reported on 2/15/2022 )    citric acid-potassium citrate (POLYCITRA K) 1,100-334 mg/5 mL solution Take 10 mEq by mouth 1 tablet by mouth twice daily with meals (Patient not taking: Reported on 3/17/2022 )    co-enzyme Q-10 30 MG capsule Take 200 mg by mouth daily  (Patient not taking: Reported on 2/15/2022 )    [DISCONTINUED] aspirin 81 MG tablet Take 81 mg by mouth every morning   (Patient not taking: Reported on 2/15/2022 )  [DISCONTINUED] cholecalciferol (VITAMIN D3) 1,000 units tablet Take 1 tablet (1,000 Units total) by mouth daily (Patient not taking: Reported on 3/17/2022 )    [DISCONTINUED] ferrous sulfate 324 (65 Fe) mg Take 1 tablet (324 mg total) by mouth daily before breakfast (Patient not taking: Reported on 3/17/2022 )    [DISCONTINUED] folic acid (KP Folic Acid) 1 mg tablet Take 1 tablet (1 mg total) by mouth daily (Patient not taking: Reported on 3/17/2022 )     No current facility-administered medications on file prior to visit  She is allergic to magnesium oxybate [oxybate]       Review of Systems   Constitutional: Negative for unexpected weight change  HENT: Negative for hearing loss  Eyes: Negative for visual disturbance  Respiratory: Negative for shortness of breath  Cardiovascular: Negative for chest pain  Gastrointestinal: Positive for constipation  Negative for abdominal pain  Genitourinary: Negative for vaginal bleeding and vaginal pain  Skin: Negative for rash  Neurological: Negative for seizures and headaches  Objective:      /74 (BP Location: Left arm, Patient Position: Sitting, Cuff Size: Standard)   Ht 5' 1" (1 549 m)   Wt 50 8 kg (112 lb)   LMP  (LMP Unknown)   BMI 21 16 kg/m²    Physical Exam  Constitutional:       Appearance: Normal appearance  She is normal weight  HENT:      Head: Normocephalic and atraumatic  Eyes:      Extraocular Movements: Extraocular movements intact  Conjunctiva/sclera: Conjunctivae normal    Cardiovascular:      Rate and Rhythm: Normal rate and regular rhythm  Heart sounds: Normal heart sounds  Pulmonary:      Effort: Pulmonary effort is normal       Breath sounds: Normal breath sounds  Abdominal:      General: Abdomen is flat  Palpations: Abdomen is soft  Genitourinary:     Comments: Def  Musculoskeletal:         General: Normal range of motion  Cervical back: Normal range of motion     Skin:     General: Skin is warm  Neurological:      General: No focal deficit present  Study Result (1/24/22)    Narrative & Impression   PELVIC ULTRASOUND, COMPLETE     INDICATION:  67years old  R93 89: Abnormal findings on diagnostic imaging of other specified body structures  Z78 0: Asymptomatic menopausal state      COMPARISON:  Concurrent CT abdomen/pelvis and pelvic ultrasound 8/16/2016     TECHNIQUE:   Transabdominal pelvic ultrasound was performed in sagittal and transverse planes with a curvilinear transducer  Transvaginal imaging was declined  Imaging included volumetric sweeps as well as traditional still imaging technique      FINDINGS:     UTERUS:  The uterus is anteverted in position, measuring 6 3 x 3 5 x 4 5 cm  Contour and echotexture appear normal    Unchanged hypoechoic intramural mass in the fundus measuring 2 2 cm, consistent with a fibroid  The cervix is poorly seen; grossly unremarkable      ENDOMETRIUM:    Thickened AP caliber of 28 mm  Heterogeneous echotexture      OVARIES/ADNEXA:     Ovaries not visualized      No suspicious adnexal mass or loculated collections  There is no free fluid      IMPRESSION:     Transvaginal imaging was declined      Thickened, heterogeneous endometrium measuring 28 mm  Biopsy is recommended if not previously performed      Ovaries not visualized    No evidence of an adnexal mass      The study was marked in [facility] for significant notification      Workstation performed: [id number]

## 2022-03-11 ENCOUNTER — TELEPHONE (OUTPATIENT)
Dept: OBGYN CLINIC | Facility: HOSPITAL | Age: 77
End: 2022-03-11

## 2022-03-11 ENCOUNTER — TELEPHONE (OUTPATIENT)
Dept: CARDIAC SURGERY | Facility: CLINIC | Age: 77
End: 2022-03-11

## 2022-03-11 NOTE — TELEPHONE ENCOUNTER
Patient states that she cancelled her hiatal hernia surgery  The hip is worse than hiatal hernia  She will hold off with Pain Mgt right now  She is taking tylenol 1000 mg TID inbetween her Tramadol dosages  Please keep her in mind for cancellations for surgery

## 2022-03-11 NOTE — TELEPHONE ENCOUNTER
Patient states Tramadol is not working still having pain level 10, would like call back directly from doctor  Possibly moving up surgery or other pain relief

## 2022-03-11 NOTE — TELEPHONE ENCOUNTER
Patient is having a hip replacement done and will need to post pone her manometry testing and follow up appt with Dr Jesus Guallpa   Pt states she will call back once she is recovered to r/s

## 2022-03-11 NOTE — TELEPHONE ENCOUNTER
Unfortunately, per policy were unable to give any stronger narcotics preoperatively  If tramadol is inefficient, we recommend that she discuss with her primary care provider or we can provide her with a referral to Pain Management for consideration of temporary oral analgesics until she has surgery  I will also for this message to our surgical schedulers to see if any sooner date for surgery is available    Thank you

## 2022-03-15 ENCOUNTER — APPOINTMENT (OUTPATIENT)
Dept: LAB | Facility: HOSPITAL | Age: 77
End: 2022-03-15
Attending: FAMILY MEDICINE
Payer: MEDICARE

## 2022-03-15 DIAGNOSIS — D51.0 PERNICIOUS ANEMIA: ICD-10-CM

## 2022-03-15 DIAGNOSIS — Z13.9 SCREENING FOR UNSPECIFIED CONDITION: ICD-10-CM

## 2022-03-15 LAB
ERYTHROCYTE [DISTWIDTH] IN BLOOD BY AUTOMATED COUNT: 13.5 % (ref 11.6–15.1)
HCT VFR BLD AUTO: 46.8 % (ref 34.8–46.1)
HGB BLD-MCNC: 14.9 G/DL (ref 11.5–15.4)
MCH RBC QN AUTO: 30.7 PG (ref 26.8–34.3)
MCHC RBC AUTO-ENTMCNC: 31.8 G/DL (ref 31.4–37.4)
MCV RBC AUTO: 97 FL (ref 82–98)
PLATELET # BLD AUTO: 216 THOUSANDS/UL (ref 149–390)
PMV BLD AUTO: 12.7 FL (ref 8.9–12.7)
RBC # BLD AUTO: 4.85 MILLION/UL (ref 3.81–5.12)
VIT B12 SERPL-MCNC: 494 PG/ML (ref 100–900)
WBC # BLD AUTO: 6.68 THOUSAND/UL (ref 4.31–10.16)

## 2022-03-15 PROCEDURE — 82607 VITAMIN B-12: CPT

## 2022-03-15 PROCEDURE — 85027 COMPLETE CBC AUTOMATED: CPT

## 2022-03-17 ENCOUNTER — CONSULT (OUTPATIENT)
Dept: OBGYN CLINIC | Facility: CLINIC | Age: 77
End: 2022-03-17

## 2022-03-17 VITALS
BODY MASS INDEX: 21.14 KG/M2 | SYSTOLIC BLOOD PRESSURE: 120 MMHG | WEIGHT: 112 LBS | DIASTOLIC BLOOD PRESSURE: 74 MMHG | HEIGHT: 61 IN

## 2022-03-17 DIAGNOSIS — R93.89 THICKENED ENDOMETRIUM: Primary | ICD-10-CM

## 2022-03-17 DIAGNOSIS — M46.1 SACROILIITIS (HCC): ICD-10-CM

## 2022-03-17 DIAGNOSIS — F11.20 CONTINUOUS OPIOID DEPENDENCE (HCC): ICD-10-CM

## 2022-03-17 DIAGNOSIS — Z01.818 PREOPERATIVE EXAM FOR GYNECOLOGIC SURGERY: ICD-10-CM

## 2022-03-17 PROCEDURE — PREOP: Performed by: STUDENT IN AN ORGANIZED HEALTH CARE EDUCATION/TRAINING PROGRAM

## 2022-03-17 RX ORDER — MISOPROSTOL 100 UG/1
200 TABLET ORAL ONCE
Qty: 2 TABLET | Refills: 0 | Status: SHIPPED | OUTPATIENT
Start: 2022-03-17 | End: 2022-05-03 | Stop reason: HOSPADM

## 2022-03-25 ENCOUNTER — OFFICE VISIT (OUTPATIENT)
Dept: GASTROENTEROLOGY | Facility: CLINIC | Age: 77
End: 2022-03-25
Payer: MEDICARE

## 2022-03-25 VITALS
HEART RATE: 75 BPM | DIASTOLIC BLOOD PRESSURE: 84 MMHG | SYSTOLIC BLOOD PRESSURE: 155 MMHG | WEIGHT: 111 LBS | HEIGHT: 61 IN | BODY MASS INDEX: 20.96 KG/M2

## 2022-03-25 DIAGNOSIS — R93.89 THICKENED ENDOMETRIUM: ICD-10-CM

## 2022-03-25 DIAGNOSIS — Z12.11 COLON CANCER SCREENING: ICD-10-CM

## 2022-03-25 DIAGNOSIS — Z12.39 ENCOUNTER FOR SCREENING FOR MALIGNANT NEOPLASM OF BREAST, UNSPECIFIED SCREENING MODALITY: ICD-10-CM

## 2022-03-25 DIAGNOSIS — K44.9 HIATAL HERNIA WITH GASTROESOPHAGEAL REFLUX DISEASE WITHOUT ESOPHAGITIS: ICD-10-CM

## 2022-03-25 DIAGNOSIS — R63.4 WEIGHT LOSS, UNINTENTIONAL: ICD-10-CM

## 2022-03-25 DIAGNOSIS — K21.9 HIATAL HERNIA WITH GASTROESOPHAGEAL REFLUX DISEASE WITHOUT ESOPHAGITIS: ICD-10-CM

## 2022-03-25 DIAGNOSIS — K59.00 CONSTIPATION, UNSPECIFIED CONSTIPATION TYPE: Primary | ICD-10-CM

## 2022-03-25 PROCEDURE — 99214 OFFICE O/P EST MOD 30 MIN: CPT | Performed by: NURSE PRACTITIONER

## 2022-03-25 NOTE — PATIENT INSTRUCTIONS
Bottle magnesium citrate x1 today  Take MiraLax 1 cap full daily in 8 oz of water or juice  Continue boost supplement  Drink plenty of fluids

## 2022-03-25 NOTE — PROGRESS NOTES
[facility] - Outpatient Follow-up Note  Krystin Mckeon 67 y o  female MRN: [id number]  Encounter: [id number]          ASSESSMENT AND PLAN:      1  Constipation, unspecified constipation type  Patient reports constipation and straining with bowel movements  Patient reports she did have a bowel movement today  Patient is taking MiraLax but it is only taking it as needed   -Patient instructed to take 1 bottle magnesium citrate today  -Starting in the a m  take 1 capful of MiraLax in 8 oz water or juice  Patient instructed to take MiraLax on a daily basis  -Stays well hydrated and drinking plenty of fluid  2  Hiatal hernia with gastroesophageal reflux disease without esophagitis  3  Weight loss, unintentional  Patient denied any esophageal discomfort, nausea vomiting, epigastric or upper abdominal pain  Patient reports symptoms of GERD are well controlled  Patient continues have decreased appetite  Weight loss has stabilized  Continue omeprazole 20 mg daily in a m  1/2 hour prior to breakfast  Continue Zofran as nausea  Continue flip laments nutrition with boost 3 times daily  Patient is following with thoracic surgery for further recommendations concerning large paraesophageal hernia  4  Colon cancer screening  Up-to-date    5  Encounter for screening for malignant neoplasm of breast, unspecified screening modality  Up to date    10  Thickened endometrium  CT of abdomen and pelvis showed distention of endometrium with enhancing tissue along the left margin  Endometrial neoplasm is not excluded  7  Anxiety  Patient is very anxious about underlying health issues  Patient is on Xanax for her anxiety which is being managed by primary care doctor  Patient advised to follow-up with primary care doctor concerning anti anxiety medication  Patient already has follow-up appointment scheduled June  ______________________________________________________________________    Kevin Bustamante is a 50-year-old female who presents to office for follow-up for GERD with hiatal hernia and paraesophageal hernia, constipation, and weight loss  CT of abdomen and pelvis showed distention of endometrium with enhancing tissue along the left margin  Endometrial neoplasm is not excluded  Per patient gyn doctor able to do biopsy  Patient was referred to specialist   Per medical records she was referred to a specialist Dr Mikaela Henson who is going to do a hysteroscopy, dilation and curettage, and possible hysteroscopy polypectomy with Symphion  Patient is very anxious about upcoming procedure  Date for procedure has not been set  Patient also reports she needs to have her hip replaced and her paraesophageal hernia repaired  Patient currently denies nausea, vomiting, acid reflux, heartburn, epigastric or abdominal pain  Abdominal exam benign  No guarding or rebound tenderness  Patient does report constipation and feels like she is not fully evacuating stool  Patient reports last bowel movement today but she does not feel like she is going adequately and needs to strain to move her bowels  Patient denies blood in stool, blood from rectal area, or black tarry stool  Patient does not smoke  REVIEW OF SYSTEMS IS OTHERWISE NEGATIVE        Historical Information   Past Medical History:   Diagnosis Date    Acid reflux     Anxiety     Chronic kidney disease     chronic stones    Colon polyp     Fractures     GERD (gastroesophageal reflux disease)     H/O hiatal hernia     Hyperlipidemia     Hypertension     Kidney stones     Low back pain     Nausea     at times    Poor appetite     Weight loss, unintentional     Since Nov 2021 20lb weight loss     Past Surgical History:   Procedure Laterality Date    APPENDECTOMY      BREAST SURGERY Left     excision cyst    COLONOSCOPY      CYSTOSCOPY      with stone extraction x 2    CYSTOSCOPY W/ RETROGRADES Left 09/17/2012    stent removal,     CYSTOSCOPY W/ URETEROSCOPY W/ LITHOTRIPSY Left 12/11/2006    Ca Phos 98%    CYSTOSCOPY W/ URETEROSCOPY W/ LITHOTRIPSY Left 08/12/2012    Ca ox di 60%, Ca ox mono 20%    ESOPHAGOGASTRODUODENOSCOPY      EXTRACORPOREAL SHOCK WAVE LITHOTRIPSY Left 4/13/2016    Procedure: LITHROTRIPSY EXTRACORPORAL SHOCKWAVE (ESWL); Surgeon: Adriano Branch MD;  Location: [facility] MAIN OR;  Service:     MA CYSTO/URETERO W/LITHOTRIPSY &INDWELL STENT INSRT Left 9/1/2016    Procedure: CYSTOSCOPY URETEROSCOPY WITH STONE EXTRACTION , RETROGRADE PYELOGRAM AND INSERTION STENT URETERAL;  Surgeon: Adriano Branch MD;  Location: [address] [place] [address];  Service: Urology    MA CYSTO/URETERO W/LITHOTRIPSY &INDWELL STENT INSRT Left 8/12/2016    Procedure: CYSTOSCOPY URETEROSCOPY WITH LITHOTRIPSY HOLMIUM LASER, RETROGRADE PYELOGRAM AND INSERTION STENT URETERAL;  Surgeon: Adriano Branch MD;  Location: [address] [place] [address];  Service: Urology    TUBAL LIGATION      UPPER GASTROINTESTINAL ENDOSCOPY      URETERAL STENT PLACEMENT Left 08/31/2012    Narrowing of the left distal ureter for 3cm        WISDOM TOOTH EXTRACTION       Social History   Social History     Substance and Sexual Activity   Alcohol Use Yes    Comment: wine almost every night to sleep     Social History     Substance and Sexual Activity   Drug Use No     Social History     Tobacco Use   Smoking Status Never Smoker   Smokeless Tobacco Never Used     Family History   Problem Relation Age of Onset    Stroke Father         TIA    Parkinsonism Father     Cancer Maternal Aunt     Cancer Maternal Uncle     Heart disease Maternal Uncle         Massive MI    No Known Problems Mother     No Known Problems Sister     Pancreatic cancer Brother     No Known Problems Paternal Aunt     No Known Problems Paternal Uncle     No Known Problems Maternal Grandmother     No Known Problems Maternal Grandfather     No Known Problems Paternal Grandmother     No Known Problems Paternal Grandfather        Meds/Allergies       Current Outpatient Medications:     ALPRAZolam (XANAX) 0 25 mg tablet    amLODIPine (NORVASC) 5 mg tablet    atorvastatin (LIPITOR) 20 mg tablet    citric acid-potassium citrate (POLYCITRA K) 1,100-334 mg/5 mL solution    MELATONIN PO    multivitamin (THERAGRAN) TABS    omeprazole (PriLOSEC) 20 mg delayed release capsule    traMADol (Ultram) 50 mg tablet    acetaminophen (TYLENOL) 650 mg CR tablet    alendronate (FOSAMAX) 70 mg tablet    co-enzyme Q-10 30 MG capsule    misoprostol (CYTOTEC) 100 mcg tablet    olmesartan (BENICAR) 40 mg tablet    Allergies   Allergen Reactions    Magnesium Oxybate [Oxybate] Diarrhea     DIARRHEA           Objective     Blood pressure 155/84, pulse 75, height 5' 1" (1 549 m), weight 50 3 kg (111 lb)  Body mass index is 20 97 kg/m²  PHYSICAL EXAM:      General Appearance:   Alert, cooperative, no distress   HEENT:   Normocephalic, atraumatic, anicteric      Neck:  Supple, symmetrical, trachea midline   Lungs:   Clear to auscultation bilaterally; no rales, rhonchi or wheezing; respirations unlabored    Heart[de-identified]   Regular rate and rhythm; no murmur, rub, or gallop  Abdomen:   Soft, non-tender, non-distended; normal bowel sounds; no masses, no organomegaly    Genitalia:   Deferred    Rectal:   Deferred    Extremities:  No cyanosis, clubbing or edema    Pulses:  2+ and symmetric    Skin:  No jaundice, rashes, or lesions    Lymph nodes:  No palpable cervical lymphadenopathy        Lab Results:   No visits with results within 1 Day(s) from this visit     Latest known visit with results is:   Appointment on 03/15/2022   Component Date Value    WBC 03/15/2022 6 68     RBC 03/15/2022 4 85     Hemoglobin 03/15/2022 14 9     Hematocrit 03/15/2022 46 8*    MCV 03/15/2022 97     MCH 03/15/2022 30 7     MCHC 03/15/2022 31 8     RDW 03/15/2022 13 5     Platelets 03/15/2022 216     MPV 03/15/2022 12 7     Vitamin B-12 03/15/2022 494          Radiology Results:   XR hip/pelv 2-3 vws left if performed    Result Date: 3/6/2022  Narrative: LEFT HIP INDICATION:   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

## 2022-03-30 ENCOUNTER — TELEPHONE (OUTPATIENT)
Dept: OBGYN CLINIC | Facility: HOSPITAL | Age: 77
End: 2022-03-30

## 2022-03-30 ENCOUNTER — PREP FOR PROCEDURE (OUTPATIENT)
Dept: OBGYN CLINIC | Facility: CLINIC | Age: 77
End: 2022-03-30

## 2022-03-30 ENCOUNTER — TELEPHONE (OUTPATIENT)
Dept: OBGYN CLINIC | Facility: CLINIC | Age: 77
End: 2022-03-30

## 2022-03-30 DIAGNOSIS — R93.89 INCREASED ENDOMETRIAL STRIPE THICKNESS: Primary | ICD-10-CM

## 2022-03-30 NOTE — TELEPHONE ENCOUNTER
Relayed this to Rigo Bartoning  She fully understands   She does have an appointment for her cleaning scheduled prior to surgery

## 2022-03-30 NOTE — TELEPHONE ENCOUNTER
I've returned call to Onesimo Snider, explained to her the process for her pre op testing  She will be going in the next few days for the pre op labs, chest Xr, etc   She does have her [facility] appointment with Dr Corrina Villagomez on 4/15  Dr Azucena Webb, is she okay to have her dental cleanings performed, before and / or after her surgery?

## 2022-03-30 NOTE — TELEPHONE ENCOUNTER
Dr Jasen Pratt    Patient is asking if its ok to go for a dental appt for a cleaning before her sx? She states she has mostly dentures  She is also asking if bloodwork or xrays are needed before sx?      # [phone]

## 2022-04-03 ENCOUNTER — APPOINTMENT (EMERGENCY)
Dept: RADIOLOGY | Facility: HOSPITAL | Age: 77
End: 2022-04-03
Payer: MEDICARE

## 2022-04-03 ENCOUNTER — HOSPITAL ENCOUNTER (EMERGENCY)
Facility: HOSPITAL | Age: 77
Discharge: HOME | End: 2022-04-03
Attending: EMERGENCY MEDICINE | Admitting: EMERGENCY MEDICINE
Payer: MEDICARE

## 2022-04-03 VITALS
OXYGEN SATURATION: 98 % | DIASTOLIC BLOOD PRESSURE: 75 MMHG | TEMPERATURE: 97.5 F | SYSTOLIC BLOOD PRESSURE: 147 MMHG | RESPIRATION RATE: 20 BRPM | HEART RATE: 90 BPM

## 2022-04-03 DIAGNOSIS — K59.00 CONSTIPATION, UNSPECIFIED CONSTIPATION TYPE: ICD-10-CM

## 2022-04-03 DIAGNOSIS — R19.5 DARK STOOLS: Primary | ICD-10-CM

## 2022-04-03 PROCEDURE — 74022 RADEX COMPL AQT ABD SERIES: CPT

## 2022-04-03 PROCEDURE — 99284 EMERGENCY DEPT VISIT MOD MDM: CPT | Performed by: EMERGENCY MEDICINE

## 2022-04-03 PROCEDURE — 82272 OCCULT BLD FECES 1-3 TESTS: CPT

## 2022-04-03 PROCEDURE — 99284 EMERGENCY DEPT VISIT MOD MDM: CPT

## 2022-04-03 RX ORDER — ASCORBIC ACID 500 MG
500 TABLET ORAL DAILY
COMMUNITY
End: 2022-04-05

## 2022-04-03 RX ORDER — ALENDRONATE SODIUM 70 MG/1
70 TABLET ORAL
COMMUNITY

## 2022-04-03 RX ORDER — FERROUS SULFATE 325(65) MG
325 TABLET ORAL
COMMUNITY
End: 2022-05-03 | Stop reason: HOSPADM

## 2022-04-03 NOTE — DISCHARGE INSTRUCTIONS
The stools were negative for blood  You need to eat more and eat a variety of healthy fruits and vegetables  Keep hydrated with fluids  You can also take a fiber supplement  See your doctor if any problems

## 2022-04-03 NOTE — ED PROVIDER NOTES
History  Chief Complaint   Patient presents with    Abdominal Pain     low pelvic pain for a couple of weeks  states has not had a normal bm since this  also started on iron tablets several days ago  did not know this causes constipation  no vomiting, colonoscopy 2 months ago  cut off a couple of benign polyps     68 yo female c/o black stools x 3 days  She has lower abdominal pain and constipation x one week  She noted black stools for the last 3 days after starting iron supplement  She was concerned it is blood  Orthopedist prescribed the iron supplement in preparation for hip surgery later this month  She was seen in GI office a week ago for constipation and they advised her to take Miralax daily but she cleaned out last week and didn't feel she needed to take any this week  Pt  Says she is under a lot of stress lately and isn't eating much at all  She last had good bowel movement 6 days ago  She has had soft black smearing stools since starting the iron  No vomiting or fever  History provided by:  Patient   used: No    Abdominal Pain  Associated symptoms: constipation    Associated symptoms: no chest pain, no cough, no diarrhea, no dysuria, no fever, no nausea, no shortness of breath and no vomiting        Prior to Admission Medications   Prescriptions Last Dose Informant Patient Reported? Taking?    ALPRAZolam (XANAX) 0 25 mg tablet  Self Yes No   Sig: Take by mouth daily at bedtime as needed for anxiety    MELATONIN PO  Self Yes No   Sig: Take 5 mg by mouth as needed     OLMESARTAN MEDOXOMIL PO   Yes Yes   Sig: Take 40 mg by mouth daily   Polyethylene Glycol 3350 (MIRALAX PO)   Yes Yes   Sig: Take by mouth if needed   Zinc Sulfate (ZINC-220 PO)   Yes Yes   Sig: Take by mouth daily   acetaminophen (TYLENOL) 650 mg CR tablet  Self No No   Sig: Take 1 tablet (650 mg total) by mouth every 8 (eight) hours as needed for mild pain   Patient not taking: Reported on 2/15/2022 alendronate (FOSAMAX) 70 mg tablet  Self Yes No   Sig: Take 70 mg by mouth once a week   Patient not taking: Reported on 3/25/2022    alendronate (FOSAMAX) 70 mg tablet   Yes Yes   Sig: Take 70 mg by mouth every 7 days   amLODIPine (NORVASC) 5 mg tablet  Self Yes No   Sig: Take 5 mg by mouth every morning     ascorbic acid (VITAMIN C) 500 MG tablet   Yes Yes   Sig: Take 500 mg by mouth daily   atorvastatin (LIPITOR) 20 mg tablet  Self Yes No   Sig: Take 10 mg by mouth daily     citric acid-potassium citrate (POLYCITRA K) 1,100-334 mg/5 mL solution  Self Yes No   Sig: Take 10 mEq by mouth 1 tablet by mouth twice daily with meals     co-enzyme Q-10 30 MG capsule  Self Yes No   Sig: Take 200 mg by mouth daily    Patient not taking: Reported on 2/15/2022    co-enzyme Q-10 30 MG capsule   Yes Yes   Sig: Take 200 mg by mouth daily   ferrous sulfate 325 (65 Fe) mg tablet   Yes Yes   Sig: Take 325 mg by mouth daily with breakfast   misoprostol (CYTOTEC) 100 mcg tablet   No No   Sig: Take 2 tablets (200 mcg total) by mouth 1 (one) time for 1 dose Place in vagina the night before surgery   multivitamin (THERAGRAN) TABS  Self Yes No   Sig: Take 1 tablet by mouth every morning     olmesartan (BENICAR) 40 mg tablet  Self Yes No   Sig: Take 40 mg by mouth daily     Patient not taking: Reported on 3/25/2022    omeprazole (PriLOSEC) 20 mg delayed release capsule  Self No No   Sig: Take 1 capsule (20 mg total) by mouth daily   Patient taking differently: Take 40 mg by mouth daily     traMADol (Ultram) 50 mg tablet   No No   Sig: Take 1 tablet (50 mg total) by mouth every 6 (six) hours as needed for moderate pain      Facility-Administered Medications: None       Past Medical History:   Diagnosis Date    Acid reflux     Anxiety     Chronic kidney disease     chronic stones    Colon polyp     Fractures     GERD (gastroesophageal reflux disease)     H/O hiatal hernia     Hyperlipidemia     Hypertension     Kidney stones     Low back pain     Nausea     at times    Poor appetite     Weight loss, unintentional     Since Nov 2021 20lb weight loss       Past Surgical History:   Procedure Laterality Date    APPENDECTOMY      BREAST SURGERY Left     excision cyst    COLONOSCOPY      CYSTOSCOPY      with stone extraction x 2    CYSTOSCOPY W/ RETROGRADES Left 09/17/2012    stent removal,     CYSTOSCOPY W/ URETEROSCOPY W/ LITHOTRIPSY Left 12/11/2006    Ca Phos 98%    CYSTOSCOPY W/ URETEROSCOPY W/ LITHOTRIPSY Left 08/12/2012    Ca ox di 60%, Ca ox mono 20%    ESOPHAGOGASTRODUODENOSCOPY      EXTRACORPOREAL SHOCK WAVE LITHOTRIPSY Left 4/13/2016    Procedure: LITHROTRIPSY EXTRACORPORAL SHOCKWAVE (ESWL); Surgeon: Hien Tolbert MD;  Location: [facility] MAIN OR;  Service:     DC CYSTO/URETERO W/LITHOTRIPSY &INDWELL STENT INSRT Left 9/1/2016    Procedure: CYSTOSCOPY URETEROSCOPY WITH STONE EXTRACTION , RETROGRADE PYELOGRAM AND INSERTION STENT URETERAL;  Surgeon: Hien Tolbert MD;  Location: [address] [place] [address];  Service: Urology    DC CYSTO/URETERO W/LITHOTRIPSY &INDWELL STENT INSRT Left 8/12/2016    Procedure: CYSTOSCOPY URETEROSCOPY WITH LITHOTRIPSY HOLMIUM LASER, RETROGRADE PYELOGRAM AND INSERTION STENT URETERAL;  Surgeon: Hien Tolbert MD;  Location: [address] [place] [address];  Service: Urology    TUBAL LIGATION      UPPER GASTROINTESTINAL ENDOSCOPY      URETERAL STENT PLACEMENT Left 08/31/2012    Narrowing of the left distal ureter for 3cm        WISDOM TOOTH EXTRACTION         Family History   Problem Relation Age of Onset    Stroke Father         TIA    Parkinsonism Father     Cancer Maternal Aunt     Cancer Maternal Uncle     Heart disease Maternal Uncle         Massive MI    No Known Problems Mother     No Known Problems Sister     Pancreatic cancer Brother     No Known Problems Paternal Aunt     No Known Problems Paternal Uncle     No Known Problems Maternal Grandmother     No Known Problems Maternal Grandfather     No Known Problems Paternal Grandmother     No Known Problems Paternal Grandfather      I have reviewed and agree with the history as documented  E-Cigarette/Vaping    E-Cigarette Use Never User      E-Cigarette/Vaping Substances    Nicotine No     THC No     CBD No     Flavoring No     Other No     Unknown No      Social History     Tobacco Use    Smoking status: Never Smoker    Smokeless tobacco: Never Used   Vaping Use    Vaping Use: Never used   Substance Use Topics    Alcohol use: Yes     Comment: wine almost every night to sleep    Drug use: No       Review of Systems   Constitutional: Negative  Negative for fever  HENT: Negative  Eyes: Negative  Respiratory: Negative  Negative for cough and shortness of breath  Cardiovascular: Negative  Negative for chest pain  Gastrointestinal: Positive for abdominal pain and constipation  Negative for diarrhea, nausea and vomiting  Black stool   Genitourinary: Negative  Negative for dysuria and flank pain  Musculoskeletal: Negative  Negative for back pain and myalgias  Hip problem   Skin: Negative  Negative for rash and wound  Neurological: Negative  Negative for dizziness and headaches  Hematological: Does not bruise/bleed easily  Psychiatric/Behavioral: Negative  All other systems reviewed and are negative  Physical Exam  Physical Exam  Vitals and nursing note reviewed  Constitutional:       General: She is not in acute distress  Appearance: She is well-developed  She is not ill-appearing, toxic-appearing or diaphoretic  HENT:      Head: Normocephalic and atraumatic  Right Ear: External ear normal       Left Ear: External ear normal    Eyes:      General: No scleral icterus  Conjunctiva/sclera: Conjunctivae normal    Cardiovascular:      Rate and Rhythm: Normal rate and regular rhythm  Heart sounds: Normal heart sounds  No murmur heard        Pulmonary:      Effort: Pulmonary effort is normal  No respiratory distress  Breath sounds: Normal breath sounds  Abdominal:      General: Bowel sounds are normal  There is no distension  Palpations: Abdomen is soft  Tenderness: There is no abdominal tenderness  Genitourinary:     Rectum: Normal  Guaiac result negative  Comments: Soft dark brown/black stool around rectum, tests guiac negative  No stool in vault  Musculoskeletal:         General: No deformity  Normal range of motion  Cervical back: Normal range of motion and neck supple  Skin:     General: Skin is warm and dry  Coloration: Skin is not pale  Findings: No rash  Neurological:      General: No focal deficit present  Mental Status: She is alert and oriented to person, place, and time  Cranial Nerves: No cranial nerve deficit  Motor: No weakness  Psychiatric:         Mood and Affect: Mood normal          Behavior: Behavior normal          Vital Signs  ED Triage Vitals [04/03/22 1701]   Temperature Pulse Respirations Blood Pressure SpO2   97 5 °F (36 4 °C) 90 20 147/75 98 %      Temp Source Heart Rate Source Patient Position - Orthostatic VS BP Location FiO2 (%)   Tympanic Monitor Sitting Right arm --      Pain Score       3           Vitals:    04/03/22 1701   BP: 147/75   Pulse: 90   Patient Position - Orthostatic VS: Sitting         Visual Acuity      ED Medications  Medications - No data to display    Diagnostic Studies  Results Reviewed     None                 XR abdomen obstruction series   ED Interpretation by Dina Diaz MD (49/15 1920)   Nonspecific, some stool on right                 Procedures  Procedures         ED Course                               SBIRT 20yo+      Most Recent Value   SBIRT (24 yo +)    In order to provide better care to our patients, we are screening all of our patients for alcohol and drug use  Would it be okay to ask you these screening questions?  No Filed at: 04/03/2022 0040 MDM  Number of Diagnoses or Management Options  Dark stools  Diagnosis management comments: Stools negative for blood, the discoloration is due to the iron  Pt  Advised and reassured  Xrays show some stool on right side  Advised she needs to eat more, eat healthy, fiber supplement  Disposition  Final diagnoses:   Dark stools - related to iron supplement   Constipation, unspecified constipation type     Time reflects when diagnosis was documented in both MDM as applicable and the Disposition within this note     Time User Action Codes Description Comment    3/3/9668  4:15 PM Michelle Montilla Add [V30 5] Dark stools     7/2/4117  6:08 PM Taj MORALES Modify [R19 5] Dark stools related to iron supplement    9/4/4367  4:38 PM Taj MORALES Add [D91 37] Constipation, unspecified constipation type       ED Disposition     ED Disposition Condition Date/Time Comment    Discharge Stable Sun Apr 3, 2022  6:20 PM Haleigh Leahy discharge to home/self care  Follow-up Information     Follow up With Specialties Details Why Contact Info    Dago Bermudez, DO Family Medicine  As needed [address] [facility]  [phone]            Patient's Medications   Discharge Prescriptions    No medications on file       No discharge procedures on file      PDMP Review       Value Time User    PDMP Reviewed  Yes 12/27/2021  4:04 PM Toño Hawkins PA-C          ED Provider  Electronically Signed by           César Pierre MD  98/33/02 [id number]

## 2022-04-04 ENCOUNTER — TELEPHONE (OUTPATIENT)
Dept: OBGYN CLINIC | Facility: HOSPITAL | Age: 77
End: 2022-04-04

## 2022-04-04 ENCOUNTER — TELEPHONE (OUTPATIENT)
Dept: OBGYN CLINIC | Facility: OTHER | Age: 77
End: 2022-04-04

## 2022-04-04 NOTE — TELEPHONE ENCOUNTER
Yes, I did review the labs that were taken in the emergency department  If it is felt that these were the source of her discomfort, she may discontinue the preoperative vitamins    Thank you

## 2022-04-04 NOTE — TELEPHONE ENCOUNTER
Patient called in , she was recently seen in the ED for a complication with one of the medication you wanted her to take prior to surgery, the Ferrous Sulfate   She has had black stools, she is very weak and the ED told her it is form that medication      C/b # [phone]

## 2022-04-04 NOTE — TELEPHONE ENCOUNTER
Preoperative Elective Admission Assessment-NN episode opened    *Discussed covid booster recs and she understands If she is going to get the booster, she should have this done more than 2 weeks before surgery and understands if she cannot get it in that time frame, she should wait until 6 weeks after surgery to get it  *Discussed preoperative vitamin regimen with patient  See below  Living Situation: Pt lives alone in a multiple level home  # of steps to enter home: 5 steps to enter with handrail  # of steps to second floor: 14 steps with handrail     First Floor Setup: Yes with bedroom and bathroom    DME: pt has a RW and BSC that she reports were her mothers  Patient's Current Level of Function: Ambulates: Independently, independent with ADLs     Post-op Caregiver: Significant other (Tray)to come to stay, or cousin that is local to assist if Yosef Adjutant unable for some reason (per pt)  Pt reports both Yosef Adjutant and cousin are planned currently to be post-op transport and care givers  Caregiver Name and phone number for Inpatient discharge needs: Chelsey Adams- significant other, ph# [phone]  Currently receive any HHC/aides/community supports: No     Post-op Transport: significant other and cousin     Outpatient Physical Therapy Site:  Site: [facility]  Pt agreeable for igobubble to call to schedule pre and post-op PT     Medication Management: self  Preferred Pharmacy for Post-op Medications: [id number] - [address] [id number]  Blood Management Vitamin Regimen: Pt was taking full regimen as prescribed but was seen in ED yesterday for constipation, black stool, nausea and abdominal pain complaints  Per IRENE "    did review the labs that were taken in the emergency department  If it is felt that these were the source of her discomfort, she may discontinue the preoperative vitamins "  Discussed this with pt   She certainly feels it was the ferrous sulfate making her feel constipated, nausea and have abdominal pain  She is unsure if the other vitamins contributed to her symptoms  She has stopped the ferrous sulfate today with "a little relief", and it was discussed that patient will hold the remaining vitamins in her preoperative vitamin regimen to assure her symptoms resolve  She is pleased and plans to stop ferrous sulfate, vit C, vit D, folic acid and zinc  She will call back with new questions or symptoms  Post-op anticoagulant: She understands she will be on an anticoagulant postoperatively to prevent blood clots  DC Plan: Pt plans to be discharged home and plans to attend OP PT  Barriers to DC identified preoperatively: none identified    BMI: 21 54    Caresense: Pt has no email to provide  She reports her significant other also has no email  Unable to enroll pt in either education or data collection pathway  Patient Education:  Pt educated on post-op pain, early mobilization (POD0), Average inpt LOS, OP PT goal   Patient educated that our goal is to appropriately discharge patient based off their post-op function while striving to maintain maximal independence  The goal is to discharge patient to home and for them to attend outpatient physical therapy  Assigned to care team? Yes    Medicare Bundle likely as insurance Medicare A and B    CHW referral placed for medicare with [facility] PCP/[place] wellness PCP? Pt declined  SW referral: N/A    Pt encouraged to call with any questions, concerns or issues

## 2022-04-04 NOTE — TELEPHONE ENCOUNTER
Patient asking if she can get Covid booster prior to sx if it is available or how long after sx must she wait

## 2022-04-04 NOTE — TELEPHONE ENCOUNTER
Dr Dailey Coon- patients DOS 5/2  Please review ED visit, and advise  Can we instruct to stop preop vitamins?

## 2022-04-04 NOTE — TELEPHONE ENCOUNTER
Pt was taking full regimen as prescribed but was seen in ED yesterday for constipation, black stool, nausea and abdominal pain complaints  Per IRENE "    did review the labs that were taken in the emergency department   If it is felt that these were the source of her discomfort, she may discontinue the preoperative vitamins "  Discussed this with pt  She certainly feels it was the ferrous sulfate making her feel constipated, nausea and have abdominal pain  She is unsure if the other vitamins contributed to her symptoms  She has stopped the ferrous sulfate today with "a little relief", and it was discussed that patient will hold the remaining vitamins in her preoperative vitamin regimen to assure her symptoms resolve  She is pleased and plans to stop ferrous sulfate, vit C, vit D, folic acid and zinc  She will call back with new questions or symptoms

## 2022-04-04 NOTE — TELEPHONE ENCOUNTER
If she is going to get the booster, she should have this done more than 2 weeks before surgery  She cannot get it in that time frame, she should wait until 6 weeks after surgery to get it    Thanks

## 2022-04-05 ENCOUNTER — APPOINTMENT (OUTPATIENT)
Dept: LAB | Facility: HOSPITAL | Age: 77
End: 2022-04-05
Attending: ORTHOPAEDIC SURGERY
Payer: MEDICARE

## 2022-04-05 DIAGNOSIS — M87.052 AVASCULAR NECROSIS OF BONE OF LEFT HIP (HCC): ICD-10-CM

## 2022-04-05 DIAGNOSIS — G89.29 CHRONIC HIP PAIN, LEFT: ICD-10-CM

## 2022-04-05 DIAGNOSIS — M25.552 CHRONIC HIP PAIN, LEFT: ICD-10-CM

## 2022-04-05 DIAGNOSIS — M16.7 SECONDARY OSTEOARTHRITIS OF HIP: ICD-10-CM

## 2022-04-05 LAB
ALBUMIN SERPL BCP-MCNC: 4.1 G/DL (ref 3.5–5)
ALP SERPL-CCNC: 59 U/L (ref 46–116)
ALT SERPL W P-5'-P-CCNC: 25 U/L (ref 12–78)
ANION GAP SERPL CALCULATED.3IONS-SCNC: 10 MMOL/L (ref 4–13)
APTT PPP: 28 SECONDS (ref 23–37)
AST SERPL W P-5'-P-CCNC: 14 U/L (ref 5–45)
BASOPHILS # BLD AUTO: 0.07 THOUSANDS/ΜL (ref 0–0.1)
BASOPHILS NFR BLD AUTO: 1 % (ref 0–1)
BILIRUB SERPL-MCNC: 0.54 MG/DL (ref 0.2–1)
BUN SERPL-MCNC: 16 MG/DL (ref 5–25)
CALCIUM SERPL-MCNC: 9 MG/DL (ref 8.3–10.1)
CHLORIDE SERPL-SCNC: 106 MMOL/L (ref 100–108)
CO2 SERPL-SCNC: 26 MMOL/L (ref 21–32)
CREAT SERPL-MCNC: 0.82 MG/DL (ref 0.6–1.3)
EOSINOPHIL # BLD AUTO: 0.15 THOUSAND/ΜL (ref 0–0.61)
EOSINOPHIL NFR BLD AUTO: 2 % (ref 0–6)
ERYTHROCYTE [DISTWIDTH] IN BLOOD BY AUTOMATED COUNT: 13.8 % (ref 11.6–15.1)
EST. AVERAGE GLUCOSE BLD GHB EST-MCNC: 111 MG/DL
GFR SERPL CREATININE-BSD FRML MDRD: 69 ML/MIN/1.73SQ M
GLUCOSE P FAST SERPL-MCNC: 103 MG/DL (ref 65–99)
HBA1C MFR BLD: 5.5 %
HCT VFR BLD AUTO: 44.9 % (ref 34.8–46.1)
HGB BLD-MCNC: 14.2 G/DL (ref 11.5–15.4)
IMM GRANULOCYTES # BLD AUTO: 0.02 THOUSAND/UL (ref 0–0.2)
IMM GRANULOCYTES NFR BLD AUTO: 0 % (ref 0–2)
INR PPP: 0.96 (ref 0.84–1.19)
LYMPHOCYTES # BLD AUTO: 1.31 THOUSANDS/ΜL (ref 0.6–4.47)
LYMPHOCYTES NFR BLD AUTO: 14 % (ref 14–44)
MCH RBC QN AUTO: 30.5 PG (ref 26.8–34.3)
MCHC RBC AUTO-ENTMCNC: 31.6 G/DL (ref 31.4–37.4)
MCV RBC AUTO: 97 FL (ref 82–98)
MONOCYTES # BLD AUTO: 0.55 THOUSAND/ΜL (ref 0.17–1.22)
MONOCYTES NFR BLD AUTO: 6 % (ref 4–12)
NEUTROPHILS # BLD AUTO: 7.15 THOUSANDS/ΜL (ref 1.85–7.62)
NEUTS SEG NFR BLD AUTO: 77 % (ref 43–75)
NRBC BLD AUTO-RTO: 0 /100 WBCS
PLATELET # BLD AUTO: 182 THOUSANDS/UL (ref 149–390)
PMV BLD AUTO: 13.1 FL (ref 8.9–12.7)
POTASSIUM SERPL-SCNC: 4.4 MMOL/L (ref 3.5–5.3)
PROT SERPL-MCNC: 6.4 G/DL (ref 6.4–8.2)
PROTHROMBIN TIME: 12.6 SECONDS (ref 11.6–14.5)
RBC # BLD AUTO: 4.65 MILLION/UL (ref 3.81–5.12)
SODIUM SERPL-SCNC: 142 MMOL/L (ref 136–145)
WBC # BLD AUTO: 9.25 THOUSAND/UL (ref 4.31–10.16)

## 2022-04-05 PROCEDURE — 85025 COMPLETE CBC W/AUTO DIFF WBC: CPT

## 2022-04-05 PROCEDURE — 83036 HEMOGLOBIN GLYCOSYLATED A1C: CPT

## 2022-04-05 PROCEDURE — 85610 PROTHROMBIN TIME: CPT

## 2022-04-05 PROCEDURE — 80053 COMPREHEN METABOLIC PANEL: CPT

## 2022-04-05 PROCEDURE — 87081 CULTURE SCREEN ONLY: CPT

## 2022-04-05 PROCEDURE — 36415 COLL VENOUS BLD VENIPUNCTURE: CPT

## 2022-04-05 PROCEDURE — 85730 THROMBOPLASTIN TIME PARTIAL: CPT

## 2022-04-05 NOTE — PRE-PROCEDURE INSTRUCTIONS
Pre-Surgery Instructions:   Medication Instructions    acetaminophen (TYLENOL) 650 mg CR tablet Instructed patient per Anesthesia Guidelines   alendronate (FOSAMAX) 70 mg tablet Instructed patient per Anesthesia Guidelines   ALPRAZolam (XANAX) 0 25 mg tablet Instructed patient per Anesthesia Guidelines   amLODIPine (NORVASC) 5 mg tablet Instructed patient per Anesthesia Guidelines   atorvastatin (LIPITOR) 20 mg tablet Instructed patient per Anesthesia Guidelines   citric acid-potassium citrate (POLYCITRA K) 1,100-334 mg/5 mL solution Instructed patient per Anesthesia Guidelines   co-enzyme Q-10 30 MG capsule Instructed patient per Anesthesia Guidelines   MELATONIN PO Instructed patient per Anesthesia Guidelines   misoprostol (CYTOTEC) 100 mcg tablet Instructed patient per Anesthesia Guidelines   Naproxen Sodium (ALEVE PO) Instructed patient per Anesthesia Guidelines   OLMESARTAN MEDOXOMIL PO Instructed patient per Anesthesia Guidelines   Polyethylene Glycol 3350 (MIRALAX PO) Instructed patient per Anesthesia Guidelines   traMADol (Ultram) 50 mg tablet Instructed patient per Anesthesia Guidelines  Instructed to take amlodipine and lipitor am of surgery with sip of water per anesthesia guidelines  To take tylenol or tramadol if needed am of surgery

## 2022-04-06 ENCOUNTER — ANESTHESIA EVENT (OUTPATIENT)
Dept: PERIOP | Facility: HOSPITAL | Age: 77
End: 2022-04-06
Payer: MEDICARE

## 2022-04-06 LAB — MRSA NOSE QL CULT: NORMAL

## 2022-04-11 ENCOUNTER — ANESTHESIA (OUTPATIENT)
Dept: PERIOP | Facility: HOSPITAL | Age: 77
End: 2022-04-11
Payer: MEDICARE

## 2022-04-11 ENCOUNTER — HOSPITAL ENCOUNTER (OUTPATIENT)
Facility: HOSPITAL | Age: 77
Setting detail: OUTPATIENT SURGERY
Discharge: HOME | End: 2022-04-11
Attending: STUDENT IN AN ORGANIZED HEALTH CARE EDUCATION/TRAINING PROGRAM | Admitting: STUDENT IN AN ORGANIZED HEALTH CARE EDUCATION/TRAINING PROGRAM
Payer: MEDICARE

## 2022-04-11 VITALS
RESPIRATION RATE: 16 BRPM | HEIGHT: 61 IN | BODY MASS INDEX: 20.77 KG/M2 | OXYGEN SATURATION: 94 % | DIASTOLIC BLOOD PRESSURE: 68 MMHG | TEMPERATURE: 97.5 F | HEART RATE: 94 BPM | WEIGHT: 110 LBS | SYSTOLIC BLOOD PRESSURE: 121 MMHG

## 2022-04-11 DIAGNOSIS — Z98.890 STATUS POST HYSTEROSCOPY: Primary | ICD-10-CM

## 2022-04-11 DIAGNOSIS — R93.89 INCREASED ENDOMETRIAL STRIPE THICKNESS: ICD-10-CM

## 2022-04-11 PROCEDURE — 58558 HYSTEROSCOPY BIOPSY: CPT | Performed by: STUDENT IN AN ORGANIZED HEALTH CARE EDUCATION/TRAINING PROGRAM

## 2022-04-11 PROCEDURE — 88305 TISSUE EXAM BY PATHOLOGIST: CPT | Performed by: PATHOLOGY

## 2022-04-11 RX ORDER — IBUPROFEN 600 MG/1
600 TABLET ORAL EVERY 6 HOURS PRN
Status: DISCONTINUED | OUTPATIENT
Start: 2022-04-11 | End: 2022-04-11 | Stop reason: HOSPADM

## 2022-04-11 RX ORDER — KETOROLAC TROMETHAMINE 30 MG/ML
INJECTION, SOLUTION INTRAMUSCULAR; INTRAVENOUS AS NEEDED
Status: DISCONTINUED | OUTPATIENT
Start: 2022-04-11 | End: 2022-04-11

## 2022-04-11 RX ORDER — LIDOCAINE HYDROCHLORIDE 10 MG/ML
INJECTION, SOLUTION EPIDURAL; INFILTRATION; INTRACAUDAL; PERINEURAL AS NEEDED
Status: DISCONTINUED | OUTPATIENT
Start: 2022-04-11 | End: 2022-04-11

## 2022-04-11 RX ORDER — ONDANSETRON 2 MG/ML
4 INJECTION INTRAMUSCULAR; INTRAVENOUS ONCE AS NEEDED
Status: DISCONTINUED | OUTPATIENT
Start: 2022-04-11 | End: 2022-04-11 | Stop reason: HOSPADM

## 2022-04-11 RX ORDER — PROPOFOL 10 MG/ML
INJECTION, EMULSION INTRAVENOUS AS NEEDED
Status: DISCONTINUED | OUTPATIENT
Start: 2022-04-11 | End: 2022-04-11

## 2022-04-11 RX ORDER — LIDOCAINE HYDROCHLORIDE 10 MG/ML
0.5 INJECTION, SOLUTION EPIDURAL; INFILTRATION; INTRACAUDAL; PERINEURAL ONCE AS NEEDED
Status: COMPLETED | OUTPATIENT
Start: 2022-04-11 | End: 2022-04-11

## 2022-04-11 RX ORDER — OXYCODONE HYDROCHLORIDE 5 MG/1
2.5 TABLET ORAL EVERY 4 HOURS PRN
Status: DISCONTINUED | OUTPATIENT
Start: 2022-04-11 | End: 2022-04-11 | Stop reason: HOSPADM

## 2022-04-11 RX ORDER — SODIUM CHLORIDE, SODIUM LACTATE, POTASSIUM CHLORIDE, CALCIUM CHLORIDE 600; 310; 30; 20 MG/100ML; MG/100ML; MG/100ML; MG/100ML
125 INJECTION, SOLUTION INTRAVENOUS CONTINUOUS
Status: DISCONTINUED | OUTPATIENT
Start: 2022-04-11 | End: 2022-04-11 | Stop reason: HOSPADM

## 2022-04-11 RX ORDER — ALBUTEROL SULFATE 2.5 MG/3ML
2.5 SOLUTION RESPIRATORY (INHALATION) ONCE AS NEEDED
Status: DISCONTINUED | OUTPATIENT
Start: 2022-04-11 | End: 2022-04-11 | Stop reason: HOSPADM

## 2022-04-11 RX ORDER — LIDOCAINE 50 MG/G
OINTMENT TOPICAL AS NEEDED
Qty: 30 G | Refills: 0
Start: 2022-04-11

## 2022-04-11 RX ORDER — FENTANYL CITRATE/PF 50 MCG/ML
50 SYRINGE (ML) INJECTION
Status: DISCONTINUED | OUTPATIENT
Start: 2022-04-11 | End: 2022-04-11 | Stop reason: HOSPADM

## 2022-04-11 RX ORDER — HYDROMORPHONE HCL/PF 1 MG/ML
0.4 SYRINGE (ML) INJECTION
Status: DISCONTINUED | OUTPATIENT
Start: 2022-04-11 | End: 2022-04-11 | Stop reason: HOSPADM

## 2022-04-11 RX ORDER — METOCLOPRAMIDE HYDROCHLORIDE 5 MG/ML
INJECTION INTRAMUSCULAR; INTRAVENOUS AS NEEDED
Status: DISCONTINUED | OUTPATIENT
Start: 2022-04-11 | End: 2022-04-11

## 2022-04-11 RX ORDER — SODIUM CHLORIDE, SODIUM LACTATE, POTASSIUM CHLORIDE, CALCIUM CHLORIDE 600; 310; 30; 20 MG/100ML; MG/100ML; MG/100ML; MG/100ML
INJECTION, SOLUTION INTRAVENOUS CONTINUOUS PRN
Status: DISCONTINUED | OUTPATIENT
Start: 2022-04-11 | End: 2022-04-11

## 2022-04-11 RX ORDER — DOCUSATE SODIUM 100 MG/1
100 CAPSULE, LIQUID FILLED ORAL 2 TIMES DAILY
Status: DISCONTINUED | OUTPATIENT
Start: 2022-04-11 | End: 2022-04-11 | Stop reason: HOSPADM

## 2022-04-11 RX ORDER — PROMETHAZINE HYDROCHLORIDE 25 MG/ML
12.5 INJECTION, SOLUTION INTRAMUSCULAR; INTRAVENOUS ONCE AS NEEDED
Status: DISCONTINUED | OUTPATIENT
Start: 2022-04-11 | End: 2022-04-11 | Stop reason: HOSPADM

## 2022-04-11 RX ORDER — DEXAMETHASONE SODIUM PHOSPHATE 10 MG/ML
INJECTION, SOLUTION INTRAMUSCULAR; INTRAVENOUS AS NEEDED
Status: DISCONTINUED | OUTPATIENT
Start: 2022-04-11 | End: 2022-04-11

## 2022-04-11 RX ORDER — FENTANYL CITRATE 50 UG/ML
INJECTION, SOLUTION INTRAMUSCULAR; INTRAVENOUS AS NEEDED
Status: DISCONTINUED | OUTPATIENT
Start: 2022-04-11 | End: 2022-04-11

## 2022-04-11 RX ORDER — SODIUM CHLORIDE 9 MG/ML
125 INJECTION, SOLUTION INTRAVENOUS CONTINUOUS
Status: DISCONTINUED | OUTPATIENT
Start: 2022-04-11 | End: 2022-04-11 | Stop reason: HOSPADM

## 2022-04-11 RX ORDER — MAGNESIUM HYDROXIDE 1200 MG/15ML
LIQUID ORAL AS NEEDED
Status: DISCONTINUED | OUTPATIENT
Start: 2022-04-11 | End: 2022-04-11 | Stop reason: HOSPADM

## 2022-04-11 RX ORDER — ONDANSETRON 2 MG/ML
4 INJECTION INTRAMUSCULAR; INTRAVENOUS EVERY 6 HOURS PRN
Status: DISCONTINUED | OUTPATIENT
Start: 2022-04-11 | End: 2022-04-11 | Stop reason: HOSPADM

## 2022-04-11 RX ORDER — EPHEDRINE SULFATE 50 MG/ML
INJECTION INTRAVENOUS AS NEEDED
Status: DISCONTINUED | OUTPATIENT
Start: 2022-04-11 | End: 2022-04-11

## 2022-04-11 RX ORDER — ONDANSETRON 2 MG/ML
INJECTION INTRAMUSCULAR; INTRAVENOUS AS NEEDED
Status: DISCONTINUED | OUTPATIENT
Start: 2022-04-11 | End: 2022-04-11

## 2022-04-11 RX ORDER — METOCLOPRAMIDE HYDROCHLORIDE 5 MG/ML
10 INJECTION INTRAMUSCULAR; INTRAVENOUS ONCE AS NEEDED
Status: DISCONTINUED | OUTPATIENT
Start: 2022-04-11 | End: 2022-04-11 | Stop reason: HOSPADM

## 2022-04-11 RX ORDER — OXYCODONE HYDROCHLORIDE 5 MG/1
5 TABLET ORAL EVERY 4 HOURS PRN
Status: DISCONTINUED | OUTPATIENT
Start: 2022-04-11 | End: 2022-04-11 | Stop reason: HOSPADM

## 2022-04-11 RX ORDER — ACETAMINOPHEN 325 MG/1
975 TABLET ORAL EVERY 6 HOURS PRN
Status: DISCONTINUED | OUTPATIENT
Start: 2022-04-11 | End: 2022-04-11 | Stop reason: HOSPADM

## 2022-04-11 RX ADMIN — DEXAMETHASONE SODIUM PHOSPHATE 10 MG: 10 INJECTION, SOLUTION INTRAMUSCULAR; INTRAVENOUS at 10:58

## 2022-04-11 RX ADMIN — LIDOCAINE HYDROCHLORIDE 50 MG: 10 INJECTION, SOLUTION EPIDURAL; INFILTRATION; INTRACAUDAL; PERINEURAL at 10:55

## 2022-04-11 RX ADMIN — EPHEDRINE SULFATE 5 MG: 50 INJECTION INTRAVENOUS at 12:09

## 2022-04-11 RX ADMIN — SODIUM CHLORIDE, SODIUM LACTATE, POTASSIUM CHLORIDE, AND CALCIUM CHLORIDE: .6; .31; .03; .02 INJECTION, SOLUTION INTRAVENOUS at 10:38

## 2022-04-11 RX ADMIN — FENTANYL CITRATE 50 MCG: 50 INJECTION INTRAMUSCULAR; INTRAVENOUS at 11:49

## 2022-04-11 RX ADMIN — METOCLOPRAMIDE HYDROCHLORIDE 10 MG: 5 INJECTION INTRAMUSCULAR; INTRAVENOUS at 10:58

## 2022-04-11 RX ADMIN — EPHEDRINE SULFATE 5 MG: 50 INJECTION INTRAVENOUS at 12:33

## 2022-04-11 RX ADMIN — SODIUM CHLORIDE, SODIUM LACTATE, POTASSIUM CHLORIDE, AND CALCIUM CHLORIDE 125 ML/HR: .6; .31; .03; .02 INJECTION, SOLUTION INTRAVENOUS at 10:00

## 2022-04-11 RX ADMIN — PROPOFOL 130 MG: 10 INJECTION, EMULSION INTRAVENOUS at 10:55

## 2022-04-11 RX ADMIN — PROPOFOL 30 MG: 10 INJECTION, EMULSION INTRAVENOUS at 10:58

## 2022-04-11 RX ADMIN — FENTANYL CITRATE 50 MCG: 50 INJECTION INTRAMUSCULAR; INTRAVENOUS at 11:52

## 2022-04-11 RX ADMIN — EPHEDRINE SULFATE 5 MG: 50 INJECTION INTRAVENOUS at 11:45

## 2022-04-11 RX ADMIN — KETOROLAC TROMETHAMINE 15 MG: 30 INJECTION, SOLUTION INTRAMUSCULAR at 12:11

## 2022-04-11 RX ADMIN — EPHEDRINE SULFATE 5 MG: 50 INJECTION INTRAVENOUS at 11:18

## 2022-04-11 RX ADMIN — LIDOCAINE HYDROCHLORIDE 0.5 ML: 10 INJECTION, SOLUTION EPIDURAL; INFILTRATION; INTRACAUDAL; PERINEURAL at 10:01

## 2022-04-11 RX ADMIN — ONDANSETRON 4 MG: 2 INJECTION INTRAMUSCULAR; INTRAVENOUS at 10:58

## 2022-04-11 NOTE — DISCHARGE INSTRUCTIONS
Hysteroscopy   WHAT YOU SHOULD KNOW:   A hysteroscopy is a procedure to look at the inside of your uterus  Other procedures may also be done during the hysteroscopy  AFTER YOU LEAVE:   Medicines:   · Acetaminophen  decreases pain  It is available without a doctor's order  Ask how much to take and how often to take it  Follow directions  Acetaminophen can cause liver damage if not taken correctly  · NSAIDs  help decrease swelling and pain  This medicine is available with or without a doctor's order  NSAIDs can cause stomach bleeding or kidney problems in certain people  If you take blood thinner medicine, always ask your primary healthcare provider (PHP) if NSAIDs are safe for you  Always read the medicine label and follow directions  · Take your medicine as directed  Contact your PHP if you think your medicine is not helping or if you have side effects  Tell him if you are allergic to any medicine  Keep a list of the medicines, vitamins, and herbs you take  Include the amounts, and when and why you take them  Bring the list or the pill bottles to follow-up visits  Carry your medicine list with you in case of an emergency  Follow up with your PHP or gynecologist as directed:  Write down your questions so you remember to ask them during your visits  Activity guidelines: You may feel like resting more after the procedure  Slowly start to do more each day  Rest when you feel it is needed  Ask your PHP when you can return to your daily activities  Self-care:   · Do not put anything into your vagina until your PHP says it is okay  Do not have sex, douche, or use tampons  · You may need to continue to wear a sanitary pad for up to a week  You may have light bleeding or spotting  Contact your PHP if:   · You have a fever  · You have to change your sanitary pad every hour  · You have chills, a cough, or feel weak and achy  · You have abdominal pain that does not go away      · You have abnormal or foul-smelling vaginal discharge  · Your skin is itchy, swollen, or you have a rash  · You have questions or concerns about your condition or care  © 2014 [address] is for End User's use only and may not be sold, redistributed or otherwise used for commercial purposes  All illustrations and images included in CareNotes® are the copyrighted property of A D A M , Inc  or Farhan Velasquez  The above information is an  only  It is not intended as medical advice for individual conditions or treatments  Talk to your doctor, nurse or pharmacist before following any medical regimen to see if it is safe and effective for you

## 2022-04-11 NOTE — NURSING NOTE
David texted resident and attending to ask about rx for lidocaine ointment  Waiting for a response  Pt wanted to leave  Will call pt with answer when dr mendiola

## 2022-04-11 NOTE — INTERVAL H&P NOTE
H&P reviewed  After examining the patient I find no changes in the patients condition since the H&P had been written      Vitals:    04/11/22 0921   BP: 132/69   Pulse: 86   Resp: 16   Temp: 98 2 °F (36 8 °C)   SpO2: 97% No bruits; no thyromegaly or nodules

## 2022-04-11 NOTE — ANESTHESIA POSTPROCEDURE EVALUATION
Post-Op Assessment Note    CV Status:  Stable  Pain Score: 0    Pain management: adequate     Mental Status:  Sleepy   Hydration Status:  Stable   PONV Controlled:  None   Airway Patency:  Patent  Airway: intubated      Post Op Vitals Reviewed: Yes      Staff: Anesthesiologist         No complications documented      BP (P) 119/57 (04/11/22 1252)    Temp (P) 98 5 °F (36 9 °C) (04/11/22 1252)    Pulse (P) 92 (04/11/22 1252)   Resp (P) 16 (04/11/22 1252)    SpO2 100% on 6 LNC

## 2022-04-11 NOTE — ANESTHESIA PREPROCEDURE EVALUATION
Procedure:  DILATATION AND CURETTAGE (D&C) WITH HYSTEROSCOPY (N/A Uterus)  HYSTEROSCOPIC MYOMECTOMY (N/A Uterus)    Relevant Problems   CARDIO   (+) Benign essential hypertension   (+) Chest pain   (+) HTN (hypertension)   (+) Hyperlipidemia      GI/HEPATIC   (+) Hiatal hernia with gastroesophageal reflux disease without esophagitis      /RENAL   (+) Calcium nephrolithiasis   (+) Chronic kidney disease      MUSCULOSKELETAL   (+) Hiatal hernia with gastroesophageal reflux disease without esophagitis   (+) Lumbar spondylosis   (+) Sacroiliitis (HCC)      NEURO/PSYCH   (+) Anxiety   (+) Continuous opioid dependence (HCC)   (+) H/O hiatal hernia      Other   (+) Closed fracture of left distal radius   (+) Constipation   (+) Diarrhea   (+) Epigastric discomfort   (+) Iliotibial band syndrome affecting left lower leg   (+) Long term use of alendronate therapy (Fosamax)   (+) Mass of uterus - possible fibroid   (+) Nausea   (+) Osteoporosis of multiple sites   (+) Pain of left femur   (+) Right flank pain   (+) Thickened endometrium   (+) Weight loss, unintentional        Physical Exam    Airway    Mallampati score: II  TM Distance: >3 FB  Neck ROM: full     Dental   No notable dental hx     Cardiovascular  Cardiovascular exam normal    Pulmonary  Pulmonary exam normal Breath sounds clear to auscultation,     Other Findings      Normal sinus rhythm  ST & T wave abnormality, consider lateral ischemia  Abnormal ECG  When compared with ECG of 15-AUG-2018 01:32,  T wave inversion now evident in Lateral leads  Confirmed by Kiki Renee ([id number]) on 1/10/2022 1:46:17 PM  Anesthesia Plan  ASA Score- 3     Anesthesia Type- general with ASA Monitors  Additional Monitors:   Airway Plan: LMA  Plan Factors-    Chart reviewed  EKG reviewed  Imaging results reviewed  Existing labs reviewed  Patient summary reviewed  Patient is not a current smoker  Patient instructed to abstain from smoking on day of procedure  Patient did not smoke on day of surgery  Obstructive sleep apnea risk education given perioperatively  Induction- intravenous  Postoperative Plan- Plan for postoperative opioid use  Planned trial extubation    Informed Consent- Anesthetic plan and risks discussed with patient  I personally reviewed this patient with the CRNA  Discussed and agreed on the Anesthesia Plan with the CRNA  Frederick Love Discussed with pt the benefits/alternatives and risks or General Anesthesia including breathing tube remaining in place if not strong enough, PONV, damage to lips and teeth, sore throat, eye injury or blindness    Lab Results   Component Value Date    WBC 9 25 04/05/2022    HGB 14 2 04/05/2022    HCT 44 9 04/05/2022    MCV 97 04/05/2022     04/05/2022     Lab Results   Component Value Date    CALCIUM 9 0 04/05/2022    K 4 4 04/05/2022    CO2 26 04/05/2022     04/05/2022    BUN 16 04/05/2022    CREATININE 0 82 04/05/2022     Lab Results   Component Value Date    INR 0 96 04/05/2022    INR 0 93 07/22/2018    PROTIME 12 6 04/05/2022    PROTIME 9 8 07/22/2018     Lab Results   Component Value Date    PTT 28 04/05/2022           I, Dr Lana Ron, the attending physician, have personally seen and evaluated the patient prior to anesthetic care  I have reviewed the pre-anesthetic record, and other medical records if appropriate to the anesthetic care  If a CRNA is involved in the case, I have reviewed the CRNA assessment, if present, and agree  The patient is in a suitable condition to proceed with my formulated anesthetic plan

## 2022-04-11 NOTE — OP NOTE
OPERATIVE REPORT  PATIENT NAME: Matthew Machado    :  [birth year]  MRN: [id number]  Pt Location:  OR ROOM 03    SURGERY DATE: 2022    Surgeon(s) and Role:     * Christopher Haynes MD - Primary     * Nargis Guzman MD - Assisting    Preop Diagnosis:  Increased endometrial stripe thickness [R93 89]    Post-Op Diagnosis Codes:     * Increased endometrial stripe thickness [R93 89]    Procedure(s) (LRB):  DILATATION AND CURETTAGE (D&C) WITH HYSTEROSCOPY (N/A)  POLYPECTOMY (N/A)    Specimen(s):  ID Type Source Tests Collected by Time Destination   1 : endometrial currettings Tissue Endometrium TISSUE EXAM Christopher Haynes MD 2022 1035    2 : endocervical polyp Tissue Polyp, Cervical/Endometrial TISSUE EXAM Christopher Haynes MD 2022 1211        Estimated Blood Loss:   Minimal    Drains:  Urine: 50cc clear yellow    Anesthesia Type:   General    Operative Indications:  Increased endometrial stripe thickness [R93 89]    Operative Findings:  Very small introitus  Anteverted uterus  Small cervix with stenosis  Small endocervical polyp noted  Normal appearing atrophic, endometrium, bilateral ostia visualized    Complications:   None    Procedure and Technique:  Patient was taken to the operating room where a time out was performed to confirm correct patient and correct procedure  General LMA anesthesia (LMA) was administered and the patient was positioned on the OR table in the dorsal lithotomy position  All pressure points were padded and a sivakumar hugger was placed to maintain control of core body temperature  A bimanual exam was performed and the uterus was noted to be anteverted, normal in size and consistency with no palpable adnexal masses or fullness  The patient was prepped and draped in the usual sterile fashion  A straight catheter was introduced into the bladder, which was drained of 50cc of clear yellow urine   A Thompson retractor and ring forcep were used to visualize the anterior lip of the cervix, which was then grasped with a single toothed tenaculum  Lacrimal duct dilators were attempted to access the cervical os but this was unsuccessful  A stab incision was made in the cervix with an 11 blade  The lacrimal duct dilators were then used to dilate the cervix  This was followed by serial dilation with Negro Yg dilators to 21F for introduction of the hysteroscope     The uterus was sounded to 8cm  EMB was performed with small amount of tissue obtained with two passes  Symphion hysteroscope was introduced under direct visualization using normal saline solution as the distention media and endocervical polyp was noted  Symphion resection device was used to remove the endoecervical polyp  After resection, attempts were made to advance the hysteroscope to the uterine fundus, however hysteroscope could not be advanced past the internal os  Negro Yg and Hailey dilators were used to dilate the internal os but Symphion hysteroscope was still unable to be advanced  At this point, A 5mm hysteroscope was used and easily able to be advanced to the uterine fundus and the entire uterine cavity was inspected in a systematic manner  There was atrophic endometrium and bilateral ostia were visualized  Hysteroscope was withdrawn and sharp curetting was performed, starting at the 12'oclock position and rotating a total of 360 degrees to cover all surfaces  Endometrial tissue was obtained and sent for pathology  The single toothed tenaculum was removed from the anterior lip of the cervix  Good hemostasis was confirmed at the tenaculum puncture sites  Thompson retractor was then removed from the vagina  At the conclusion of the procedure, all needle, sponge, and instrument counts were noted to be correct x2  Dr Ada Nieto was present and participated in all key portions of the case        Duncan New MD, PGY-4  4/11/2022  1:19 PM

## 2022-04-15 ENCOUNTER — TELEPHONE (OUTPATIENT)
Dept: OBGYN CLINIC | Facility: CLINIC | Age: 77
End: 2022-04-15

## 2022-04-15 NOTE — TELEPHONE ENCOUNTER
S/p [facility], D&C on 4/11 [facility], D&C    Called to check-in  No answer, but left voicemail to call/present fo eval--severe pain unrelieved by tylenol/motrin, heavy bleeding, fevers, etc   Pathology still pending    Kirstin Wilkins MD   04/15/22   12:25 PM

## 2022-04-27 ENCOUNTER — EVALUATION (OUTPATIENT)
Dept: PHYSICAL THERAPY | Facility: CLINIC | Age: 77
End: 2022-04-27
Payer: MEDICARE

## 2022-04-27 DIAGNOSIS — Z01.818 PRE-OP EXAM: ICD-10-CM

## 2022-04-27 DIAGNOSIS — M25.552 LEFT HIP PAIN: Primary | ICD-10-CM

## 2022-04-27 PROCEDURE — 97110 THERAPEUTIC EXERCISES: CPT | Performed by: PHYSICAL THERAPIST

## 2022-04-27 PROCEDURE — 97161 PT EVAL LOW COMPLEX 20 MIN: CPT | Performed by: PHYSICAL THERAPIST

## 2022-04-27 NOTE — PROGRESS NOTES
PT Evaluation     Today's date: 2022  Patient name: Nick Dumont  :   MRN: [id number]  Referring provider: Iggy Terrell DO  Dx:   Encounter Diagnosis     ICD-10-CM    1  Left hip pain  M25 [id number]    2  Pre-op exam  Z01 818      Start Time:   Stop Time: 1100  Total time in clinic (min): 45 minutes    Assessment  Assessment details: Risk Assessment and Prediction Tool results reviewed  Patient reported functional outcome scores reviewed  Discussed DOS and patients questions were answered to patients satisfaction  Mobility/ROM results per above  Strength results per above  Balance/Gait (including Timed Up & Go) per above  Virtual Home Assessment was reviewed with patient  Home Preparation Checklist was reviewed with patient including identification of care partner and encouragement of single level set-up  Post-operative pain management expectations discussed to the patients satisfaction  Post-operative gait training for level ground, stairs, and car transfers was performed  Patient demonstrated competence with immediate post-operative home exercise program       Impairments: abnormal muscle firing, abnormal muscle tone, abnormal or restricted ROM, abnormal movement, impaired balance, impaired physical strength, lacks appropriate home exercise program, pain with function and poor posture     Symptom irritability: moderateUnderstanding of Dx/Px/POC: good   Prognosis: good    Plan  Patient would benefit from: skilled physical therapy  Planned therapy interventions: joint mobilization, manual therapy, patient education, postural training, strengthening, stretching, therapeutic activities, therapeutic exercise, home exercise program, neuromuscular re-education, flexibility, functional ROM exercises, balance, body mechanics training and gait training  Frequency: 2-3x    Duration in weeks: 12  Treatment plan discussed with: patient        Subjective Evaluation    History of Present Illness  Mechanism of injury: Patient presenting to therapy for pre-op physical therapy for left hip MOLINA  Pain reported as sharp in nature depending upon movement without any sleep disturbances present  Patient lives in [facility] household with step to stair navigation reported  Patient lives alone but will have help at home  Has had injection in low back due to PT previously  Not a recurrent problem   Quality of life: good    Pain  Current pain ratin  At best pain ratin  At worst pain rating: 10  Quality: dull ache  Aggravating factors: stair climbing, standing and walking  Progression: worsening    Social Support  Steps to enter house: yes (5 in front, 2 in back)  5  Stairs in house: yes     Treatments  Previous treatment: physical therapy  Patient Goals  Patient goals for therapy: decreased pain, increased motion and increased strength  Patient goal: patient would like to return to [employer] dancing        Objective    GAIT: WNL  Squat assess: 50% depth poor form  SLS: RLE: 4 sec , LLE: 5 sec  Tug 10 ft: < 10 sec  MMT         AROM          PROM    Hip         R          L         R        L          R         L   Flex  5 4 WNL WNL     Extn  4 4- WNL WNL     Abd  4 3 40 30p!  32p! Add  4 4       IR  5 4   25 10p! ER  5 4+   20 17p! MMT    Knee      R          L   Flex  5 5   Extn  5 5                     MMT    Ankle         R          L   PF 5 5   DF  5 5     Slump test: L= -   R= -      Straight leg raise:   L= -   R= -       Hip:  scour= -    tino =  +  Fadir= +     Precautions: HTN, OP    Manuals 4 27                                            Neuro Re-Ed                                                                        Ther Ex         HEP 15'        Pt edu FB                                                              Ther Activity                           Gait Training                           Modalities                             Short Term: from Date of surgery  1   Pt will report decreased levels of pain by at least 2 subjective ratings in 4 weeks  2  Pt will demonstrate improved ROM by at least 10 degrees in 4 weeks  3  Pt will demonstrate improved strength by 1/2 grade in 4 weeks  4  Pt will be able to ambulate without AD in 4 weeks  Long Term:   1  Pt will be independent in their HEP in 8 weeks  2  Pt will be pain free with IADL's in 8 weeks  3  Pt will be able to perform reciprocal stair navigation in 8 weeks  4  Pt will return to [facility] in 10 weeks with regards to recreational activities

## 2022-04-28 ENCOUNTER — OFFICE VISIT (OUTPATIENT)
Dept: OBGYN CLINIC | Facility: CLINIC | Age: 77
End: 2022-04-28

## 2022-04-28 VITALS
BODY MASS INDEX: 21.14 KG/M2 | DIASTOLIC BLOOD PRESSURE: 70 MMHG | WEIGHT: 112 LBS | SYSTOLIC BLOOD PRESSURE: 130 MMHG | HEIGHT: 61 IN

## 2022-04-28 DIAGNOSIS — Z09 POSTOPERATIVE EXAMINATION: Primary | ICD-10-CM

## 2022-04-28 PROCEDURE — 99024 POSTOP FOLLOW-UP VISIT: CPT | Performed by: STUDENT IN AN ORGANIZED HEALTH CARE EDUCATION/TRAINING PROGRAM

## 2022-04-28 RX ORDER — OMEPRAZOLE 40 MG/1
40 CAPSULE, DELAYED RELEASE ORAL DAILY
COMMUNITY

## 2022-04-28 NOTE — PROGRESS NOTES
Post-operative visit    Sheron Rosa is a 67 y o  female who presents to the office status post [facility], D&C on 4/11/22 for thickened endometrial stripe  Vaginal bleeding--none  Pain--no pelvic or abdominal pain  No fevers or chills    Bladder and bowel function normal  No concerns    The following portions of the patient's history were reviewed and updated as appropriate: allergies, current medications, past medical history, past social history, past surgical history and problem list     Pathology:   Final Diagnosis   A  Uterine contents, "Endometrial curetting," Endometrial curettage:  - Scant superficial fragments of benign endometrial glands  - Scant fragments of benign endocervical glands and cervical squamous mucosa  - Negative for hyperplasia and carcinoma     B  Uterine cervix, "Endocervical polyp," Biopsy:  - Polypoid portion of benign cervical stroma and predominately denuded endocervical glands         Review of Systems  Pertinent items are noted in HPI  Objective     /70   Ht 5' 1" (1 549 m)   Wt 50 8 kg (112 lb)   LMP  (LMP Unknown)   BMI 21 16 kg/m²   General:alert and oriented, in no acute distress  Pulm: normal respiratory effort, clear to auscultation bilaterally  CV: regular rate and rhythm  Abdomen: soft, non-tender  Extremities: full range of motion, no lower extremity edema         Assessment    Doing well postoperatively  Operative findings again reviewed  Pathology report discussed  Plan  1   RTO for annual in November/December (no hx of abnormal pap smears so graduated from cervical cancer screening)    Rusty Torres MD   04/28/22   9:17 AM

## 2022-04-28 NOTE — PRE-PROCEDURE INSTRUCTIONS
My Surgical Experience    The following information was developed to assist you to prepare for your operation  What do I need to do before coming to the hospital?   Arrange for a responsible person to drive you to and from the hospital    Arrange care for your children at home  Children are not allowed in the recovery areas of the hospital   Plan to wear clothing that is easy to put on and take off  If you are having shoulder surgery, wear a shirt that buttons or zippers in the front  Bathing  o Shower the evening before and the morning of your surgery with an antibacterial soap  Please refer to the Pre Op Showering Instructions for Surgery Patients Sheet   o Remove nail polish and all body piercing jewelry  o Do not shave any body part for at least 24 hours before surgery-this includes face, arms, legs and upper body  Food  o Nothing to eat or drink after midnight the night before your surgery  This includes candy and chewing gum  o Exception: If your surgery is after 12:00pm (noon), you may have clear liquids such as 7-Up®, ginger ale, apple or cranberry juice, Jell-O®, water, or clear broth until 8:00 am  o Do not drink milk or juice with pulp on the morning before surgery  o Do not drink alcohol 24 hours before surgery  Medicine  o Follow instructions you received from your surgeon about which medicines you may take on the day of surgery  o If instructed to take medicine on the morning of surgery, take pills with just a small sip of water  Call your prescribing doctor for specific infroamtion on what to do if you take insulin    What should I bring to the hospital?    Bring:  Deonna Armstrong or a walker, if you have them, for foot or knee surgery   A list of the daily medicines, vitamins, minerals, herbals and nutritional supplements you take   Include the dosages of medicines and the time you take them each day   Glasses, dentures or hearing aids   Minimal clothing; you will be wearing hospital sleepwear   Photo ID; required to verify your identity   If you have a Living Will or Power of , bring a copy of the documents   If you have an ostomy, bring an extra pouch and any supplies you use    Do not bring   Medicines or inhalers   Money, valuables or jewelry    What other information should I know about the day of surgery?  Notify your surgeons if you develop a cold, sore throat, cough, fever, rash or any other illness   Report to the Ambulatory Surgical/Same Day Surgery Unit   You will be instructed to stop at Registration only if you have not been pre-registered   Inform your  fi they do not stay that they will be asked by the staff to leave a phone number where they can be reached   Be available to be reached before surgery  In the event the operating room schedule changes, you may be asked to come in earlier or later than expected    *It is important to tell your doctor and others involved in your health care if you are taking or have been taking any non-prescription drugs, vitamins, minerals, herbals or other nutritional supplements  Any of these may interact with some food or medicines and cause a reaction      Pre-Surgery Instructions:   Medication Instructions    acetaminophen (TYLENOL) 650 mg CR tablet Hold day of surgery   alendronate (FOSAMAX) 70 mg tablet Hold day of surgery   ALPRAZolam (XANAX) 0 25 mg tablet Take night before surgery    amLODIPine (NORVASC) 5 mg tablet Take day of surgery   atorvastatin (LIPITOR) 20 mg tablet Take night before surgery    citric acid-potassium citrate (POLYCITRA K) 1,100-334 mg/5 mL solution Hold day of surgery   co-enzyme Q-10 30 MG capsule Hold day of surgery   MELATONIN PO Take night before surgery    Naproxen Sodium (ALEVE PO) Stop taking 7 days prior to surgery   OLMESARTAN MEDOXOMIL PO Hold day of surgery   omeprazole (PriLOSEC) 40 MG capsule Take day of surgery      Polyethylene Glycol 3350 (MIRALAX PO) Uses PRN- DO NOT take day of surgery

## 2022-04-29 ENCOUNTER — ANESTHESIA EVENT (OUTPATIENT)
Dept: PERIOP | Facility: HOSPITAL | Age: 77
End: 2022-04-29
Payer: MEDICARE

## 2022-04-29 PROBLEM — M54.50 LOW BACK PAIN: Status: ACTIVE | Noted: 2022-04-29

## 2022-04-29 NOTE — ANESTHESIA PREPROCEDURE EVALUATION
Procedure:  ARTHROPLASTY HIP TOTAL ANTERIOR - Left (Left Hip)    Relevant Problems   CARDIO   (+) Benign essential hypertension   (+) Chest pain   (+) HTN (hypertension)   (+) Hyperlipidemia      GI/HEPATIC   (+) Hiatal hernia      /RENAL   (+) Calcium nephrolithiasis   (+) Chronic kidney disease      MUSCULOSKELETAL   (+) Low back pain   (+) Lumbar spondylosis   (+) Sacroiliitis (HCC)      NEURO/PSYCH   (+) Anxiety   (+) Continuous opioid dependence (HCC)   (+) H/O hiatal hernia        Physical Exam    Airway    Mallampati score: II  TM Distance: >3 FB  Neck ROM: full     Dental   upper dentures,     Cardiovascular  Rhythm: regular, Rate: normal,     Pulmonary  Breath sounds clear to auscultation,     Other Findings        Anesthesia Plan  ASA Score- 3     Anesthesia Type- spinal with ASA Monitors  Additional Monitors:   Airway Plan:     Comment: Spinal with MAC/Sedation; post-op left PENG block  Plan Factors-    Chart reviewed  Patient is not a current smoker  Induction- intravenous  Postoperative Plan- Plan for postoperative opioid use  Informed Consent- Anesthetic plan and risks discussed with patient  I personally reviewed this patient with the CRNA  Discussed and agreed on the Anesthesia Plan with the RADHA Tobar

## 2022-05-02 ENCOUNTER — APPOINTMENT (OUTPATIENT)
Dept: RADIOLOGY | Facility: HOSPITAL | Age: 77
End: 2022-05-02
Payer: MEDICARE

## 2022-05-02 ENCOUNTER — ANESTHESIA (OUTPATIENT)
Dept: PERIOP | Facility: HOSPITAL | Age: 77
End: 2022-05-02
Payer: MEDICARE

## 2022-05-02 ENCOUNTER — HOSPITAL ENCOUNTER (OUTPATIENT)
Facility: HOSPITAL | Age: 77
Setting detail: OUTPATIENT SURGERY
Discharge: HOME | End: 2022-05-03
Attending: ORTHOPAEDIC SURGERY | Admitting: ORTHOPAEDIC SURGERY
Payer: MEDICARE

## 2022-05-02 DIAGNOSIS — Z96.642 STATUS POST HIP REPLACEMENT, LEFT: Primary | ICD-10-CM

## 2022-05-02 PROCEDURE — C9290 INJ, BUPIVACAINE LIPOSOME: HCPCS | Performed by: ANESTHESIOLOGY

## 2022-05-02 PROCEDURE — C1776 JOINT DEVICE (IMPLANTABLE): HCPCS | Performed by: ORTHOPAEDIC SURGERY

## 2022-05-02 PROCEDURE — 99024 POSTOP FOLLOW-UP VISIT: CPT | Performed by: ORTHOPAEDIC SURGERY

## 2022-05-02 PROCEDURE — 97163 PT EVAL HIGH COMPLEX 45 MIN: CPT

## 2022-05-02 PROCEDURE — C1713 ANCHOR/SCREW BN/BN,TIS/BN: HCPCS | Performed by: ORTHOPAEDIC SURGERY

## 2022-05-02 PROCEDURE — 97110 THERAPEUTIC EXERCISES: CPT

## 2022-05-02 PROCEDURE — 73501 X-RAY EXAM HIP UNI 1 VIEW: CPT

## 2022-05-02 PROCEDURE — 0054T BONE SRGRY CMPTR FLUOR IMAGE: CPT | Performed by: ORTHOPAEDIC SURGERY

## 2022-05-02 PROCEDURE — 27130 TOTAL HIP ARTHROPLASTY: CPT | Performed by: PHYSICIAN ASSISTANT

## 2022-05-02 PROCEDURE — 27130 TOTAL HIP ARTHROPLASTY: CPT | Performed by: ORTHOPAEDIC SURGERY

## 2022-05-02 DEVICE — PINNACLE HIP SOLUTIONS ALTRX POLYETHYLENE ACETABULAR LINER NEUTRAL 32MM ID 50MM OD
Type: IMPLANTABLE DEVICE | Site: HIP | Status: FUNCTIONAL
Brand: PINNACLE ALTRX

## 2022-05-02 DEVICE — PINNACLE CANCELLOUS BONE SCREW 6.5MM X 25MM
Type: IMPLANTABLE DEVICE | Site: HIP | Status: FUNCTIONAL
Brand: PINNACLE

## 2022-05-02 DEVICE — PINNACLE POROCOAT ACETABULAR SHELL SECTOR II 50MM OD
Type: IMPLANTABLE DEVICE | Site: HIP | Status: FUNCTIONAL
Brand: PINNACLE POROCOAT

## 2022-05-02 DEVICE — PINNACLE CANCELLOUS BONE SCREW 6.5MM X 30MM
Type: IMPLANTABLE DEVICE | Site: HIP | Status: FUNCTIONAL
Brand: PINNACLE

## 2022-05-02 DEVICE — APEX HOLE ELIMINATOR - PS
Type: IMPLANTABLE DEVICE | Site: HIP | Status: FUNCTIONAL
Brand: APEX

## 2022-05-02 DEVICE — ACTIS DUOFIX HIP PROSTHESIS (FEMORAL STEM 12/14 TAPER CEMENTLESS SIZE 6 STD COLLAR)  CE
Type: IMPLANTABLE DEVICE | Site: HIP | Status: FUNCTIONAL
Brand: ACTIS

## 2022-05-02 DEVICE — BIOLOX DELTA CERAMIC FEMORAL HEAD 32MM DIA +1 12/14 TAPER
Type: IMPLANTABLE DEVICE | Site: HIP | Status: FUNCTIONAL
Brand: BIOLOX DELTA

## 2022-05-02 RX ORDER — ASPIRIN 325 MG
325 TABLET ORAL 2 TIMES DAILY
Status: DISCONTINUED | OUTPATIENT
Start: 2022-05-02 | End: 2022-05-03 | Stop reason: HOSPADM

## 2022-05-02 RX ORDER — CEFAZOLIN SODIUM 2 G/50ML
SOLUTION INTRAVENOUS AS NEEDED
Status: DISCONTINUED | OUTPATIENT
Start: 2022-05-02 | End: 2022-05-02

## 2022-05-02 RX ORDER — ATORVASTATIN CALCIUM 20 MG/1
20 TABLET, FILM COATED ORAL
Status: DISCONTINUED | OUTPATIENT
Start: 2022-05-02 | End: 2022-05-03 | Stop reason: HOSPADM

## 2022-05-02 RX ORDER — PROPOFOL 10 MG/ML
INJECTION, EMULSION INTRAVENOUS CONTINUOUS PRN
Status: DISCONTINUED | OUTPATIENT
Start: 2022-05-02 | End: 2022-05-02

## 2022-05-02 RX ORDER — CHLORHEXIDINE GLUCONATE 0.12 MG/ML
15 RINSE ORAL ONCE
Status: COMPLETED | OUTPATIENT
Start: 2022-05-02 | End: 2022-05-02

## 2022-05-02 RX ORDER — KETAMINE HCL IN NACL, ISO-OSM 100MG/10ML
SYRINGE (ML) INJECTION AS NEEDED
Status: DISCONTINUED | OUTPATIENT
Start: 2022-05-02 | End: 2022-05-02

## 2022-05-02 RX ORDER — SODIUM CHLORIDE, SODIUM LACTATE, POTASSIUM CHLORIDE, CALCIUM CHLORIDE 600; 310; 30; 20 MG/100ML; MG/100ML; MG/100ML; MG/100ML
75 INJECTION, SOLUTION INTRAVENOUS CONTINUOUS
Status: DISCONTINUED | OUTPATIENT
Start: 2022-05-02 | End: 2022-05-02

## 2022-05-02 RX ORDER — PROPOFOL 10 MG/ML
INJECTION, EMULSION INTRAVENOUS AS NEEDED
Status: DISCONTINUED | OUTPATIENT
Start: 2022-05-02 | End: 2022-05-02

## 2022-05-02 RX ORDER — DOCUSATE SODIUM 100 MG/1
100 CAPSULE, LIQUID FILLED ORAL 2 TIMES DAILY
Status: DISCONTINUED | OUTPATIENT
Start: 2022-05-02 | End: 2022-05-03 | Stop reason: HOSPADM

## 2022-05-02 RX ORDER — MAGNESIUM HYDROXIDE 1200 MG/15ML
LIQUID ORAL AS NEEDED
Status: DISCONTINUED | OUTPATIENT
Start: 2022-05-02 | End: 2022-05-02 | Stop reason: HOSPADM

## 2022-05-02 RX ORDER — FENTANYL CITRATE/PF 50 MCG/ML
50 SYRINGE (ML) INJECTION
Status: DISCONTINUED | OUTPATIENT
Start: 2022-05-02 | End: 2022-05-02 | Stop reason: HOSPADM

## 2022-05-02 RX ORDER — TRANEXAMIC ACID 100 MG/ML
INJECTION, SOLUTION INTRAVENOUS AS NEEDED
Status: DISCONTINUED | OUTPATIENT
Start: 2022-05-02 | End: 2022-05-02

## 2022-05-02 RX ORDER — DEXAMETHASONE SODIUM PHOSPHATE 4 MG/ML
10 INJECTION, SOLUTION INTRA-ARTICULAR; INTRALESIONAL; INTRAMUSCULAR; INTRAVENOUS; SOFT TISSUE ONCE
Status: COMPLETED | OUTPATIENT
Start: 2022-05-03 | End: 2022-05-03

## 2022-05-02 RX ORDER — EPHEDRINE SULFATE 50 MG/ML
INJECTION INTRAVENOUS AS NEEDED
Status: DISCONTINUED | OUTPATIENT
Start: 2022-05-02 | End: 2022-05-02

## 2022-05-02 RX ORDER — CEFAZOLIN SODIUM 2 G/50ML
2000 SOLUTION INTRAVENOUS ONCE
Status: DISCONTINUED | OUTPATIENT
Start: 2022-05-02 | End: 2022-05-02 | Stop reason: HOSPADM

## 2022-05-02 RX ORDER — BISACODYL 10 MG
10 SUPPOSITORY, RECTAL RECTAL DAILY PRN
Status: DISCONTINUED | OUTPATIENT
Start: 2022-05-02 | End: 2022-05-03 | Stop reason: HOSPADM

## 2022-05-02 RX ORDER — SODIUM CHLORIDE 9 MG/ML
75 INJECTION, SOLUTION INTRAVENOUS CONTINUOUS
Status: DISCONTINUED | OUTPATIENT
Start: 2022-05-02 | End: 2022-05-03 | Stop reason: ALTCHOICE

## 2022-05-02 RX ORDER — GABAPENTIN 300 MG/1
300 CAPSULE ORAL ONCE
Status: COMPLETED | OUTPATIENT
Start: 2022-05-02 | End: 2022-05-02

## 2022-05-02 RX ORDER — MIDAZOLAM HYDROCHLORIDE 2 MG/2ML
INJECTION, SOLUTION INTRAMUSCULAR; INTRAVENOUS AS NEEDED
Status: DISCONTINUED | OUTPATIENT
Start: 2022-05-02 | End: 2022-05-02

## 2022-05-02 RX ORDER — HYDROMORPHONE HCL/PF 1 MG/ML
0.2 SYRINGE (ML) INJECTION
Status: DISCONTINUED | OUTPATIENT
Start: 2022-05-02 | End: 2022-05-02 | Stop reason: HOSPADM

## 2022-05-02 RX ORDER — MAGNESIUM HYDROXIDE/ALUMINUM HYDROXICE/SIMETHICONE 120; 1200; 1200 MG/30ML; MG/30ML; MG/30ML
30 SUSPENSION ORAL EVERY 6 HOURS PRN
Status: DISCONTINUED | OUTPATIENT
Start: 2022-05-02 | End: 2022-05-03 | Stop reason: HOSPADM

## 2022-05-02 RX ORDER — GABAPENTIN 100 MG/1
200 CAPSULE ORAL 2 TIMES DAILY
Status: DISCONTINUED | OUTPATIENT
Start: 2022-05-02 | End: 2022-05-03 | Stop reason: HOSPADM

## 2022-05-02 RX ORDER — CEFAZOLIN SODIUM 2 G/50ML
2000 SOLUTION INTRAVENOUS EVERY 8 HOURS
Status: COMPLETED | OUTPATIENT
Start: 2022-05-02 | End: 2022-05-03

## 2022-05-02 RX ORDER — LIDOCAINE HYDROCHLORIDE 10 MG/ML
INJECTION, SOLUTION EPIDURAL; INFILTRATION; INTRACAUDAL; PERINEURAL AS NEEDED
Status: DISCONTINUED | OUTPATIENT
Start: 2022-05-02 | End: 2022-05-02

## 2022-05-02 RX ORDER — AMLODIPINE BESYLATE 5 MG/1
5 TABLET ORAL EVERY MORNING
Status: DISCONTINUED | OUTPATIENT
Start: 2022-05-03 | End: 2022-05-03 | Stop reason: HOSPADM

## 2022-05-02 RX ORDER — PANTOPRAZOLE SODIUM 40 MG/1
40 TABLET, DELAYED RELEASE ORAL DAILY
Status: DISCONTINUED | OUTPATIENT
Start: 2022-05-02 | End: 2022-05-03 | Stop reason: HOSPADM

## 2022-05-02 RX ORDER — ACETAMINOPHEN 325 MG/1
975 TABLET ORAL ONCE
Status: COMPLETED | OUTPATIENT
Start: 2022-05-02 | End: 2022-05-02

## 2022-05-02 RX ORDER — BUPIVACAINE HYDROCHLORIDE 5 MG/ML
INJECTION, SOLUTION PERINEURAL
Status: DISCONTINUED | OUTPATIENT
Start: 2022-05-02 | End: 2022-05-02

## 2022-05-02 RX ORDER — ONDANSETRON 2 MG/ML
4 INJECTION INTRAMUSCULAR; INTRAVENOUS ONCE AS NEEDED
Status: DISCONTINUED | OUTPATIENT
Start: 2022-05-02 | End: 2022-05-02 | Stop reason: HOSPADM

## 2022-05-02 RX ORDER — BUPIVACAINE HYDROCHLORIDE 7.5 MG/ML
INJECTION, SOLUTION INTRASPINAL AS NEEDED
Status: DISCONTINUED | OUTPATIENT
Start: 2022-05-02 | End: 2022-05-02

## 2022-05-02 RX ORDER — OXYCODONE HYDROCHLORIDE 5 MG/1
5 TABLET ORAL EVERY 4 HOURS PRN
Status: DISCONTINUED | OUTPATIENT
Start: 2022-05-02 | End: 2022-05-03 | Stop reason: HOSPADM

## 2022-05-02 RX ORDER — ONDANSETRON 2 MG/ML
4 INJECTION INTRAMUSCULAR; INTRAVENOUS EVERY 6 HOURS PRN
Status: DISCONTINUED | OUTPATIENT
Start: 2022-05-02 | End: 2022-05-03 | Stop reason: HOSPADM

## 2022-05-02 RX ORDER — ACETAMINOPHEN 325 MG/1
975 TABLET ORAL EVERY 8 HOURS
Status: DISCONTINUED | OUTPATIENT
Start: 2022-05-02 | End: 2022-05-03 | Stop reason: HOSPADM

## 2022-05-02 RX ORDER — ALBUMIN, HUMAN INJ 5% 5 %
SOLUTION INTRAVENOUS CONTINUOUS PRN
Status: DISCONTINUED | OUTPATIENT
Start: 2022-05-02 | End: 2022-05-02

## 2022-05-02 RX ORDER — OXYCODONE HYDROCHLORIDE 5 MG/1
2.5 TABLET ORAL EVERY 4 HOURS PRN
Status: DISCONTINUED | OUTPATIENT
Start: 2022-05-02 | End: 2022-05-03 | Stop reason: HOSPADM

## 2022-05-02 RX ORDER — OXYCODONE HCL 10 MG/1
10 TABLET, FILM COATED, EXTENDED RELEASE ORAL ONCE
Status: COMPLETED | OUTPATIENT
Start: 2022-05-02 | End: 2022-05-02

## 2022-05-02 RX ADMIN — SODIUM CHLORIDE 75 ML/HR: 0.9 INJECTION, SOLUTION INTRAVENOUS at 11:51

## 2022-05-02 RX ADMIN — GABAPENTIN 200 MG: 100 CAPSULE ORAL at 11:49

## 2022-05-02 RX ADMIN — BUPIVACAINE HYDROCHLORIDE 5 ML: 5 INJECTION, SOLUTION PERINEURAL at 10:31

## 2022-05-02 RX ADMIN — ASPIRIN 325 MG: 325 TABLET ORAL at 21:02

## 2022-05-02 RX ADMIN — PROPOFOL 20 MG: 10 INJECTION, EMULSION INTRAVENOUS at 07:50

## 2022-05-02 RX ADMIN — Medication 25 MG: at 08:03

## 2022-05-02 RX ADMIN — SODIUM CHLORIDE, SODIUM LACTATE, POTASSIUM CHLORIDE, AND CALCIUM CHLORIDE: .6; .31; .03; .02 INJECTION, SOLUTION INTRAVENOUS at 07:28

## 2022-05-02 RX ADMIN — BUPIVACAINE HYDROCHLORIDE IN DEXTROSE 1.6 ML: 7.5 INJECTION, SOLUTION SUBARACHNOID at 07:35

## 2022-05-02 RX ADMIN — PHENYLEPHRINE HYDROCHLORIDE 30 MCG/MIN: 10 INJECTION INTRAVENOUS at 08:57

## 2022-05-02 RX ADMIN — CEFAZOLIN SODIUM 2000 MG: 2 SOLUTION INTRAVENOUS at 15:05

## 2022-05-02 RX ADMIN — GABAPENTIN 300 MG: 300 CAPSULE ORAL at 06:31

## 2022-05-02 RX ADMIN — FENTANYL CITRATE 50 MCG: 50 INJECTION, SOLUTION INTRAMUSCULAR; INTRAVENOUS at 09:56

## 2022-05-02 RX ADMIN — ALBUMIN (HUMAN): 12.5 INJECTION, SOLUTION INTRAVENOUS at 08:39

## 2022-05-02 RX ADMIN — EPHEDRINE SULFATE 10 MG: 50 INJECTION, SOLUTION INTRAVENOUS at 08:39

## 2022-05-02 RX ADMIN — OXYCODONE HYDROCHLORIDE 10 MG: 10 TABLET, FILM COATED, EXTENDED RELEASE ORAL at 06:31

## 2022-05-02 RX ADMIN — PROPOFOL 130 MCG/KG/MIN: 10 INJECTION, EMULSION INTRAVENOUS at 07:38

## 2022-05-02 RX ADMIN — GABAPENTIN 200 MG: 100 CAPSULE ORAL at 18:07

## 2022-05-02 RX ADMIN — ONDANSETRON 4 MG: 2 INJECTION INTRAMUSCULAR; INTRAVENOUS at 16:41

## 2022-05-02 RX ADMIN — ACETAMINOPHEN 975 MG: 325 TABLET, FILM COATED ORAL at 18:07

## 2022-05-02 RX ADMIN — PANTOPRAZOLE SODIUM 40 MG: 40 TABLET, DELAYED RELEASE ORAL at 11:50

## 2022-05-02 RX ADMIN — CEFAZOLIN SODIUM 2000 MG: 2 SOLUTION INTRAVENOUS at 07:30

## 2022-05-02 RX ADMIN — LIDOCAINE HYDROCHLORIDE 50 MG: 10 INJECTION, SOLUTION EPIDURAL; INFILTRATION; INTRACAUDAL; PERINEURAL at 07:37

## 2022-05-02 RX ADMIN — MIDAZOLAM 2 MG: 1 INJECTION INTRAMUSCULAR; INTRAVENOUS at 07:28

## 2022-05-02 RX ADMIN — PROPOFOL 50 MG: 10 INJECTION, EMULSION INTRAVENOUS at 07:37

## 2022-05-02 RX ADMIN — Medication 15 MG: at 08:42

## 2022-05-02 RX ADMIN — DOCUSATE SODIUM 100 MG: 100 CAPSULE, LIQUID FILLED ORAL at 11:49

## 2022-05-02 RX ADMIN — TRANEXAMIC ACID 1000 MG: 1 INJECTION, SOLUTION INTRAVENOUS at 07:42

## 2022-05-02 RX ADMIN — Medication 10 MG: at 09:09

## 2022-05-02 RX ADMIN — DOCUSATE SODIUM 100 MG: 100 CAPSULE, LIQUID FILLED ORAL at 18:07

## 2022-05-02 RX ADMIN — ATORVASTATIN CALCIUM 20 MG: 20 TABLET, FILM COATED ORAL at 21:02

## 2022-05-02 RX ADMIN — ACETAMINOPHEN 975 MG: 325 TABLET, FILM COATED ORAL at 11:50

## 2022-05-02 RX ADMIN — ACETAMINOPHEN 975 MG: 325 TABLET, FILM COATED ORAL at 06:31

## 2022-05-02 RX ADMIN — CHLORHEXIDINE GLUCONATE 15 ML: 1.2 SOLUTION ORAL at 06:31

## 2022-05-02 RX ADMIN — EPHEDRINE SULFATE 5 MG: 50 INJECTION, SOLUTION INTRAVENOUS at 08:36

## 2022-05-02 NOTE — ANESTHESIA PROCEDURE NOTES
Spinal Block    Patient location during procedure: OR  Start time: 5/2/2022 7:35 AM  Reason for block: procedure for pain, at surgeon's request, post-op pain management and primary anesthetic  Staffing  Anesthesiologist: Ron Briones MD  Resident/CRNA: Soto Chiu CRNA  Preanesthetic Checklist  Completed: patient identified, IV checked, site marked, risks and benefits discussed, surgical consent, monitors and equipment checked, pre-op evaluation and timeout performed  Spinal Block  Patient position: sitting  Prep: ChloraPrep  Patient monitoring: heart rate and continuous pulse ox  Approach: midline  Location: L3-4  Injection technique: single-shot  Needle  Needle type: pencil-tip   Needle gauge: 25 G  Needle length: 5 cm  Assessment  Sensory level: T4  Injection Assessment:  negative aspiration for heme, no paresthesia on injection and positive aspiration for clear CSF    Post-procedure:  site cleaned

## 2022-05-02 NOTE — CASE MANAGEMENT
Case Management Assessment & Discharge Planning Note    Patient name Lauren Canseco  Location [address] [place] [address] MRN [id number]  : 1945 Date 2022       Current Admission Date: 2022  Current Admission Diagnosis:Status post hip replacement, left   Patient Active Problem List    Diagnosis Date Noted    Status post hip replacement, left 2022    Low back pain 2022    Status post hysteroscopy, D&C 2022    Constipation 2022    Sacroiliitis ([facility] [address] ) 2022    Continuous opioid dependence ([facility] [address] ) 2022    H/O hiatal hernia     Thickened endometrium 2022    Weight loss, unintentional 2022    Unintentional weight change 2022    Colon cancer screening 2022    Epigastric discomfort 2022    Nausea 2022    Abnormal CT scan, pelvis 2022    Lumbar spondylosis 2022    Long term use of alendronate therapy (Fosamax) 2021    Osteoporosis of multiple sites 2021    Pain of left femur 2021    Iliotibial band syndrome affecting left lower leg 2021    Benign essential hypertension 2017    Closed fracture of left distal radius 2017    Right flank pain 2016    Calcium nephrolithiasis 2016    Hyperlipidemia     Chronic kidney disease     Hiatal hernia     Anxiety     Diarrhea 2016    Mass of uterus - possible fibroid 2016    Pyelonephritis 08/15/2016    Chest pain 2016    HTN (hypertension) 2016      LOS (days): 0  Geometric Mean LOS (GMLOS) (days):   Days to GMLOS:     OBJECTIVE:     Current admission status: Outpatient Surgery    Preferred Pharmacy:   [facility] [address], [address] [place] [address]  Phone: [phone] Fax: [phone]    Primary Care Provider: Yahaira Fatima DO    Primary Insurance: MEDICARE  Secondary Insurance: BLUE CROSS    ASSESSMENT:  Active Health Care Proxies    There are no active Health Care Proxies on file  Patient Information  Admitted from[de-identified] Home  Mental Status: Alert  During Assessment patient was accompanied by: Not accompanied during assessment  Assessment information provided by[de-identified] Patient  Primary Caregiver: Self  Support Systems: Spouse/significant other  [place] of Residence: [address] [place] [address] do you live in?: [place]      DISCHARGE DETAILS:    Discharge planning discussed with[de-identified] Patient  Freedom of Choice: Yes  Comments - Freedom of Choice: SW met with pt to discuss plans  Pt was admitted following elective orthopedic surgery  Per PT pt's plans are to return home with outpatient therapy and pt is in need of rolling walker  During visist pt confirmed plan and DME need  Pt said she did not have any specific DME company preference  Pt chose to have rolling ordered from [employer]    CM contacted family/caregiver?: No- see comments (per pt)  Were Treatment Team discharge recommendations reviewed with patient/caregiver?: Yes  Did patient/caregiver verbalize understanding of patient care needs?: Yes  Were patient/caregiver advised of the risks associated with not following Treatment Team discharge recommendations?: Yes    [address]         Is the patient interested in [facility] at discharge?: No    DME Referral Provided  Referral made for DME?: Yes  DME referral completed for the following items[de-identified] Sherri Alexandre  DME Supplier Name[de-identified] [employer]    Other Referral/Resources/Interventions Provided:  Interventions: DME  Referral Comments: Rolling walker ordered from [employer] through [place]    Treatment Team Recommendation: Home (with outpatient therapy)  Discharge Destination Plan[de-identified] Home (with outpatient therapy)  Transport at Discharge : Jose Cook

## 2022-05-02 NOTE — OP NOTE
OPERATIVE REPORT  PATIENT NAME: Viktor Fox    :  [birth year]  MRN: [id number]  Pt Location: [facility] OR ROOM 03    SURGERY DATE: 2022    Surgeon(s) and Role:     * Ritta Merlin, DO - Primary     * Rocco Lowery PA-C- no qualified resident was available an assistant was needed for patient positioning, prepping and draping, soft tissue retraction, protection of vital structures throughout the case, exposure of the femur and acetabulum for preparation and implantation of final prosthesis, superficial closure, and to complete the case safely  Deja Weinstein,  ATC/OTC - 2nd Assist    Preop Diagnosis:  Avascular necrosis of bone of left hip ([address] ) [M87 052]  Primary osteoarthritis of one hip, left [M16 12]    Post-Op Diagnosis Codes:     * Avascular necrosis of bone of left hip (HCC) [M87 052]     * Primary osteoarthritis of one hip, left [M16 12]    Procedure(s) (LRB):  ARTHROPLASTY HIP TOTAL ANTERIOR - Left (Left)    Specimen(s):  * No specimens in log *    Estimated Blood Loss:   200 mL    Drains:  None  Urethral Catheter Latex 16 Fr  (Active)   Reasons to continue Urinary Catheter  Post-operative urological requirements 22 1130   Goal for Removal Remove POD#1 22 1130   Site Assessment Clean;Skin intact 22 1130   Espinoza Care Done 22 1130   Collection Container Standard drainage bag 22 1130   Securement Method Securing device (Describe) 22 1130   Output (mL) 200 mL 22 1030   Number of days: 0       Anesthesia Type:   Spinal with sedation, postoperative FABIO block with Exparel    Intravenous fluids:  400 cc/250 cc albumin    Antibiotics:  Ancef 2 g    Urine output:  Espinoza:  95 cc    Implant Name Type Inv   Item Serial No   Lot No  LRB No  Used Action   SHELL ACETABULAR 50MM POROUS SOLID LCK RING PINNACLE - [id number]  SHELL ACETABULAR 50MM POROUS SOLID LCK RING PINNACLE  DEPUY  Left 1 Implanted   ELIMINATOR ACTB THRD POS STOP - [id number]  ELIMINATOR ACTB THRD POS STOP  DEPUY  Left 1 Implanted   SCREW FABRICIO 6 5 X 25MM SLF TAP PINNACLE - [id number]  SCREW FABRICIO 6 5 X 25MM SLF TAP PINNACLE  DEPUY  Left 1 Implanted   SCREW FABRICIO 6 5 X 30MM SLF TAP PINNACLE - [id number]  SCREW FABRICIO 6 5 X 30MM SLF TAP PINNACLE  DEPUY  Left 1 Implanted   LINER ACTB ULT LNK PE 32 X 50MM NEUTRAL ALTRX - [id number]  LINER ACTB ULT LNK PE 32 X 50MM NEUTRAL ALTRX  DEPUY  Left 1 Implanted   STEM FEM SZ 6 TAPER CMNTLS STD COLLAR ACTIS - [id number]  STEM FEM SZ 6 TAPER CMNTLS STD COLLAR ACTIS  DEPUY  Left 1 Implanted   HEAD FEMORAL 12/14 TPR 32MM +1MM BIOLOX ARTICULEZE - [id number]  HEAD FEMORAL 12/14 TPR 32MM +1MM BIOLOX ARTICULEZE  DEPUY  Left 1 Implanted       Operative Indications:  Avascular necrosis of bone of left hip (HCC) [M87 052]  Primary osteoarthritis of one hip, left [M16 12]  Patient is a very pleasant 69-year-old female known to me for treatment of her activity-related left hip and groin pain  After thorough evaluation she was found to have persistent activity-related pain from underlying avascular necrosis and secondary osteoarthritis of her left hip  She has attempted multiple forms of conservative measures of treatment all of which have failed to provide long-lasting relief of her discomfort  With failure of conservative treatment I recommended that she undergo direct anterior left total hip arthroplasty  She was agreeable to this  Consents were signed and placed in chart  She was optimized for the intended procedure and patient underwent direct anterior left total hip arthroplasty on 5/2/2022  Operative Findings:  Intraoperatively there was evidence of grade 4 degenerative change of the left hip in addition to a flattening deformity of the femoral head  On inspection intraoperative the femoral head there was evidence of avascular necrosis    Ultimately a uncemented acetabular component was implanted in approximately 47° of inclination and 23° of anteversion with excellent Press-Fit  Two acetabular screws were utilized with excellent purchase  The polyethylene liner was locked into place  The uncemented femoral component was implanted and its final station with excellent rotational and axial stability  The final construct demonstrated a leg length that was 2 mm longer than the preoperative state as desired and in accordance with the preoperative template  The hip had excellent stability to 50° of internal rotation 110° of external rotation, and 50° of external rotation with the leg lowered to the floor  The patient tolerated procedure well  There were no complications  Complications:   None    Procedure and Technique:  The patient was identified and marked in the preoperative holding area, and the correct operative extremity and intended procedure was confirmed  The consents were visualized and confirmed for correct procedure and laterality  The patient was then taken back to the operating room where there were administered spinal anesthesia by the anesthesia staff  The patient was administered 2 g of Ancef intravenously by the anesthesia staff  The patient was then transferred to the [facility] table for the procedure  After the patient was appropriately positioned, a AP of the pelvis was obtained using fluoroscopy to evaluate preoperative leg lengths  It was found that the left lower extremity was approximately 2 mm short of than the right lower extremity  The left lower extremity was prepped and draped in a standard sterile fashion  After a timeout was performed and all members of the operating room team were in agreement of the correct patient, and correct intended procedure the bony landmarks were identified using marking pen  Tranexamic acid was administered by anesthesia  A modified Childs-Kerr incision was delineated obliquely starting 2 cm distal and 2 cm laterally to the ASIS    Sharp dissection was carried down through the subcutaneous tissues to the investing fascia of the tensor fascia cristion muscle  Electrocautery was used to maintain hemostasis in the subcutaneous tissues  The investing fascia of the tensor fascia cristino was incised in line with the fibers in this compartment was entered bluntly and the muscle was retracted laterally  The perforating circumflex femoral vessels were carefully identified and cauterized using electrocautery and the Aquamantis  Dissection was then carried down to the left hip capsule, and the pre-capsular fat was excised  A capsulotomy was carried out across the edge of the acetabulum superiorly down the femoral neck and into the intra-articular trochanteric line  Both sides of the capsulotomy were tagged with a Ethibond suture  Dissection was carried out medially around the medial calcar  It was also carried out laterally to expose the saddle  The femoral neck osteotomy was templated and carried out using an oscillating saw and finished with a osteotome in accordance with preoperative templating  The femoral head was extracted from the acetabulum using a corkscrew  There were grade 4 changes diffusely with osteophytes peripherally  The femoral head was sized  On inspection the flattening deformity of the femoral head was evaluated and found to have areas of avascular necrosis  There were grade 4 changes diffusely in the acetabulum  The acetabulum was cleaned of debris and pulvinar, the JOSE was well-visualized, the remaining labrum was removed, and reaming began  The acetabulum was reamed starting with a size 45 reamer and carried up in 1-2 mm increments until a size 49 was reached  The hip navigation software was utilized throughout the procedure  A size 49 acetabular trial was impacted into the acetabulum and demonstrated an appropriate fit  The appropriate abduction and anteversion of approximately 45° and 25° respectively was confirmed using fluoroscopy   The trial implant was removed, the host bone was touched with a size 50 Reamer, and a size 50 [place] [address] cluster hole final acetabular component was impacted into place under direct visualization with fluoroscopy  Its final position was in approximately 47° of inclination 23° of anteversion based off of the hip navigation software  This demonstrated excellent scratch fit  2 acetabular screws were drilled, measured, and the appropriate size screw was inserted in sequence  The final 32 mm/50 mm Altrx highly cross-linked polyethylene insert was impacted into the acetabular component  The liner was locked in its position on visual and tactile inspection  Attention was then turned back to the proximal femur  The appropriate proximal femoral releases were utilized as needed to mobilize the femur, ensuring not to release the obturator externus  The soft tissue was removed from the region between the femoral neck and the greater trochanter a box osteotome was used to cut into the lateral aspect of the proximal femur  The small starter broach was then inserted into the proximal femur adding proximally 5° of anteversion while preparing the proximal femur  Broaching was carried up in sequence until a size 6 broach demonstrated excellent fit and rotational stability  The calcar planer was used to bring the femoral neck osteotomy coplanar with the broach  Trialing was performed  A trial construct with a standard neck and a +5 32 mm femoral head showed excellent stability on 100 of external rotation, 50 of internal rotation, and 50 of external rotation with extension of the hip to the floor  Leg lengths were evaluated on fluoroscopy images in the hip navigation software demonstrated that the leg was slightly over lengthened and that a +1 ball would produce the desired leg length outcome of lengthening 2 mm    The trial components were removed from the proximal femur and the final Nearboxuy Actis size 6 standard offset femoral component was inserted into the femur by hand and impacted into place  The trunnion was cleaned and dried and the final Biolox delta ceramic +1 32 mm head was impacted upon the final femoral stem  Hip was reduced and taken through stability testing  The patient demonstrated excellent stability with 110° of external rotation, 50° of internal rotation, and 50° of external rotation and extension of the hip to the floor  There was no lateral subluxation of the hip on manual testing  Leg lengths were again evaluated using fluoroscopy and the software and the operative extremity was lengthened approximately 2 mm in accordance with the preoperative template  The wound was copiously irrigated with Irrisept followed by normal saline, the capsule was closed using a #2 Ethibond suture  The wound was again irrigated  The investing fascia of the tensor fascia cristino muscle was closed using a bidirectional barbed #2 barbed suture  The deep subcutaneous tissues were reapproximated using an undyed 0 Vicryl, the superficial subcutaneous tissues were reapproximated using an undyed 2-0 Vicryl, the skin edges were reapproximated using a 3-0 bidirectional barbed Monocryl suture, and the skin edges sealed with Exofin  After the skin adhesive had dried a silver impregnated Mepilex dressing was applied over the surgical incision  The patient was awakened from spinal anesthesia with sedation and transferred to PACU in stable condition         I was present for all critical portions of the procedure    Patient Disposition:  PACU       SIGNATURE: Silvano Aranda DO  DATE: May 2, 2022  TIME: 2:07 PM

## 2022-05-02 NOTE — PHYSICAL THERAPY NOTE
PHYSICAL THERAPY EVALUATION/TREATMENT     05/02/22 1401   Note Type   Note type Evaluation   Pain Assessment   Pain Assessment Tool 0-10   Pain Score 6   Pain Location/Orientation Orientation: Left; Location: Hip   Restrictions/Precautions   Weight Bearing Precautions Per Order Yes   LLE Weight Bearing Per Order WBAT   Other Precautions Bed Alarm; Chair Alarm;Pain; Fall Risk   Home Living   Type of [address] to live on main level with bedroom/bathroom  (5 CORTNEY)   [address] [facility]  (2 standard and 1 4 wheeled walker)   Prior Function   Level of [place] Independent with ADLs and functional mobility   Lives With Alone   Receives Help From   (fiance will be staying with pt )   ADL Assistance Independent   General   Additional Pertinent History Pt is POD zero s/p L DA MOLINA   Family/Caregiver Present No   Cognition   Overall Cognitive Status WFL   Subjective   Subjective "I'm so tired"   RLE Assessment   RLE Assessment WFL   LLE Assessment   LLE Assessment   (ROM WFL, MMT hip 3-/5, knee 4/5, ankle 4+/5)   Bed Mobility   Supine to Sit 3  Moderate assistance   Sit to Supine 4  Minimal assistance   Transfers   Sit to Stand 3  Moderate assistance   Stand to Sit 3  Moderate assistance   Stand pivot 3  Moderate assistance   Additional items   (with walker)   Ambulation/Elevation   Gait pattern   (antalgic L)   Gait Assistance   (min/mod)   Assistive Device Rolling walker   Distance 5 feet   Balance   Static Sitting Fair +   Dynamic Sitting Fair +   Static Standing Fair   Dynamic Standing Fair -   Assessment   Prognosis Good   Problem List Decreased strength;Decreased endurance; Impaired balance;Decreased mobility;Pain   Assessment Patient seen for Physical Therapy evaluation  Patient admitted with Status post hip replacement, left  Comorbidities affecting patient's physical performance include: back pain, anxiety    Personal factors affecting patient at time of initial evaluation include: stairs to enter home, inability to navigate level surfaces without external assistance, limited home support and inability to live alone  Prior to admission, patient was independent with functional mobility without assistive device and independent with ADLS  Please find objective findings from Physical Therapy assessment regarding body systems outlined above with impairments and limitations including weakness, impaired balance, gait deviations, pain, decreased activity tolerance, decreased functional mobility tolerance and fall risk  The Barthel Index was used as a functional outcome tool presenting with a score of Barthel Index Score: 40 today indicating marked limitations of functional mobility and ADLS  Patient's clinical presentation is currently unstable/unpredictable as seen in patient's presentation of changing level of pain, increased fall risk, new onset of impairment of functional mobility and new onset of weakness  Pt would benefit from continued Physical Therapy treatment to address deficits as defined above and maximize level of functional mobility  As demonstrated by objective findings, the assigned level of complexity for this evaluation is high  The patient's AM-[facility] Basic Mobility Inpatient Short Form Raw Score is 17  A Raw score of greater than 16 suggests the patient may benefit from discharge to home  Goals   Patient Goals "polka!"   STG Expiration Date 05/09/22   Short Term Goal #1 independent bed mobility, independent transfers, independent ambulation with a walker indoor level surfaces 100 feet, independent up and down 5 steps so pt can enter and exit her home   LTG Expiration Date 05/16/22   Long Term Goal #1 improve L LE strength to at least 4/5, independent ambulation with a walker outdoor surfaces 200 feet with a steady normal gait pattern   Plan   Treatment/Interventions ADL retraining;Functional transfer training;LE strengthening/ROM; Elevations; Therapeutic exercise;Gait training; Bed mobility; Equipment eval/education;Patient/family training   PT Frequency Twice a day   Recommendation   PT Discharge Recommendation Home with outpatient rehabilitation   Equipment Recommended [facility] [facility] Recommended Wheeled walker   Change/add to Traxer? No   AM-PAC Basic Mobility Inpatient   Turning in Bed Without Bedrails 3   Lying on Back to Sitting on Edge of Flat Bed 3   Moving Bed to Chair 3   Standing Up From Chair 3   Walk in Room 3   Climb 3-5 Stairs 2   Basic Mobility Inpatient Raw Score 17   Basic Mobility Standardized Score 39 67   Highest Level Of Mobility   -[facility] Goal 5: Stand one or more mins   -[facility] Highest Level of Mobility 6: Walk 10 steps or more   -[facility] Goal Achieved Yes   Barthel Index   Feeding 10   Bathing 0   Grooming Score 0   Dressing Score 5   Bladder Score 0   Bowels Score 10   Toilet Use Score 5   Transfers (Bed/Chair) Score 10   Mobility (Level Surface) Score 0   Stairs Score 0   Barthel Index Score 40   Additional Treatment Session   Start Time 1350   End Time 1400   Treatment Assessment S:  "I'm tired" 0: x 10 each ankle pumps, glut sets, AAROM hip abd/add, AAROM LAQ  Pt ambulated 15 feet with min assist with a rolling walker with cues to use her UEs more to support her L LE   A:  Pt tolerated initial post op activity well  P:  Anticipate pt will be able to go home tomorrow with her fiance if cleared medically  End of Consult   Patient Position at End of Consult Supine; All needs within reach;Bed/Chair alarm activated   Licensure   [place] License Number  Lady Mohan PT [id number]

## 2022-05-02 NOTE — ADDENDUM NOTE
Addendum  created 05/02/22 1157 by Edgardo Mcnair CRNA    Intraprocedure Meds edited, Orders acknowledged in Narrator

## 2022-05-02 NOTE — ANESTHESIA POSTPROCEDURE EVALUATION
Post-Op Assessment Note    CV Status:  Stable  Pain Score: 0    Pain management: adequate     Mental Status:  Alert   Hydration Status:  Stable   PONV Controlled:  None   Airway Patency:  Patent   Two or more mitigation strategies used for obstructive sleep apnea   Post Op Vitals Reviewed: Yes      Staff: CRNA         No complications documented      BP   103/52   Temp      Pulse 81   Resp 16   SpO2 95

## 2022-05-02 NOTE — ANESTHESIA PROCEDURE NOTES
Peripheral Block    Patient location during procedure: OR  Start time: 5/2/2022 10:28 AM  Reason for block: procedure for pain, at surgeon's request and post-op pain management  Staffing  Anesthesiologist: Amanda Alfonso MD  Preanesthetic Checklist  Completed: patient identified, IV checked, site marked, risks and benefits discussed, surgical consent, monitors and equipment checked, pre-op evaluation and timeout performed  Peripheral Block  Patient position: sitting  Prep: ChloraPrep  Patient monitoring: heart rate, cardiac monitor, continuous pulse ox and frequent blood pressure checks  Anesthesia block type: PENG block  Laterality: left  Injection technique: single-shot  Procedures: ultrasound guided, Ultrasound guidance required for the procedure to increase accuracy and safety of medication placement and decrease risk of complications    Ultrasound permanent image savedbupivacaine (MARCAINE) 0 5 % perineural infiltration, 5 mL (with 20 ml of exparel)  Needle  Needle type: Stimuplex   Needle gauge: 22 G  Needle length: 10 cm  Needle localization: ultrasound guidance  Assessment  Injection assessment: incremental injection, local visualized surrounding nerve on ultrasound, negative aspiration for CSF, negative aspiration for heme and no paresthesia on injection  Paresthesia pain: none  Heart rate change: no  Slow fractionated injection: yes  Post-procedure:  site cleaned  patient tolerated the procedure well with no immediate complications

## 2022-05-02 NOTE — PLAN OF CARE
Initial assessment  Problem: Nutrition/Hydration-ADULT  Goal: Nutrient/Hydration intake appropriate for improving, restoring or maintaining nutritional needs  Description: Monitor and assess patient's nutrition/hydration status for malnutrition  Collaborate with interdisciplinary team and initiate plan and interventions as ordered  Monitor patient's weight and dietary intake as ordered or per policy  Utilize nutrition screening tool and intervene as necessary  Determine patient's food preferences and provide high-protein, high-caloric foods as appropriate       INTERVENTIONS:  - Monitor oral intake, urinary output, labs, and treatment plans  - Assess nutrition and hydration status and recommend course of action  - Evaluate amount of meals eaten  - Assist patient with eating if necessary   - Allow adequate time for meals  - Recommend/ encourage appropriate diets, oral nutritional supplements, and vitamin/mineral supplements  - Order, calculate, and assess calorie counts as needed  - Recommend, monitor, and adjust tube feedings and TPN/PPN based on assessed needs  - Assess need for intravenous fluids  - Provide specific nutrition/hydration education as appropriate  - Include patient/family/caregiver in decisions related to nutrition  Outcome: Progressing     Problem: MOBILITY - ADULT  Goal: Maintain or return to baseline ADL function  Description: INTERVENTIONS:  -  Assess patient's ability to carry out ADLs; assess patient's baseline for ADL function and identify physical deficits which impact ability to perform ADLs (bathing, care of mouth/teeth, toileting, grooming, dressing, etc )  - Assess/evaluate cause of self-care deficits   - Assess range of motion  - Assess patient's mobility; develop plan if impaired  - Assess patient's need for assistive devices and provide as appropriate  - Encourage maximum independence but intervene and supervise when necessary  - Involve family in performance of ADLs  - Assess for home care needs following discharge   - Consider OT consult to assist with ADL evaluation and planning for discharge  - Provide patient education as appropriate  Outcome: Progressing  Goal: Maintains/Returns to pre admission functional level  Description: INTERVENTIONS:  - Perform BMAT or MOVE assessment daily    - Set and communicate daily mobility goal to care team and patient/family/caregiver     - Collaborate with rehabilitation services on mobility goals if consulted  -awaiting PT to ambulate pt  - Out of bed for toileting  - Record patient progress and toleration of activity level   Outcome: Progressing

## 2022-05-02 NOTE — PROGRESS NOTES
Progress Note - Orthopedics   Viktor Fox 67 y o  female MRN: [id number]  Unit/Bed#: 2 [place] [address] Encounter: [id number]    Assessment:  1) POD#0 s/p DA L MOLINA    Plan:  Ancef 2g IV x 2 for 24 hours postop  DVT prophylaxis: ASA 325mg PO BID/SCD's/Ambulation  WBAT LLE  PT/OT- WBAT LLE  Analgesia PRN  Follow up AM labs  D/c estrella in AM POD #1  Dressing- monitor for drainage  Discharge planning - disposition pending progress with PT postoperatively  Plan for discharge to home to start outpatient physical therapy later this week    Weight bearing: WBAT LLE    VTE Pharmacologic Prophylaxis: ASA 325mg PO BID  VTE Mechanical Prophylaxis: sequential compression device    Subjective:  Patient seen examined postoperatively  Pain controlled  Events of surgery discussed with the patient patient's   Vitals: Blood pressure 99/50, pulse 66, temperature (!) 97 2 °F (36 2 °C), resp  rate 17, height 5' 1" (1 549 m), weight 50 3 kg (110 lb 12 8 oz), SpO2 94 %, not currently breastfeeding  ,Body mass index is 20 94 kg/m²  Intake/Output Summary (Last 24 hours) at 5/2/2022 1420  Last data filed at 5/2/2022 1037  Gross per 24 hour   Intake 950 ml   Output 550 ml   Net 400 ml       Invasive Devices  Report    Peripheral Intravenous Line            Peripheral IV 05/02/22 Left Arm <1 day          Drain            Urethral Catheter Latex 16 Fr  <1 day                Physical Exam: NAD  Ortho Exam: LLE: Dsg c/d/i, thigh soft, +LFCN SILT, +Fem nerve motor, +DF/PF/EHL, +L3-S1 SILT, DP2+, foot warm    Lab, Imaging and other studies: PO XR L hip:  Reviewed and acceptable    Peña VIEYRA    Division of Adult Reconstruction  Department of Orthopaedic Surgery  [facility]

## 2022-05-02 NOTE — H&P
Assessment/Plan:  1  Avascular necrosis of bone of left hip (HCC)  XR hip/pelv 2-3 vws left if performed     Case request operating room: ARTHROPLASTY HIP TOTAL ANTERIOR - Left     Comprehensive metabolic panel     Hemoglobin A1C W/EAG Estimation     CBC and differential     If Symptomatic, order: UA w Reflex to Microscopic w Reflex to Culture     Protime-INR     APTT     MRSA culture     Ambulatory referral to Cardiology     Ambulatory referral to Family Practice     Ambulatory referral to Physical Therapy     EKG 12 lead     XR chest pa & lateral     Case request operating room: ARTHROPLASTY HIP TOTAL ANTERIOR - Left     folic acid (KP Folic Acid) 1 mg tablet     ferrous sulfate 324 (65 Fe) mg     ascorbic acid (VITAMIN C) 500 MG tablet     cholecalciferol (VITAMIN D3) 1,000 units tablet     zinc sulfate (ZINCATE) 220 mg capsule     traMADol (Ultram) 50 mg tablet   2  Secondary osteoarthritis of hip  Case request operating room: ARTHROPLASTY HIP TOTAL ANTERIOR - Left     Comprehensive metabolic panel     Hemoglobin A1C W/EAG Estimation     CBC and differential     If Symptomatic, order: UA w Reflex to Microscopic w Reflex to Culture     Protime-INR     APTT     MRSA culture     Ambulatory referral to Cardiology     Ambulatory referral to Family Practice     Ambulatory referral to Physical Therapy     EKG 12 lead     XR chest pa & lateral     Case request operating room: ARTHROPLASTY HIP TOTAL ANTERIOR - Left     traMADol (Ultram) 50 mg tablet   3  Greater trochanteric bursitis of left hip      4  Chronic hip pain, left  Comprehensive metabolic panel     Hemoglobin A1C W/EAG Estimation     CBC and differential     If Symptomatic, order: UA w Reflex to Microscopic w Reflex to Culture     Protime-INR     APTT     MRSA culture     Ambulatory referral to Cardiology     Ambulatory referral to Family Practice     Ambulatory referral to Physical Therapy     EKG 12 lead     XR chest pa & lateral   5   History of hypertension  Ambulatory referral to Cardiology   6  History of chronic kidney disease  Ambulatory referral to [facility]   7  Pre-op exam  Ambulatory referral to Cardiology     Ambulatory referral to Family Practice     Ambulatory referral to Physical Therapy     folic acid ( Folic Acid) 1 mg tablet     ferrous sulfate 324 (65 Fe) mg     ascorbic acid (VITAMIN C) 500 MG tablet     cholecalciferol (VITAMIN D3) 1,000 units tablet     zinc sulfate (ZINCATE) 220 mg capsule           Scribe Attestation    I,:  Yamileth Fernando am acting as a scribe while in the presence of the attending physician :       I,:  Tip Sawyer DO personally performed the services described in this documentation    as scribed in my presence  :           Please see below the last office encounter to serve as today's H&P no medical or orthopedic changes in her overall exam   The patient denies any recent fever, chills, nausea, vomiting, headache, chest pain, trouble breathing  She has been seen and cleared by her medical subspecialist in preparation for the procedure today  She will be a good candidate for aspirin postoperatively for DVT prophylaxis  She will be a good candidate for outpatient physical therapy following her discharge from the hospital   All questions addressed this morning  Proceed to OR for direct anterior left total hip arthroplasty  Vitals:    05/02/22 0626   BP: 134/64   Pulse: 81   Resp: 18   Temp: 97 9 °F (36 6 °C)   SpO2: 97%         Walter Hardwick is a very pleasant 68 y/o female who presents to the office today for a follow up evaluation of her left hip osteoarthritis, avascular necrosis, and greater trochanteric bursitis  Patient and I discussed the believe most her pain is stemming from the underlying arthritis and avascular necrosis  We did discuss surgical intervention of a left total hip arthroplasty with the anterior approach    In regards to her bursitis this can be treated with Voltaren gel which she may purchase over-the-counter  Patient and I engaged in a long discussion in regards to left anterior total hip arthroplasty  Patient denies any history of diabetes, gastric bleeding, peptic ulcers, MRSA, history of pulmonary embolism, or malignancy  She does not take turmeric  She does have a history of hypertension and chronic kidney disease  She does also have a history of a hernia that is being treated by gastroenterology  She is scheduled to undergo an endoscopy for possible surgery  I did discuss with her that any surgery for hernia would have to be done prior to total hip arthroplasty  Patient was in good understanding of this and aware that this may delay her surgery  The pre rocío and postoperative expectations were discussed here in the office today  The risks and benefits of undergoing left anterior total hip arthroplasty were discussed at length and consents were signed and placed in the chart   Please see risk discussion below  Risk of the surgery are inclusive of but not limited to bleeding, infection, nerve injury, blood clot, worsening of symptoms, not achieving the anticipated results, persistent stiffness, weakness and the need for additional surgery  The patient verbally stated they understood those risks and would like to proceed with the surgery  Patient will require PCP clearance as well as cardiac clearance prior to surgery  She is a good candidate for outpatient physical therapy  We did discuss that this is imperative postoperatively in order to assist in her recovery and that she should make any of her lower back conditions known to the therapist   She was in good understanding of this  Patient is inquiring about pain medication prior to surgery  I did prescribe her a small amount of tramadol which she should use sparingly  We also discussed that the use of tramadol prior to surgery could make it harder to control her pain postoperatively  She was in good understanding of this  All her questions were answered in the office today  She will be seen 2 weeks after surgery for re-evaluation      Subjective:  Left hip pain     Patient ID: Anuj Blackwood is a 67 y o  female      HPI  Manuelito Hedrick is a 67 year female presents the office today for follow-up evaluation of her left hip  Patient does have a known history of left hip arthritis, avascular necrosis, and greater trochanteric bursitis  She states in the past she has done physical therapy for the bursitis but this greatly irritated her lumbar spine  Since her last visit in December of 2021 she has had a series of injections with Dr Gloria clay which has greatly improved her lower back pain  Today she notes lateral-sided hip pain which is constant nature  She reports the pain is worse with motion and ambulation  She reports she has been very anxious about her left hip pain  Due to this she has been losing weight having GI upset as she is very nervous that she may require surgery for her left hip  She also states she has an abdominal hernia and due to this she is scheduled to undergo endoscopy in the next few days and possible surgery in the future  She denies any history of PE, blood clot, MRSA, malignancy, peptic ulcer, gastric bleed, or diabetes  She does have history of hypertension and chronic kidney disease       Review of Systems   Constitutional: Negative for chills, fever and unexpected weight change  HENT: Negative for hearing loss, nosebleeds and sore throat  Eyes: Negative for pain, redness and visual disturbance  Respiratory: Negative for cough, shortness of breath and wheezing  Cardiovascular: Negative for chest pain, palpitations and leg swelling  Gastrointestinal: Negative for abdominal pain, nausea and vomiting  Endocrine: Negative for polydipsia and polyuria  Genitourinary: Negative for dyspareunia and hematuria  Musculoskeletal: Positive for arthralgias and myalgias  Negative for joint swelling     Skin: Negative for rash and wound  Neurological: Negative for dizziness, numbness and headaches  Psychiatric/Behavioral: Negative for decreased concentration and suicidal ideas  The patient is not nervous/anxious              Medical History        Past Medical History:   Diagnosis Date    Acid reflux      Anxiety      Chronic kidney disease       chronic stones    Fractures      GERD (gastroesophageal reflux disease)      H/O hiatal hernia      Hyperlipidemia      Hypertension      Kidney stones      Low back pain      Nausea       at times    Poor appetite      Weight loss, unintentional       Since Nov 2021 20lb weight loss            Surgical History         Past Surgical History:   Procedure Laterality Date    APPENDECTOMY        BREAST SURGERY Left       excision cyst    CYSTOSCOPY         with stone extraction x 2    CYSTOSCOPY W/ RETROGRADES Left 09/17/2012     stent removal,     CYSTOSCOPY W/ URETEROSCOPY W/ LITHOTRIPSY Left 12/11/2006     Ca Phos 98%    CYSTOSCOPY W/ URETEROSCOPY W/ LITHOTRIPSY Left 08/12/2012     Ca ox di 60%, Ca ox mono 20%    ESOPHAGOGASTRODUODENOSCOPY        EXTRACORPOREAL SHOCK WAVE LITHOTRIPSY Left 4/13/2016     Procedure: LITHROTRIPSY EXTRACORPORAL SHOCKWAVE (ESWL);   Surgeon: Rc Wagner MD;  Location: [facility] OR;  Service:     GA CYSTO/URETERO W/LITHOTRIPSY &INDWELL STENT INSRT Left 9/1/2016     Procedure: CYSTOSCOPY URETEROSCOPY WITH STONE EXTRACTION , RETROGRADE PYELOGRAM AND INSERTION STENT URETERAL;  Surgeon: Rc Wagner MD;  Location: [address] [place] [address];  Service: Urology    GA CYSTO/URETERO W/LITHOTRIPSY &INDWELL STENT INSRT Left 8/12/2016     Procedure: CYSTOSCOPY URETEROSCOPY WITH LITHOTRIPSY HOLMIUM LASER, RETROGRADE PYELOGRAM AND INSERTION STENT URETERAL;  Surgeon: Rc Wagner MD;  Location: [address] [place] [address];  Service: Urology    TUBAL LIGATION        URETERAL STENT PLACEMENT Left 08/31/2012     Narrowing of the left distal ureter for 3cm       WISDOM TOOTH EXTRACTION                      Family History   Problem Relation Age of Onset    Stroke Father           TIA    Parkinsonism Father      Cancer Maternal Aunt      Cancer Maternal Uncle      Heart disease Maternal Uncle           Massive MI    No Known Problems Mother      No Known Problems Sister      No Known Problems Brother      No Known Problems Paternal Aunt      No Known Problems Paternal Uncle      No Known Problems Maternal Grandmother      No Known Problems Maternal Grandfather      No Known Problems Paternal Grandmother      No Known Problems Paternal Grandfather           Social History      Occupational History    Not on file   Tobacco Use    Smoking status: Never Smoker    Smokeless tobacco: Never Used   Vaping Use    Vaping Use: Never used   Substance and Sexual Activity    Alcohol use: Yes       Comment: wine almost every night to sleep    Drug use: No    Sexual activity: Yes            Current Outpatient Medications:     acetaminophen (TYLENOL) 650 mg CR tablet, Take 1 tablet (650 mg total) by mouth every 8 (eight) hours as needed for mild pain (Patient not taking: Reported on 2/15/2022 ), Disp: 30 tablet, Rfl: 0    alendronate (FOSAMAX) 70 mg tablet, Take 70 mg by mouth once a week, Disp: , Rfl: 0    ALPRAZolam (XANAX) 0 25 mg tablet, Take by mouth daily at bedtime as needed for anxiety , Disp: , Rfl:     amLODIPine (NORVASC) 5 mg tablet, Take 5 mg by mouth every morning , Disp: , Rfl:     ascorbic acid (VITAMIN C) 500 MG tablet, Take 1 tablet (500 mg total) by mouth 2 (two) times a day, Disp: 60 tablet, Rfl: 0    aspirin 81 MG tablet, Take 81 mg by mouth every morning   (Patient not taking: Reported on 2/15/2022 ), Disp: , Rfl:     atorvastatin (LIPITOR) 20 mg tablet, Take 10 mg by mouth daily  , Disp: , Rfl:     cholecalciferol (VITAMIN D3) 1,000 units tablet, Take 1 tablet (1,000 Units total) by mouth daily, Disp: 30 tablet, Rfl: 0    citric acid-potassium citrate (POLYCITRA K) 1,100-334 mg/5 mL solution, Take 10 mEq by mouth 1 tablet by mouth twice daily with meals, Disp: , Rfl:     co-enzyme Q-10 30 MG capsule, Take 200 mg by mouth daily  (Patient not taking: Reported on 2/15/2022 ), Disp: , Rfl:     famotidine (PEPCID) 20 mg tablet, Take 1 tablet (20 mg total) by mouth daily (Patient taking differently: Take 20 mg by mouth daily Half hour prior to breakfast ), Disp: 30 tablet, Rfl: 0    ferrous sulfate 324 (65 Fe) mg, Take 1 tablet (324 mg total) by mouth daily before breakfast, Disp: 30 tablet, Rfl: 0    folic acid (KP Folic Acid) 1 mg tablet, Take 1 tablet (1 mg total) by mouth daily, Disp: 30 tablet, Rfl: 0    MELATONIN PO, Take by mouth as needed, Disp: , Rfl:     multivitamin (THERAGRAN) TABS, Take 1 tablet by mouth every morning  , Disp: , Rfl:     olmesartan (BENICAR) 40 mg tablet, Take 40 mg by mouth daily  , Disp: , Rfl:     omeprazole (PriLOSEC) 20 mg delayed release capsule, Take 1 capsule (20 mg total) by mouth daily (Patient taking differently: Take 40 mg by mouth daily  ), Disp: 90 capsule, Rfl: 2    traMADol (Ultram) 50 mg tablet, Take 1 tablet (50 mg total) by mouth every 6 (six) hours as needed for moderate pain, Disp: 30 tablet, Rfl: 0    zinc sulfate (ZINCATE) 220 mg capsule, Take 1 capsule (220 mg total) by mouth daily, Disp: 30 capsule, Rfl: 0           Allergies   Allergen Reactions    Magnesium Oxybate [Oxybate] Diarrhea       DIARRHEA         Objective:      Vitals:     03/03/22 1023   BP: 134/80   Pulse: 100         Body mass index is 21 54 kg/m²      Left Hip Exam      Tenderness   Left hip tenderness location: proximal IT band tenderness      Range of Motion   Abduction: 45   Adduction: 25   Extension: 0   Flexion: 120   External rotation: 40   Internal rotation: 10      Other   Erythema: absent  Scars: absent  Sensation: normal  Pulse: present     Comments:  Anterior skin is clean, dry, intact   No overhanging panus    Positive stinchfield test   Positive FADDIR  Pain is worse with lateral hip motions and at the extremes are motions   Minimal IT band tenderness at rest                  Physical Exam  Vitals reviewed  Constitutional:       Appearance: She is well-developed  HENT:      Head: Normocephalic and atraumatic  Eyes:      General: No scleral icterus  Conjunctiva/sclera: Conjunctivae normal    Cardiovascular:      Rate and Rhythm: Normal rate  Pulses: Normal pulses  Pulmonary:      Effort: Pulmonary effort is normal  No respiratory distress  Musculoskeletal:      Cervical back: Normal range of motion and neck supple  Comments: As noted in HPI   Skin:     General: Skin is warm and dry  Neurological:      Mental Status: She is alert and oriented to person, place, and time  Psychiatric:         Behavior: Behavior normal             I have personally reviewed pertinent films in PACS  MRI of the left femur obtained on 11/4/21 was reviewed again and demonstrate proximal lateral femoral cortical thickening, benign in nature  Avascular necrosis of the left hip joint with severe osteoarthritis noted      X-rays of the left hip obtained on 03/03/2022 demonstrate severe end-stage osteoarthritis with avascular necrosis noted    No fracture, lytic or blastic lesion appreciated

## 2022-05-03 VITALS
OXYGEN SATURATION: 96 % | RESPIRATION RATE: 16 BRPM | TEMPERATURE: 98.7 F | HEIGHT: 61 IN | BODY MASS INDEX: 20.92 KG/M2 | WEIGHT: 110.8 LBS | HEART RATE: 88 BPM | DIASTOLIC BLOOD PRESSURE: 59 MMHG | SYSTOLIC BLOOD PRESSURE: 114 MMHG

## 2022-05-03 LAB
ANION GAP SERPL CALCULATED.3IONS-SCNC: 5 MMOL/L (ref 4–13)
BUN SERPL-MCNC: 10 MG/DL (ref 5–25)
CALCIUM SERPL-MCNC: 7.6 MG/DL (ref 8.3–10.1)
CHLORIDE SERPL-SCNC: 110 MMOL/L (ref 100–108)
CO2 SERPL-SCNC: 27 MMOL/L (ref 21–32)
CREAT SERPL-MCNC: 0.77 MG/DL (ref 0.6–1.3)
DME PARACHUTE DELIVERY DATE REQUESTED: NORMAL
DME PARACHUTE ITEM DESCRIPTION: NORMAL
DME PARACHUTE ORDER STATUS: NORMAL
DME PARACHUTE SUPPLIER NAME: NORMAL
DME PARACHUTE SUPPLIER PHONE: NORMAL
ERYTHROCYTE [DISTWIDTH] IN BLOOD BY AUTOMATED COUNT: 13.3 % (ref 11.6–15.1)
GFR SERPL CREATININE-BSD FRML MDRD: 74 ML/MIN/1.73SQ M
GLUCOSE P FAST SERPL-MCNC: 99 MG/DL (ref 65–99)
GLUCOSE SERPL-MCNC: 99 MG/DL (ref 65–140)
HCT VFR BLD AUTO: 29.7 % (ref 34.8–46.1)
HCT VFR BLD AUTO: 35.7 % (ref 34.8–46.1)
HGB BLD-MCNC: 11.2 G/DL (ref 11.5–15.4)
HGB BLD-MCNC: 9.7 G/DL (ref 11.5–15.4)
MCH RBC QN AUTO: 32.4 PG (ref 26.8–34.3)
MCHC RBC AUTO-ENTMCNC: 32.7 G/DL (ref 31.4–37.4)
MCV RBC AUTO: 99 FL (ref 82–98)
PLATELET # BLD AUTO: 116 THOUSANDS/UL (ref 149–390)
PMV BLD AUTO: 12.9 FL (ref 8.9–12.7)
POTASSIUM SERPL-SCNC: 3.8 MMOL/L (ref 3.5–5.3)
RBC # BLD AUTO: 2.99 MILLION/UL (ref 3.81–5.12)
SODIUM SERPL-SCNC: 142 MMOL/L (ref 136–145)
WBC # BLD AUTO: 5.84 THOUSAND/UL (ref 4.31–10.16)

## 2022-05-03 PROCEDURE — 80048 BASIC METABOLIC PNL TOTAL CA: CPT | Performed by: PHYSICIAN ASSISTANT

## 2022-05-03 PROCEDURE — 85018 HEMOGLOBIN: CPT | Performed by: PHYSICIAN ASSISTANT

## 2022-05-03 PROCEDURE — 85027 COMPLETE CBC AUTOMATED: CPT | Performed by: PHYSICIAN ASSISTANT

## 2022-05-03 PROCEDURE — 97110 THERAPEUTIC EXERCISES: CPT

## 2022-05-03 PROCEDURE — 97116 GAIT TRAINING THERAPY: CPT

## 2022-05-03 PROCEDURE — 97535 SELF CARE MNGMENT TRAINING: CPT

## 2022-05-03 PROCEDURE — 97167 OT EVAL HIGH COMPLEX 60 MIN: CPT

## 2022-05-03 PROCEDURE — 85014 HEMATOCRIT: CPT | Performed by: PHYSICIAN ASSISTANT

## 2022-05-03 PROCEDURE — 99024 POSTOP FOLLOW-UP VISIT: CPT | Performed by: ORTHOPAEDIC SURGERY

## 2022-05-03 RX ORDER — ASPIRIN 325 MG
325 TABLET ORAL 2 TIMES DAILY
Qty: 84 TABLET | Refills: 0 | Status: SHIPPED | OUTPATIENT
Start: 2022-05-03

## 2022-05-03 RX ORDER — OXYCODONE HYDROCHLORIDE 5 MG/1
TABLET ORAL
Qty: 30 TABLET | Refills: 0 | Status: SHIPPED | OUTPATIENT
Start: 2022-05-03

## 2022-05-03 RX ORDER — ACETAMINOPHEN 325 MG/1
975 TABLET ORAL EVERY 8 HOURS
Refills: 0
Start: 2022-05-03

## 2022-05-03 RX ORDER — NALOXONE HYDROCHLORIDE 4 MG/.1ML
SPRAY NASAL
Qty: 1 EACH | Refills: 0 | Status: SHIPPED | OUTPATIENT
Start: 2022-05-03

## 2022-05-03 RX ORDER — DOCUSATE SODIUM 100 MG/1
100 CAPSULE, LIQUID FILLED ORAL 2 TIMES DAILY
Qty: 60 CAPSULE | Refills: 0 | Status: SHIPPED | OUTPATIENT
Start: 2022-05-03

## 2022-05-03 RX ADMIN — PANTOPRAZOLE SODIUM 40 MG: 40 TABLET, DELAYED RELEASE ORAL at 07:40

## 2022-05-03 RX ADMIN — DOCUSATE SODIUM 100 MG: 100 CAPSULE, LIQUID FILLED ORAL at 08:17

## 2022-05-03 RX ADMIN — ASPIRIN 325 MG: 325 TABLET ORAL at 08:17

## 2022-05-03 RX ADMIN — AMLODIPINE BESYLATE 5 MG: 5 TABLET ORAL at 08:17

## 2022-05-03 RX ADMIN — CEFAZOLIN SODIUM 2000 MG: 2 SOLUTION INTRAVENOUS at 00:08

## 2022-05-03 RX ADMIN — OXYCODONE HYDROCHLORIDE 5 MG: 5 TABLET ORAL at 07:39

## 2022-05-03 RX ADMIN — GABAPENTIN 200 MG: 100 CAPSULE ORAL at 08:17

## 2022-05-03 RX ADMIN — ACETAMINOPHEN 975 MG: 325 TABLET, FILM COATED ORAL at 03:22

## 2022-05-03 RX ADMIN — SODIUM CHLORIDE 75 ML/HR: 0.9 INJECTION, SOLUTION INTRAVENOUS at 01:30

## 2022-05-03 RX ADMIN — ACETAMINOPHEN 975 MG: 325 TABLET, FILM COATED ORAL at 11:20

## 2022-05-03 RX ADMIN — DEXAMETHASONE SODIUM PHOSPHATE 10 MG: 4 INJECTION, SOLUTION INTRA-ARTICULAR; INTRALESIONAL; INTRAMUSCULAR; INTRAVENOUS; SOFT TISSUE at 08:17

## 2022-05-03 NOTE — CASE MANAGEMENT
Case Management Discharge Planning Note    Patient name Marlon Murphy  Location [facility]  [address] 67 0 MRN [id number]  : 1945 Date 5/3/2022       Current Admission Date: 2022  Current Admission Diagnosis:Status post hip replacement, left   Patient Active Problem List    Diagnosis Date Noted    Status post hip replacement, left 2022    Low back pain 2022    Status post hysteroscopy, D&C 2022    Constipation 2022    Sacroiliitis ([facility] ) 2022    Continuous opioid dependence ([facility] ) 2022    H/O hiatal hernia     Thickened endometrium 2022    Weight loss, unintentional 2022    Unintentional weight change 2022    Colon cancer screening 2022    Epigastric discomfort 2022    Nausea 2022    Abnormal CT scan, pelvis 2022    Lumbar spondylosis 2022    Long term use of alendronate therapy (Fosamax) 2021    Osteoporosis of multiple sites 2021    Pain of left femur 2021    Iliotibial band syndrome affecting left lower leg 2021    Benign essential hypertension 2017    Closed fracture of left distal radius 2017    Right flank pain 2016    Calcium nephrolithiasis 2016    Hyperlipidemia     Chronic kidney disease     Hiatal hernia     Anxiety     Diarrhea 2016    Mass of uterus - possible fibroid 2016    Pyelonephritis 08/15/2016    Chest pain 2016    HTN (hypertension) 2016      LOS (days): 0  Geometric Mean LOS (GMLOS) (days):   Days to GMLOS:     OBJECTIVE:     Current admission status: Outpatient Surgery   Preferred Pharmacy:   [address] [place] [address]  Phone: [phone] Fax: [phone]    Primary Care Provider: Franky John DO    Primary Insurance: MEDICARE  Secondary Insurance: BLUE CROSS    DISCHARGE DETAILS:    Other Referral/Resources/Interventions Provided:  Interventions: DME  Referral Comments: Rolling walker approved - no copay    Rolling walker delivered to pt's room, PT will adjust     Treatment Team Recommendation: Home (with outpatient therapy)  Discharge Destination Plan[de-identified] Home (with outpatient therapy)  Transport at Discharge : [facility]

## 2022-05-03 NOTE — NURSING NOTE
Patient's IV removed  DSD applied to area  All discharge instructions gone over with patient  Verdia Roam removed and placed in cameron  All belongings with patient   Walkewr and cane with pt

## 2022-05-03 NOTE — DISCHARGE INSTRUCTIONS
Direct Anterior Total Hip Replacement     WHAT YOU SHOULD KNOW:   Minimally invasive total hip replacement is surgery to replace a damaged hip joint with an implant  AFTER YOU LEAVE:     Medicines:   · Anticoagulants  are a type of blood thinner medicine that helps prevent clots  Clots can cause strokes, heart attacks, and death  These medicines may cause you to bleed or bruise more easily  You will be on full-dose aspirin 325mg twice a day for 6 weeks  ¨ Watch for bleeding from your gums or nose  Watch for blood in your urine and bowel movements  Use a soft washcloth and a soft toothbrush  If you shave, use an electric razor  Avoid activities that can cause bruising or bleeding  ¨ Tell your healthcare provider about all medicines you take because many medicines cannot be used with anticoagulants  Do not start or stop any medicines unless your healthcare provider tells you to  Tell your dentist and other healthcare providers that you take anticoagulants  Wear a bracelet or necklace that says you take this medicine  · Prescription pain medicine  You should take 975mg of over the counter acetaminophen (tylenol) every 8 hours for baseline pain control  You will also be given oxycodone 5mg tablets  Take 1/2 - 1 by mouth every 4 to 6 hours as needed for pain  · Stool softeners  make it easier for you to have a bowel movement  You may need this medicine to treat or prevent constipation  You will be given Colace 100mg to take twice a day    · Take your medicine as directed  Contact your orthopedist if you think your medicine is not helping or if you have side effects  Tell him if you are allergic to any medicine  Keep a list of the medicines, vitamins, and herbs you take  Include the amounts, and when and why you take them  Bring the list or the pill bottles to follow-up visits  Carry your medicine list with you in case of an emergency  Follow up with your orthopedist as directed:   You may need to return to have your wound checked  Write down your questions so you remember to ask them during your visits  Please schedule an appointment to see Dr Bibi Vargas 2 weeks after surgery  Physical therapy:  A physical therapist teaches you exercises to help improve movement and strength, and to decrease pain  You will begin outpatient physical therapy later this week as planned  Wound Care:  Please leave the Mepilex dressing in place for 7 days after surgery  After 7 days, you may remove the dressing and leave the incision open to air  If the incision is uncomfortable under clothing after the Mepilex is removed, you may cover it with a a dry sterile dressing, but should remain dry at all times  Once the dressing is off, please keep the incision dry for another week until your follow up appointment    Self-care:   · Use a cane, walker, or crutches as directed  These devices will help decrease your risk of falling  Your therapist will guide you  · Use ice:  Ice helps decrease swelling and pain  Ice may also help prevent tissue damage  Use an ice pack or put crushed ice in a plastic bag  Cover it with a towel, and place it on your knee for 15 to 20 minutes every hour as directed  · Prevent dislocation of your hip implant:      ¨ Avoid extremes of external rotation of the leg    Contact your orthopedist if:  · You have a fever over 101 0°F     · You have trouble moving or bending your joint  · You have increased pain and swelling in your joint, even after you take pain medicine  · You have back pain or lower leg pain when you bend your foot upwards  · You have questions or concerns about your condition or care  Seek immediate care or call 911 if:   · You feel like you are going to faint  · Blood soaks through/saturates your bandage  · Your incision comes apart  · Your incision is red, swollen, or draining pus  · You cannot walk or move your joint, or the limb is numb      · Your arm or leg feels warm, tender, and painful  It may look swollen and red  · You have a seizure or feel confused  · You feel lightheaded, short of breath, and have chest pain  · You have chest pain when you take a deep breath or cough  You may cough up blood  © 2014 [address] is for End User's use only and may not be sold, redistributed or otherwise used for commercial purposes  All illustrations and images included in CareNotes® are the copyrighted property of [employer] , [employer]  or Farhan Velasquez  The above information is an  only  It is not intended as medical advice for individual conditions or treatments  Talk to your doctor, nurse or pharmacist before following any medical regimen to see if it is safe and effective for you

## 2022-05-03 NOTE — PROGRESS NOTES
Progress Note - Orthopedics   Kartik Garcia 67 y o  female MRN: [id number]  Unit/Bed#: [address] [place] [address] Encounter: [id number]    Assessment:  1) POD#1 s/p Direct Anterior Left MOLINA    Plan:  Ancef 2g IV x 2 for 24 hours postop - completed  DVT prophylaxis: ASA 325mg PO BID/SCD's/Ambulation  PT/OT- WBAT LLE  Analgesia PRN  Follow up AM labs - H/H low, DC IVF and recheck at 12 showed significant improvement to 11/35  Asymptomatic; no hematoma  Espinoza DC - voiding well  Dressing- monitor for drainage, Mepilex may remain in place 7 days  Discharge planning - Plan for discharge to home today to start outpatient physical therapy later this week    Weight bearing: WBAT LLE    VTE Pharmacologic Prophylaxis: ASA 325mg PO BID  VTE Mechanical Prophylaxis: sequential compression device    Subjective:  Patient seen and examined in bed this afternoon  Pain controlled  No overnight events  Able to work with PT/OT today and did well  Denies parasthesias    Vitals: Blood pressure 150/90, pulse 87, temperature 98 3 °F (36 8 °C), temperature source Oral, resp  rate 18, height 5' 1" (1 549 m), weight 50 3 kg (110 lb 12 9 oz), SpO2 95 %, not currently breastfeeding  ,Body mass index is 20 94 kg/m²        Intake/Output Summary (Last 24 hours) at 5/3/2022 1502  Last data filed at 5/3/2022 0546  Gross per 24 hour   Intake 950 ml   Output 950 ml   Net 0 ml       Invasive Devices  Report    Peripheral Intravenous Line            Peripheral IV 05/02/22 Left Arm 1 day                Physical Exam: NAD, A&Ox3, resting comfortably  Ortho Exam: LLE: left anterior hip Dsg c/d/i, thigh soft, +LFCN SILT, +Fem nerve motor, +DF/PF/EHL, +L3-S1 SILT, DP2+, foot warm    Lab, Imaging and other studies: PO XR L hip:  Reviewed and acceptable    Lab Results   Component Value Date    WBC 5 84 05/03/2022    HGB 11 2 (L) 05/03/2022    HCT 35 7 05/03/2022    MCV 99 (H) 05/03/2022     (L) 05/03/2022     Lab Results   Component Value Date    SODIUM 142 05/03/2022 K 3 8 05/03/2022     (H) 05/03/2022    CO2 27 05/03/2022    AGAP 5 05/03/2022    BUN 10 05/03/2022    CREATININE 0 77 05/03/2022    GLUC 99 05/03/2022    GLUF 99 05/03/2022    CALCIUM 7 6 (L) 05/03/2022    AST 14 04/05/2022    ALT 25 04/05/2022    ALKPHOS 59 04/05/2022    TP 6 4 04/05/2022    TBILI 0 54 04/05/2022    EGFR 74 05/03/2022     Aby Saenz PA-C

## 2022-05-03 NOTE — PHYSICAL THERAPY NOTE
PT TREATMENT     05/03/22 0859   Note Type   Note Type BID visit/treatment   Pain Assessment   Pain Assessment Tool 0-10   Pain Score 8   Pain Location/Orientation Orientation: Left; Location: Hip  (Pt was medicated for pain prior to PT visit)   Restrictions/Precautions   LLE Weight Bearing Per Order WBAT   Other Precautions Pain   General   Chart Reviewed Yes   Subjective   Subjective Pt reports 8/10 pain left hip  Pt also reports when she 1st woke up morning that she felt fine, but now she feels like her ears are clogged and she can not hear normally  RN is aware  Bed Mobility   Supine to Sit 4  Minimal assistance   Additional items Assist x 1;LE management;Verbal cues; Increased time required; Bedrails   Transfers   Sit to Stand   (Close S)   Additional items Assist x 1;Verbal cues   Stand to Sit   (Close S)   Additional items Assist x 1;Verbal cues   Ambulation/Elevation   Gait pattern Short stride; Step to  (Mildly antalgic LLE)   Gait Assistance   (Close S)   Additional items Assist x 1;Verbal cues; Tactile cues   Assistive Device Rolling walker   Distance 80 ft x 2 with change in direction   Stair Management Assistance 5  Supervision   Additional items Verbal cues   Stair Management Technique Step to pattern; Two rails   Number of Stairs 4   Balance   Static Sitting Good   Static Standing Fair +   Dynamic Standing Fair   Ambulatory Fair   Exercises   Quad Sets 10 reps; Left   Heelslides AAROM;10 reps; Left   Glute Sets 10 reps   Hip Abduction AAROM;10 reps; Left   Ankle Pumps 10 reps; Left   Assessment   Assessment Pt is doing very well with bed mobility, transfers, ambulation with a RW and gait on stairs status post left MOLINA  Pt will be safe for discharge this afternoon after 2nd session of physical therapy and will benefit from continued physical therapy services as an outpatient  The patient's AM-PAC Basic Mobility Inpatient Short Form Raw Score is 18   A Raw score of greater than 16 suggests the patient may benefit from discharge to home  Please also refer to the recommendation of the Physical Therapist for safe discharge planning  Plan   Treatment/Interventions Functional transfer training;ADL retraining;LE strengthening/ROM; Elevations; Therapeutic exercise; Endurance training;Patient/family training;Equipment eval/education; Bed mobility;Gait training; Compensatory technique education   PT Frequency Twice a day   Recommendation   PT Discharge Recommendation Home with outpatient rehabilitation   Equipment Recommended   Aleks Anthony to be given to pt by CM  Pt has a cane)   AM-PAC Basic Mobility Inpatient   Turning in Bed Without Bedrails 3   Lying on Back to Sitting on Edge of Flat Bed 3   Moving Bed to Chair 3   Standing Up From Chair 3   Walk in Room 3   Climb 3-5 Stairs 3   Basic Mobility Inpatient Raw Score 18   Basic Mobility Standardized Score 41 05   Highest Level Of Mobility   -[facility] Goal 6: Walk 10 steps or more   Education   Education Provided Mobility training;Home exercise program;Assistive device   Patient Explanation/teachback used;Demonstrates acceptance/verbal understanding   End of Consult   Patient Position at End of Consult Bedside chair; All needs within reach   End of Consult Comments Cold pack left hip/thigh   Licensure   [place] License Number  Syl Petersen PT [id number]     Portions of the documentation may have been created using voice recognition software  Occasional wrong word or sound alike substitutions may have occurred due to the inherent limitation of the voice recognition software  Read the chart carefully and recognize, using context, where substitutions have occurred

## 2022-05-03 NOTE — OCCUPATIONAL THERAPY NOTE
Occupational Therapy Evaluation/Treatment Note       05/03/22 0950   Note Type   Note type Evaluation   Restrictions/Precautions   Weight Bearing Precautions Per Order Yes   LLE Weight Bearing Per Order WBAT   Other Precautions Fall Risk;Pain   Pain Assessment   Pain Assessment Tool 0-10   Pain Score 5   Pain Location/Orientation Orientation: Left; Location: Hip   Home Living   Type of [address] [place] [address] Avenue; Able to live on main level with bedroom/bathroom;Stairs to enter with rails  (5 [address])   Bathroom Shower/Tub Walk-in shower   Bathroom Toilet Standard   Bathroom Equipment Grab bars in [address] [facility]   (Standard walker, 4 wheeled walker)   Additional Comments Patient presents for OT eval POD #1 s/p elective L MOLINA   Prior Function   Level of [place] Independent with ADLs and functional mobility   Lives With Alone   Receives Help From Family  (Fiance will be staying with patient at discharge)   ADL Assistance Independent   IADLs Independent   Falls in the last 6 months 0   Comments PTA patient was independent in all ADLs and mobility without AD   ADL   Eating Assistance 7  Independent   Grooming Assistance 5  Supervision/Setup   UB Bathing Assistance 5  Supervision/Setup   [address]  [address]  [address] [facility] [address]  [address]  4  Minimal Assistance   Bed Mobility   Additional Comments Not assessed, patient received seated in recliner chair   Transfers   Sit to Stand 4  Minimal assistance   Additional items Assist x 1   Stand to Sit 4  Minimal assistance   Additional items Assist x 1   Functional Mobility   Functional Mobility 4  Minimal assistance   Additional Comments Patient ambulated few feet in room with RW   Balance   Static Sitting Good   Dynamic Sitting Fair +   Static Standing Fair   Dynamic Standing Fair   Activity Tolerance   Activity Tolerance Patient limited by fatigue;Patient limited by pain   RUE Assessment   RUE Assessment WFL   LUE Assessment   LUE Assessment WFL   Cognition   Overall Cognitive Status WFL   Arousal/Participation Alert; Cooperative   Attention Within functional limits   Orientation Level Oriented X4   Memory Within functional limits   Following Commands Follows all commands and directions without difficulty   Assessment   Limitation Decreased ADL status; Decreased UE strength;Decreased Safe judgement during ADL;Decreased endurance;Decreased self-care trans;Decreased high-level ADLs   Prognosis Good   Assessment Patient evaluated by Occupational Therapy  Patient admitted with Status post hip replacement, left  The patients occupational profile, medical and therapy history includes a extensive additional review of physical, cognitive, or psychosocial history related to current functional performance  Comorbidities affecting functional mobility and ADLS include: HTN, HLD, CKD, anxiety, GERD, kidney stones, lumbar disc disease, sacroilitis  Prior to admission, patient was independent with functional mobility without assistive device, independent with ADLS and independent with IADLS  The evaluation identifies the following performance deficits: weakness, impaired balance, decreased endurance, increased fall risk, new onset of impairment of functional mobility, decreased ADLS, decreased IADLS, pain, decreased activity tolerance, decreased safety awareness and decreased strength, that result in activity limitations and/or participation restrictions  This evaluation requires clinical decision making of high complexity, because the patient presents with comorbidites that affect occupational performance and required significant modification of tasks or assistance with consideration of multiple treatment options    The Barthel Index was used as a functional outcome tool presenting with a score of Barthel Index Score: 55, indicating marked limitations of functional mobility and ADLS  The patient's raw score on the AM-PAC Daily Activity inpatient short form is 20, standardized score is 42 03, greater than 39 4  Patients at this level are likely to benefit from DC to home  Please refer to the recommendation of the Occupational Therapist for safe DC planning  Patient will benefit from skilled Occupational Therapy services to address above deficits and facilitate a safe return to prior level of function  Goals   Patient Goals 'to be as independent as possible'   STG Time Frame 1-3   Short Term Goal  Goals established to promote Patient Goals: 'to be as independent as possible':  Patient will increase standing tolerance to 10 minutes during ADL task to decrease assistance level and decrease fall risk; Patient will increase bed mobility to supervision in preparation for ADLS and transfers; Patient will increase functional mobility to and from bathroom with rolling walker with supervision to increase performance with ADLS and to use a toilet; Patient will tolerate 5 minutes of UE ROM/strengthening to increase general activity tolerance and performance in ADLS/IADLS; Patient will improve functional activity tolerance to 5 minutes of sustained functional tasks to increase participation in basic self-care and decrease assistance level;  Patient will be able to to verbalize understanding and perform energy conservation/proper body mechanics during ADLS and functional mobility at least 75% of the time with minimal cueing to decrease signs of fatigue and increase stamina to return to prior level of function; Patient will increase dynamic sitting balance to good to improve the ability to sit at edge of bed or on a chair for ADLS;  Patient will increase static/dynamic standing balance to fair+ to improve postural stability and decrease fall risk during standing ADLS and transfers      LTG Time Frame 3-7   Long Term Goal Patient will increase standing tolerance to 15 minutes during ADL task to decrease assistance level and decrease fall risk; Patient will increase bed mobility to independent in preparation for ADLS and transfers; Patient will increase functional mobility to and from bathroom with rolling walker independently to increase performance with ADLS and to use a toilet; Patient will tolerate 10 minutes of UE ROM/strengthening to increase general activity tolerance and performance in ADLS/IADLS; Patient will improve functional activity tolerance to 10 minutes of sustained functional tasks to increase participation in basic self-care and decrease assistance level;  Patient will be able to to verbalize understanding and perform energy conservation/proper body mechanics during ADLS and functional mobility at least 90% of the time with no cueing to decrease signs of fatigue and increase stamina to return to prior level of function; Patient will increase static/dynamic standing balance to good to improve postural stability and decrease fall risk during standing ADLS and transfers  Pt will score >/= 24/24 on AM-PAC Daily Activity Inpatient scale to promote safe independence with ADLs and functional mobility; Pt will score >/= 90/100 on Barthel Index in order to decrease caregiver assistance needed and increase ability to perform ADLs and functional mobility  Functional Transfer Goals   Pt Will Perform All Functional Transfers   (STG supervision, LTG independent)   ADL Goals   Pt Will Perform Grooming   (LTG independent)   Pt Will Perform Bathing   (STG supervision, LTG independent)   Pt Will Perform UE Dressing   (LTG independent)   Pt Will Perform LE Dressing   (STG supervision, LTG independent)   Pt Will Perform Toileting   (STG supervision, LTG independent)   Plan   Treatment Interventions ADL retraining;Functional transfer training;UE strengthening/ROM; Endurance training;Patient/family training;Equipment evaluation/education; Compensatory technique education;Continued evaluation; Energy conservation; Activityengagement   Goal Expiration Date 05/10/22   OT Frequency 5x/wk   Additional Treatment Session   Start Time 0940   End Time 0950   Treatment Assessment S: "It feels great to get dressed in my own clothes" O: Patient performed sit to stand from recliner with supervision; ambulated short household distance to/from bathroom with RW and supervision; ambulatory transfer to/from standard height toilet with RW and supervision; stand to sit to recliner with supervision; While seated in chair completed dressing task: Upper body dressing: [facility] gown, don bra, don overhead shirt independently;  Lower body dressing: Don pants with min assist to thread LLE through, supervision to stand and pull up over hips; supervision to don/doff R sock and shoe; min assist to don/doff L sock and shoe; Pt educate don safe walk in shower transfer technique via demonstration/explanation by OT, pt verbalized good understanding; A: Lower body ADLs on the LLE limited by pain however patient reports bony will be assisting her upon discharge home, patient with good tolerance to activity and anticipate pt will progress quickly towards OT goals of care, at end of session seated in recliner chair with all needs met P: return home with assist PRN from bony; plans for outpatient PT later this week   Recommendation   OT Discharge Recommendation No rehabilitation needs   AM-PAC Daily Activity Inpatient   Lower Body Dressing 3   Bathing 3   Toileting 3   Upper Body Dressing 3   Grooming 4   Eating 4   Daily Activity Raw Score 20   Daily Activity Standardized Score (Calc for Raw Score >=11) 42 03   AM-PAC Applied Cognition Inpatient   Following a Speech/Presentation 4   Understanding Ordinary Conversation 4   Taking Medications 4   Remembering Where Things Are Placed or Put Away 4   Remembering List of 4-5 Errands 4   Taking Care of Complicated Tasks 4   Applied Cognition Raw Score 24   Applied Cognition Standardized Score 62 21   Barthel Index   Feeding 10   Bathing 0   Grooming Score 0   Dressing Score 5   Bladder Score 10   Bowels Score 10   Toilet Use Score 5   Transfers (Bed/Chair) Score 10   Mobility (Level Surface) Score 0   Stairs Score 5   Barthel Index Score [address] [facility]   Licensure   [place] License Number  Mary Grace Sosa, OTR/L [id number]

## 2022-05-03 NOTE — PLAN OF CARE
Problem: Nutrition/Hydration-ADULT  Goal: Nutrient/Hydration intake appropriate for improving, restoring or maintaining nutritional needs  Description: Monitor and assess patient's nutrition/hydration status for malnutrition  Collaborate with interdisciplinary team and initiate plan and interventions as ordered  Monitor patient's weight and dietary intake as ordered or per policy  Utilize nutrition screening tool and intervene as necessary  Determine patient's food preferences and provide high-protein, high-caloric foods as appropriate       INTERVENTIONS:  - Monitor oral intake, urinary output, labs, and treatment plans  - Assess nutrition and hydration status and recommend course of action  - Evaluate amount of meals eaten  - Assist patient with eating if necessary   - Allow adequate time for meals  - Recommend/ encourage appropriate diets, oral nutritional supplements, and vitamin/mineral supplements  - Order, calculate, and assess calorie counts as needed  - Recommend, monitor, and adjust tube feedings and TPN/PPN based on assessed needs  - Assess need for intravenous fluids  - Provide specific nutrition/hydration education as appropriate  - Include patient/family/caregiver in decisions related to nutrition  Outcome: Progressing     Problem: MOBILITY - ADULT  Goal: Maintain or return to baseline ADL function  Description: INTERVENTIONS:  -  Assess patient's ability to carry out ADLs; assess patient's baseline for ADL function and identify physical deficits which impact ability to perform ADLs (bathing, care of mouth/teeth, toileting, grooming, dressing, etc )  - Assess/evaluate cause of self-care deficits   - Assess range of motion  - Assess patient's mobility; develop plan if impaired  - Assess patient's need for assistive devices and provide as appropriate  - Encourage maximum independence but intervene and supervise when necessary  - Involve family in performance of ADLs  - Assess for home care needs following discharge   - Consider OT consult to assist with ADL evaluation and planning for discharge  - Provide patient education as appropriate  Outcome: Progressing  Goal: Maintains/Returns to pre admission functional level  Description: INTERVENTIONS:  - Perform BMAT or MOVE assessment daily    - Set and communicate daily mobility goal to care team and patient/family/caregiver  - Collaborate with rehabilitation services on mobility goals if consulted  - Perform Range of Motion 3 times a day    - Out of bed for toileting  - Record patient progress and toleration of activity level   Outcome: Progressing     Problem: Potential for Falls  Goal: Patient will remain free of falls  Description: INTERVENTIONS:  - Educate patient/family on patient safety including physical limitations  - Instruct patient to call for assistance with activity   - Consult OT/PT to assist with strengthening/mobility   - Keep Call bell within reach  - Keep bed low and locked with side rails adjusted as appropriate  - Keep care items and personal belongings within reach  - Initiate and maintain comfort rounds  - Make Fall Risk Sign visible to staff  - Offer Toileting every 2 Hours, in advance of need  - Initiate/Maintain bed alarm  - Obtain necessary fall risk management equipment:   - Apply yellow socks and bracelet for high fall risk patients  - Consider moving patient to room near nurses station  Outcome: Progressing

## 2022-05-03 NOTE — PLAN OF CARE
Problem: Nutrition/Hydration-ADULT  Goal: Nutrient/Hydration intake appropriate for improving, restoring or maintaining nutritional needs  Description: Monitor and assess patient's nutrition/hydration status for malnutrition  Collaborate with interdisciplinary team and initiate plan and interventions as ordered  Monitor patient's weight and dietary intake as ordered or per policy  Utilize nutrition screening tool and intervene as necessary  Determine patient's food preferences and provide high-protein, high-caloric foods as appropriate       INTERVENTIONS:  - Monitor oral intake, urinary output, labs, and treatment plans  - Assess nutrition and hydration status and recommend course of action  - Evaluate amount of meals eaten  - Assist patient with eating if necessary   - Allow adequate time for meals  - Recommend/ encourage appropriate diets, oral nutritional supplements, and vitamin/mineral supplements  - Order, calculate, and assess calorie counts as needed  - Recommend, monitor, and adjust tube feedings and TPN/PPN based on assessed needs  - Assess need for intravenous fluids  - Provide specific nutrition/hydration education as appropriate  - Include patient/family/caregiver in decisions related to nutrition  5/3/2022 1508 by Estelita Rinne, RN  Outcome: Adequate for Discharge  5/3/2022 1031 by Estelita Rinne, RN  Outcome: Progressing

## 2022-05-03 NOTE — PHYSICAL THERAPY NOTE
PT TREATMENT     05/03/22 1325   Note Type   Note Type BID visit/treatment   Pain Assessment   Pain Assessment Tool 0-10   Pain Score 7   Pain Location/Orientation Orientation: Left; Location: Hip   Restrictions/Precautions   LLE Weight Bearing Per Order WBAT   Other Precautions Pain   General   Chart Reviewed Yes   Family/Caregiver Present Yes  (Patient's fiancee)   Subjective   Subjective Doing good   Bed Mobility   Supine to Sit 7  Independent   Transfers   Sit to Stand 7  Independent   Stand to Sit 7  Independent   Ambulation/Elevation   Gait pattern   (Mildly antalgic LLE)   Gait Assistance 7  Independent   Assistive Device Rolling walker   Distance 150 ft with change in direction   Stair Management Assistance 7  Independent   Stair Management Technique Step to pattern; With cane; One rail L   Number of Stairs 4   Balance   Static Sitting Good   Static Standing Fair +   Dynamic Standing Fair   Ambulatory Fair   Activity Tolerance   Activity Tolerance Patient limited by pain; Patient limited by fatigue;Patient tolerated treatment well   Assessment   Assessment Pt is I with all gait and functional mobility with a RW on level surfaces and is I with a railing and cane on the steps  Pt remains safe for discharge home this afternoon with follow-up outpatient physical therapy  The patient's AM-PAC Basic Mobility Inpatient Short Form Raw Score is 24  A Raw score of greater than 16 suggests the patient may benefit from discharge to home  Please also refer to the recommendation of the Physical Therapist for safe discharge planning  Plan   Treatment/Interventions ADL retraining;Functional transfer training;LE strengthening/ROM; Elevations; Therapeutic exercise;Patient/family training;Equipment eval/education; Bed mobility;Gait training; Compensatory technique education; Endurance training   PT Frequency Twice a day   Recommendation   PT Discharge Recommendation Home with outpatient rehabilitation   Equipment Recommended (Pt received a RW from )   Additional Comments Significant amount of time set with pt and patient's fiancee answering all their questions regarding discharge related to physical therapy - exercise, gait with a RW, and gait on stairs  Pt and patient's fiancee with good understanding of all information discussed  AM-PAC Basic Mobility Inpatient   Turning in Bed Without Bedrails 4   Lying on Back to Sitting on Edge of Flat Bed 4   Moving Bed to Chair 4   Standing Up From Chair 4   Walk in Room 4   Climb 3-5 Stairs 4   Basic Mobility Inpatient Raw Score 24   Basic Mobility Standardized Score 57 68   Highest Level Of Mobility   JH-HLM Goal 8: Walk 250 feet or more   JH-HLM Highest Level of Mobility 7: Walk 25 feet or more   JH-HLM Goal Achieved No   Education   Education Provided Mobility training;Home exercise program;Assistive device   Patient Demonstrates acceptance/verbal understanding;Explanation/teachback used   End of Consult   Patient Position at End of Consult Bedside chair; All needs within reach   End of Consult Comments Cold pack to left hip/thigh   Licensure   [address] Number  Olman Kent PT [id number]     Portions of the documentation may have been created using voice recognition software  Occasional wrong word or sound alike substitutions may have occurred due to the inherent limitation of the voice recognition software  Read the chart carefully and recognize, using context, where substitutions have occurred

## 2022-05-04 ENCOUNTER — TELEPHONE (OUTPATIENT)
Dept: OBGYN CLINIC | Facility: CLINIC | Age: 77
End: 2022-05-04

## 2022-05-04 NOTE — TELEPHONE ENCOUNTER
Pt contacted to discuss pain, we also discussed postoperative follow up call assessment  She reports difficulty getting her leg onto the couch, and we dicussed that being very normal, and using assistance of spouse to assist (which she is doing)  She reports "not very swollen" and icing  She reports her dressing is dry and no drainage  Pt has outpatient PT tomorrow  We reviewed her AVS medication list  She reports taking Oxycodone 1/2 tablet, 2 5mg every 4 hours  She is not taking tylenol, and we discussed the script of tylenol, and taking tylenol 975mg TID for pain, she has 500mg at home, and will do 1000mg TID  She reports ASA 325mg BID is coming today and educated to start  She is also taking colace 100mg BID (denies yet having BM, decreased appetite, but she is passing gas)  She denies any chest pain, SOB, dizziness, fever or calf pain  She denies any questions or issues at this time  Pt encouraged to call me with questions, concerns or issues

## 2022-05-05 ENCOUNTER — OFFICE VISIT (OUTPATIENT)
Dept: PHYSICAL THERAPY | Facility: CLINIC | Age: 77
End: 2022-05-05
Payer: MEDICARE

## 2022-05-05 DIAGNOSIS — M25.552 LEFT HIP PAIN: Primary | ICD-10-CM

## 2022-05-05 PROCEDURE — 97164 PT RE-EVAL EST PLAN CARE: CPT | Performed by: PHYSICAL THERAPIST

## 2022-05-05 PROCEDURE — 97110 THERAPEUTIC EXERCISES: CPT | Performed by: PHYSICAL THERAPIST

## 2022-05-05 NOTE — PROGRESS NOTES
PT Evaluation     Today's date: 2022  Patient name: Lauren Canseco  : 7[birth year]  MRN: [id number]  Referring provider: Bill Kenny DO  Dx:   Encounter Diagnosis     ICD-10-CM    1  Left hip pain  M25 552                 Assessment  Assessment details: Lauren Canseco is a 67 y o  female who presents with complaints of Left hip pain 3 days post op MOLINA with significant amount soreness in thigh  Patient is presenting with decreased hip ROM, strength, and gait leading to limitations with ambulation, stair navigation, performing transfers, and participating in age related activities  Patient educated on findings and post surgical care  Positive prognostic indicators include positive attitude toward recovery  Please contact me if you have any questions or recommendations  Thank you for the opportunity to share in [address] [place] [address]  Impairments: abnormal muscle firing, abnormal muscle tone, abnormal or restricted ROM, abnormal movement, impaired balance, impaired physical strength, lacks appropriate home exercise program, pain with function and poor posture     Symptom irritability: moderateUnderstanding of Dx/Px/POC: good   Prognosis: good    Plan  Patient would benefit from: skilled physical therapy  Planned modality interventions: cryotherapy  Planned therapy interventions: joint mobilization, manual therapy, patient education, postural training, strengthening, stretching, therapeutic activities, therapeutic exercise, home exercise program, neuromuscular re-education, flexibility, functional ROM exercises, balance, body mechanics training and gait training  Frequency: 2-3x  Duration in weeks: 12  Treatment plan discussed with: patient      Subjective Evaluation    History of Present Illness  Mechanism of injury: Patient is presenting 3 days post op MOLINA  Reports significant amount of pain when trying to move leg  Trouble with entering/exiting vehicle  Patient reports limitation with transfers and ambulation  Patient presenting to therapy for pre-op physical therapy for left hip MOLINA  Pain reported as sharp in nature depending upon movement without any sleep disturbances present  [facility] with step to stair navigation reported  Patient lives alone but will have help at home  Has had injection in low back due to PT previously  Not a recurrent problem   Quality of life: good    Pain  Current pain ratin  At best pain ratin  At worst pain rating: 10  Quality: dull ache  Aggravating factors: stair climbing, standing and walking  Progression: worsening    Social Support  Steps to enter house: yes (5 in front, 2 in back)  5  Stairs in house: yes     Treatments  Previous treatment: physical therapy  Patient Goals  Patient goals for therapy: decreased pain, increased motion and increased strength  Patient goal: patient would like to return to [employer]        Objective    GAIT: decreased gait speed with RW  Squat assess: 25% depth poor form  SLS: RLE: 4 sec , LLE: unable  Tug 10 ft: 12 sec with RW           MMT         AROM          PROM    Hip         R          L         R        L          R         L   Flex  5 2+ WNL 30 110 80   Extn  4 3+ WNL WNL     Abd  4 3 40 30p!  32p! Add  4 4-       IR  5 4   25 25   ER  5 4 25 10p! 20 17p! MMT    Knee      R          L   Flex  5 5   Extn  5 5                     MMT    Ankle         R          L   PF 5 5   DF  5 5     Slump test: L= -   R= -      Straight leg raise:   L= -   R= -       Hip:  deferred        Insurance:  AMA/CMS Eval/ Re-eval POC expires Haig Meka #/ Referral # Total    Start date  Expiration date Extension  Visit limitation? PT only or  PT+OT?  Co-Insurance   CMS 5 5 22 7 28 22                                                                        AUTH #:  Date               Authed: Used                Remaining                    Precautions: HTN, OP    Manuals 4 27 5 5       visit  1- post op Neuro Re-Ed                                                                        Ther Ex         HEP 15' 10'       Pt edu FB        LAQ  10x       Quad set  10x       Supine hip abd heel slide  10x       Heel slide  10x       glute set  10x                Ther Activity                           Gait Training                           Modalities                             Short Term: from Date of surgery  1  Pt will report decreased levels of pain by at least 2 subjective ratings in 4 weeks  2  Pt will demonstrate improved ROM by at least 10 degrees in 4 weeks  3  Pt will demonstrate improved strength by 1/2 grade in 4 weeks  4  Pt will be able to ambulate without AD in 4 weeks  Long Term:   1  Pt will be independent in their HEP in 8 weeks  2  Pt will be pain free with IADL's in 8 weeks  3  Pt will be able to perform reciprocal stair navigation in 8 weeks  4  Pt will return to PLOF in 10 weeks with regards to recreational activities

## 2022-05-06 NOTE — TELEPHONE ENCOUNTER
Patient called in and advised her hip seemed to be a little warm to the touch  I advised that what she was describing "puffiness" and "warmth" is to be expected after MOLINA   I encouraged her to contact the office with any other concerns/questions

## 2022-05-09 ENCOUNTER — OFFICE VISIT (OUTPATIENT)
Dept: PHYSICAL THERAPY | Facility: CLINIC | Age: 77
End: 2022-05-09
Payer: MEDICARE

## 2022-05-09 DIAGNOSIS — M25.552 LEFT HIP PAIN: Primary | ICD-10-CM

## 2022-05-09 PROCEDURE — 97110 THERAPEUTIC EXERCISES: CPT | Performed by: PHYSICAL THERAPIST

## 2022-05-11 ENCOUNTER — OFFICE VISIT (OUTPATIENT)
Dept: PHYSICAL THERAPY | Facility: CLINIC | Age: 77
End: 2022-05-11
Payer: MEDICARE

## 2022-05-11 DIAGNOSIS — M25.552 LEFT HIP PAIN: Primary | ICD-10-CM

## 2022-05-11 PROCEDURE — 97116 GAIT TRAINING THERAPY: CPT | Performed by: PHYSICAL THERAPIST

## 2022-05-11 PROCEDURE — 97110 THERAPEUTIC EXERCISES: CPT | Performed by: PHYSICAL THERAPIST

## 2022-05-11 NOTE — PROGRESS NOTES
Daily Note     Today's date: 2022  Patient name: Ruthy Beckwith  : [birth year]  MRN: [id number]  Referring provider: Ludmila Cavazos DO  Dx:   Encounter Diagnosis     ICD-10-CM    1  Left hip pain  M25 552               Subjective: Patient reports doing slightly better overall with pain still located lateral thigh  Still has numbness present at this time  Objective: See treatment diary below     Assessment: Tolerated treatment well  Patient demonstrated good gait pattern with progression to [facility]  Educated patient to use cane when ambulating at home  Fair tolerance to hip strengthening with lateral leg ache reported with addition of standing hip flexion  Plan: Continue per plan of care  Insurance:  AMA/CMS Eval/ Re-eval POC expires [place] #/ Referral # Total    Start date  Expiration date Extension  Co-Insurance   CMS 5  7 28 22                                  Precautions: HTN, OP  DOS 5 2 22  HEP: quad set, glute set, ankle pumps, stand hip abd, heel slide     Manuals 4 27 5 5 5 9 5 11     visit  1- post op 2 3     Soft tissue quad   FB FB                       Neuro Re-Ed         Feet together    5*10'' EC CCG                                Ther Ex         HEP 15' 10'       Pt edu FB        LAQ  10x 2*10 3*10     Nu step st 6   5', lvl 1 6' lvl 1     Stand hip abd    2*10 LLE moving, 1*10 RLE moving 2*10 ea  Stand hip extn   2*10 3*10 ea  Calf raise   2*10 3*10     squats    2*10     Stand hip flexion    10x     Hip PROM    FB                                         Ther Activity                           Gait Training         SPC    5'              Modalities                             Short Term: from Date of surgery  1  Pt will report decreased levels of pain by at least 2 subjective ratings in 4 weeks  2  Pt will demonstrate improved ROM by at least 10 degrees in 4 weeks  3  Pt will demonstrate improved strength by 1/2 grade in 4 weeks    4  Pt will be able to ambulate without AD in 4 weeks  Long Term:   1  Pt will be independent in their HEP in 8 weeks  2  Pt will be pain free with IADL's in 8 weeks  3  Pt will be able to perform reciprocal stair navigation in 8 weeks  4  Pt will return to PLOF in 10 weeks with regards to recreational activities

## 2022-05-13 ENCOUNTER — OFFICE VISIT (OUTPATIENT)
Dept: PHYSICAL THERAPY | Facility: CLINIC | Age: 77
End: 2022-05-13
Payer: MEDICARE

## 2022-05-13 DIAGNOSIS — M25.552 LEFT HIP PAIN: Primary | ICD-10-CM

## 2022-05-13 PROCEDURE — 97110 THERAPEUTIC EXERCISES: CPT | Performed by: PHYSICAL THERAPIST

## 2022-05-13 NOTE — PROGRESS NOTES
Daily Note     Today's date: 2022  Patient name: Simon Norwood  : [birth year]  MRN: [id number]  Referring provider: Drea Wright DO  Dx:   Encounter Diagnosis     ICD-10-CM    1  Left hip pain  M25 552      Start Time: 1055  Stop Time: 1140  Total time in clinic (min): 45 minutes  Subjective: Patient reports thigh pain described as burning in nature  Can be present when not even moving  Objective: See treatment diary below     Assessment: Tolerated treatment well  Patient reported feeling weak post session  When asked about nutrition, patient reports poor intake of food due to lack of appetite  Educated on need for proper nutritional intake before and after therapy  Good scar healing as bandage was removed  Plan: Continue per plan of care  Insurance:  AMA/CMS Eval/ Re-eval POC expires Jonita Dire #/ Referral # Total    Start date  Expiration date Extension  Co-Insurance   CMS 5  7 28 22                                  Precautions: HTN, OP  DOS 5 2 22  HEP: quad set, glute set, ankle pumps, stand hip abd, heel slide     Manuals 4 27 5 5 5 9 5 11 5 13    visit  1- post op 2 3 4    Soft tissue quad   FB FB np                      Neuro Re-Ed         Feet together    5*10'' EC CCG 5*10'' EC CCG                               Ther Ex         HEP 15' 10'       Pt edu FB    7'    LAQ  10x 2*10 3*10 3*10    Nu step st 6   5', lvl 1 6' lvl 1 7' lvl 1    Stand hip abd    2*10 LLE moving, 1*10 RLE moving 2*10 ea  2*10 ea  Stand hip extn   2*10 3*10 ea  3*10 ea  Calf raise   2*10 3*10 2*10    squats    2*10 2*10    Stand hip flexion    10x 2*10    Hip PROM    FB FB    Supine hip IR/ER rolling     FB                               Ther Activity                           Gait Training         SPC    5' np             Modalities                             Short Term: from Date of surgery  1  Pt will report decreased levels of pain by at least 2 subjective ratings in 4 weeks  2   Pt will demonstrate improved ROM by at least 10 degrees in 4 weeks  3  Pt will demonstrate improved strength by 1/2 grade in 4 weeks  4  Pt will be able to ambulate without AD in 4 weeks  Long Term:   1  Pt will be independent in their HEP in 8 weeks  2  Pt will be pain free with IADL's in 8 weeks  3  Pt will be able to perform reciprocal stair navigation in 8 weeks  4  Pt will return to PLOF in 10 weeks with regards to recreational activities

## 2022-05-16 ENCOUNTER — OFFICE VISIT (OUTPATIENT)
Dept: PHYSICAL THERAPY | Facility: CLINIC | Age: 77
End: 2022-05-16
Payer: MEDICARE

## 2022-05-16 DIAGNOSIS — M25.552 LEFT HIP PAIN: Primary | ICD-10-CM

## 2022-05-16 PROCEDURE — 97110 THERAPEUTIC EXERCISES: CPT | Performed by: PHYSICAL THERAPIST

## 2022-05-16 NOTE — PROGRESS NOTES
Daily Note     Today's date: 2022  Patient name: Ruthy Beckwith  :   MRN: [id number]  Referring provider: Ludmila Cavazos DO  Dx:   Encounter Diagnosis     ICD-10-CM    1  Left hip pain  M25 552      Start Time: 840  Stop Time: 930  Total time in clinic (min): 50 minutes  Subjective: Patient reports her level of function is improving and walking at times without cane with ease, but minimal to no change in pain reported  1 on  with PT for 23 minutes  Remaining time independent fitness program     Objective: See treatment diary below     Assessment: Tolerated treatment well  Improved hip motion noted in hip ER during PROM  Patient ambulates with normal gait short distances  Gave patient desensitization sponge to use at home for nerve irritation  Plan: Continue per plan of care  Insurance:  A/CMS Eval/ Re-eval POC expires [place] #/ Referral # Total    Start date  Expiration date Extension  Co-Insurance   CMS 5 5 22 7 28 22                                  Precautions: HTN, OP  DOS 5 2 22  HEP: quad set, glute set, ankle pumps, stand hip abd, heel slide     Manuals 5 5 5 9 5 11 5 13 5 16   visit 1- post op 2 3 4 5   Soft tissue quad  FB FB np                    Neuro Re-Ed        Feet together   5*10'' EC CCG 5*10'' EC CCG np                           Ther Ex        HEP 10'       Pt edu    7'    LAQ 10x 2*10 3*10 3*10 3*15   Nu step st 6  5', lvl 1 6' lvl 1 7' lvl 1 7' lvl 1   Stand hip abd   2*10 LLE moving, 1*10 RLE moving 2*10 ea  2*10 ea  2*10 ea  Stand hip extn  2*10 3*10 ea  3*10 ea  3*10 ea     Calf raise  2*10 3*10 2*10 3*10   squats   2*10 2*10 3*10   Stand hip flexion   10x 2*10 2*10   Hip PROM   FB FB FB   Supine hip IR/ER rolling    FB    Supine hip add      3*10   Supine hip flexion     2*15   Side stepping     2 laps 15'                                   Ther Activity                        Gait Training        SPC   5' np            Modalities Short Term: from Date of surgery  1  Pt will report decreased levels of pain by at least 2 subjective ratings in 4 weeks  2  Pt will demonstrate improved ROM by at least 10 degrees in 4 weeks  3  Pt will demonstrate improved strength by 1/2 grade in 4 weeks  4  Pt will be able to ambulate without AD in 4 weeks  Long Term:   1  Pt will be independent in their HEP in 8 weeks  2  Pt will be pain free with IADL's in 8 weeks  3  Pt will be able to perform reciprocal stair navigation in 8 weeks  4  Pt will return to [facility] in 10 weeks with regards to recreational activities

## 2022-05-18 ENCOUNTER — OFFICE VISIT (OUTPATIENT)
Dept: PHYSICAL THERAPY | Facility: CLINIC | Age: 77
End: 2022-05-18
Payer: MEDICARE

## 2022-05-18 ENCOUNTER — OFFICE VISIT (OUTPATIENT)
Dept: OBGYN CLINIC | Facility: CLINIC | Age: 77
End: 2022-05-18

## 2022-05-18 ENCOUNTER — APPOINTMENT (OUTPATIENT)
Dept: RADIOLOGY | Facility: CLINIC | Age: 77
End: 2022-05-18
Payer: MEDICARE

## 2022-05-18 VITALS
HEART RATE: 100 BPM | HEIGHT: 61 IN | DIASTOLIC BLOOD PRESSURE: 80 MMHG | SYSTOLIC BLOOD PRESSURE: 127 MMHG | TEMPERATURE: 98.2 F | BODY MASS INDEX: 20.32 KG/M2 | WEIGHT: 107.6 LBS | RESPIRATION RATE: 19 BRPM

## 2022-05-18 DIAGNOSIS — Z96.642 STATUS POST HIP REPLACEMENT, LEFT: Primary | ICD-10-CM

## 2022-05-18 DIAGNOSIS — Z47.1 AFTERCARE FOLLOWING LEFT HIP JOINT REPLACEMENT SURGERY: ICD-10-CM

## 2022-05-18 DIAGNOSIS — M25.552 LEFT HIP PAIN: Primary | ICD-10-CM

## 2022-05-18 DIAGNOSIS — Z96.642 AFTERCARE FOLLOWING LEFT HIP JOINT REPLACEMENT SURGERY: ICD-10-CM

## 2022-05-18 DIAGNOSIS — M16.12 PRIMARY OSTEOARTHRITIS OF ONE HIP, LEFT: ICD-10-CM

## 2022-05-18 PROCEDURE — 1124F ACP DISCUSS-NO DSCNMKR DOCD: CPT | Performed by: ORTHOPAEDIC SURGERY

## 2022-05-18 PROCEDURE — 73502 X-RAY EXAM HIP UNI 2-3 VIEWS: CPT

## 2022-05-18 PROCEDURE — 97110 THERAPEUTIC EXERCISES: CPT | Performed by: PHYSICAL THERAPIST

## 2022-05-18 PROCEDURE — 99024 POSTOP FOLLOW-UP VISIT: CPT | Performed by: ORTHOPAEDIC SURGERY

## 2022-05-18 NOTE — PROGRESS NOTES
Assessment/Plan:  1  Status post hip replacement, left     2  Aftercare following left hip joint replacement surgery  XR hip/pelv 2-3 vws left if performed     David Pringle is a pleasant 69-year-old female presenting today for follow-up 2 weeks after a direct anterior left total hip arthroplasty  The prosthesis remains stable on imaging and exam in her leg lengths appear equal   Her incision is healing very nicely, and she may now get it wet in the shower but should avoid direct scrubbing or saturation  She will need to continue with aspirin for DVT prophylaxis for the next 4 weeks  We discussed her ongoing left thigh pain, and believe this is attributable to her lateral femoral cutaneous nerve recovering from the surgical insult  She does have hypersensitivity in this area, but we anticipate that this will continue to improve  She also has some tightness of the IT band and discomfort with palpation  Encouraged her to use Voltaren gel in this area up to 4 times a day  We anticipate that this hypersensitivity and IT band syndrome will resolve as she continues with physical therapy and her home exercises  We would like to see her back in 4 weeks for clinical re-evaluation  She expressed understanding all of her questions were addressed today    Subjective: 2 weeks s/p DA left MOLINA    Patient ID: Sandra Villalobos is a 67 y o  female  David Pringle is a pleasant 69-year-old presenting today for follow-up 2 weeks after a direct anterior left hip arthroplasty  She has been compliant with the incision and DVT prophylaxis instructions  She has been working with physical therapy and doing her home exercises, but reports significant pain in the left thigh  She states that it can be a burning, pins and needles, or sharp pain with activity  The oxycodone did not help  She has been taking the Tylenol  She denies any paresthesias below the left knee  Review of Systems   Constitutional: Negative  HENT: Negative      Eyes: Negative  Respiratory: Negative  Cardiovascular: Negative  Gastrointestinal: Negative  Endocrine: Negative  Genitourinary: Negative  Musculoskeletal: Positive for arthralgias, joint swelling and myalgias  Skin: Negative  Allergic/Immunologic: Negative  Neurological: Negative  Hematological: Negative  Psychiatric/Behavioral: Negative  Past Medical History:   Diagnosis Date    Anxiety     Arthritis     left hip    Chronic kidney disease     chronic stones    Chronic narcotic dependence (HCC)     tramadol for hip    Colon polyp     Gallstones     GERD (gastroesophageal reflux disease)     H/O hiatal hernia     Headache     occ    Hyperlipidemia     Hypertension     Kidney stone     Low back pain     Lumbar disc disease     Nausea     at times    Poor appetite     Sacroiliitis (HCC)     Thickened endometrium     Wears dentures     full upper/ partial lower    Weight loss, unintentional     Since Nov 2021 20lb weight loss       Past Surgical History:   Procedure Laterality Date    APPENDECTOMY      BREAST SURGERY Left     excision cyst    COLONOSCOPY      CYSTOSCOPY      with stone extraction x 2    CYSTOSCOPY W/ RETROGRADES Left 09/17/2012    stent removal,     CYSTOSCOPY W/ URETEROSCOPY W/ LITHOTRIPSY Left 12/11/2006    Ca Phos 98%    CYSTOSCOPY W/ URETEROSCOPY W/ LITHOTRIPSY Left 08/12/2012    Ca ox di 60%, Ca ox mono 20%    ESOPHAGOGASTRODUODENOSCOPY      EXTRACORPOREAL SHOCK WAVE LITHOTRIPSY Left 4/13/2016    Procedure: LITHROTRIPSY EXTRACORPORAL SHOCKWAVE (ESWL);   Surgeon: Ata Anne MD;  Location: [facility] MAIN OR;  Service:     POLYPECTOMY N/A 4/11/2022    Procedure: POLYPECTOMY;  Surgeon: Princess Mccormack MD;  Location:  MAIN OR;  Service: Gynecology    MO CYSTO/URETERO W/LITHOTRIPSY &INDWELL STENT INSRT Left 9/1/2016    Procedure: CYSTOSCOPY URETEROSCOPY WITH STONE EXTRACTION , RETROGRADE PYELOGRAM AND INSERTION STENT URETERAL; Surgeon: Marisela Dominique MD;  Location: [address] [place] [address];  Service: Urology    LA CYSTO/URETERO W/LITHOTRIPSY &INDWELL STENT INSRT Left 8/12/2016    Procedure: CYSTOSCOPY URETEROSCOPY WITH LITHOTRIPSY HOLMIUM LASER, RETROGRADE PYELOGRAM AND INSERTION STENT URETERAL;  Surgeon: Marisela Dominique MD;  Location: [address] [place] [address];  Service: Urology    LA HYSTEROSCOPY,W/ENDO BX N/A 4/11/2022    Procedure: DILATATION AND CURETTAGE (D&C) WITH HYSTEROSCOPY;  Surgeon: Mathieu Fang MD;  Location: [facility] MAIN [facility];  Service: Gynecology    LA TOTAL HIP ARTHROPLASTY Left 5/2/2022    Procedure: ARTHROPLASTY HIP TOTAL ANTERIOR - Left;  Surgeon: Nicol Rico DO;  Location: [address] [place] [address];  Service: Orthopedics    TUBAL LIGATION      UPPER GASTROINTESTINAL ENDOSCOPY      URETERAL STENT PLACEMENT Left 08/31/2012    Narrowing of the left distal ureter for 3cm        WISDOM TOOTH EXTRACTION         Family History   Problem Relation Age of Onset    Stroke Father         TIA    Parkinsonism Father     Cancer Maternal Aunt     Cancer Maternal Uncle     Heart disease Maternal Uncle         Massive MI    No Known Problems Mother     No Known Problems Sister     Pancreatic cancer Brother     No Known Problems Paternal Aunt     No Known Problems Paternal Uncle     No Known Problems Maternal Grandmother     No Known Problems Maternal Grandfather     No Known Problems Paternal Grandmother     No Known Problems Paternal Grandfather        Social History     Occupational History    Not on file   Tobacco Use    Smoking status: Never Smoker    Smokeless tobacco: Never Used   Vaping Use    Vaping Use: Never used   Substance and Sexual Activity    Alcohol use: Yes     Comment: wine almost every night to sleep    Drug use: No    Sexual activity: Yes         Current Outpatient Medications:     acetaminophen (TYLENOL) 325 mg tablet, Take 3 tablets (975 mg total) by mouth every 8 (eight) hours, Disp: , Rfl: 0    alendronate (FOSAMAX) 70 mg tablet, Take 70 mg by mouth every 7 days sundays , Disp: , Rfl:     ALPRAZolam (XANAX) 0 25 mg tablet, Take by mouth daily at bedtime as needed for anxiety , Disp: , Rfl:     amLODIPine (NORVASC) 5 mg tablet, Take 5 mg by mouth every morning , Disp: , Rfl:     aspirin 325 mg tablet, Take 1 tablet (325 mg total) by mouth 2 (two) times a day, Disp: 84 tablet, Rfl: 0    atorvastatin (LIPITOR) 20 mg tablet, Take 20 mg by mouth daily at bedtime  , Disp: , Rfl:     naloxone (NARCAN) 4 mg/0 1 mL nasal spray, Administer 1 spray into a nostril  If no response after 2-3 minutes, give another dose in the other nostril using a new spray , Disp: 1 each, Rfl: 0    OLMESARTAN MEDOXOMIL PO, Take 40 mg by mouth daily, Disp: , Rfl:     omeprazole (PriLOSEC) 40 MG capsule, Take 40 mg by mouth daily, Disp: , Rfl:     Polyethylene Glycol 3350 (MIRALAX PO), Take by mouth if needed, Disp: , Rfl:     docusate sodium (COLACE) 100 mg capsule, Take 1 capsule (100 mg total) by mouth 2 (two) times a day (Patient not taking: Reported on 5/18/2022), Disp: 60 capsule, Rfl: 0    lidocaine (XYLOCAINE) 5 % ointment, Apply topically as needed for mild pain APPLY TO VAGINAL OPENING AS NEEDED FOR PAIN (Patient not taking: Reported on 5/18/2022), Disp: 30 g, Rfl: 0    oxyCODONE (Roxicodone) 5 immediate release tablet, Take 1/2 to 1 tablets by mouth every 4-6 hours as needed for pain (Patient not taking: Reported on 5/18/2022), Disp: 30 tablet, Rfl: 0    Allergies   Allergen Reactions    Magnesium Oxybate [Oxybate] Diarrhea     DIARRHEA       Objective:  Vitals:    05/18/22 0939   BP: 127/80   Pulse: 100   Resp: 19   Temp: 98 2 °F (36 8 °C)       Body mass index is 20 33 kg/m²  Left Hip Exam     Tenderness   The patient is experiencing tenderness in the lateral (it band)      Range of Motion   Abduction:  45 normal   Adduction:  30 normal   Extension:  0 normal   Flexion:  110 normal   External rotation:  40 normal   Internal rotation: 25 normal     Muscle Strength   Abduction: 5/5   Adduction: 5/5   Flexion: 5/5     Tests   ROSSANA: negative  Kt: negative    Other   Erythema: absent  Scars: present  Sensation: decreased (LFCN)  Pulse: present    Comments:  Mobilizes to table well  Ambulates with walker  Hypersensitivity LFCN   Calf nontender  Grossly distally neurovascularly intact  Tender IT band            Physical Exam  Vitals and nursing note reviewed  Constitutional:       Appearance: She is well-developed  Comments: Body mass index is 20 33 kg/m²  HENT:      Head: Normocephalic and atraumatic  Right Ear: External ear normal       Left Ear: External ear normal    Cardiovascular:      Rate and Rhythm: Normal rate  Pulmonary:      Effort: Pulmonary effort is normal    Abdominal:      Palpations: Abdomen is soft  Musculoskeletal:      Cervical back: Normal range of motion  Comments: See ortho exam   Skin:     General: Skin is warm and dry  Neurological:      General: No focal deficit present  Mental Status: She is alert and oriented to person, place, and time  Mental status is at baseline  Psychiatric:         Mood and Affect: Mood normal          Behavior: Behavior normal          Thought Content: Thought content normal          Judgment: Judgment normal        I have personally reviewed pertinent films in PACS of the x-rays taken today of her pelvis and left hip which demonstrate a well-aligned well-fixed total hip prosthesis with no signs of loosening, periprosthetic fracture, or other interval complication

## 2022-05-20 ENCOUNTER — TELEPHONE (OUTPATIENT)
Dept: OBGYN CLINIC | Facility: CLINIC | Age: 77
End: 2022-05-20

## 2022-05-20 ENCOUNTER — OFFICE VISIT (OUTPATIENT)
Dept: PHYSICAL THERAPY | Facility: CLINIC | Age: 77
End: 2022-05-20
Payer: MEDICARE

## 2022-05-20 DIAGNOSIS — M25.552 LEFT HIP PAIN: Primary | ICD-10-CM

## 2022-05-20 PROCEDURE — 97110 THERAPEUTIC EXERCISES: CPT | Performed by: PHYSICAL THERAPIST

## 2022-05-20 PROCEDURE — 97140 MANUAL THERAPY 1/> REGIONS: CPT | Performed by: PHYSICAL THERAPIST

## 2022-05-20 NOTE — TELEPHONE ENCOUNTER
Patient contacted office and lvm  Patient has increased pain after PT today and it wondering if ice or heat would be better to put on her hip?

## 2022-05-20 NOTE — TELEPHONE ENCOUNTER
Pt advised to ice, 30m max on time with barrier between skin and ice  She is pleased and understands she can do multiple times per day for pain and swelling control  Denies other questions at this time  Pt encouraged to call with any questions, concerns or issues

## 2022-05-20 NOTE — PROGRESS NOTES
Daily Note     Today's date: 2022  Patient name: Elaine Rabago  : [birth year]  MRN: [id number]  Referring provider: Denice Baron DO  Dx:   Encounter Diagnosis     ICD-10-CM    1  Left hip pain  M25 552      Start Time: 840  Stop Time: 925  Total time in clinic (min): 45 minutes  Subjective: Patient reports that she felt better after last treatment session but turned over in bed yesterday causing significant lateral thigh pain  Has tried Voltaren without any relief  Reports feeling very fatigued entering treatment today  1 on  with PT for 23 minutes  Remaining time independent fitness program      Objective: See treatment diary below     Assessment: Tolerated treatment well  Patient reported pulling sensation with ambulation for assessment and continues to demonstrate toe in gait pattern  Symptoms improve with cueing for hip ER with AD  Added central gliders upper lumbar spine for lateral femoral cutaneous nerve symptoms  Patient requires tactile cueing for gluteal activation prior to clam shell exercise  Plan: Continue per plan of care  Insurance:  AMA/CMS Eval/ Re-eval POC expires [facility] #/ Referral # Total    Start date  Expiration date Extension  Co-Insurance   CMS 5 5 22 7 28 22                                  Precautions: HTN, OP  DOS 5 2 22  HEP: quad set, glute set, ankle pumps, stand hip abd, heel slide     Manuals 5 11 5 13 5 16 5 18 22 5 20 22   visit 3 4 5 6 7   Soft tissue quad FB np  FB desensitization    L1-2 central gliders     3*40           Neuro Re-Ed        Feet together 5*10'' EC CCG 5*10'' EC CCG np     Clam shell    3*8 pillow b/w knees 3*10 pillow b/w knees                   Ther Ex        HEP        Pt edu  7'  10' 5'   LAQ 3*10 3*10 3*15 HOLD    Nu step st 6 6' lvl 1 7' lvl 1 7' lvl 1 7' lvl 1 7' lvl 1   Stand hip abd  2*10 ea  2*10 ea  2*10 ea  2*10 LLE coupled with extn Not performed   Stand hip extn 3*10 ea  3*10 ea  3*10 ea       Calf raise 3*10 2*10 3*10 3*10 2*10   squats 2*10 2*10 3*10 3*10 2*10   Stand hip flexion 10x 2*10 2*10     Hip PROM FB FB FB FB    Supine hip IR/ER rolling  FB      Supine hip add    3*10     Supine hip flexion   2*15     Side stepping   2 laps 15'     glute set    2*10 in supine and stand 2*10 in supine and stand                           Ther Activity                        Gait Training        SPC 5' np  education            Modalities                          Short Term: from Date of surgery  1  Pt will report decreased levels of pain by at least 2 subjective ratings in 4 weeks  2  Pt will demonstrate improved ROM by at least 10 degrees in 4 weeks  3  Pt will demonstrate improved strength by 1/2 grade in 4 weeks  4  Pt will be able to ambulate without AD in 4 weeks  Long Term:   1  Pt will be independent in their HEP in 8 weeks  2  Pt will be pain free with IADL's in 8 weeks  3  Pt will be able to perform reciprocal stair navigation in 8 weeks  4  Pt will return to [facility] in 10 weeks with regards to recreational activities

## 2022-05-24 ENCOUNTER — OFFICE VISIT (OUTPATIENT)
Dept: PHYSICAL THERAPY | Facility: CLINIC | Age: 77
End: 2022-05-24
Payer: MEDICARE

## 2022-05-24 DIAGNOSIS — M25.552 LEFT HIP PAIN: Primary | ICD-10-CM

## 2022-05-24 DIAGNOSIS — Z01.818 PRE-OP EXAM: ICD-10-CM

## 2022-05-24 PROCEDURE — 97110 THERAPEUTIC EXERCISES: CPT

## 2022-05-24 PROCEDURE — 97112 NEUROMUSCULAR REEDUCATION: CPT

## 2022-05-24 PROCEDURE — 97140 MANUAL THERAPY 1/> REGIONS: CPT

## 2022-05-24 NOTE — PROGRESS NOTES
Daily Note         Today's date: 2022  Patient name: Lizett Mejia  : [birth year]  MRN: [id number]  Referring provider: Cedric Milligan DO  Dx:   Encounter Diagnosis     ICD-10-CM    1  Left hip pain  M25 552    2  Pre-op exam  Z01 818        Subjective: Lizett Mejia reports she had difficulty sleeping last night due to pain in left hip  States she rolled onto left hip  Pain entering today is 7-8/10  Objective: See treatment diary below    Assessment: Patient new to PT   patient had increased tension in left IT band and quad with STM  patient was tender with STM but reduced over time  good ORM noted iwth pain when performing hip IR  added stepping over hurdles this session for gait and balance training, which she tolerated well needed viridiana UE support to complete the actvity    The goal of the current treatment is to address patient's functional limitations as well as objective findings  Plan: continue as indicated by primary PT  Insurance:  AMA/CMS Eval/ Re-eval POC expires [facility] #/ Referral # Total    Start date  Expiration date Extension  Co-Insurance   CMS 5 5 22 7 28 22                                  Precautions: HTN, OP  DOS 5 2 22  HEP: quad set, glute set, ankle pumps, stand hip abd, heel slide     Manuals 5 11 5 13 5 16 5 18 22 5 20 22 22   visit 3 4 5 6 7    Soft tissue quad FB np  FB desensitization  STM left quAD, iT BAND    L1-2 central gliders     3*40             Neuro Re-Ed         Feet together 5*10'' EC CCG 5*10'' EC CCG np      Clam shell    3*8 pillow b/w knees 3*10 pillow b/w knees          Instructed in sleeping with pillow between knees             Ther Ex         HEP         Pt edu  7'  10' 5'    LAQ 3*10 3*10 3*15 HOLD     Nu step st 6 6' lvl 1 7' lvl 1 7' lvl 1 7' lvl 1 7' lvl 1 lv 1 7 min    Stand hip abd  2*10 ea  2*10 ea  2*10 ea  2*10 LLE coupled with extn Not performed 10 x viridiana    Stand hip extn 3*10 ea  3*10 ea  3*10 ea     10 x viridiana    Calf raise 3*10 2*10 3*10 3*10 2*10 10 x 2    squats 2*10 2*10 3*10 3*10 2*10 --   Stand hip flexion 10x 2*10 2*10   2 x 10     Hip PROM FB FB FB FB  Performed    Supine hip IR/ER rolling  FB       Supine hip add    3*10   5 sec x 10     Supine hip flexion   2*15      Side stepping   2 laps 15'   16 feet x 2 x 3 rounds    glute set    2*10 in supine and stand 2*10 in supine and stand 5 sec x 10           Stepping over hurdles fwd/ lat 4 hurdles 2 rounds each    Butterfly stretch      5 sec x 10              Ther Activity                           Gait Training         SPC 5' np  education              Modalities                             Short Term: from Date of surgery  1  Pt will report decreased levels of pain by at least 2 subjective ratings in 4 weeks  2  Pt will demonstrate improved ROM by at least 10 degrees in 4 weeks  3  Pt will demonstrate improved strength by 1/2 grade in 4 weeks  4  Pt will be able to ambulate without AD in 4 weeks  Long Term:   1  Pt will be independent in their HEP in 8 weeks  2  Pt will be pain free with IADL's in 8 weeks  3  Pt will be able to perform reciprocal stair navigation in 8 weeks  4  Pt will return to [facility] in 10 weeks with regards to recreational activities

## 2022-05-26 ENCOUNTER — OFFICE VISIT (OUTPATIENT)
Dept: PHYSICAL THERAPY | Facility: CLINIC | Age: 77
End: 2022-05-26
Payer: MEDICARE

## 2022-05-26 DIAGNOSIS — M25.552 LEFT HIP PAIN: Primary | ICD-10-CM

## 2022-05-26 PROCEDURE — 97110 THERAPEUTIC EXERCISES: CPT | Performed by: PHYSICAL THERAPIST

## 2022-05-26 NOTE — PROGRESS NOTES
Daily Note         Today's date: 2022  Patient name: Dyan Castellanos  :   MRN: [id number]  Referring provider: Kortney Rossi DO  Dx:   Encounter Diagnosis     ICD-10-CM    1  Left hip pain  M25 552        Subjective: Dyan Castellanos reports that she continues to have lateral thigh pain with all activities  Objective: See treatment diary below    Assessment: Patient able to complete all exercises today with reporting of no change in pain in lateral thigh region  Patient requires constant cueing for proper gait pattern to avoid hip internal rotation with use of AD  Plan: Continue with hip strengthening per patient's tolerance  Insurance:  AMA/CMS Eval/ Re-eval POC expires [facility] #/ Referral # Total    Start date  Expiration date Extension  Co-Insurance   CMS  22                                  Precautions: HTN, OP  DOS 5 2 22  HEP: quad set, glute set, ankle pumps, stand hip abd, heel slide     Manuals 5 16 5 18 22 5 20 22 22   visit 5 6 7 8 9   Soft tissue quad  FB desensitization  STM left quAD, iT BAND     L1-2 central gliders   3*40             Neuro Re-Ed        Feet together np       Clam shell  3*8 pillow b/w knees 3*10 pillow b/w knees         Instructed in sleeping with pillow between knees             Ther Ex        HEP        Pt edu  10' 5'     LAQ 3*15 HOLD      Nu step st 6 7' lvl 1 7' lvl 1 7' lvl 1 lv 1 7 min  lvl 1 7'    Stand hip abd  2*10 ea  2*10 LLE coupled with extn Not performed 10 x viridiana  2*10 B/L   Stand hip extn 3*10 ea  10 x viridiana  3*10 ea     Stand hip abd/extn combo     2*10 LLE   Calf raise 3*10 3*10 2*10 10 x 2  3*10   squats 3*10 3*10 2*10 -- 3*10   Stand hip flexion 2*10   2 x 10   2*10   Supine hip add  3*10   5 sec x 10   resume   Side stepping 2 laps 15'   16 feet x 2 x 3 rounds  16 feet x 2 x 3 rounds   glute set  2*10 in supine and stand 2*10 in supine and stand 5 sec x 10   2*10, 5"       Stepping over hurdles fwd/ lat 4 hurdles 2 rounds each     Butterfly stretch    5 sec x 10      Step up     3*10, 6" LLE   Ther Activity                        Gait Training        [facility]  education   5'           Modalities                          Short Term: from Date of surgery  1  Pt will report decreased levels of pain by at least 2 subjective ratings in 4 weeks  2  Pt will demonstrate improved ROM by at least 10 degrees in 4 weeks  3  Pt will demonstrate improved strength by 1/2 grade in 4 weeks  4  Pt will be able to ambulate without AD in 4 weeks  Long Term:   1  Pt will be independent in their HEP in 8 weeks  2  Pt will be pain free with IADL's in 8 weeks  3  Pt will be able to perform reciprocal stair navigation in 8 weeks  4  Pt will return to [facility] in 10 weeks with regards to recreational activities

## 2022-05-31 ENCOUNTER — OFFICE VISIT (OUTPATIENT)
Dept: PHYSICAL THERAPY | Facility: CLINIC | Age: 77
End: 2022-05-31
Payer: MEDICARE

## 2022-05-31 DIAGNOSIS — M25.552 LEFT HIP PAIN: Primary | ICD-10-CM

## 2022-05-31 PROCEDURE — 97110 THERAPEUTIC EXERCISES: CPT | Performed by: PHYSICAL THERAPIST

## 2022-05-31 NOTE — PROGRESS NOTES
Daily Note         Today's date: 2022  Patient name: Nancy Mccallum  : [birth year]  MRN: [id number]  Referring provider: Renuka Lee DO  Dx:   Encounter Diagnosis     ICD-10-CM    1  Left hip pain  M25 552        Subjective: Patient continues to report upper lateral thigh discomfort with transitions in bed  1 on 1 with PT for 23 minutes  Remaining time independent fitness program     Objective: See treatment diary below    Assessment: Patient reported less discomfort with gluteal based activation with all exercises and with constant cueing  Continues to place her self in knee bent hip internally rotated position  Plan: Continue posterior hip strengthening  Insurance:  AMA/CMS Eval/ Re-eval POC expires [place] #/ Referral # Total    Start date  Expiration date Extension  Co-Insurance   CMS 5  7 28 22                                  Precautions: HTN, OP  DOS 5 2 22  HEP: quad set, glute set, ankle pumps, stand hip abd, heel slide     Manuals 5 16 5 18 22 5 20 22 22 5  22   visit 5 6 7 8 9 10   Soft tissue quad  FB desensitization  STM left quAD, iT BAND      L1-2 central gliders   3*40               Neuro Re-Ed         Clam shell  3*8 pillow b/w knees 3*10 pillow b/w knees          Instructed in sleeping with pillow between knees               Ther Ex         HEP         Pt edu  10' 5'      Nu step st 6 7' lvl 1 7' lvl 1 7' lvl 1 lv 1 7 min  lvl 1 7'     Stand hip abd  2*10 ea  2*10 LLE coupled with extn Not performed 10 x viridiana  2*10 B/L 2*10 ea  Stand hip extn 3*10 ea  10 x viridiana  3*10 ea  3*10 ea     Stand hip abd/extn combo     2*10 LLE 2*10 LLE   Calf raise 3*10 3*10 2*10 10 x 2  3*10 3*10   squats 3*10 3*10 2*10 -- 3*10 2*10   Stand hip flexion 2*10   2 x 10   2*10 2*10 LLE   Supine hip add  3*10   5 sec x 10   resume    Side stepping 2 laps 15'   16 feet x 2 x 3 rounds  16 feet x 2 x 3 rounds 3 steps per, 5 rounds   glute set  2*10 in supine and stand 2*10 in supine and stand 5 sec x 10   2*10, 5" 2*10, 5"       Stepping over hurdles fwd/ lat 4 hurdles 2 rounds each      Step up     3*10, 6" LLE resume   Ther Activity                           Gait Training         [facility]  education   5' 3'  glute drive            Modalities                             Short Term: from Date of surgery  1  Pt will report decreased levels of pain by at least 2 subjective ratings in 4 weeks  2  Pt will demonstrate improved ROM by at least 10 degrees in 4 weeks  3  Pt will demonstrate improved strength by 1/2 grade in 4 weeks  4  Pt will be able to ambulate without AD in 4 weeks  Long Term:   1  Pt will be independent in their HEP in 8 weeks  2  Pt will be pain free with IADL's in 8 weeks  3  Pt will be able to perform reciprocal stair navigation in 8 weeks  4  Pt will return to [facility] in 10 weeks with regards to recreational activities

## 2022-06-02 ENCOUNTER — OFFICE VISIT (OUTPATIENT)
Dept: PHYSICAL THERAPY | Facility: CLINIC | Age: 77
End: 2022-06-02
Payer: MEDICARE

## 2022-06-02 DIAGNOSIS — M25.552 LEFT HIP PAIN: Primary | ICD-10-CM

## 2022-06-02 PROCEDURE — 97110 THERAPEUTIC EXERCISES: CPT | Performed by: PHYSICAL THERAPIST

## 2022-06-02 NOTE — PROGRESS NOTES
Daily Note/Re-evaluation         Today's date: 2022  Patient name: Jacqueline Rodrigez  : [id number]  MRN: [id number]  Referring provider: Linnette Silva DO  Dx:   Encounter Diagnosis     ICD-10-CM    1  Left hip pain  M25 552        Subjective: Patient reports that posterior lateral hip pain continues with ambulation  Reports walking at times without AD  Reports she would be doing significantly better if it was not for the pain  1 on 1 with PT for 23 minutes  Remaining time independent fitness program     Pain  Current pain rating: 10  At best pain ratin  At worst pain rating: 10  Quality: dull ache  Aggravating factors: stair climbing, standing and walking  Progression: no change     Social Support  Steps to enter house: yes (5 in front, 2 in back)  5  Stairs in house: yes      Treatments  Previous treatment: physical therapy  Patient Goals  Patient goals for therapy: decreased pain- 0% met, increased motion and increased strength (75% met with regards to strength)  Patient goal: patient would like to return to Polka dancing      Objective     GAIT: RW with ambulation, slow gait speed without cane  Squat assess: 50% depth  SLS: RLE: 4 sec , LLE: unable  Tug 10 ft: 10 sec no AD               MMT          AROM           PROM     Hip         R          L         R        L          R         L   Flex  5 4+ WNL  105   Extn  4 3+ WNL WNL       Abd  4 3 40 40   40   Add  4 4           IR  5 4+     25 25   ER  5 4+ 25 20 20 23                  MMT     Knee      R          L   Flex  5 5   Extn  5 5                           MMT     Ankle         R          L   PF 5 5   DF  5 5      Slump test: L= -   R= -      Straight leg raise:   L= -   R= -      Assessment: Overall patient is making good functional gains since starting therapy  She is able to ambulate distances without AD but is limited due to gluteal strength deficits and pain  Symptoms improve with ambulation cueing   She will continue to benefit from treatment to address functional deficits and ADLs as she lives alone  Plan: Continue posterior hip strengthening  Insurance:  AMA/CMS Eval/ Re-eval POC expires Niurka Ford #/ Referral # Total    Start date  Expiration date Extension  Co-Insurance   CMS 5 5 22 7 28 22                                  Precautions: HTN, OP  DOS 5 2 22  HEP: quad set, glute set, ankle pumps, stand hip abd, heel slide     Manuals 5 20 22 5/24/22 5/26 5 31 22 6 2 22   visit 7 8 9 10 11   Soft tissue quad  STM left quAD, iT BAND       MFR glute med     FB                           Neuro Re-Ed        Clam shell 3*10 pillow b/w knees    2*10, 3" hold PT assist with glute set   TrA supine hip abd     3*10, 3" YTB ea  Mini bridge     3*10                   Ther Ex        HEP        Pt edu 5'       Nu step st 6 7' lvl 1 lv 1 7 min  lvl 1 7'      Stand hip abd  Not performed 10 x viridiana  2*10 B/L 2*10 ea  3*10 ea  Stand hip extn  10 x viridiana  3*10 ea  3*10 ea  Stand hip abd/extn combo   2*10 LLE 2*10 LLE 3*10 LLE   Calf raise 2*10 10 x 2  3*10 3*10    squats 2*10 -- 3*10 2*10    Stand hip flexion  2 x 10   2*10 2*10 LLE 2*10 LLE   Side stepping  16 feet x 2 x 3 rounds  16 feet x 2 x 3 rounds 3 steps per, 5 rounds    glute set 2*10 in supine and stand 5 sec x 10   2*10, 5" 2*10, 5" 2*10, 5"     Stepping over hurdles fwd/ lat 4 hurdles 2 rounds each       Step up   3*10, 6" LLE resume    Ther Activity                        Gait Training        [facility]   5' 3'  glute drive            Modalities                          Short Term: from Date of surgery  1  Pt will report decreased levels of pain by at least 2 subjective ratings in 4 weeks  Not met  2  Pt will demonstrate improved ROM by at least 10 degrees in 4 weeks  met  3  Pt will demonstrate improved strength by 1/2 grade in 4 weeks  Met   4  Pt will be able to ambulate without AD in 4 weeks  met  Long Term: not met  1  Pt will be independent in their HEP in 8 weeks  2   Pt will be pain free with IADL's in 8 weeks  3  Pt will be able to perform reciprocal stair navigation in 8 weeks  4  Pt will return to [facility] in 10 weeks with regards to recreational activities

## 2022-06-03 ENCOUNTER — TELEPHONE (OUTPATIENT)
Dept: OBGYN CLINIC | Facility: CLINIC | Age: 77
End: 2022-06-03

## 2022-06-03 DIAGNOSIS — Z96.642 STATUS POST HIP REPLACEMENT, LEFT: Primary | ICD-10-CM

## 2022-06-03 DIAGNOSIS — M79.605 PAIN IN LEFT LEG: ICD-10-CM

## 2022-06-03 NOTE — TELEPHONE ENCOUNTER
Patient had a left total hip arthroplasty on 05/2/2022  States she is still in a lot of pain wondering if this is normal? Can anything be done to help her pain  Please advise

## 2022-06-03 NOTE — TELEPHONE ENCOUNTER
Without examining her, it will be difficult to tell if it is normal  Perhaps she can see me on Monday? In the meantime, I ordered a stat duplex US of her LLE to rule out a DVT  Please help facilitate an appointment for her today  Continue with ice, tylenol, oxycodone for pain   Thanks

## 2022-06-03 NOTE — TELEPHONE ENCOUNTER
Stacie Hall no schedule is open for you yet on Monday  We can reassess Monday morning pending on her US result?

## 2022-06-04 ENCOUNTER — HOSPITAL ENCOUNTER (OUTPATIENT)
Dept: VASCULAR ULTRASOUND | Facility: HOSPITAL | Age: 77
Discharge: HOME | End: 2022-06-04
Payer: MEDICARE

## 2022-06-04 DIAGNOSIS — M79.605 PAIN IN LEFT LEG: ICD-10-CM

## 2022-06-04 DIAGNOSIS — Z96.642 STATUS POST HIP REPLACEMENT, LEFT: ICD-10-CM

## 2022-06-04 PROCEDURE — 93971 EXTREMITY STUDY: CPT

## 2022-06-04 PROCEDURE — 93971 EXTREMITY STUDY: CPT | Performed by: SURGERY

## 2022-06-06 ENCOUNTER — TELEPHONE (OUTPATIENT)
Dept: OBGYN CLINIC | Facility: HOSPITAL | Age: 77
End: 2022-06-06

## 2022-06-06 NOTE — TELEPHONE ENCOUNTER
Dr Connell [place]    [phone]    Patient has an appt on 6/10 to discuss her US results  Can you call her to discuss results?

## 2022-06-07 ENCOUNTER — APPOINTMENT (OUTPATIENT)
Dept: PHYSICAL THERAPY | Facility: CLINIC | Age: 77
End: 2022-06-07
Payer: MEDICARE

## 2022-06-09 ENCOUNTER — OFFICE VISIT (OUTPATIENT)
Dept: PHYSICAL THERAPY | Facility: CLINIC | Age: 77
End: 2022-06-09
Payer: MEDICARE

## 2022-06-09 DIAGNOSIS — M25.552 LEFT HIP PAIN: Primary | ICD-10-CM

## 2022-06-09 PROCEDURE — 97110 THERAPEUTIC EXERCISES: CPT | Performed by: PHYSICAL THERAPIST

## 2022-06-09 NOTE — PROGRESS NOTES
Daily Note/Progress note    Today's date: 2022  Patient name: Simon Norwood  :   MRN: [id number]  Referring provider: Drea Wright DO  Dx:   Encounter Diagnosis     ICD-10-CM    1  Left hip pain  M25 552      Start Time:   Stop Time: 104  Total time in clinic (min): 25 minutes   Assessment: Tolerated treatment well  Patient reported slight anterior thigh ache with performance of standing hip flexion returning to baseline of zero pain upon completion of exercise  Overall Silvestre Centeno has done well from a functional stand point and has met all goals set forth at initial evaluation  She is recommended for discharge from skilled care at this time as physical therapy is not changing pain reporting but has improved her overall function  Plan: D/C from therapy at this time per functional goals being met  Subjective: Patient reports returning back to driving and normal daily activities  Patient reports no functional limitations at this time and was even able to to [facility] dance for assessment in therapy with no pain or limitation reported  Silvestre Centeno reports that she has no pain when walking but will have shooting pain when lifting leg to get into bed/car but is able to complete task  Short Term: from Date of surgery  1  Pt will report decreased levels of pain by at least 2 subjective ratings in 4 weeks  Not met  2  Pt will demonstrate improved ROM by at least 10 degrees in 4 weeks  met  3  Pt will demonstrate improved strength by 1/2 grade in 4 weeks  Met   4  Pt will be able to ambulate without AD in 4 weeks  met  Long Term: not met  1  Pt will be independent in their HEP in 8 weeks  Met  2  Pt will be pain free with IADL's in 8 weeks  met  3  Pt will be able to perform reciprocal stair navigation in 8 weeks  met  4  Pt will return to [facility] in 10 weeks with regards to recreational activities   met    Objective: See treatment diary below    Treatments  Previous treatment: physical therapy  Patient Goals  Patient goals for therapy: decreased pain- 0% met, increased motion and increased strength (100% met with regards to strength)  Patient goal: patient would like to return to Polka dancing- 100% met      Objective     GAIT: normal gait pattern  Squat assess: 75% depth  SLS: RLE: 4 sec , LLE: 4 sec  Tug 10 ft: < 10 sec           Insurance:  AMA/CMS Eval/ Re-eval POC expires Ed Melvahoff #/ Referral # Total    Start date  Expiration date Extension  Co-Insurance   CMS 5 5 22 7 28 22                                  Precautions: HTN, OP  DOS 5 2 22  HEP: quad set, glute set, ankle pumps, stand hip abd, heel slide     Manuals 5 20 22 5/24/22 5/26 5 31 22 6 2 22 6 9 22   visit 7 8 9 10 11 12   Soft tissue quad  STM left quAD, iT BAND        MFR glute med     FB                               Neuro Re-Ed         Clam shell 3*10 pillow b/w knees    2*10, 3" hold PT assist with glute set    TrA supine hip abd     3*10, 3" YTB ea  Mini bridge     3*10                      Ther Ex         Pt edu 5'        Nu step st 6 7' lvl 1 lv 1 7 min  lvl 1 7'       Stand hip abd  Not performed 10 x viridiana  2*10 B/L 2*10 ea  3*10 ea  3*10 ea  Stand hip extn  10 x viridiana  3*10 ea  3*10 ea  Stand hip abd/extn combo   2*10 LLE 2*10 LLE 3*10 LLE 3*10 LLE   Calf raise 2*10 10 x 2  3*10 3*10     squats 2*10 -- 3*10 2*10     Stand hip flexion  2 x 10   2*10 2*10 LLE 2*10 LLE 2*10 LLE   Side stepping  16 feet x 2 x 3 rounds  16 feet x 2 x 3 rounds 3 steps per, 5 rounds  8', 2*2 laps   glute set 2*10 in supine and stand 5 sec x 10   2*10, 5" 2*10, 5" 2*10, 5"      Stepping over hurdles fwd/ lat 4 hurdles 2 rounds each        Step up   3*10, 6" LLE resume  5x reciprocal pattern   Ther Activity                           Gait Training         [facility]   5' 3'  glute drive  335' no cane            Modalities                             Short Term: from Date of surgery  1   Pt will report decreased levels of pain by at least 2 subjective ratings in 4 weeks  Not met  2  Pt will demonstrate improved ROM by at least 10 degrees in 4 weeks  met  3  Pt will demonstrate improved strength by 1/2 grade in 4 weeks  Met   4  Pt will be able to ambulate without AD in 4 weeks  met  Long Term: not met  1  Pt will be independent in their HEP in 8 weeks  2  Pt will be pain free with IADL's in 8 weeks  3  Pt will be able to perform reciprocal stair navigation in 8 weeks  4  Pt will return to [facility] in 10 weeks with regards to recreational activities

## 2022-06-10 ENCOUNTER — OFFICE VISIT (OUTPATIENT)
Dept: OBGYN CLINIC | Facility: CLINIC | Age: 77
End: 2022-06-10

## 2022-06-10 VITALS
BODY MASS INDEX: 20.35 KG/M2 | HEART RATE: 83 BPM | WEIGHT: 107.8 LBS | DIASTOLIC BLOOD PRESSURE: 76 MMHG | SYSTOLIC BLOOD PRESSURE: 126 MMHG | HEIGHT: 61 IN

## 2022-06-10 DIAGNOSIS — Z47.1 AFTERCARE FOLLOWING LEFT HIP JOINT REPLACEMENT SURGERY: ICD-10-CM

## 2022-06-10 DIAGNOSIS — Z96.642 AFTERCARE FOLLOWING LEFT HIP JOINT REPLACEMENT SURGERY: ICD-10-CM

## 2022-06-10 DIAGNOSIS — Z96.642 STATUS POST HIP REPLACEMENT, LEFT: Primary | ICD-10-CM

## 2022-06-10 PROCEDURE — 99024 POSTOP FOLLOW-UP VISIT: CPT | Performed by: ORTHOPAEDIC SURGERY

## 2022-06-10 RX ORDER — POTASSIUM CITRATE 10 MEQ/1
10 TABLET, EXTENDED RELEASE ORAL 2 TIMES DAILY
COMMUNITY
Start: 2022-05-31

## 2022-06-10 NOTE — PROGRESS NOTES
Assessment/Plan:  1  Status post hip replacement, left     2  Aftercare following left hip joint replacement surgery       Scribe Attestation    I,:  Tank Borjas am acting as a scribe while in the presence of the attending physician :       I,:  Brianna Dean, DO personally performed the services described in this documentation    as scribed in my presence :         Fernando Meraz is a pleasant 67year old who presents to the office today 6 weeks s/p direct anterior left total hip arthroplasty  I am pleased with her clinical examination at today's visit  She has been discharged from formal physical therapy and transition into a home exercise program   Her incision is well healed with no erythema, warmth or signs of infection  She does have some mild hypersensitivity which could be attributed to the formal nerve  I discussed with patient that she should make sure she is getting enough electrolytes and use vitamins B6 and B12  I will follow-up with her in 6 weeks for a clinical re-evaluation and repeat left hip x-rays  She understood and had no further questions  Subjective:  6 weeks status post direct anterior left hip arthroplasty    Patient ID: Minh Mendoza is a 67 y o  female who presents the office today 6 weeks status post direct anterior left hip arthroplasty  She has been compliant with attending formal physical therapy and notes improvements with her activities of daily living  She states that physical therapy has discharged her and has transitioned into a home exercise program  She states that she is not using any type of assistive device at this time  She denies any issues with her activities of daily living  She does know a harness feeling and numbness over her thigh  She rates her pain as a 5/10  She denies any groin pain  She states that she will use Tylenol to help alleviate her pain  Review of Systems   Constitutional: Negative for chills, fever and unexpected weight change     HENT: Negative for hearing loss, nosebleeds and sore throat  Eyes: Negative for pain, redness and visual disturbance  Respiratory: Negative for cough, shortness of breath and wheezing  Cardiovascular: Negative for chest pain, palpitations and leg swelling  Gastrointestinal: Negative for abdominal pain, nausea and vomiting  Endocrine: Negative for polyphagia and polyuria  Genitourinary: Negative for dysuria and hematuria  Musculoskeletal: Positive for arthralgias  Negative for gait problem and myalgias  Skin: Negative for rash and wound  Neurological: Negative for dizziness, numbness and headaches  Psychiatric/Behavioral: Negative for confusion and suicidal ideas  The patient is not nervous/anxious  Past Medical History:   Diagnosis Date    Anxiety     Arthritis     left hip    Chronic kidney disease     chronic stones    Chronic narcotic dependence (HCC)     tramadol for hip    Colon polyp     Gallstones     GERD (gastroesophageal reflux disease)     H/O hiatal hernia     Headache     occ    Hyperlipidemia     Hypertension     Kidney stone     Low back pain     Lumbar disc disease     Nausea     at times    Poor appetite     Sacroiliitis (HCC)     Thickened endometrium     Wears dentures     full upper/ partial lower    Weight loss, unintentional     Since Nov 2021 20lb weight loss       Past Surgical History:   Procedure Laterality Date    APPENDECTOMY      BREAST SURGERY Left     excision cyst    COLONOSCOPY      CYSTOSCOPY      with stone extraction x 2    CYSTOSCOPY W/ RETROGRADES Left 09/17/2012    stent removal,     CYSTOSCOPY W/ URETEROSCOPY W/ LITHOTRIPSY Left 12/11/2006    Ca Phos 98%    CYSTOSCOPY W/ URETEROSCOPY W/ LITHOTRIPSY Left 08/12/2012    Ca ox di 60%, Ca ox mono 20%    ESOPHAGOGASTRODUODENOSCOPY      EXTRACORPOREAL SHOCK WAVE LITHOTRIPSY Left 4/13/2016    Procedure: LITHROTRIPSY EXTRACORPORAL SHOCKWAVE (ESWL);   Surgeon: Ata Anne MD;  [facility];  Service:     POLYPECTOMY N/A 4/11/2022    Procedure: POLYPECTOMY;  Surgeon: Alex Matthews MD;  Location: [facility];  Service: Gynecology    ND CYSTO/URETERO W/LITHOTRIPSY &INDWELL STENT INSRT Left 9/1/2016    Procedure: CYSTOSCOPY URETEROSCOPY WITH STONE EXTRACTION , RETROGRADE PYELOGRAM AND INSERTION STENT URETERAL;  Surgeon: Colletta Feathers, MD;  Location: [address] [place] [address];  Service: Urology    ND CYSTO/URETERO W/LITHOTRIPSY &INDWELL STENT INSRT Left 8/12/2016    Procedure: CYSTOSCOPY URETEROSCOPY WITH LITHOTRIPSY HOLMIUM LASER, RETROGRADE PYELOGRAM AND INSERTION STENT URETERAL;  Surgeon: Colletta Feathers, MD;  Location: [address] [place] [address];  Service: Urology    ND HYSTEROSCOPY,W/ENDO [place] [address] N/A 4/11/2022    Procedure: DILATATION AND CURETTAGE (D&C) WITH HYSTEROSCOPY;  Surgeon: Alex Matthews MD;  Location: [facility];  Service: Gynecology    ND TOTAL HIP ARTHROPLASTY Left 5/2/2022    Procedure: ARTHROPLASTY HIP TOTAL ANTERIOR - Left;  Surgeon: Tip Sawyer DO;  Location: [address] [place] [address];  Service: Orthopedics    TUBAL LIGATION      UPPER GASTROINTESTINAL ENDOSCOPY      URETERAL STENT PLACEMENT Left 08/31/2012    Narrowing of the left distal ureter for 3cm        WISDOM TOOTH EXTRACTION         Family History   Problem Relation Age of Onset    Stroke Father         TIA    Parkinsonism Father     Cancer Maternal Aunt     Cancer Maternal Uncle     Heart disease Maternal Uncle         Massive MI    No Known Problems Mother     No Known Problems Sister     Pancreatic cancer Brother     No Known Problems Paternal Aunt     No Known Problems Paternal Uncle     No Known Problems Maternal Grandmother     No Known Problems Maternal Grandfather     No Known Problems Paternal Grandmother     No Known Problems Paternal Grandfather        Social History     Occupational History    Not on file   Tobacco Use    Smoking status: Never Smoker    Smokeless tobacco: Never Used   Vaping Use    Vaping Use: Never used   Substance and Sexual Activity    Alcohol use: Yes     Comment: wine almost every night to sleep    Drug use: No    Sexual activity: Yes         Current Outpatient Medications:     acetaminophen (TYLENOL) 325 mg tablet, Take 3 tablets (975 mg total) by mouth every 8 (eight) hours, Disp: , Rfl: 0    alendronate (FOSAMAX) 70 mg tablet, Take 70 mg by mouth every 7 days sundays , Disp: , Rfl:     ALPRAZolam (XANAX) 0 25 mg tablet, Take by mouth daily at bedtime as needed for anxiety , Disp: , Rfl:     amLODIPine (NORVASC) 5 mg tablet, Take 5 mg by mouth every morning , Disp: , Rfl:     aspirin 325 mg tablet, Take 1 tablet (325 mg total) by mouth 2 (two) times a day, Disp: 84 tablet, Rfl: 0    atorvastatin (LIPITOR) 20 mg tablet, Take 20 mg by mouth daily at bedtime  , Disp: , Rfl:     docusate sodium (COLACE) 100 mg capsule, Take 1 capsule (100 mg total) by mouth 2 (two) times a day (Patient not taking: Reported on 5/18/2022), Disp: 60 capsule, Rfl: 0    lidocaine (XYLOCAINE) 5 % ointment, Apply topically as needed for mild pain APPLY TO VAGINAL OPENING AS NEEDED FOR PAIN (Patient not taking: Reported on 5/18/2022), Disp: 30 g, Rfl: 0    naloxone (NARCAN) 4 mg/0 1 mL nasal spray, Administer 1 spray into a nostril  If no response after 2-3 minutes, give another dose in the other nostril using a new spray , Disp: 1 each, Rfl: 0    OLMESARTAN MEDOXOMIL PO, Take 40 mg by mouth daily, Disp: , Rfl:     omeprazole (PriLOSEC) 40 MG capsule, Take 40 mg by mouth daily, Disp: , Rfl:     oxyCODONE (Roxicodone) 5 immediate release tablet, Take 1/2 to 1 tablets by mouth every 4-6 hours as needed for pain (Patient not taking: Reported on 5/18/2022), Disp: 30 tablet, Rfl: 0    Polyethylene Glycol 3350 (MIRALAX PO), Take by mouth if needed, Disp: , Rfl:     Allergies   Allergen Reactions    Magnesium Oxybate [Oxybate] Diarrhea     DIARRHEA       Objective:   There were no vitals filed for this visit  Body mass index is 20 22 kg/m²  Left Hip Exam     Tenderness   The patient is experiencing no tenderness  Other   Erythema: absent  Scars: present (Well healed anterior surgical scar)  Sensation: normal  Pulse: present    Comments:  Hypersensitivity over thigh likely related to femoral nerve  Full and pain-free range of motion  Neurovascularly intact            Physical Exam  Vitals reviewed  Constitutional:       Appearance: Normal appearance  She is well-developed  HENT:      Head: Normocephalic and atraumatic  Eyes:      General:         Right eye: No discharge  Left eye: No discharge  Extraocular Movements: Extraocular movements intact  Conjunctiva/sclera: Conjunctivae normal    Cardiovascular:      Rate and Rhythm: Normal rate  Pulmonary:      Effort: Pulmonary effort is normal  No respiratory distress  Musculoskeletal:      Cervical back: Normal range of motion and neck supple  Skin:     General: Skin is warm and dry  Neurological:      General: No focal deficit present  Mental Status: She is alert and oriented to person, place, and time     Psychiatric:         Mood and Affect: Mood normal          Behavior: Behavior normal

## 2022-06-14 ENCOUNTER — APPOINTMENT (OUTPATIENT)
Dept: PHYSICAL THERAPY | Facility: CLINIC | Age: 77
End: 2022-06-14
Payer: MEDICARE

## 2022-06-16 ENCOUNTER — APPOINTMENT (OUTPATIENT)
Dept: PHYSICAL THERAPY | Facility: CLINIC | Age: 77
End: 2022-06-16
Payer: MEDICARE

## 2022-06-21 ENCOUNTER — APPOINTMENT (OUTPATIENT)
Dept: PHYSICAL THERAPY | Facility: CLINIC | Age: 77
End: 2022-06-21
Payer: MEDICARE

## 2022-06-23 ENCOUNTER — APPOINTMENT (OUTPATIENT)
Dept: PHYSICAL THERAPY | Facility: CLINIC | Age: 77
End: 2022-06-23
Payer: MEDICARE

## 2022-06-28 ENCOUNTER — APPOINTMENT (OUTPATIENT)
Dept: PHYSICAL THERAPY | Facility: CLINIC | Age: 77
End: 2022-06-28
Payer: MEDICARE

## 2022-06-30 ENCOUNTER — APPOINTMENT (OUTPATIENT)
Dept: PHYSICAL THERAPY | Facility: CLINIC | Age: 77
End: 2022-06-30
Payer: MEDICARE

## 2022-07-22 ENCOUNTER — APPOINTMENT (OUTPATIENT)
Dept: RADIOLOGY | Facility: CLINIC | Age: 77
End: 2022-07-22
Payer: MEDICARE

## 2022-07-22 ENCOUNTER — OFFICE VISIT (OUTPATIENT)
Dept: OBGYN CLINIC | Facility: CLINIC | Age: 77
End: 2022-07-22

## 2022-07-22 VITALS — SYSTOLIC BLOOD PRESSURE: 148 MMHG | DIASTOLIC BLOOD PRESSURE: 82 MMHG | HEART RATE: 96 BPM

## 2022-07-22 DIAGNOSIS — Z96.642 STATUS POST HIP REPLACEMENT, LEFT: Primary | ICD-10-CM

## 2022-07-22 DIAGNOSIS — Z96.642 STATUS POST HIP REPLACEMENT, LEFT: ICD-10-CM

## 2022-07-22 PROCEDURE — 73502 X-RAY EXAM HIP UNI 2-3 VIEWS: CPT

## 2022-07-22 PROCEDURE — 99024 POSTOP FOLLOW-UP VISIT: CPT | Performed by: ORTHOPAEDIC SURGERY

## 2022-07-22 RX ORDER — OLMESARTAN MEDOXOMIL 40 MG/1
40 TABLET ORAL DAILY
COMMUNITY
Start: 2022-07-03

## 2022-07-22 NOTE — PROGRESS NOTES
Assessment/Plan:  1  Status post hip replacement, left  XR hip/pelv 2-3 vws left if performed     Scribe Attestation    I,:  Jim Martin am acting as a scribe while in the presence of the attending physician :       I,:  Aroldo Moreira DO personally performed the services described in this documentation    as scribed in my presence :         Jacob Friday upon examination review the x-rays of the left hip demonstrates a stable appearing total hip arthroplasty  Her range of motion and strength are within functional limits  I did explain the anterior thigh pain is likely associated with her nerve block surgery  I do believe this will improve with time  However noted that it can take up to 8 months to see significant resolution  Upon examination today she did not have significant exacerbation of pain into the anterior thigh with manipulation of her hip  I did encourage her to continue to be active and continue home exercises learned from physical therapy  I would like to see her back in 9 months for repeat clinical evaluation and repeat x-ray of her left hip  Subjective: Follow-up left hip 3 months status post anterior total hip arthroplasty  Patient ID: Mis Woodall is a 67 y o  female presenting for follow-up evaluation of her left hip  She is now approximately 3 months status post left total hip arthroplasty  She states that she is doing better overall  However, is still experiencing intermittent mild to moderate pain into the anterior thigh and anterior hip  She notes that pain can be exacerbated with hip flexion or direct pressure over her thigh  She denies any distal paresthesias  She has been continuing her home exercises learned from physical therapy  She notes that she has utilized Voltaren gel for additional pain relief  Unfortunately, this failed to provide her with significant symptomatic relief  She has been also utilizing Tylenol Arthritis    This too has also failed to provide her with symptomatic relief  Review of Systems   Constitutional: Negative for chills, fever and unexpected weight change  HENT: Negative for hearing loss, nosebleeds and sore throat  Eyes: Negative for pain, redness and visual disturbance  Respiratory: Negative for cough, shortness of breath and wheezing  Cardiovascular: Negative for chest pain, palpitations and leg swelling  Gastrointestinal: Negative for abdominal pain, nausea and vomiting  Endocrine: Negative for polydipsia and polyuria  Genitourinary: Negative for dysuria and hematuria  Musculoskeletal: Positive for arthralgias and myalgias  See HPI   Skin: Negative for rash and wound  Neurological: Negative for dizziness, numbness and headaches  Psychiatric/Behavioral: Negative for decreased concentration and suicidal ideas  The patient is not nervous/anxious            Past Medical History:   Diagnosis Date    Anxiety     Arthritis     left hip    Chronic kidney disease     chronic stones    Chronic narcotic dependence (HCC)     tramadol for hip    Colon polyp     Gallstones     GERD (gastroesophageal reflux disease)     H/O hiatal hernia     Headache     occ    Hyperlipidemia     Hypertension     Kidney stone     Low back pain     Lumbar disc disease     Nausea     at times    Poor appetite     Sacroiliitis (HCC)     Thickened endometrium     Wears dentures     full upper/ partial lower    Weight loss, unintentional     Since Nov 2021 20lb weight loss       Past Surgical History:   Procedure Laterality Date    APPENDECTOMY      BREAST SURGERY Left     excision cyst    COLONOSCOPY      CYSTOSCOPY      with stone extraction x 2    CYSTOSCOPY W/ RETROGRADES Left 09/17/2012    stent removal,     CYSTOSCOPY W/ URETEROSCOPY W/ LITHOTRIPSY Left 12/11/2006    Ca Phos 98%    CYSTOSCOPY W/ URETEROSCOPY W/ LITHOTRIPSY Left 08/12/2012    Ca ox di 60%, Ca ox mono 20%    ESOPHAGOGASTRODUODENOSCOPY      EXTRACORPOREAL SHOCK WAVE LITHOTRIPSY Left 4/13/2016    Procedure: LITHROTRIPSY EXTRACORPORAL SHOCKWAVE (ESWL); Surgeon: Chastity Barney MD;  Location: [facility] MAIN OR;  Service:     POLYPECTOMY N/A 4/11/2022    Procedure: POLYPECTOMY;  Surgeon: Anna Andersne MD;  [facility]: [facility];  Service: Gynecology    GA CYSTO/URETERO W/LITHOTRIPSY &INDWELL STENT INSRT Left 9/1/2016    Procedure: CYSTOSCOPY URETEROSCOPY WITH STONE EXTRACTION , RETROGRADE PYELOGRAM AND INSERTION STENT URETERAL;  Surgeon: Chastity Barney MD;  Location: [address] [place] [address];  Service: Urology    GA CYSTO/URETERO W/LITHOTRIPSY &INDWELL STENT INSRT Left 8/12/2016    Procedure: CYSTOSCOPY URETEROSCOPY WITH LITHOTRIPSY HOLMIUM LASER, RETROGRADE PYELOGRAM AND INSERTION STENT URETERAL;  Surgeon: Chastity Barney MD;  Location: [address] [place] [address];  Service: Urology    GA HYSTEROSCOPY,W/ENDO BX N/A 4/11/2022    Procedure: DILATATION AND CURETTAGE (D&C) WITH HYSTEROSCOPY;  Surgeon: Anna Andersen MD;  [facility];  Service: Gynecology    GA TOTAL HIP ARTHROPLASTY Left 5/2/2022    Procedure: ARTHROPLASTY HIP TOTAL ANTERIOR - Left;  Surgeon: Vincent Thorpe DO;  Location: [address] [place] [address];  Service: Orthopedics    TUBAL LIGATION      UPPER GASTROINTESTINAL ENDOSCOPY      URETERAL STENT PLACEMENT Left 08/31/2012    Narrowing of the left distal ureter for 3cm        WISDOM TOOTH EXTRACTION         Family History   Problem Relation Age of Onset    Stroke Father         TIA    Parkinsonism Father     Cancer Maternal Aunt     Cancer Maternal Uncle     Heart disease Maternal Uncle         Massive MI    No Known Problems Mother     No Known Problems Sister     Pancreatic cancer Brother     No Known Problems Paternal Aunt     No Known Problems Paternal Uncle     No Known Problems Maternal Grandmother     No Known Problems Maternal Grandfather     No Known Problems Paternal Grandmother     No Known Problems Paternal Grandfather        Social History Occupational History    Not on file   Tobacco Use    Smoking status: Never Smoker    Smokeless tobacco: Never Used   Vaping Use    Vaping Use: Never used   Substance and Sexual Activity    Alcohol use: Yes     Comment: wine almost every night to sleep    Drug use: No    Sexual activity: Yes         Current Outpatient Medications:     acetaminophen (TYLENOL) 325 mg tablet, Take 3 tablets (975 mg total) by mouth every 8 (eight) hours, Disp: , Rfl: 0    alendronate (FOSAMAX) 70 mg tablet, Take 70 mg by mouth every 7 days sundays , Disp: , Rfl:     ALPRAZolam (XANAX) 0 25 mg tablet, Take by mouth daily at bedtime as needed for anxiety , Disp: , Rfl:     amLODIPine (NORVASC) 5 mg tablet, Take 5 mg by mouth every morning , Disp: , Rfl:     aspirin 325 mg tablet, Take 1 tablet (325 mg total) by mouth 2 (two) times a day, Disp: 84 tablet, Rfl: 0    atorvastatin (LIPITOR) 20 mg tablet, Take 20 mg by mouth daily at bedtime  , Disp: , Rfl:     olmesartan (BENICAR) 40 mg tablet, Take 40 mg by mouth daily, Disp: , Rfl:     omeprazole (PriLOSEC) 40 MG capsule, Take 40 mg by mouth daily, Disp: , Rfl:     Polyethylene Glycol 3350 (MIRALAX PO), Take by mouth if needed, Disp: , Rfl:     potassium citrate (UROCIT-K) 10 mEq, Take 10 mEq by mouth 2 (two) times a day, Disp: , Rfl:     docusate sodium (COLACE) 100 mg capsule, Take 1 capsule (100 mg total) by mouth 2 (two) times a day (Patient not taking: No sig reported), Disp: 60 capsule, Rfl: 0    lidocaine (XYLOCAINE) 5 % ointment, Apply topically as needed for mild pain APPLY TO VAGINAL OPENING AS NEEDED FOR PAIN (Patient not taking: No sig reported), Disp: 30 g, Rfl: 0    naloxone (NARCAN) 4 mg/0 1 mL nasal spray, Administer 1 spray into a nostril  If no response after 2-3 minutes, give another dose in the other nostril using a new spray   (Patient not taking: No sig reported), Disp: 1 each, Rfl: 0    oxyCODONE (Roxicodone) 5 immediate release tablet, Take 1/2 to 1 tablets by mouth every 4-6 hours as needed for pain (Patient not taking: No sig reported), Disp: 30 tablet, Rfl: 0    Allergies   Allergen Reactions    Magnesium Oxybate [Oxybate] Diarrhea     DIARRHEA       Objective:  Vitals:    07/22/22 0929   BP: 148/82   Pulse: 96       There is no height or weight on file to calculate BMI  Left Hip Exam     Tenderness   Left hip tenderness location: Anterior thigh  Range of Motion   Abduction: 45   Adduction: 20   Flexion: 120   Left hip external rotation: 45  Internal rotation: 20     Muscle Strength   Abduction: 5/5   Adduction: 5/5   Flexion: 4/5     Other   Erythema: absent  Scars: present (Well-healed anterior hip scar)  Sensation: normal  Pulse: present            Physical Exam  Vitals reviewed  HENT:      Head: Normocephalic and atraumatic  Eyes:      General:         Right eye: No discharge  Left eye: No discharge  Conjunctiva/sclera: Conjunctivae normal       Pupils: Pupils are equal, round, and reactive to light  Cardiovascular:      Rate and Rhythm: Normal rate  Pulmonary:      Effort: Pulmonary effort is normal  No respiratory distress  Musculoskeletal:      Cervical back: Normal range of motion and neck supple  Comments: As noted in HPI   Skin:     General: Skin is warm and dry  Neurological:      Mental Status: She is alert and oriented to person, place, and time  I have personally reviewed pertinent films in PACS  X-rays of the left hip demonstrates a stable appearing total hip arthroplasty  Prosthesis is well seated with no evidence of lucency or implant failure  No acute fractures demonstrated  No obvious lytic or blastic lesions demonstrated

## 2022-09-07 ENCOUNTER — OFFICE VISIT (OUTPATIENT)
Dept: URGENT CARE | Facility: CLINIC | Age: 77
End: 2022-09-07
Payer: MEDICARE

## 2022-09-07 ENCOUNTER — APPOINTMENT (OUTPATIENT)
Dept: RADIOLOGY | Facility: CLINIC | Age: 77
End: 2022-09-07
Payer: MEDICARE

## 2022-09-07 VITALS — OXYGEN SATURATION: 99 % | RESPIRATION RATE: 14 BRPM | HEART RATE: 85 BPM | TEMPERATURE: 98.1 F

## 2022-09-07 DIAGNOSIS — R52 PAIN: ICD-10-CM

## 2022-09-07 DIAGNOSIS — R07.81 RIB PAIN ON LEFT SIDE: Primary | ICD-10-CM

## 2022-09-07 PROCEDURE — 71101 X-RAY EXAM UNILAT RIBS/CHEST: CPT

## 2022-09-07 PROCEDURE — 99213 OFFICE O/P EST LOW 20 MIN: CPT | Performed by: PHYSICIAN ASSISTANT

## 2022-09-07 RX ORDER — NAPROXEN 500 MG/1
250 TABLET ORAL 2 TIMES DAILY WITH MEALS
Qty: 30 TABLET | Refills: 0 | Status: SHIPPED | OUTPATIENT
Start: 2022-09-07

## 2022-09-07 NOTE — PROGRESS NOTES
[facility]        NAME: Genaro Patel is a 67 y o  female  : 1945    MRN: [id number]  DATE: 2022  TIME: 7:52 PM    Assessment and Plan   Rib pain on left side [R07 81]  1  Rib pain on left side  XR ribs left w pa chest min 3 views    naproxen (Naprosyn) 500 mg tablet         Patient Instructions     Patient Instructions   My interpretation of x-ray is no acute fracture dislocation, no acute cardio or pulmonary abnormalities, official read is pending  Take naproxen as prescribed  Provided incentive spirometer to be used as educated  Follow up with PCP in 3-5 days  Proceed to  ER if symptoms worsen  Chief Complaint     Chief Complaint   Patient presents with    Pain     Pt presents with left sided rib pain below left breast/ started yesterday         History of Present Illness       Patient is a 40-year-old female presenting today with left-sided rib pain x1 day  Patient notes she awoke this morning and was experiencing pain and discomfort of her left ribs under left breast, denies any trauma or injury to the area, does note that she has been more physically active recently and participating and Polka dancing which she states is very physically active, has not taken any medication or alleviating factors for her pain  Denies chest tightness, SOB, lightheadedness, dizziness, bruising, swelling  Review of Systems   Review of Systems   Constitutional: Negative for chills and fever  HENT: Negative for ear pain and sore throat  Eyes: Negative for pain and visual disturbance  Respiratory: Negative for cough and shortness of breath  Cardiovascular: Negative for chest pain and palpitations  Gastrointestinal: Negative for abdominal pain and vomiting  Genitourinary: Negative for dysuria and hematuria  Musculoskeletal:        See HPI   Skin:        See HPI   Neurological: Negative for seizures and syncope     All other systems reviewed and are negative  Current Medications       Current Outpatient Medications:     alendronate (FOSAMAX) 70 mg tablet, Take 70 mg by mouth every 7 days sundays , Disp: , Rfl:     ALPRAZolam (XANAX) 0 25 mg tablet, Take by mouth daily at bedtime as needed for anxiety , Disp: , Rfl:     amLODIPine (NORVASC) 5 mg tablet, Take 5 mg by mouth every morning , Disp: , Rfl:     atorvastatin (LIPITOR) 20 mg tablet, Take 20 mg by mouth daily at bedtime  , Disp: , Rfl:     naproxen (Naprosyn) 500 mg tablet, Take 0 5 tablets (250 mg total) by mouth 2 (two) times a day with meals, Disp: 30 tablet, Rfl: 0    olmesartan (BENICAR) 40 mg tablet, Take 40 mg by mouth daily, Disp: , Rfl:     omeprazole (PriLOSEC) 40 MG capsule, Take 40 mg by mouth daily, Disp: , Rfl:     acetaminophen (TYLENOL) 325 mg tablet, Take 3 tablets (975 mg total) by mouth every 8 (eight) hours (Patient not taking: Reported on 9/7/2022), Disp: , Rfl: 0    aspirin 325 mg tablet, Take 1 tablet (325 mg total) by mouth 2 (two) times a day (Patient not taking: Reported on 9/7/2022), Disp: 84 tablet, Rfl: 0    docusate sodium (COLACE) 100 mg capsule, Take 1 capsule (100 mg total) by mouth 2 (two) times a day (Patient not taking: No sig reported), Disp: 60 capsule, Rfl: 0    lidocaine (XYLOCAINE) 5 % ointment, Apply topically as needed for mild pain APPLY TO VAGINAL OPENING AS NEEDED FOR PAIN (Patient not taking: No sig reported), Disp: 30 g, Rfl: 0    naloxone (NARCAN) 4 mg/0 1 mL nasal spray, Administer 1 spray into a nostril  If no response after 2-3 minutes, give another dose in the other nostril using a new spray   (Patient not taking: No sig reported), Disp: 1 each, Rfl: 0    oxyCODONE (Roxicodone) 5 immediate release tablet, Take 1/2 to 1 tablets by mouth every 4-6 hours as needed for pain (Patient not taking: No sig reported), Disp: 30 tablet, Rfl: 0    Polyethylene Glycol 3350 (MIRALAX PO), Take by mouth if needed (Patient not taking: Reported on 9/7/2022), Disp: , Rfl:     potassium citrate (UROCIT-K) 10 mEq, Take 10 mEq by mouth 2 (two) times a day (Patient not taking: Reported on 9/7/2022), Disp: , Rfl:     Current Allergies     Allergies as of 09/07/2022 - Reviewed 09/07/2022   Allergen Reaction Noted    Magnesium oxybate [oxybate] Diarrhea 09/25/2020            The following portions of the patient's history were reviewed and updated as appropriate: allergies, current medications, past family history, past medical history, past social history, past surgical history and problem list      Past Medical History:   Diagnosis Date    Anxiety     Arthritis     left hip    Chronic kidney disease     chronic stones    Chronic narcotic dependence ([address] )     tramadol for hip    Colon polyp     Gallstones     GERD (gastroesophageal reflux disease)     H/O hiatal hernia     Headache     occ    Hyperlipidemia     Hypertension     Kidney stone     Low back pain     Lumbar disc disease     Nausea     at times    Poor appetite     Sacroiliitis ([address] )     Thickened endometrium     Wears dentures     full upper/ partial lower    Weight loss, unintentional     Since Nov 2021 20lb weight loss       Past Surgical History:   Procedure Laterality Date    APPENDECTOMY      BREAST SURGERY Left     excision cyst    COLONOSCOPY      CYSTOSCOPY      with stone extraction x 2    CYSTOSCOPY W/ RETROGRADES Left 09/17/2012    stent removal,     CYSTOSCOPY W/ URETEROSCOPY W/ LITHOTRIPSY Left 12/11/2006    Ca Phos 98%    CYSTOSCOPY W/ URETEROSCOPY W/ LITHOTRIPSY Left 08/12/2012    Ca ox di 60%, Ca ox mono 20%    ESOPHAGOGASTRODUODENOSCOPY      EXTRACORPOREAL SHOCK WAVE LITHOTRIPSY Left 4/13/2016    Procedure: LITHROTRIPSY EXTRACORPORAL SHOCKWAVE (ESWL);   Surgeon: Chele Buck MD;  Location: [facility] MAIN OR;  Service:     POLYPECTOMY N/A 4/11/2022    Procedure: POLYPECTOMY;  Surgeon: Viviana Mancilla MD;  [facility];  Service: Gynecology  [place] CYSTO/URETERO W/LITHOTRIPSY &INDWELL STENT INSRT Left 9/1/2016    Procedure: CYSTOSCOPY URETEROSCOPY WITH STONE EXTRACTION , RETROGRADE PYELOGRAM AND INSERTION STENT URETERAL;  Surgeon: Ana Cristina Lewis MD;  Location: [address] [place] [address];  Service: Urology    [place] CYSTO/URETERO W/LITHOTRIPSY &INDWELL STENT INSRT Left 8/12/2016    Procedure: CYSTOSCOPY URETEROSCOPY WITH LITHOTRIPSY HOLMIUM LASER, RETROGRADE PYELOGRAM AND INSERTION STENT URETERAL;  Surgeon: Ana Cristina Lewis MD;  Location: [address] [place] [address];  Service: Urology    [place] HYSTEROSCOPY,W/ENDO [address] N/A 4/11/2022    Procedure: DILATATION AND CURETTAGE (D&C) WITH HYSTEROSCOPY;  Surgeon: Alessio Graham MD;  Location:  MAIN OR;  Service: Gynecology    [place] TOTAL HIP ARTHROPLASTY Left 5/2/2022    Procedure: ARTHROPLASTY HIP TOTAL ANTERIOR - Left;  Surgeon: Favian Lemus DO;  Location: [address] [place] [address];  Service: Orthopedics    TUBAL LIGATION      UPPER GASTROINTESTINAL ENDOSCOPY      URETERAL STENT PLACEMENT Left 08/31/2012    Narrowing of the left distal ureter for 3cm   WISDOM TOOTH EXTRACTION         Family History   Problem Relation Age of Onset    No Known Problems Mother     Stroke Father         TIA    Parkinsonism Father     No Known Problems Sister     Pancreatic cancer Brother     Cancer Maternal Aunt     Cancer Maternal Uncle     Heart disease Maternal Uncle         Massive MI    No Known Problems Paternal Aunt     No Known Problems Paternal Uncle     No Known Problems Maternal Grandmother     No Known Problems Maternal Grandfather     No Known Problems Paternal Grandmother     No Known Problems Paternal Grandfather          Medications have been verified  Objective   Pulse 85   Temp 98 1 °F (36 7 °C)   Resp 14   LMP  (LMP Unknown)   SpO2 99%        Physical Exam     Physical Exam  Vitals and nursing note reviewed  Constitutional:       Appearance: Normal appearance  HENT:      Head: Normocephalic and atraumatic  Right Ear: Tympanic membrane, ear canal and external ear normal       Left Ear: Tympanic membrane, ear canal and external ear normal       Nose: Nose normal       Mouth/Throat:      Mouth: Mucous membranes are moist       Pharynx: Oropharynx is clear  Eyes:      Conjunctiva/sclera: Conjunctivae normal    Cardiovascular:      Rate and Rhythm: Normal rate and regular rhythm  Pulses: Normal pulses  Heart sounds: Normal heart sounds  Pulmonary:      Effort: Pulmonary effort is normal       Breath sounds: Normal breath sounds  Comments: SpO2 99% indicating adequate oxygenation  Musculoskeletal:      Comments: No obvious deformity of left ribs, no bruising, no swelling, mild TTP of left anterior ribs under left breast, no palpable crepitus noted   Skin:     General: Skin is warm  Neurological:      Mental Status: She is alert

## 2022-09-07 NOTE — PATIENT INSTRUCTIONS
My interpretation of x-ray is no acute fracture dislocation, no acute cardio or pulmonary abnormalities, official read is pending  Take naproxen as prescribed  Provided incentive spirometer to be used as educated

## 2022-09-12 ENCOUNTER — TELEPHONE (OUTPATIENT)
Dept: GYNECOLOGIC ONCOLOGY | Facility: CLINIC | Age: 77
End: 2022-09-12

## 2022-09-12 NOTE — TELEPHONE ENCOUNTER
Pt called in to r/s her GI esophageal Manometry appt that she originally had back in March  Patient had  to cancel as a result of a hip replacement  Patient would like to be r/s for the appt   Patient can be reached back @ [phone]

## 2022-09-19 ENCOUNTER — TELEPHONE (OUTPATIENT)
Dept: GASTROENTEROLOGY | Facility: HOSPITAL | Age: 77
End: 2022-09-19

## 2022-09-20 ENCOUNTER — TELEPHONE (OUTPATIENT)
Dept: CARDIAC SURGERY | Facility: CLINIC | Age: 77
End: 2022-09-20

## 2022-09-20 NOTE — TELEPHONE ENCOUNTER
PT canceled her manometry and f/u with Dr Bharath Davis  She stated something has come up that she needs to deal with and she will call back to r/s when she can

## 2022-09-27 ENCOUNTER — APPOINTMENT (EMERGENCY)
Dept: RADIOLOGY | Facility: HOSPITAL | Age: 77
End: 2022-09-27
Payer: MEDICARE

## 2022-09-27 ENCOUNTER — HOSPITAL ENCOUNTER (EMERGENCY)
Facility: HOSPITAL | Age: 77
Discharge: HOME | End: 2022-09-27
Attending: EMERGENCY MEDICINE
Payer: MEDICARE

## 2022-09-27 VITALS
WEIGHT: 116.6 LBS | OXYGEN SATURATION: 97 % | SYSTOLIC BLOOD PRESSURE: 132 MMHG | TEMPERATURE: 98 F | RESPIRATION RATE: 18 BRPM | BODY MASS INDEX: 22.03 KG/M2 | HEART RATE: 88 BPM | DIASTOLIC BLOOD PRESSURE: 60 MMHG

## 2022-09-27 DIAGNOSIS — R93.89 THICKENED ENDOMETRIUM: ICD-10-CM

## 2022-09-27 DIAGNOSIS — K21.9 GERD (GASTROESOPHAGEAL REFLUX DISEASE): ICD-10-CM

## 2022-09-27 DIAGNOSIS — R10.9 LEFT FLANK PAIN: Primary | ICD-10-CM

## 2022-09-27 DIAGNOSIS — K29.70 GASTRITIS: ICD-10-CM

## 2022-09-27 DIAGNOSIS — K44.9 HIATAL HERNIA: ICD-10-CM

## 2022-09-27 LAB
ALBUMIN SERPL BCP-MCNC: 3.9 G/DL (ref 3.5–5)
ALP SERPL-CCNC: 91 U/L (ref 46–116)
ALT SERPL W P-5'-P-CCNC: 18 U/L (ref 12–78)
ANION GAP SERPL CALCULATED.3IONS-SCNC: 10 MMOL/L (ref 4–13)
AST SERPL W P-5'-P-CCNC: 15 U/L (ref 5–45)
BASOPHILS # BLD AUTO: 0.07 THOUSANDS/ΜL (ref 0–0.1)
BASOPHILS NFR BLD AUTO: 1 % (ref 0–1)
BILIRUB SERPL-MCNC: 0.46 MG/DL (ref 0.2–1)
BILIRUB UR QL STRIP: NEGATIVE
BUN SERPL-MCNC: 11 MG/DL (ref 5–25)
CALCIUM SERPL-MCNC: 8.9 MG/DL (ref 8.3–10.1)
CHLORIDE SERPL-SCNC: 106 MMOL/L (ref 96–108)
CLARITY UR: NORMAL
CO2 SERPL-SCNC: 28 MMOL/L (ref 21–32)
COLOR UR: YELLOW
CREAT SERPL-MCNC: 0.79 MG/DL (ref 0.6–1.3)
EOSINOPHIL # BLD AUTO: 0.11 THOUSAND/ΜL (ref 0–0.61)
EOSINOPHIL NFR BLD AUTO: 2 % (ref 0–6)
ERYTHROCYTE [DISTWIDTH] IN BLOOD BY AUTOMATED COUNT: 13.6 % (ref 11.6–15.1)
GFR SERPL CREATININE-BSD FRML MDRD: 71 ML/MIN/1.73SQ M
GLUCOSE SERPL-MCNC: 111 MG/DL (ref 65–140)
GLUCOSE UR STRIP-MCNC: NEGATIVE MG/DL
HCT VFR BLD AUTO: 44.2 % (ref 34.8–46.1)
HGB BLD-MCNC: 13.8 G/DL (ref 11.5–15.4)
HGB UR QL STRIP.AUTO: NEGATIVE
IMM GRANULOCYTES # BLD AUTO: 0.02 THOUSAND/UL (ref 0–0.2)
IMM GRANULOCYTES NFR BLD AUTO: 0 % (ref 0–2)
KETONES UR STRIP-MCNC: NEGATIVE MG/DL
LEUKOCYTE ESTERASE UR QL STRIP: NEGATIVE
LYMPHOCYTES # BLD AUTO: 1.14 THOUSANDS/ΜL (ref 0.6–4.47)
LYMPHOCYTES NFR BLD AUTO: 16 % (ref 14–44)
MAGNESIUM SERPL-MCNC: 2.1 MG/DL (ref 1.6–2.6)
MCH RBC QN AUTO: 29 PG (ref 26.8–34.3)
MCHC RBC AUTO-ENTMCNC: 31.2 G/DL (ref 31.4–37.4)
MCV RBC AUTO: 93 FL (ref 82–98)
MONOCYTES # BLD AUTO: 0.5 THOUSAND/ΜL (ref 0.17–1.22)
MONOCYTES NFR BLD AUTO: 7 % (ref 4–12)
NEUTROPHILS # BLD AUTO: 5.11 THOUSANDS/ΜL (ref 1.85–7.62)
NEUTS SEG NFR BLD AUTO: 74 % (ref 43–75)
NITRITE UR QL STRIP: NEGATIVE
NRBC BLD AUTO-RTO: 0 /100 WBCS
PH UR STRIP.AUTO: 8.5 [PH]
PLATELET # BLD AUTO: 194 THOUSANDS/UL (ref 149–390)
PMV BLD AUTO: 12.1 FL (ref 8.9–12.7)
POTASSIUM SERPL-SCNC: 4.1 MMOL/L (ref 3.5–5.3)
PROT SERPL-MCNC: 6.7 G/DL (ref 6.4–8.4)
PROT UR STRIP-MCNC: NEGATIVE MG/DL
RBC # BLD AUTO: 4.76 MILLION/UL (ref 3.81–5.12)
SODIUM SERPL-SCNC: 144 MMOL/L (ref 135–147)
SP GR UR STRIP.AUTO: 1.01 (ref 1–1.03)
UROBILINOGEN UR QL STRIP.AUTO: 0.2 E.U./DL
WBC # BLD AUTO: 6.95 THOUSAND/UL (ref 4.31–10.16)

## 2022-09-27 PROCEDURE — 85025 COMPLETE CBC W/AUTO DIFF WBC: CPT | Performed by: EMERGENCY MEDICINE

## 2022-09-27 PROCEDURE — 80053 COMPREHEN METABOLIC PANEL: CPT | Performed by: EMERGENCY MEDICINE

## 2022-09-27 PROCEDURE — 96374 THER/PROPH/DIAG INJ IV PUSH: CPT

## 2022-09-27 PROCEDURE — 81003 URINALYSIS AUTO W/O SCOPE: CPT | Performed by: EMERGENCY MEDICINE

## 2022-09-27 PROCEDURE — 96361 HYDRATE IV INFUSION ADD-ON: CPT

## 2022-09-27 PROCEDURE — 74177 CT ABD & PELVIS W/CONTRAST: CPT

## 2022-09-27 PROCEDURE — 36415 COLL VENOUS BLD VENIPUNCTURE: CPT | Performed by: EMERGENCY MEDICINE

## 2022-09-27 PROCEDURE — G1004 CDSM NDSC: HCPCS

## 2022-09-27 PROCEDURE — 83735 ASSAY OF MAGNESIUM: CPT | Performed by: EMERGENCY MEDICINE

## 2022-09-27 PROCEDURE — 99284 EMERGENCY DEPT VISIT MOD MDM: CPT

## 2022-09-27 PROCEDURE — 99284 EMERGENCY DEPT VISIT MOD MDM: CPT | Performed by: EMERGENCY MEDICINE

## 2022-09-27 RX ORDER — SUCRALFATE ORAL 1 G/10ML
1 SUSPENSION ORAL 4 TIMES DAILY
Qty: 420 ML | Refills: 0 | Status: SHIPPED | OUTPATIENT
Start: 2022-09-27

## 2022-09-27 RX ORDER — LIDOCAINE 50 MG/G
1 PATCH TOPICAL DAILY
Qty: 30 PATCH | Refills: 0 | Status: SHIPPED | OUTPATIENT
Start: 2022-09-27

## 2022-09-27 RX ORDER — KETOROLAC TROMETHAMINE 30 MG/ML
15 INJECTION, SOLUTION INTRAMUSCULAR; INTRAVENOUS ONCE
Status: COMPLETED | OUTPATIENT
Start: 2022-09-27 | End: 2022-09-27

## 2022-09-27 RX ORDER — SUCRALFATE 1 G/1
1 TABLET ORAL ONCE
Status: COMPLETED | OUTPATIENT
Start: 2022-09-27 | End: 2022-09-27

## 2022-09-27 RX ORDER — LIDOCAINE 50 MG/G
1 PATCH TOPICAL ONCE
Status: DISCONTINUED | OUTPATIENT
Start: 2022-09-27 | End: 2022-09-27 | Stop reason: HOSPADM

## 2022-09-27 RX ORDER — ONDANSETRON 2 MG/ML
4 INJECTION INTRAMUSCULAR; INTRAVENOUS ONCE
Status: DISCONTINUED | OUTPATIENT
Start: 2022-09-27 | End: 2022-09-27 | Stop reason: HOSPADM

## 2022-09-27 RX ADMIN — IOHEXOL 100 ML: 350 INJECTION, SOLUTION INTRAVENOUS at 14:40

## 2022-09-27 RX ADMIN — LIDOCAINE 5% 1 PATCH: 700 PATCH TOPICAL at 16:02

## 2022-09-27 RX ADMIN — SUCRALFATE 1 G: 1 TABLET ORAL at 16:03

## 2022-09-27 RX ADMIN — SODIUM CHLORIDE 1000 ML: 0.9 INJECTION, SOLUTION INTRAVENOUS at 13:28

## 2022-09-27 RX ADMIN — KETOROLAC TROMETHAMINE 15 MG: 30 INJECTION, SOLUTION INTRAMUSCULAR at 13:29

## 2022-09-27 NOTE — ED PROVIDER NOTES
History  Chief Complaint   Patient presents with    Flank Pain     States hx of kidney stones  Started yesterday with left flank pain, has nausea , no vomiting      74-year-old female with past history of kidney stones, GERD, hiatal hernia, endometrial wall thickening, hypertension, hyperlipidemia, chronic kidney disease, anxiety, presents to the ED for evaluation of intermittent left flank pain over the past 2 days  The patient denies any fevers, chills, abdominal pain, nausea, vomiting, diarrhea, dysuria, hematuria, or any increased urinary frequency  Patient states that her pain feels like her previous school  Patient came to the ED for further evaluation to rule out kidney stones  History provided by:  Patient  Flank Pain  Associated symptoms: no chest pain, no chills, no constipation, no cough, no diarrhea, no dysuria, no fever, no nausea and no shortness of breath        Prior to Admission Medications   Prescriptions Last Dose Informant Patient Reported? Taking? ALPRAZolam (XANAX) 0 25 mg tablet  Self Yes No   Sig: Take by mouth daily at bedtime as needed for anxiety    Polyethylene Glycol 3350 (MIRALAX PO)   Yes No   Sig: Take by mouth if needed   Patient not taking: Reported on 9/7/2022   acetaminophen (TYLENOL) 325 mg tablet   No No   Sig: Take 3 tablets (975 mg total) by mouth every 8 (eight) hours   Patient not taking: Reported on 9/7/2022   alendronate (FOSAMAX) 70 mg tablet   Yes No   Sig: Take 70 mg by mouth every 7 days sundays    amLODIPine (NORVASC) 5 mg tablet  Self Yes No   Sig: Take 5 mg by mouth every morning     aspirin 325 mg tablet   No No   Sig: Take 1 tablet (325 mg total) by mouth 2 (two) times a day   Patient not taking: Reported on 9/7/2022   atorvastatin (LIPITOR) 20 mg tablet  Self Yes No   Sig: Take 20 mg by mouth daily at bedtime     docusate sodium (COLACE) 100 mg capsule   No No   Sig: Take 1 capsule (100 mg total) by mouth 2 (two) times a day   Patient not taking: No sig reported   lidocaine (XYLOCAINE) 5 % ointment   No No   Sig: Apply topically as needed for mild pain APPLY TO VAGINAL OPENING AS NEEDED FOR PAIN   Patient not taking: No sig reported   naloxone (NARCAN) 4 mg/0 1 mL nasal spray   No No   Sig: Administer 1 spray into a nostril  If no response after 2-3 minutes, give another dose in the other nostril using a new spray     Patient not taking: No sig reported   naproxen (Naprosyn) 500 mg tablet   No No   Sig: Take 0 5 tablets (250 mg total) by mouth 2 (two) times a day with meals   olmesartan (BENICAR) 40 mg tablet   Yes No   Sig: Take 40 mg by mouth daily   omeprazole (PriLOSEC) 40 MG capsule   Yes No   Sig: Take 40 mg by mouth daily   oxyCODONE (Roxicodone) 5 immediate release tablet   No No   Sig: Take 1/2 to 1 tablets by mouth every 4-6 hours as needed for pain   Patient not taking: No sig reported   potassium citrate (UROCIT-K) 10 mEq   Yes No   Sig: Take 10 mEq by mouth 2 (two) times a day   Patient not taking: Reported on 9/7/2022      Facility-Administered Medications: None       Past Medical History:   Diagnosis Date    Anxiety     Arthritis     left hip    Chronic kidney disease     chronic stones    Chronic narcotic dependence (HCC)     tramadol for hip    Colon polyp     Gallstones     GERD (gastroesophageal reflux disease)     H/O hiatal hernia     Headache     occ    Hyperlipidemia     Hypertension     Kidney stone     Low back pain     Lumbar disc disease     Nausea     at times    Poor appetite     Sacroiliitis (HCC)     Thickened endometrium     Wears dentures     full upper/ partial lower    Weight loss, unintentional     Since Nov 2021 20lb weight loss       Past Surgical History:   Procedure Laterality Date    APPENDECTOMY      BREAST SURGERY Left     excision cyst    COLONOSCOPY      CYSTOSCOPY      with stone extraction x 2    CYSTOSCOPY W/ RETROGRADES Left 09/17/2012    stent removal,     CYSTOSCOPY W/ URETEROSCOPY W/ LITHOTRIPSY Left 12/11/2006    Ca Phos 98%    CYSTOSCOPY W/ URETEROSCOPY W/ LITHOTRIPSY Left 08/12/2012    Ca ox di 60%, Ca ox mono 20%    ESOPHAGOGASTRODUODENOSCOPY      EXTRACORPOREAL SHOCK WAVE LITHOTRIPSY Left 4/13/2016    Procedure: LITHROTRIPSY EXTRACORPORAL SHOCKWAVE (ESWL); Surgeon: Main Sims MD;  Location: [facility] MAIN OR;  Service:     POLYPECTOMY N/A 4/11/2022    Procedure: POLYPECTOMY;  Surgeon: Danii Mendez MD;  [facility]: [facility] MA[facility];  Service: Gynecology    [place] CYSTO/URETERO W/LITHOTRIPSY &INDWELL STENT INSRT Left 9/1/2016    Procedure: CYSTOSCOPY URETEROSCOPY WITH STONE EXTRACTION , RETROGRADE PYELOGRAM AND INSERTION STENT URETERAL;  Surgeon: Main Sims MD;  Location: [address] [place] [address];  Service: Urology    [place] CYSTO/URETERO W/LITHOTRIPSY &INDWELL STENT INSRT Left 8/12/2016    Procedure: CYSTOSCOPY URETEROSCOPY WITH LITHOTRIPSY HOLMIUM LASER, RETROGRADE PYELOGRAM AND INSERTION STENT URETERAL;  Surgeon: Main Sims MD;  Location: [address] [place] [address];  Service: Urology    [place] HYSTEROSCOPY,W/ENDO BX N/A 4/11/2022    Procedure: DILATATION AND CURETTAGE (D&C) WITH HYSTEROSCOPY;  Surgeon: Danii Mendez MD;  Location:  MAIN OR;  Service: Gynecology    [place] TOTAL HIP ARTHROPLASTY Left 5/2/2022    Procedure: ARTHROPLASTY HIP TOTAL ANTERIOR - Left;  Surgeon: Flavio Menjivar DO;  Location: [address] [place] [address];  Service: Orthopedics    TUBAL LIGATION      UPPER GASTROINTESTINAL ENDOSCOPY      URETERAL STENT PLACEMENT Left 08/31/2012    Narrowing of the left distal ureter for 3cm        WISDOM TOOTH EXTRACTION         Family History   Problem Relation Age of Onset    No Known Problems Mother     Stroke Father         TIA    Parkinsonism Father     No Known Problems Sister     Pancreatic cancer Brother     Cancer Maternal Aunt     Cancer Maternal Uncle     Heart disease Maternal Uncle         Massive MI    No Known Problems Paternal Aunt     No Known Problems Paternal Uncle     No Known Problems Maternal Grandmother     No Known Problems Maternal Grandfather     No Known Problems Paternal Grandmother     No Known Problems Paternal Grandfather      I have reviewed and agree with the history as documented  E-Cigarette/Vaping    E-Cigarette Use Never User      E-Cigarette/Vaping Substances    Nicotine No     THC No     CBD No     Flavoring No     Other No     Unknown No      Social History     Tobacco Use    Smoking status: Never Smoker    Smokeless tobacco: Never Used   Vaping Use    Vaping Use: Never used   Substance Use Topics    Alcohol use: Yes     Comment: wine almost every night to sleep    Drug use: No       Review of Systems   Constitutional: Negative for activity change, appetite change, chills and fever  HENT: Negative for congestion and ear pain  Eyes: Negative for pain and discharge  Respiratory: Negative for cough, chest tightness, shortness of breath, wheezing and stridor  Cardiovascular: Negative for chest pain and palpitations  Gastrointestinal: Negative for abdominal distention, abdominal pain, constipation, diarrhea and nausea  Endocrine: Negative for cold intolerance  Genitourinary: Positive for flank pain  Negative for dysuria, frequency and urgency  Musculoskeletal: Negative for arthralgias and back pain  Skin: Negative for color change and rash  Allergic/Immunologic: Negative for environmental allergies and food allergies  Neurological: Negative for dizziness, weakness, numbness and headaches  Hematological: Negative for adenopathy  Psychiatric/Behavioral: Negative for agitation, behavioral problems and confusion  The patient is not nervous/anxious  All other systems reviewed and are negative  Physical Exam  Physical Exam  Vitals and nursing note reviewed  Constitutional:       Appearance: She is well-developed  HENT:      Head: Normocephalic and atraumatic     Eyes:      Conjunctiva/sclera: Conjunctivae normal  Cardiovascular:      Rate and Rhythm: Normal rate and regular rhythm  Heart sounds: Normal heart sounds  Pulmonary:      Effort: Pulmonary effort is normal       Breath sounds: Normal breath sounds  Abdominal:      General: Bowel sounds are normal  There is no distension  Palpations: Abdomen is soft  Tenderness: There is no abdominal tenderness  Comments: Abdomen is soft, nondistended, with bowel sounds present all 4 quadrants  No abdominal wall tenderness noted to palpation  Mild tenderness noted to left CVA region  Musculoskeletal:         General: Normal range of motion  Cervical back: Normal range of motion and neck supple  Skin:     General: Skin is warm and dry  Neurological:      Mental Status: She is alert and oriented to person, place, and time  Psychiatric:         Behavior: Behavior normal          Thought Content:  Thought content normal          Judgment: Judgment normal          Vital Signs  ED Triage Vitals [09/27/22 1224]   Temperature Pulse Respirations Blood Pressure SpO2   (!) 97 4 °F (36 3 °C) 87 18 159/79 98 %      Temp Source Heart Rate Source Patient Position - Orthostatic VS BP Location FiO2 (%)   Tympanic Monitor Sitting Left arm --      Pain Score       8           Vitals:    09/27/22 1224 09/27/22 1459   BP: 159/79 137/60   Pulse: 87 89   Patient Position - Orthostatic VS: Sitting Lying         Visual Acuity      ED Medications  Medications   ondansetron (ZOFRAN) injection 4 mg (4 mg Intravenous Not Given 9/27/22 1329)   sucralfate (CARAFATE) tablet 1 g (has no administration in time range)   lidocaine (LIDODERM) 5 % patch 1 patch (has no administration in time range)   sodium chloride 0 9 % bolus 1,000 mL (0 mL Intravenous Stopped 9/27/22 1456)   ketorolac (TORADOL) injection 15 mg (15 mg Intravenous Given 9/27/22 1329)   iohexol (OMNIPAQUE) 350 MG/ML injection (SINGLE-DOSE) 100 mL (100 mL Intravenous Given 9/27/22 1440)       Diagnostic Studies  Results Reviewed     Procedure Component Value Units Date/Time    Comprehensive metabolic panel [[id number]] Collected: 09/27/22 1328    Lab Status: Final result Specimen: Blood from Arm, Left Updated: 09/27/22 1351     Sodium 144 mmol/L      Potassium 4 1 mmol/L      Chloride 106 mmol/L      CO2 28 mmol/L      ANION GAP 10 mmol/L      BUN 11 mg/dL      Creatinine 0 79 mg/dL      Glucose 111 mg/dL      Calcium 8 9 mg/dL      AST 15 U/L      ALT 18 U/L      Alkaline Phosphatase 91 U/L      Total Protein 6 7 g/dL      Albumin 3 9 g/dL      Total Bilirubin 0 46 mg/dL      eGFR 71 ml/min/1 73sq m     Narrative:      National Kidney Disease Foundation guidelines for Chronic Kidney Disease (CKD):     Stage 1 with normal or high GFR (GFR > 90 mL/min/1 73 square meters)    Stage 2 Mild CKD (GFR = 60-89 mL/min/1 73 square meters)    Stage 3A Moderate CKD (GFR = 45-59 mL/min/1 73 square meters)    Stage 3B Moderate CKD (GFR = 30-44 mL/min/1 73 square meters)    Stage 4 Severe CKD (GFR = 15-29 mL/min/1 73 square meters)    Stage 5 End Stage CKD (GFR <15 mL/min/1 73 square meters)  Note: GFR calculation is accurate only with a steady state creatinine    Magnesium [[id number]]  (Normal) Collected: 09/27/22 1328    Lab Status: Final result Specimen: Blood from Arm, Left Updated: 09/27/22 1351     Magnesium 2 1 mg/dL     UA w Reflex to Microscopic w Reflex to Culture [[id number]] Collected: 09/27/22 1328    Lab Status: Final result Specimen: Urine, Clean Catch Updated: 09/27/22 1335     Color, UA Yellow     Clarity, UA Slightly Cloudy     Specific Gravity, UA 1 015     pH, UA 8 5     Leukocytes, UA Negative     Nitrite, UA Negative     Protein, UA Negative mg/dl      Glucose, UA Negative mg/dl      Ketones, UA Negative mg/dl      Urobilinogen, UA 0 2 E U /dl      Bilirubin, UA Negative     Occult Blood, UA Negative    CBC and differential [[id number]]  (Abnormal) Collected: 09/27/22 1328    Lab Status: Final result Specimen: Blood from Arm, Left Updated: 09/27/22 1335     WBC 6 95 Thousand/uL      RBC 4 76 Million/uL      Hemoglobin 13 8 g/dL      Hematocrit 44 2 %      MCV 93 fL      MCH 29 0 pg      MCHC 31 2 g/dL      RDW 13 6 %      MPV 12 1 fL      Platelets 965 Thousands/uL      nRBC 0 /100 WBCs      Neutrophils Relative 74 %      Immat GRANS % 0 %      Lymphocytes Relative 16 %      Monocytes Relative 7 %      Eosinophils Relative 2 %      Basophils Relative 1 %      Neutrophils Absolute 5 11 Thousands/µL      Immature Grans Absolute 0 02 Thousand/uL      Lymphocytes Absolute 1 14 Thousands/µL      Monocytes Absolute 0 50 Thousand/µL      Eosinophils Absolute 0 11 Thousand/µL      Basophils Absolute 0 07 Thousands/µL                  CT abdomen pelvis with contrast   Final Result by Chandrakant Quan MD (09/27 1518)      No acute inflammatory changes in the abdomen or pelvis  Reidentified large hiatal hernia containing most of the stomach  Probable gastritis  Reidentified unchanged endometrial thickening  See the prior biopsy report  Workstation performed: [id number]                    Procedures  Procedures         ED Course  ED Course as of 09/27/22 1555   Tue Sep 27, 2022   1430 Patient examined bedside  Pain is significantly improved after pain medication given  Patient is currently awaiting CT abdomen/pelvis  1539 Patient examined bedside  Patient remains pain-free at this time  SBIRT 20yo+    Flowsheet Row Most Recent Value   SBIRT (23 yo +)    In order to provide better care to our patients, we are screening all of our patients for alcohol and drug use  Would it be okay to ask you these screening questions?  No Filed at: 09/27/2022 1259                    MDM  Number of Diagnoses or Management Options  Gastritis: new and requires workup  GERD (gastroesophageal reflux disease): new and requires workup  Hiatal hernia: new and requires workup  Left flank pain: new and requires workup  Thickened endometrium  Diagnosis management comments: Obtain blood work, UA, CT abdomen/pelvis  Give IV fluid, Toradol, Zofran and reassess       Amount and/or Complexity of Data Reviewed  Clinical lab tests: reviewed and ordered  Tests in the radiology section of CPT®: ordered and reviewed  Tests in the medicine section of CPT®: ordered and reviewed  Review and summarize past medical records: yes  Independent visualization of images, tracings, or specimens: yes    Risk of Complications, Morbidity, and/or Mortality  General comments: Lab work was essentially unremarkable  No acute abdominal pathology noted however there was a large hiatal hernia that was re-identified in the CT scan with probable gastritis  Patient also has history of endometrial thickening that is again noted  Patient is followed by GI for her hiatal hernia  Patient was supposed to have EGD done recently but it was canceled due to her hip surgery  At this time patient placed on Carafate and told to follow-up with GI for further management  Patient takes omeprazole on a daily basis  Patient has history of thickened endometrium  Patient is followed by gyn for this  Patient is flank pain resolved in the ED with IV fluids and Toradol  At this time patient's pain may be musculoskeletal in origin  Patient did admit to me later that she was leaning over to get something from the bottom of her falling cabinet few days ago  Patient placed on Lidoderm patch as well as over-the-counter pain medication and discharged home with follow-up to PCP for follow-up in 3-5 days  Close return instructions given to return to the ER for any worsening symptoms  Patient agrees with discharge plan  Patient well appearing at time of discharge  Please Note: Fluency Direct voice recognition software may have been used in the creation of this document   Wrong words or sound a like substitutions may have occurred due to the inherent limitations of the voice software  Patient Progress  Patient progress: resolved      Disposition  Final diagnoses:   Left flank pain   Gastritis   GERD (gastroesophageal reflux disease)   Hiatal hernia   Thickened endometrium     Time reflects when diagnosis was documented in both MDM as applicable and the Disposition within this note     Time User Action Codes Description Comment    9/27/2022  3:40 PM Esaw So Add [R10 9] Left flank pain     9/27/2022  3:40 PM Ferdous, Jose M Shutter Add [K29 70] Gastritis     9/27/2022  3:40 PM Esaw So Add [K21 9] GERD (gastroesophageal reflux disease)     9/27/2022  3:40 PM Esaw So Add [K44 9] Hiatal hernia     9/27/2022  3:40 PM Mamadou Carlos, Jose M Shutter Add [R93 89] Thickened endometrium     9/27/2022  3:45 PM Esaw So Modify [R93 89] Thickened endometrium       ED Disposition     ED Disposition   Discharge    Condition   Stable    Date/Time   Tue Sep 27, 2022  3:40 PM    Comment   Parth Leahy discharge to home/self care  Follow-up Information     Follow up With Specialties Details Why Contact Wilder Guevara DO Family Medicine In 3 days  [address]  [phone]      Your gastroenterologist  Schedule an appointment as soon as possible for a visit  For further evaluation of your hiatal hernia and reflux symptoms     Your gynecologist  Schedule an appointment as soon as possible for a visit  To continue to monitor your thickened endometrial symptoms             Patient's Medications   Discharge Prescriptions    LIDOCAINE (LIDODERM) 5 %    Apply 1 patch topically daily Remove & Discard patch within 12 hours or as directed by MD       Start Date: 9/27/2022 End Date: --       Order Dose: 1 patch       Quantity: 30 patch    Refills: 0    SUCRALFATE (CARAFATE) 1 G/10 ML SUSPENSION    Take 10 mL (1 g total) by mouth 4 (four) times a day       Start Date: 9/27/2022 End Date: --       Order Dose: 1 g Quantity: 420 mL    Refills: 0       No discharge procedures on file      PDMP Review       Value Time User    PDMP Reviewed  Yes 5/3/2022  3:03 PM Kenan Saldaña PA-C          ED Provider  Electronically Signed by           Sylvie Kay DO  09/27/22 5813

## 2022-10-11 PROBLEM — Z12.11 COLON CANCER SCREENING: Status: RESOLVED | Noted: 2022-01-26 | Resolved: 2022-10-11

## 2022-11-21 ENCOUNTER — APPOINTMENT (OUTPATIENT)
Dept: LAB | Facility: HOSPITAL | Age: 77
End: 2022-11-21

## 2022-11-21 DIAGNOSIS — E78.5 HYPERLIPIDEMIA, UNSPECIFIED HYPERLIPIDEMIA TYPE: ICD-10-CM

## 2022-11-21 DIAGNOSIS — I10 ESSENTIAL HYPERTENSION, MALIGNANT: ICD-10-CM

## 2022-11-21 DIAGNOSIS — Z13.9 SCREENING FOR UNSPECIFIED CONDITION: ICD-10-CM

## 2022-11-21 LAB
CHOLEST SERPL-MCNC: 188 MG/DL
HDLC SERPL-MCNC: 77 MG/DL
LDLC SERPL CALC-MCNC: 75 MG/DL (ref 0–100)
NONHDLC SERPL-MCNC: 111 MG/DL
TRIGL SERPL-MCNC: 180 MG/DL

## 2023-02-13 ENCOUNTER — HOSPITAL ENCOUNTER (OUTPATIENT)
Dept: RADIOLOGY | Facility: HOSPITAL | Age: 78
Discharge: HOME | End: 2023-02-13

## 2023-02-13 DIAGNOSIS — M47.9 SPONDYLOSIS: ICD-10-CM

## 2023-03-15 ENCOUNTER — APPOINTMENT (OUTPATIENT)
Dept: LAB | Facility: HOSPITAL | Age: 78
End: 2023-03-15

## 2023-03-15 DIAGNOSIS — I10 ESSENTIAL HYPERTENSION, MALIGNANT: ICD-10-CM

## 2023-03-15 DIAGNOSIS — Z13.9 SCREENING FOR UNSPECIFIED CONDITION: ICD-10-CM

## 2023-03-15 DIAGNOSIS — E78.1 PURE HYPERTRIGLYCERIDEMIA: ICD-10-CM

## 2023-03-15 DIAGNOSIS — I51.9 MYXEDEMA HEART DISEASE: ICD-10-CM

## 2023-03-15 DIAGNOSIS — E03.9 MYXEDEMA HEART DISEASE: ICD-10-CM

## 2023-03-15 LAB
ALBUMIN SERPL BCP-MCNC: 4.4 G/DL (ref 3.5–5)
ALP SERPL-CCNC: 69 U/L (ref 34–104)
ALT SERPL W P-5'-P-CCNC: 14 U/L (ref 7–52)
ANION GAP SERPL CALCULATED.3IONS-SCNC: 8 MMOL/L (ref 4–13)
AST SERPL W P-5'-P-CCNC: 18 U/L (ref 13–39)
BASOPHILS # BLD AUTO: 0.08 THOUSANDS/ÂΜL (ref 0–0.1)
BASOPHILS NFR BLD AUTO: 2 % (ref 0–1)
BILIRUB SERPL-MCNC: 0.64 MG/DL (ref 0.2–1)
BUN SERPL-MCNC: 11 MG/DL (ref 5–25)
CALCIUM SERPL-MCNC: 8.8 MG/DL (ref 8.4–10.2)
CHLORIDE SERPL-SCNC: 107 MMOL/L (ref 96–108)
CHOLEST SERPL-MCNC: 207 MG/DL
CO2 SERPL-SCNC: 27 MMOL/L (ref 21–32)
CREAT SERPL-MCNC: 0.73 MG/DL (ref 0.6–1.3)
EOSINOPHIL # BLD AUTO: 0.13 THOUSAND/ÂΜL (ref 0–0.61)
EOSINOPHIL NFR BLD AUTO: 2 % (ref 0–6)
ERYTHROCYTE [DISTWIDTH] IN BLOOD BY AUTOMATED COUNT: 13.5 % (ref 11.6–15.1)
GFR SERPL CREATININE-BSD FRML MDRD: 79 ML/MIN/1.73SQ M
GLUCOSE P FAST SERPL-MCNC: 103 MG/DL (ref 65–99)
HCT VFR BLD AUTO: 46.8 % (ref 34.8–46.1)
HDLC SERPL-MCNC: 69 MG/DL
HGB BLD-MCNC: 15.1 G/DL (ref 11.5–15.4)
IMM GRANULOCYTES # BLD AUTO: 0.01 THOUSAND/UL (ref 0–0.2)
IMM GRANULOCYTES NFR BLD AUTO: 0 % (ref 0–2)
LDLC SERPL CALC-MCNC: 101 MG/DL (ref 0–100)
LYMPHOCYTES # BLD AUTO: 1.09 THOUSANDS/ÂΜL (ref 0.6–4.47)
LYMPHOCYTES NFR BLD AUTO: 20 % (ref 14–44)
MCH RBC QN AUTO: 30.6 PG (ref 26.8–34.3)
MCHC RBC AUTO-ENTMCNC: 32.3 G/DL (ref 31.4–37.4)
MCV RBC AUTO: 95 FL (ref 82–98)
MONOCYTES # BLD AUTO: 0.42 THOUSAND/ÂΜL (ref 0.17–1.22)
MONOCYTES NFR BLD AUTO: 8 % (ref 4–12)
NEUTROPHILS # BLD AUTO: 3.62 THOUSANDS/ÂΜL (ref 1.85–7.62)
NEUTS SEG NFR BLD AUTO: 68 % (ref 43–75)
NONHDLC SERPL-MCNC: 138 MG/DL
NRBC BLD AUTO-RTO: 0 /100 WBCS
PLATELET # BLD AUTO: 200 THOUSANDS/UL (ref 149–390)
PMV BLD AUTO: 12.2 FL (ref 8.9–12.7)
POTASSIUM SERPL-SCNC: 4 MMOL/L (ref 3.5–5.3)
PROT SERPL-MCNC: 6.5 G/DL (ref 6.4–8.4)
RBC # BLD AUTO: 4.93 MILLION/UL (ref 3.81–5.12)
SODIUM SERPL-SCNC: 142 MMOL/L (ref 135–147)
TRIGL SERPL-MCNC: 183 MG/DL
TSH SERPL DL<=0.05 MIU/L-ACNC: 3.66 UIU/ML (ref 0.45–4.5)
WBC # BLD AUTO: 5.35 THOUSAND/UL (ref 4.31–10.16)

## 2023-03-28 ENCOUNTER — HOSPITAL ENCOUNTER (OUTPATIENT)
Dept: RADIOLOGY | Facility: HOSPITAL | Age: 78
Discharge: HOME | End: 2023-03-28
Attending: FAMILY MEDICINE

## 2023-03-28 VITALS — BODY MASS INDEX: 22.84 KG/M2 | HEIGHT: 61 IN | WEIGHT: 121 LBS

## 2023-03-28 DIAGNOSIS — M81.0 AGE-RELATED OSTEOPOROSIS WITHOUT CURRENT PATHOLOGICAL FRACTURE: ICD-10-CM

## 2023-04-21 ENCOUNTER — APPOINTMENT (OUTPATIENT)
Dept: RADIOLOGY | Facility: CLINIC | Age: 78
End: 2023-04-21

## 2023-04-21 DIAGNOSIS — Z96.642 STATUS POST HIP REPLACEMENT, LEFT: ICD-10-CM

## 2023-05-18 ENCOUNTER — APPOINTMENT (OUTPATIENT)
Dept: LAB | Facility: HOSPITAL | Age: 78
End: 2023-05-18

## 2023-05-18 DIAGNOSIS — E78.00 PURE HYPERCHOLESTEROLEMIA: ICD-10-CM

## 2023-05-18 DIAGNOSIS — E78.1 PURE HYPERGLYCERIDEMIA: ICD-10-CM

## 2023-05-18 DIAGNOSIS — Z79.899 ENCOUNTER FOR LONG-TERM (CURRENT) USE OF OTHER MEDICATIONS: ICD-10-CM

## 2023-05-18 LAB
ALBUMIN SERPL BCP-MCNC: 4.2 G/DL (ref 3.5–5)
ALP SERPL-CCNC: 62 U/L (ref 34–104)
ALT SERPL W P-5'-P-CCNC: 13 U/L (ref 7–52)
ANION GAP SERPL CALCULATED.3IONS-SCNC: 12 MMOL/L (ref 4–13)
AST SERPL W P-5'-P-CCNC: 19 U/L (ref 13–39)
BILIRUB DIRECT SERPL-MCNC: 0.12 MG/DL (ref 0–0.2)
BILIRUB SERPL-MCNC: 0.65 MG/DL (ref 0.2–1)
BUN SERPL-MCNC: 12 MG/DL (ref 5–25)
CALCIUM SERPL-MCNC: 9.3 MG/DL (ref 8.4–10.2)
CHLORIDE SERPL-SCNC: 105 MMOL/L (ref 96–108)
CHOLEST SERPL-MCNC: 179 MG/DL
CK SERPL-CCNC: 30 U/L (ref 26–192)
CO2 SERPL-SCNC: 27 MMOL/L (ref 21–32)
CREAT SERPL-MCNC: 0.75 MG/DL (ref 0.6–1.3)
EST. AVERAGE GLUCOSE BLD GHB EST-MCNC: 114 MG/DL
GFR SERPL CREATININE-BSD FRML MDRD: 76 ML/MIN/1.73SQ M
GLUCOSE P FAST SERPL-MCNC: 109 MG/DL (ref 65–99)
HBA1C MFR BLD: 5.6 %
HDLC SERPL-MCNC: 64 MG/DL
LDLC SERPL CALC-MCNC: 74 MG/DL (ref 0–100)
NONHDLC SERPL-MCNC: 115 MG/DL
POTASSIUM SERPL-SCNC: 3.9 MMOL/L (ref 3.5–5.3)
PROT SERPL-MCNC: 6.5 G/DL (ref 6.4–8.4)
SODIUM SERPL-SCNC: 144 MMOL/L (ref 135–147)
TRIGL SERPL-MCNC: 206 MG/DL

## 2023-05-25 ENCOUNTER — TELEPHONE (OUTPATIENT)
Dept: OBGYN CLINIC | Facility: CLINIC | Age: 78
End: 2023-05-25

## 2023-05-25 NOTE — TELEPHONE ENCOUNTER
Caller: Patient    Doctor: Rocco Ellis    Reason for call: Patient needing to find out how long she will need to continue to take antibiotics prior to dental procedures for ? Will it be 1 year or the rest of her life?      Call back#: [phone]

## 2023-05-25 NOTE — TELEPHONE ENCOUNTER
Milton pt and advised that the antibiotics will be life time for dental procedures and it is to be taken 60 mins prior to surgery   Pt gave verbal understanding

## 2023-06-18 ENCOUNTER — TELEPHONE (OUTPATIENT)
Dept: OBGYN CLINIC | Facility: CLINIC | Age: 78
End: 2023-06-18

## 2023-06-18 NOTE — TELEPHONE ENCOUNTER
Patient is calling regarding cancelling an appointment      Date/Time: 6/20/2023  11:00    Patient was rescheduled: YES [] NO [x]    Patient requesting call back to reschedule: YES [] NO [x]

## 2023-07-25 ENCOUNTER — APPOINTMENT (EMERGENCY)
Dept: RADIOLOGY | Facility: HOSPITAL | Age: 78
End: 2023-07-25
Payer: MEDICARE

## 2023-07-25 ENCOUNTER — HOSPITAL ENCOUNTER (EMERGENCY)
Facility: HOSPITAL | Age: 78
Discharge: HOME | End: 2023-07-25
Attending: EMERGENCY MEDICINE
Payer: MEDICARE

## 2023-07-25 VITALS
BODY MASS INDEX: 22.11 KG/M2 | SYSTOLIC BLOOD PRESSURE: 135 MMHG | RESPIRATION RATE: 18 BRPM | DIASTOLIC BLOOD PRESSURE: 63 MMHG | HEART RATE: 72 BPM | TEMPERATURE: 98.7 F | OXYGEN SATURATION: 95 % | WEIGHT: 117 LBS

## 2023-07-25 DIAGNOSIS — R10.13 EPIGASTRIC PAIN: Primary | ICD-10-CM

## 2023-07-25 LAB
ALBUMIN SERPL BCP-MCNC: 3.9 G/DL (ref 3.5–5)
ALP SERPL-CCNC: 72 U/L (ref 34–104)
ALT SERPL W P-5'-P-CCNC: 12 U/L (ref 7–52)
ANION GAP SERPL CALCULATED.3IONS-SCNC: 8 MMOL/L
AST SERPL W P-5'-P-CCNC: 14 U/L (ref 13–39)
ATRIAL RATE: 80 BPM
BACTERIA UR QL AUTO: NORMAL /HPF
BASOPHILS # BLD AUTO: 0.07 THOUSANDS/ÂΜL (ref 0–0.1)
BASOPHILS NFR BLD AUTO: 1 % (ref 0–1)
BILIRUB SERPL-MCNC: 0.45 MG/DL (ref 0.2–1)
BILIRUB UR QL STRIP: NEGATIVE
BUN SERPL-MCNC: 15 MG/DL (ref 5–25)
CALCIUM SERPL-MCNC: 9.2 MG/DL (ref 8.4–10.2)
CARDIAC TROPONIN I PNL SERPL HS: <2 NG/L
CHLORIDE SERPL-SCNC: 105 MMOL/L (ref 96–108)
CLARITY UR: CLEAR
CO2 SERPL-SCNC: 27 MMOL/L (ref 21–32)
COLOR UR: ABNORMAL
CREAT SERPL-MCNC: 0.8 MG/DL (ref 0.6–1.3)
EOSINOPHIL # BLD AUTO: 0.12 THOUSAND/ÂΜL (ref 0–0.61)
EOSINOPHIL NFR BLD AUTO: 1 % (ref 0–6)
ERYTHROCYTE [DISTWIDTH] IN BLOOD BY AUTOMATED COUNT: 12.9 % (ref 11.6–15.1)
GFR SERPL CREATININE-BSD FRML MDRD: 70 ML/MIN/1.73SQ M
GLUCOSE SERPL-MCNC: 104 MG/DL (ref 65–140)
GLUCOSE UR STRIP-MCNC: NEGATIVE MG/DL
HCT VFR BLD AUTO: 43.5 % (ref 34.8–46.1)
HGB BLD-MCNC: 14.2 G/DL (ref 11.5–15.4)
HGB UR QL STRIP.AUTO: NEGATIVE
IMM GRANULOCYTES # BLD AUTO: 0.03 THOUSAND/UL (ref 0–0.2)
IMM GRANULOCYTES NFR BLD AUTO: 0 % (ref 0–2)
KETONES UR STRIP-MCNC: NEGATIVE MG/DL
LEUKOCYTE ESTERASE UR QL STRIP: ABNORMAL
LIPASE SERPL-CCNC: 21 U/L (ref 11–82)
LYMPHOCYTES # BLD AUTO: 1.15 THOUSANDS/ÂΜL (ref 0.6–4.47)
LYMPHOCYTES NFR BLD AUTO: 11 % (ref 14–44)
MCH RBC QN AUTO: 30.8 PG (ref 26.8–34.3)
MCHC RBC AUTO-ENTMCNC: 32.6 G/DL (ref 31.4–37.4)
MCV RBC AUTO: 94 FL (ref 82–98)
MONOCYTES # BLD AUTO: 0.6 THOUSAND/ÂΜL (ref 0.17–1.22)
MONOCYTES NFR BLD AUTO: 6 % (ref 4–12)
NEUTROPHILS # BLD AUTO: 8.26 THOUSANDS/ÂΜL (ref 1.85–7.62)
NEUTS SEG NFR BLD AUTO: 81 % (ref 43–75)
NITRITE UR QL STRIP: NEGATIVE
NON-SQ EPI CELLS URNS QL MICRO: NORMAL /HPF
NRBC BLD AUTO-RTO: 0 /100 WBCS
P AXIS: 47 DEGREES
PH UR STRIP.AUTO: 8 [PH]
PLATELET # BLD AUTO: 200 THOUSANDS/UL (ref 149–390)
PMV BLD AUTO: 12 FL (ref 8.9–12.7)
POTASSIUM SERPL-SCNC: 4.4 MMOL/L (ref 3.5–5.3)
PR INTERVAL: 166 MS
PROT SERPL-MCNC: 6 G/DL (ref 6.4–8.4)
PROT UR STRIP-MCNC: NEGATIVE MG/DL
QRS AXIS: 14 DEGREES
QRSD INTERVAL: 74 MS
QT INTERVAL: 376 MS
QTC INTERVAL: 433 MS
RBC # BLD AUTO: 4.61 MILLION/UL (ref 3.81–5.12)
RBC #/AREA URNS AUTO: NORMAL /HPF
SODIUM SERPL-SCNC: 140 MMOL/L (ref 135–147)
SP GR UR STRIP.AUTO: 1.01 (ref 1–1.03)
T WAVE AXIS: 70 DEGREES
UROBILINOGEN UR QL STRIP.AUTO: 0.2 E.U./DL
VENTRICULAR RATE: 80 BPM
WBC # BLD AUTO: 10.23 THOUSAND/UL (ref 4.31–10.16)
WBC #/AREA URNS AUTO: NORMAL /HPF

## 2023-07-25 PROCEDURE — 74177 CT ABD & PELVIS W/CONTRAST: CPT

## 2023-07-25 PROCEDURE — 36415 COLL VENOUS BLD VENIPUNCTURE: CPT | Performed by: PHYSICIAN ASSISTANT

## 2023-07-25 PROCEDURE — 93010 ELECTROCARDIOGRAM REPORT: CPT | Performed by: INTERNAL MEDICINE

## 2023-07-25 PROCEDURE — 83690 ASSAY OF LIPASE: CPT | Performed by: PHYSICIAN ASSISTANT

## 2023-07-25 PROCEDURE — 93005 ELECTROCARDIOGRAM TRACING: CPT

## 2023-07-25 PROCEDURE — 96374 THER/PROPH/DIAG INJ IV PUSH: CPT

## 2023-07-25 PROCEDURE — 84484 ASSAY OF TROPONIN QUANT: CPT | Performed by: PHYSICIAN ASSISTANT

## 2023-07-25 PROCEDURE — 96361 HYDRATE IV INFUSION ADD-ON: CPT

## 2023-07-25 PROCEDURE — G1004 CDSM NDSC: HCPCS

## 2023-07-25 PROCEDURE — 81001 URINALYSIS AUTO W/SCOPE: CPT | Performed by: PHYSICIAN ASSISTANT

## 2023-07-25 PROCEDURE — 99284 EMERGENCY DEPT VISIT MOD MDM: CPT

## 2023-07-25 PROCEDURE — 80053 COMPREHEN METABOLIC PANEL: CPT | Performed by: PHYSICIAN ASSISTANT

## 2023-07-25 PROCEDURE — 85025 COMPLETE CBC W/AUTO DIFF WBC: CPT | Performed by: PHYSICIAN ASSISTANT

## 2023-07-25 RX ORDER — FAMOTIDINE 10 MG/ML
20 INJECTION, SOLUTION INTRAVENOUS ONCE
Status: COMPLETED | OUTPATIENT
Start: 2023-07-25 | End: 2023-07-25

## 2023-07-25 RX ORDER — FAMOTIDINE 20 MG/1
20 TABLET, FILM COATED ORAL DAILY
Qty: 30 TABLET | Refills: 0 | Status: SHIPPED | OUTPATIENT
Start: 2023-07-25

## 2023-07-25 RX ORDER — OMEPRAZOLE 20 MG/1
20 CAPSULE, DELAYED RELEASE ORAL DAILY
Qty: 15 CAPSULE | Refills: 0 | Status: SHIPPED | OUTPATIENT
Start: 2023-07-25 | End: 2023-08-09

## 2023-07-25 RX ADMIN — IOHEXOL 100 ML: 350 INJECTION, SOLUTION INTRAVENOUS at 14:31

## 2023-07-25 RX ADMIN — FAMOTIDINE 20 MG: 10 INJECTION, SOLUTION INTRAVENOUS at 13:29

## 2023-07-25 RX ADMIN — SODIUM CHLORIDE 1000 ML: 0.9 INJECTION, SOLUTION INTRAVENOUS at 13:20

## 2023-07-25 NOTE — ED PROVIDER NOTES
History  Chief Complaint   Patient presents with   • Abdominal Pain     Arrives BLS reported that she has mid abd pain, hx of hernia, didn't get surgery 2 years ago. Also reported that she was exposed with Covid + cousin last night     43-year-old female presenting today for evaluation of mid abdominal pain that began yesterday. She relays that she has a history of a large hiatal hernia that needed surgery however has not had any issues over the past year. Pain seems to worsen when laying down. Has not had any nausea vomiting diarrhea or constipation. States that the pain is overall mild. Is not accompanied with any chest pain diaphoresis or shortness of breath. Has had normal appetite. Sick contact with COVID yesterday. Denies fevers, cough, congestion, shortness of breath. Differential includes but is not limited to gastritis, GERD, hiatal hernia, arrhythmia, ACS. Prior to Admission Medications   Prescriptions Last Dose Informant Patient Reported? Taking? ALPRAZolam (XANAX) 0.25 mg tablet  Self Yes No   Sig: Take by mouth daily at bedtime as needed for anxiety    Patient not taking: Reported on 4/21/2023   alendronate (FOSAMAX) 70 mg tablet   Yes No   Sig: Take 70 mg by mouth every 7 days sundays    amLODIPine (NORVASC) 5 mg tablet  Self Yes No   Sig: Take 5 mg by mouth every morning.    atorvastatin (LIPITOR) 20 mg tablet  Self Yes No   Sig: Take 20 mg by mouth daily at bedtime     lidocaine (Lidoderm) 5 %   No No   Sig: Apply 1 patch topically daily Remove & Discard patch within 12 hours or as directed by MD   lidocaine (XYLOCAINE) 5 % ointment   No No   Sig: Apply topically as needed for mild pain APPLY TO VAGINAL OPENING AS NEEDED FOR PAIN   Patient not taking: No sig reported   meloxicam (MOBIC) 7.5 mg tablet   No No   Sig: Take 1 tablet (7.5 mg total) by mouth daily   naproxen (Naprosyn) 500 mg tablet   No No   Sig: Take 0.5 tablets (250 mg total) by mouth 2 (two) times a day with meals olmesartan (BENICAR) 40 mg tablet   Yes No   Sig: Take 40 mg by mouth daily   omeprazole (PriLOSEC) 40 MG capsule   Yes No   Sig: Take 40 mg by mouth daily   potassium citrate (UROCIT-K) 10 mEq   Yes No   Sig: Take 10 mEq by mouth 2 (two) times a day   sucralfate (CARAFATE) 1 g/10 mL suspension   No No   Sig: Take 10 mL (1 g total) by mouth 4 (four) times a day      Facility-Administered Medications: None       Past Medical History:   Diagnosis Date   • Anxiety    • Arthritis     left hip   • Chronic kidney disease     chronic stones   • Chronic narcotic dependence (HCC)     tramadol for hip   • Colon polyp    • Gallstones    • GERD (gastroesophageal reflux disease)    • H/O hiatal hernia    • Headache     occ   • Hyperlipidemia    • Hypertension    • Kidney stone    • Low back pain    • Lumbar disc disease    • Nausea     at times   • Poor appetite    • Sacroiliitis (HCC)    • Thickened endometrium    • Wears dentures     full upper/ partial lower   • Weight loss, unintentional     Since Nov 2021 20lb weight loss       Past Surgical History:   Procedure Laterality Date   • APPENDECTOMY     • BREAST SURGERY Left     excision cyst   • COLONOSCOPY     • CYSTOSCOPY      with stone extraction x 2   • CYSTOSCOPY W/ RETROGRADES Left 09/17/2012    stent removal,    • CYSTOSCOPY W/ URETEROSCOPY W/ LITHOTRIPSY Left 12/11/2006    Ca Phos 98%   • CYSTOSCOPY W/ URETEROSCOPY W/ LITHOTRIPSY Left 08/12/2012    Ca ox di 60%, Ca ox mono 20%   • ESOPHAGOGASTRODUODENOSCOPY     • EXTRACORPOREAL SHOCK WAVE LITHOTRIPSY Left 4/13/2016    Procedure: LITHROTRIPSY EXTRACORPORAL SHOCKWAVE (ESWL);   Surgeon: Kalie Campos MD;  Location: [facility] MAIN OR;  Service:    • POLYPECTOMY N/A 4/11/2022    Procedure: POLYPECTOMY;  Surgeon: Lyssa Garza MD;  Location:  MAIN OR;  Service: Gynecology   • DC ARTHRP ACETBLR/PROX FEM PROSTC AGRFT/ALGRFT Left 5/2/2022    Procedure: ARTHROPLASTY HIP TOTAL ANTERIOR - Left;  Surgeon: Cathie More Bria Khan DO;  Location: [facility];  Service: Orthopedics   • SC CYSTO/URETERO W/LITHOTRIPSY &INDWELL STENT INSRT Left 9/1/2016    Procedure: CYSTOSCOPY URETEROSCOPY WITH STONE EXTRACTION , RETROGRADE PYELOGRAM AND INSERTION STENT URETERAL;  Surgeon: Saran Durant MD;  Location: [facility];  Service: Urology   • SC CYSTO/URETERO W/LITHOTRIPSY &INDWELL STENT INSRT Left 8/12/2016    Procedure: CYSTOSCOPY URETEROSCOPY WITH LITHOTRIPSY HOLMIUM LASER, RETROGRADE PYELOGRAM AND INSERTION STENT URETERAL;  Surgeon: Saran Durant MD;  Location: [facility];  Service: Urology   • SC HYSTEROSCOPY BX ENDOMETRIUM&/POLYPC W/WO D&C N/A 4/11/2022    Procedure: DILATATION AND CURETTAGE (D&C) WITH HYSTEROSCOPY;  Surgeon: Anastasiia Sanabria MD;  Location: [facility];  Service: Gynecology   • TUBAL LIGATION     • UPPER GASTROINTESTINAL ENDOSCOPY     • URETERAL STENT PLACEMENT Left 08/31/2012    Narrowing of the left distal ureter for 3cm. • WISDOM TOOTH EXTRACTION         Family History   Problem Relation Age of Onset   • No Known Problems Mother    • Stroke Father         TIA   • Parkinsonism Father    • No Known Problems Sister    • Pancreatic cancer Brother    • Cancer Maternal Aunt    • Cancer Maternal Uncle    • Heart disease Maternal Uncle         Massive MI   • No Known Problems Paternal Aunt    • No Known Problems Paternal Uncle    • No Known Problems Maternal Grandmother    • No Known Problems Maternal Grandfather    • No Known Problems Paternal Grandmother    • No Known Problems Paternal Grandfather      I have reviewed and agree with the history as documented. E-Cigarette/Vaping   • E-Cigarette Use Never User      E-Cigarette/Vaping Substances   • Nicotine No    • THC No    • CBD No    • Flavoring No    • Other No    • Unknown No      Social History     Tobacco Use   • Smoking status: Never   • Smokeless tobacco: Never   Vaping Use   • Vaping Use: Never used   Substance Use Topics   • Alcohol use:  Yes Comment: wine almost every night to sleep   • Drug use: No       Review of Systems   Constitutional: Negative. HENT: Negative. Eyes: Negative. Respiratory: Negative. Cardiovascular: Negative. Gastrointestinal: Positive for abdominal pain. Negative for abdominal distention, anal bleeding, blood in stool, constipation, diarrhea, nausea, rectal pain and vomiting. Endocrine: Negative. Genitourinary: Negative. Musculoskeletal: Negative. Skin: Negative. Allergic/Immunologic: Negative. Neurological: Negative. Hematological: Negative. Psychiatric/Behavioral: Negative. All other systems reviewed and are negative. Physical Exam  Physical Exam  Vitals and nursing note reviewed. Constitutional:       General: She is not in acute distress. Appearance: She is well-developed. She is not diaphoretic. HENT:      Head: Normocephalic and atraumatic. Right Ear: External ear normal.      Left Ear: External ear normal.      Nose: Nose normal.      Mouth/Throat:      Pharynx: No oropharyngeal exudate. Eyes:      General: No scleral icterus. Right eye: No discharge. Left eye: No discharge. Conjunctiva/sclera: Conjunctivae normal.      Pupils: Pupils are equal, round, and reactive to light. Cardiovascular:      Rate and Rhythm: Normal rate and regular rhythm. Pulses: Normal pulses. Heart sounds: Normal heart sounds. No murmur heard. No friction rub. No gallop. Pulmonary:      Effort: Pulmonary effort is normal. No respiratory distress. Breath sounds: Normal breath sounds. No stridor. No wheezing, rhonchi or rales. Chest:      Chest wall: No tenderness. Abdominal:      General: Abdomen is flat. Bowel sounds are normal. There is no distension. Palpations: Abdomen is soft. There is no mass. Tenderness: There is no abdominal tenderness. There is no right CVA tenderness, left CVA tenderness, guarding or rebound.       Hernia: No hernia is present. Musculoskeletal:      Cervical back: Normal range of motion and neck supple. Lymphadenopathy:      Cervical: No cervical adenopathy. Skin:     General: Skin is warm and dry. Capillary Refill: Capillary refill takes less than 2 seconds. Coloration: Skin is not pale. Findings: No erythema or rash. Neurological:      Mental Status: She is alert and oriented to person, place, and time.          Vital Signs  ED Triage Vitals [07/25/23 1243]   Temperature Pulse Respirations Blood Pressure SpO2   98.7 °F (37.1 °C) 77 18 146/78 98 %      Temp Source Heart Rate Source Patient Position - Orthostatic VS BP Location FiO2 (%)   Tympanic Monitor Sitting Left arm --      Pain Score       8           Vitals:    07/25/23 1243 07/25/23 1500 07/25/23 1626   BP: 146/78 120/57 135/63   Pulse: 77 77 72   Patient Position - Orthostatic VS: Sitting  Lying         Visual Acuity      ED Medications  Medications   sodium chloride 0.9 % bolus 1,000 mL (0 mL Intravenous Stopped 7/25/23 1626)   Famotidine (PF) (PEPCID) injection 20 mg (20 mg Intravenous Given 7/25/23 1329)   iohexol (OMNIPAQUE) 350 MG/ML injection (SINGLE-DOSE) 100 mL (100 mL Intravenous Given 7/25/23 1431)       Diagnostic Studies  Results Reviewed     Procedure Component Value Units Date/Time    UA w Reflex to Microscopic w Reflex to Culture [[id number]]     Lab Status: No result Specimen: Urine     HS Troponin 0hr (reflex protocol) [[id number]]  (Normal) Collected: 07/25/23 1318    Lab Status: Final result Specimen: Blood from Arm, Right Updated: 07/25/23 1404     hs TnI 0hr <2 ng/L     Lipase [[id number]]  (Normal) Collected: 07/25/23 1318    Lab Status: Final result Specimen: Blood from Arm, Right Updated: 07/25/23 1400     Lipase 21 u/L     Comprehensive metabolic panel [[id number]]  (Abnormal) Collected: 07/25/23 1318    Lab Status: Final result Specimen: Blood from Arm, Right Updated: 07/25/23 1400     Sodium 140 mmol/L Potassium 4.4 mmol/L      Chloride 105 mmol/L      CO2 27 mmol/L      ANION GAP 8 mmol/L      BUN 15 mg/dL      Creatinine 0.80 mg/dL      Glucose 104 mg/dL      Calcium 9.2 mg/dL      AST 14 U/L      ALT 12 U/L      Alkaline Phosphatase 72 U/L      Total Protein 6.0 g/dL      Albumin 3.9 g/dL      Total Bilirubin 0.45 mg/dL      eGFR 70 ml/min/1.73sq m     Narrative:      National Kidney Disease Foundation guidelines for Chronic Kidney Disease (CKD):   •  Stage 1 with normal or high GFR (GFR > 90 mL/min/1.73 square meters)  •  Stage 2 Mild CKD (GFR = 60-89 mL/min/1.73 square meters)  •  Stage 3A Moderate CKD (GFR = 45-59 mL/min/1.73 square meters)  •  Stage 3B Moderate CKD (GFR = 30-44 mL/min/1.73 square meters)  •  Stage 4 Severe CKD (GFR = 15-29 mL/min/1.73 square meters)  •  Stage 5 End Stage CKD (GFR <15 mL/min/1.73 square meters)  Note: GFR calculation is accurate only with a steady state creatinine    CBC and differential [[id number]]  (Abnormal) Collected: 07/25/23 1318    Lab Status: Final result Specimen: Blood from Arm, Right Updated: 07/25/23 1326     WBC 10.23 Thousand/uL      RBC 4.61 Million/uL      Hemoglobin 14.2 g/dL      Hematocrit 43.5 %      MCV 94 fL      MCH 30.8 pg      MCHC 32.6 g/dL      RDW 12.9 %      MPV 12.0 fL      Platelets 215 Thousands/uL      nRBC 0 /100 WBCs      Neutrophils Relative 81 %      Immat GRANS % 0 %      Lymphocytes Relative 11 %      Monocytes Relative 6 %      Eosinophils Relative 1 %      Basophils Relative 1 %      Neutrophils Absolute 8.26 Thousands/µL      Immature Grans Absolute 0.03 Thousand/uL      Lymphocytes Absolute 1.15 Thousands/µL      Monocytes Absolute 0.60 Thousand/µL      Eosinophils Absolute 0.12 Thousand/µL      Basophils Absolute 0.07 Thousands/µL                  CT abdomen pelvis with contrast   Final Result by Grey Yanez DO (07/25 0385)      1. No acute intra-abdominal abnormality.       2. Large hiatal hernia containing most of the stomach, unchanged. 3. Chronic abnormal appearance of the uterus as above. 4. Colonic diverticulosis without evidence of diverticulitis. Workstation performed: [id number]                    Procedures  ECG 12 Lead Documentation Only    Date/Time: 7/25/2023 1:29 PM    Performed by: Bennett Pearce PA-C  Authorized by: Bennett Pearce PA-C    Indications / Diagnosis:  Epigastric pain   Patient location:  ED  Interpretation:     Interpretation: normal    Rate:     ECG rate:  80    ECG rate assessment: normal    Rhythm:     Rhythm: sinus rhythm    Ectopy:     Ectopy: none    QRS:     QRS axis:  Normal    QRS intervals:  Normal  Conduction:     Conduction: normal    ST segments:     ST segments:  Normal  T waves:     T waves: normal               ED Course  ED Course as of 07/25/23 1637   Tue Jul 25, 2023   1422 Patient updated                                             Medical Decision Making  Symptoms likely consistent with large hiatal hernia which is chronic and unchanged. Patient has not been taking any GI medications. States that she canceled her appointment with GI multiple times as she was afraid of the surgery. She sees Dr. Jimmie Christianson out of [facility]. Patient relays that she will follow-up. Will start patient on omeprazole and Pepcid. Strict return precautions for worsening including but not limited to vomiting, fevers, severe persistent pain. Otherwise patient agrees to follow-up. Patient verbalizes understanding and agrees with the above assessment and plan. Epigastric pain: acute illness or injury  Amount and/or Complexity of Data Reviewed  Labs: ordered. Radiology: ordered. Risk  Prescription drug management.           Disposition  Final diagnoses:   Epigastric pain     Time reflects when diagnosis was documented in both MDM as applicable and the Disposition within this note     Time User Action Codes Description Comment    7/25/2023  4:36 PM [address] [address] [R10.13] Epigastric pain       ED Disposition     ED Disposition   Discharge    Condition   Stable    Date/Time   Tue Jul 25, 2023  4:36 PM    Comment   Robet Breath discharge to home/self care. Follow-up Information     Follow up With Specialties Details Why Contact Info Additional Information    [address] Emergency Department Emergency Medicine Go to  If symptoms worsen, otherwise please follow up with your family doctor and gastroenterologist [address] [address]  [address] [facility] Emergency Department, [address] [facility] [address], [facility], [facility] [place] Jazzy Talley MD Thoracic Surgery, Thoracic Diseases, Cardiothoracic Surgery Schedule an appointment as soon as possible for a visit in 1 day  [address]  [phone]             Patient's Medications   Discharge Prescriptions    FAMOTIDINE (PEPCID) 20 MG TABLET    Take 1 tablet (20 mg total) by mouth daily       Start Date: 7/25/2023 End Date: --       Order Dose: 20 mg       Quantity: 30 tablet    Refills: 0    OMEPRAZOLE (PRILOSEC) 20 MG DELAYED RELEASE CAPSULE    Take 1 capsule (20 mg total) by mouth daily for 15 days       Start Date: 7/25/2023 End Date: 8/9/2023       Order Dose: 20 mg       Quantity: 15 capsule    Refills: 0       No discharge procedures on file.     PDMP Review       Value Time User    PDMP Reviewed  Yes 5/3/2022  3:03 PM Devan Kelly PA-C          ED Provider  Electronically Signed by           Dylon Washington PA-C  07/25/23 2195

## 2023-07-26 ENCOUNTER — TELEPHONE (OUTPATIENT)
Dept: HEMATOLOGY ONCOLOGY | Facility: CLINIC | Age: 78
End: 2023-07-26

## 2023-07-26 NOTE — TELEPHONE ENCOUNTER
Appointment Schedule   Who are you speaking with? Patient   If it is not the patient, are they listed on an active communication consent form? N/A   Which provider is the appointment scheduled with? Dr. Dionna Friedman   At which location is the appointment scheduled for? [place]   When is the appointment scheduled? Please list date and time 8/15/23 10am   What is the reason for this appointment? f/u   Did patient voice understanding of the details of this appointment? Yes   Was the no show policy reviewed with patient?  Yes

## 2023-07-28 ENCOUNTER — OFFICE VISIT (OUTPATIENT)
Dept: FAMILY MEDICINE CLINIC | Facility: CLINIC | Age: 78
End: 2023-07-28
Payer: MEDICARE

## 2023-07-28 VITALS
DIASTOLIC BLOOD PRESSURE: 78 MMHG | HEIGHT: 61 IN | SYSTOLIC BLOOD PRESSURE: 130 MMHG | WEIGHT: 116.25 LBS | RESPIRATION RATE: 18 BRPM | HEART RATE: 136 BPM | BODY MASS INDEX: 21.95 KG/M2 | TEMPERATURE: 97.9 F

## 2023-07-28 DIAGNOSIS — M81.0 OSTEOPOROSIS OF MULTIPLE SITES: ICD-10-CM

## 2023-07-28 DIAGNOSIS — F41.9 ANXIETY: ICD-10-CM

## 2023-07-28 DIAGNOSIS — F11.20 CONTINUOUS OPIOID DEPENDENCE (HCC): ICD-10-CM

## 2023-07-28 DIAGNOSIS — I10 BENIGN ESSENTIAL HYPERTENSION: Primary | ICD-10-CM

## 2023-07-28 DIAGNOSIS — M46.1 SACROILIITIS (HCC): ICD-10-CM

## 2023-07-28 DIAGNOSIS — K44.9 HIATAL HERNIA: ICD-10-CM

## 2023-07-28 DIAGNOSIS — E78.5 DYSLIPIDEMIA: ICD-10-CM

## 2023-07-28 PROBLEM — R68.89 UNINTENTIONAL WEIGHT CHANGE: Status: RESOLVED | Noted: 2022-01-26 | Resolved: 2023-07-28

## 2023-07-28 PROBLEM — M89.8X5 PAIN OF LEFT FEMUR: Status: RESOLVED | Noted: 2021-11-04 | Resolved: 2023-07-28

## 2023-07-28 PROBLEM — R63.4 WEIGHT LOSS, UNINTENTIONAL: Status: RESOLVED | Noted: 2022-02-01 | Resolved: 2023-07-28

## 2023-07-28 PROCEDURE — 99214 OFFICE O/P EST MOD 30 MIN: CPT | Performed by: FAMILY MEDICINE

## 2023-07-28 RX ORDER — OLMESARTAN MEDOXOMIL 40 MG/1
40 TABLET ORAL DAILY
Qty: 90 TABLET | Refills: 1 | Status: SHIPPED | OUTPATIENT
Start: 2023-07-28

## 2023-07-28 RX ORDER — ATORVASTATIN CALCIUM 20 MG/1
20 TABLET, FILM COATED ORAL
Qty: 90 TABLET | Refills: 1 | Status: SHIPPED | OUTPATIENT
Start: 2023-07-28

## 2023-07-28 RX ORDER — PHENOL 1.4 %
AEROSOL, SPRAY (ML) MUCOUS MEMBRANE
COMMUNITY

## 2023-07-28 RX ORDER — ALENDRONATE SODIUM 70 MG/1
70 TABLET ORAL
Qty: 12 TABLET | Refills: 3 | Status: SHIPPED | OUTPATIENT
Start: 2023-07-28

## 2023-07-28 RX ORDER — TRAMADOL HYDROCHLORIDE 50 MG/1
TABLET ORAL
COMMUNITY
Start: 2023-07-13

## 2023-07-28 RX ORDER — AMLODIPINE BESYLATE 5 MG/1
5 TABLET ORAL EVERY MORNING
Qty: 90 TABLET | Refills: 1 | Status: SHIPPED | OUTPATIENT
Start: 2023-07-28

## 2023-07-28 RX ORDER — ALPRAZOLAM 0.25 MG/1
0.25 TABLET ORAL
Qty: 30 TABLET | Refills: 3 | Status: SHIPPED | OUTPATIENT
Start: 2023-07-28

## 2023-07-28 NOTE — ASSESSMENT & PLAN NOTE
Stable on alprazolam as needed  We discussed the interaction of alprazolam and tramadol and that it can cause her to stop breathing.   I recommended that she take a prescription for naloxone just in case she needs her first medication but she declined

## 2023-07-28 NOTE — PROGRESS NOTES
Name: Aramis Wong      :       MRN: [id number]  Encounter Provider: Sherlyn James DO  Encounter Date: 2023   Encounter department: [address] [facility]     1. Benign essential hypertension  Assessment & Plan:  Blood pressure is well controlled  Continue amlodipine 5 mg daily and olmesartan 40 mg daily    Orders:  -     amLODIPine (NORVASC) 5 mg tablet; Take 1 tablet (5 mg total) by mouth every morning  -     olmesartan (BENICAR) 40 mg tablet; Take 1 tablet (40 mg total) by mouth daily  -     CBC; Future; Expected date: 2024  -     Comprehensive metabolic panel; Future; Expected date: 2024  -     Lipid Panel with Direct LDL reflex; Future; Expected date: 2024    2. Sacroiliitis [facility])  Assessment & Plan:  Managed by Dr. Digna Carpenter      3. Continuous opioid dependence [facility])  Assessment & Plan:  Managed by Dr. Digna Carpenter      4. Dyslipidemia  Assessment & Plan:  Stable  Continue atorvastatin 20 mg daily    Orders:  -     atorvastatin (LIPITOR) 20 mg tablet; Take 1 tablet (20 mg total) by mouth daily at bedtime  -     Comprehensive metabolic panel; Future; Expected date: 2024  -     Lipid Panel with Direct LDL reflex; Future; Expected date: 2024    5. Osteoporosis of multiple sites  -     alendronate (FOSAMAX) 70 mg tablet; Take 1 tablet (70 mg total) by mouth every 7 days sundays    6. Anxiety  Assessment & Plan:  Stable on alprazolam as needed  We discussed the interaction of alprazolam and tramadol and that it can cause her to stop breathing. I recommended that she take a prescription for naloxone just in case she needs her first medication but she declined    Orders:  -     ALPRAZolam (XANAX) 0.25 mg tablet; Take 1 tablet (0.25 mg total) by mouth daily at bedtime as needed for anxiety    7.  Hiatal hernia  Assessment & Plan:  Has been causing her more discomfort  She is scheduled appoint with the surgeon to have it repaired  She is finally ready to get the surgery done. She has been feeling anxious about it      Return in about 6 months (around 1/28/2024) for Next scheduled follow up. [place] prescription monitoring program report reviewed and is appropriate. Depression Screening and Follow-up Plan: Patient was screened for depression during today's encounter. They screened negative with a PHQ-2 score of 0. Subjective     Patient is here today to establish for primary care  Her previous physician Dr. Bettie Vigil retired. She has had a problem with a hiatal hernia for many years. Is causing her significant discomfort. She had previously had the surgery scheduled twice and canceled it. She is now feeling ready to get the procedure done. The discomfort is really affecting her quality of life. She also has chronic back pain that she follows with a pain specialist for. She has injection scheduled. She takes an occasional tramadol. She does not mix it with her alprazolam.    She takes alprazolam intermittently at night to help with anxiety and sleeping. Hypertension-patient has been on medication for her blood pressure for quite some time. She is tolerating it well.     Review of Systems    Past Medical History:   Diagnosis Date   • Anxiety    • Arthritis     left hip   • Chronic kidney disease     chronic stones   • Chronic narcotic dependence ([address])     tramadol for hip   • Colon polyp    • Gallstones    • GERD (gastroesophageal reflux disease)    • H/O hiatal hernia    • Headache     occ   • Hyperlipidemia    • Hypertension    • Kidney stone    • Low back pain    • Lumbar disc disease    • Nausea     at times   • Poor appetite    • Sacroiliitis (HCC)    • Thickened endometrium    • Wears dentures     full upper/ partial lower   • Weight loss, unintentional     Since Nov 2021 20lb weight loss     Past Surgical History:   Procedure Laterality Date   • APPENDECTOMY     • BREAST SURGERY Left     excision cyst   • COLONOSCOPY     • CYSTOSCOPY      with stone extraction x 2   • CYSTOSCOPY W/ RETROGRADES Left 09/17/2012    stent removal,    • CYSTOSCOPY W/ URETEROSCOPY W/ LITHOTRIPSY Left 12/11/2006    Ca Phos 98%   • CYSTOSCOPY W/ URETEROSCOPY W/ LITHOTRIPSY Left 08/12/2012    Ca ox di 60%, Ca ox mono 20%   • ESOPHAGOGASTRODUODENOSCOPY     • EXTRACORPOREAL SHOCK WAVE LITHOTRIPSY Left 4/13/2016    Procedure: LITHROTRIPSY EXTRACORPORAL SHOCKWAVE (ESWL); Surgeon: Snow Almaguer MD;  Location: [facility] MAIN OR;  Service:    • POLYPECTOMY N/A 4/11/2022    Procedure: POLYPECTOMY;  Surgeon: Isabel Price MD;  Location:  MAIN OR;  Service: Gynecology   • PA ARTHRP ACETBLR/PROX FEM PROSTC AGRFT/ALGRFT Left 5/2/2022    Procedure: ARTHROPLASTY HIP TOTAL ANTERIOR - Left;  Surgeon: Julianna Mortimer, DO;  Location: [facility];  Service: Orthopedics   • PA CYSTO/URETERO W/LITHOTRIPSY &INDWELL STENT INSRT Left 9/1/2016    Procedure: CYSTOSCOPY URETEROSCOPY WITH STONE EXTRACTION , RETROGRADE PYELOGRAM AND INSERTION STENT URETERAL;  Surgeon: Snow Almaguer MD;  Location: [facility];  Service: Urology   • PA CYSTO/URETERO W/LITHOTRIPSY &INDWELL STENT INSRT Left 8/12/2016    Procedure: CYSTOSCOPY URETEROSCOPY WITH LITHOTRIPSY HOLMIUM LASER, RETROGRADE PYELOGRAM AND INSERTION STENT URETERAL;  Surgeon: Snow Almaguer MD;  Location: [facility];  Service: Urology   • PA HYSTEROSCOPY BX ENDOMETRIUM&/POLYPC W/WO D&C N/A 4/11/2022    Procedure: DILATATION AND CURETTAGE (D&C) WITH HYSTEROSCOPY;  Surgeon: Isabel Price MD;  Location:  MAIN OR;  Service: Gynecology   • TUBAL LIGATION     • UPPER GASTROINTESTINAL ENDOSCOPY     • URETERAL STENT PLACEMENT Left 08/31/2012    Narrowing of the left distal ureter for 3cm.      • WISDOM TOOTH EXTRACTION       Family History   Problem Relation Age of Onset   • No Known Problems Mother    • Stroke Father         TIA   • Parkinsonism Father    • No Known Problems Sister    • Pancreatic cancer Brother    • Cancer Maternal Aunt • Cancer Maternal Uncle    • Heart disease Maternal Uncle         Massive MI   • No Known Problems Paternal Aunt    • No Known Problems Paternal Uncle    • No Known Problems Maternal Grandmother    • No Known Problems Maternal Grandfather    • No Known Problems Paternal Grandmother    • No Known Problems Paternal Grandfather      Social History     Socioeconomic History   • Marital status:      Spouse name: None   • Number of children: None   • Years of education: None   • Highest education level: None   Occupational History   • None   Tobacco Use   • Smoking status: Never   • Smokeless tobacco: Never   Vaping Use   • Vaping Use: Never used   Substance and Sexual Activity   • Alcohol use: Yes     Comment: wine almost every night to sleep   • Drug use: No   • Sexual activity: Yes   Other Topics Concern   • None   Social History Narrative   • None     Social Determinants of Health     Financial Resource Strain: Not on file   Food Insecurity: Not on file   Transportation Needs: Not on file   Physical Activity: Not on file   Stress: Not on file   Social Connections: Not on file   Intimate Partner Violence: Not on file   Housing Stability: Not on file     Current Outpatient Medications on File Prior to Visit   Medication Sig   • Apoaequorin (PREVAGEN PO) Take by mouth   • co-enzyme Q-10 30 MG capsule Take 200 mg by mouth daily   • famotidine (PEPCID) 20 mg tablet Take 1 tablet (20 mg total) by mouth daily   • Melatonin 10 MG TABS Take by mouth   • Multiple Vitamins-Minerals (CENTRUM SILVER 50+WOMEN PO) Take by mouth   • omeprazole (PriLOSEC) 20 mg delayed release capsule Take 1 capsule (20 mg total) by mouth daily for 15 days   • potassium citrate (UROCIT-K) 10 mEq Take 10 mEq by mouth 2 (two) times a day   • traMADol (ULTRAM) 50 mg tablet TAKE ONE TABLET BY MOUTH EVERY DAY AS NEEDED FOR PAIN.  DO NOT DRIVE ON THIS MEDICATION   • [DISCONTINUED] alendronate (FOSAMAX) 70 mg tablet Take 70 mg by mouth every 7 days sundays    • [DISCONTINUED] ALPRAZolam (XANAX) 0.25 mg tablet Take by mouth daily at bedtime as needed for anxiety   • [DISCONTINUED] amLODIPine (NORVASC) 5 mg tablet Take 5 mg by mouth every morning.    • [DISCONTINUED] atorvastatin (LIPITOR) 20 mg tablet Take 20 mg by mouth daily at bedtime     • [DISCONTINUED] olmesartan (BENICAR) 40 mg tablet Take 40 mg by mouth daily   • lidocaine (Lidoderm) 5 % Apply 1 patch topically daily Remove & Discard patch within 12 hours or as directed by MD (Patient not taking: Reported on 7/28/2023)   • [DISCONTINUED] lidocaine (XYLOCAINE) 5 % ointment Apply topically as needed for mild pain APPLY TO VAGINAL OPENING AS NEEDED FOR PAIN (Patient not taking: Reported on 5/18/2022)   • [DISCONTINUED] meloxicam (MOBIC) 7.5 mg tablet Take 1 tablet (7.5 mg total) by mouth daily (Patient not taking: Reported on 7/28/2023)   • [DISCONTINUED] naproxen (Naprosyn) 500 mg tablet Take 0.5 tablets (250 mg total) by mouth 2 (two) times a day with meals (Patient not taking: Reported on 7/28/2023)   • [DISCONTINUED] omeprazole (PriLOSEC) 40 MG capsule Take 40 mg by mouth daily (Patient not taking: Reported on 7/28/2023)   • [DISCONTINUED] sucralfate (CARAFATE) 1 g/10 mL suspension Take 10 mL (1 g total) by mouth 4 (four) times a day (Patient not taking: Reported on 7/28/2023)     Allergies   Allergen Reactions   • Oxybate Diarrhea     DIARRHEA     Immunization History   Administered Date(s) Administered   • COVID-19 MODERNA VACC 0.5 ML IM 01/22/2021, 01/22/2021, 02/19/2021, 02/19/2021, 04/15/2022, 04/15/2022   • INFLUENZA 12/05/2011, 09/07/2012, 09/09/2014, 10/13/2015, 09/27/2016, 09/12/2017, 09/26/2018, 09/11/2019, 09/20/2021, 09/09/2022   • Influenza, Seasonal Vaccine, Quadrivalent, Adjuvanted, .5e 09/20/2021, 09/09/2022   • Influenza, high dose seasonal 0.7 mL 09/13/2020   • Influenza, seasonal, injectable 09/27/2016   • Pneumococcal Conjugate 13-Valent 10/07/2016, 10/23/2020   • Pneumococcal Polysaccharide PPV23 10/11/2012   • influenza, trivalent, adjuvanted 09/11/2019       Objective     /78   Pulse (!) 136   Temp 97.9 °F (36.6 °C) (Tympanic)   Resp 18   Ht 5' 1" (1.549 m)   Wt 52.7 kg (116 lb 4 oz)   LMP  (LMP Unknown)   BMI 21.97 kg/m²     Physical Exam  Vitals and nursing note reviewed. Constitutional:       Appearance: She is well-developed. HENT:      Head: Normocephalic and atraumatic. Right Ear: Tympanic membrane and external ear normal.      Left Ear: Tympanic membrane and external ear normal.   Cardiovascular:      Rate and Rhythm: Normal rate and regular rhythm. Heart sounds: Normal heart sounds. No murmur heard. No friction rub. Pulmonary:      Effort: Pulmonary effort is normal. No respiratory distress. Breath sounds: Normal breath sounds. No wheezing or rales. Musculoskeletal:      Right lower leg: No edema. Left lower leg: No edema.        Cheko Haynes, DO

## 2023-07-28 NOTE — ASSESSMENT & PLAN NOTE
Has been causing her more discomfort  She is scheduled appoint with the surgeon to have it repaired  She is finally ready to get the surgery done.   She has been feeling anxious about it

## 2023-08-10 ENCOUNTER — TELEPHONE (OUTPATIENT)
Dept: CARDIAC SURGERY | Facility: CLINIC | Age: 78
End: 2023-08-10

## 2023-08-10 DIAGNOSIS — K44.9 HIATAL HERNIA: Primary | ICD-10-CM

## 2023-08-10 NOTE — TELEPHONE ENCOUNTER
LVM for pt requesting a call back in regards to rescheduling appointment with Dr. Saba Stockton after she gets esophageal manometry test done first. The order is in her chart and I have provided her with Central Scheduling phone number. I advised her to call us if she needs assistance with scheduling this test. I also asked if she can call us when the test is scheduled so that we can reschedule her follow-up with Dr. Saba Stockton.

## 2023-08-11 ENCOUNTER — TELEPHONE (OUTPATIENT)
Dept: HEMATOLOGY ONCOLOGY | Facility: CLINIC | Age: 78
End: 2023-08-11

## 2023-08-11 NOTE — TELEPHONE ENCOUNTER
Call Transfer   Who are you speaking with? Patient   If it is not the patient, are they listed on an active communication consent form? N/A   Who is the patients HemOnc/SurgOnc provider? Dr. Jossue Toure   What is the reason for this call? Schedule esophageal test   Person/Department that the call was transferred to? Time that call was transferred? central scheduling   Your call will be transferred now. If you receive a voicemail, please leave a detailed message and a member of the team will return your call as soon as possible. Did you relay this information to the caller?   N/A

## 2023-08-18 ENCOUNTER — TELEPHONE (OUTPATIENT)
Dept: GASTROENTEROLOGY | Facility: HOSPITAL | Age: 78
End: 2023-08-18

## 2023-08-24 ENCOUNTER — TELEPHONE (OUTPATIENT)
Dept: GASTROENTEROLOGY | Facility: HOSPITAL | Age: 78
End: 2023-08-24

## 2023-08-29 ENCOUNTER — TELEPHONE (OUTPATIENT)
Dept: GASTROENTEROLOGY | Facility: HOSPITAL | Age: 78
End: 2023-08-29

## 2023-09-01 ENCOUNTER — HOSPITAL ENCOUNTER (OUTPATIENT)
Dept: GASTROENTEROLOGY | Facility: HOSPITAL | Age: 78
End: 2023-09-01
Payer: MEDICARE

## 2023-09-01 VITALS
SYSTOLIC BLOOD PRESSURE: 138 MMHG | TEMPERATURE: 97.4 F | RESPIRATION RATE: 16 BRPM | DIASTOLIC BLOOD PRESSURE: 74 MMHG | HEART RATE: 80 BPM | OXYGEN SATURATION: 95 %

## 2023-09-01 DIAGNOSIS — K44.9 HIATAL HERNIA: ICD-10-CM

## 2023-09-01 PROCEDURE — 91010 ESOPHAGUS MOTILITY STUDY: CPT

## 2023-09-01 NOTE — PERIOPERATIVE NURSING NOTE
Patient brought in the room and explained the esophageal manometry procedure. After the confirmation of allergies, lidocaine 2% inserted via right /left nostril and  a transnasal insertion of the High Resolution esophageal manometry catheter was inserted via right/left nostril. Patient given water to drink during the insertion and once the catheter inserted pressure bands of both Upper esophageal sphincter  (UES) and Lower esophageal sphincter ( LES) were observed on the color contour. Catheter was secured to left cheek. Patient was assisted to supine position . Patient was instructed to relax  while acclimating the catheter for about 5 minutes. A 30 second baseline resting pressure was obtained to identify the UES and LES followed by a series of 10 liquid swallows using 5 cc room temperature normal saline to assess esophageal motility and bolus transit. Patient administered 10 viscous swallows using 5 cc viscous solution, 1 multiple rapid drink swallow using 2 cc room temperature normal saline given a total of 5 drinks. Patient instructed to sit up at the edge of the stretcher and given 5 upright liquid swallows using 5 cc room temperature normal saline and 1 rapid drink challenge using 200 cc room temperature water. At the end of the procedure the high resolution esophageal manometry catheter was removed from the nostril intact. Patient complained of chest pressure and reports she gets the same type of discomfort with eating. She reports that she induces vomiting to relieve the discomfort. Patient asking if she could induce vomiting and I suggested that it may not be a good idea but she did attempt to induce vomiting by placing her fingers inside mouth. No vomiting noted. Patient was monitored and reported feeling better before her departure. Patient given discharge instructions and patient left in stable condition.

## 2023-09-06 PROCEDURE — 91020 GASTRIC MOTILITY STUDIES: CPT | Performed by: INTERNAL MEDICINE

## 2023-10-03 DIAGNOSIS — I10 BENIGN ESSENTIAL HYPERTENSION: ICD-10-CM

## 2023-10-03 RX ORDER — OLMESARTAN MEDOXOMIL 40 MG/1
40 TABLET ORAL DAILY
Qty: 90 TABLET | Refills: 1 | Status: SHIPPED | OUTPATIENT
Start: 2023-10-03

## 2023-10-10 DIAGNOSIS — M81.0 OSTEOPOROSIS OF MULTIPLE SITES: ICD-10-CM

## 2023-10-10 RX ORDER — ALENDRONATE SODIUM 70 MG/1
70 TABLET ORAL
Qty: 12 TABLET | Refills: 0 | Status: SHIPPED | OUTPATIENT
Start: 2023-10-10

## 2023-10-12 DIAGNOSIS — I10 BENIGN ESSENTIAL HYPERTENSION: ICD-10-CM

## 2023-10-12 RX ORDER — OLMESARTAN MEDOXOMIL 40 MG/1
40 TABLET ORAL EVERY MORNING
Qty: 90 TABLET | Refills: 1 | OUTPATIENT
Start: 2023-10-12

## 2023-10-12 RX ORDER — OLMESARTAN MEDOXOMIL 40 MG/1
40 TABLET ORAL DAILY
Qty: 90 TABLET | Refills: 1 | OUTPATIENT
Start: 2023-10-12

## 2023-10-12 RX ORDER — OLMESARTAN MEDOXOMIL 40 MG/1
40 TABLET ORAL DAILY
Qty: 90 TABLET | Refills: 1 | Status: SHIPPED | OUTPATIENT
Start: 2023-10-12

## 2023-10-12 NOTE — TELEPHONE ENCOUNTER
Patient called stating that [facility] advised her they never received the order that was sent on 10/3/23, asked us to resend.

## 2023-12-05 ENCOUNTER — TELEPHONE (OUTPATIENT)
Age: 78
End: 2023-12-05

## 2023-12-05 NOTE — TELEPHONE ENCOUNTER
Pt called bc her bloodwork scripts have highlighted date of 1/9/24 and she confused and I explained that those are to be complted before her appt 1/29/24 but not before the expected date . She understood

## 2024-01-04 DIAGNOSIS — R10.13 EPIGASTRIC PAIN: ICD-10-CM

## 2024-01-04 RX ORDER — OMEPRAZOLE 20 MG/1
20 CAPSULE, DELAYED RELEASE ORAL DAILY
Qty: 90 CAPSULE | Refills: 1 | Status: SHIPPED | OUTPATIENT
Start: 2024-01-04 | End: 2024-07-02

## 2024-01-04 NOTE — TELEPHONE ENCOUNTER
Reason for call:   [x] Refill   [] Prior Auth  [] Other:     Office:   [x] PCP/Provider - Dr Sylvester DO  [] Specialty/Provider -     Medication: omeprazole (PriLOSEC) 20 mg delayed release capsule  # 90      Pharmacy: [id number] [address]     Does the patient have enough for 3 days?   [] Yes   [x] No - Send as HP to POD

## 2024-01-09 ENCOUNTER — APPOINTMENT (OUTPATIENT)
Dept: LAB | Facility: HOSPITAL | Age: 79
End: 2024-01-09
Payer: MEDICARE

## 2024-01-09 DIAGNOSIS — I10 BENIGN ESSENTIAL HYPERTENSION: ICD-10-CM

## 2024-01-09 DIAGNOSIS — E78.5 DYSLIPIDEMIA: ICD-10-CM

## 2024-01-09 LAB
ALBUMIN SERPL BCP-MCNC: 4.3 G/DL (ref 3.5–5)
ALP SERPL-CCNC: 56 U/L (ref 34–104)
ALT SERPL W P-5'-P-CCNC: 10 U/L (ref 7–52)
ANION GAP SERPL CALCULATED.3IONS-SCNC: 8 MMOL/L
AST SERPL W P-5'-P-CCNC: 16 U/L (ref 13–39)
BILIRUB SERPL-MCNC: 0.62 MG/DL (ref 0.2–1)
BUN SERPL-MCNC: 12 MG/DL (ref 5–25)
CALCIUM SERPL-MCNC: 9.5 MG/DL (ref 8.4–10.2)
CHLORIDE SERPL-SCNC: 106 MMOL/L (ref 96–108)
CHOLEST SERPL-MCNC: 224 MG/DL
CO2 SERPL-SCNC: 29 MMOL/L (ref 21–32)
CREAT SERPL-MCNC: 0.84 MG/DL (ref 0.6–1.3)
ERYTHROCYTE [DISTWIDTH] IN BLOOD BY AUTOMATED COUNT: 13.2 % (ref 11.6–15.1)
GFR SERPL CREATININE-BSD FRML MDRD: 66 ML/MIN/1.73SQ M
GLUCOSE P FAST SERPL-MCNC: 101 MG/DL (ref 65–99)
HCT VFR BLD AUTO: 47.5 % (ref 34.8–46.1)
HDLC SERPL-MCNC: 65 MG/DL
HGB BLD-MCNC: 15.4 G/DL (ref 11.5–15.4)
LDLC SERPL CALC-MCNC: 109 MG/DL (ref 0–100)
MCH RBC QN AUTO: 30.8 PG (ref 26.8–34.3)
MCHC RBC AUTO-ENTMCNC: 32.4 G/DL (ref 31.4–37.4)
MCV RBC AUTO: 95 FL (ref 82–98)
PLATELET # BLD AUTO: 182 THOUSANDS/UL (ref 149–390)
PMV BLD AUTO: 12.6 FL (ref 8.9–12.7)
POTASSIUM SERPL-SCNC: 4 MMOL/L (ref 3.5–5.3)
PROT SERPL-MCNC: 6.6 G/DL (ref 6.4–8.4)
RBC # BLD AUTO: 5 MILLION/UL (ref 3.81–5.12)
SODIUM SERPL-SCNC: 143 MMOL/L (ref 135–147)
TRIGL SERPL-MCNC: 252 MG/DL
WBC # BLD AUTO: 5.59 THOUSAND/UL (ref 4.31–10.16)

## 2024-01-09 PROCEDURE — 80061 LIPID PANEL: CPT

## 2024-01-09 PROCEDURE — 85027 COMPLETE CBC AUTOMATED: CPT

## 2024-01-09 PROCEDURE — 36415 COLL VENOUS BLD VENIPUNCTURE: CPT

## 2024-01-09 PROCEDURE — 80053 COMPREHEN METABOLIC PANEL: CPT

## 2024-01-11 ENCOUNTER — TELEPHONE (OUTPATIENT)
Age: 79
End: 2024-01-11

## 2024-01-11 DIAGNOSIS — R10.13 EPIGASTRIC PAIN: ICD-10-CM

## 2024-01-11 NOTE — TELEPHONE ENCOUNTER
Patient is requesting a call back from the doctor regarding her medication below which should be 40 mg and not 20 mg   She also wants to know if the mg will be changed back to 40 mg will she be charged again for another refill script.    Please call back to discuss further    omeprazole (PriLOSEC) 20 mg delayed release capsule

## 2024-01-12 RX ORDER — OMEPRAZOLE 40 MG/1
40 CAPSULE, DELAYED RELEASE ORAL DAILY
Qty: 90 CAPSULE | Refills: 1 | Status: SHIPPED | OUTPATIENT
Start: 2024-01-12

## 2024-01-12 NOTE — TELEPHONE ENCOUNTER
1/12/2024 7:55 AM returned call to Анна     We discussed her medications.  I let her know I was not the one that refilled the omeprazole but I can change the prescription to 40 mg for her.  I sent the new prescription in    Message complete  Argelia Simmons, DO

## 2024-01-22 DIAGNOSIS — M81.0 OSTEOPOROSIS OF MULTIPLE SITES: ICD-10-CM

## 2024-01-22 NOTE — TELEPHONE ENCOUNTER
Reason for call:   [x] Refill   [] Prior Auth  [] Other:     Office:   [x] PCP/Provider - Argelia Simmons, DO    [] Specialty/Provider -     Medication: alendronate (FOSAMAX) 70 mg tablet     Dose/Frequency: 70 mg, Oral, Every 7 days     Quantity: 12    Pharmacy: [id number] [place], [place] - [address]     Does the patient have enough for 3 days?   [x] Yes   [] No - Send as HP to POD

## 2024-01-23 RX ORDER — ALENDRONATE SODIUM 70 MG/1
70 TABLET ORAL
Qty: 12 TABLET | Refills: 3 | Status: SHIPPED | OUTPATIENT
Start: 2024-01-23

## 2024-01-24 DIAGNOSIS — I10 BENIGN ESSENTIAL HYPERTENSION: Primary | ICD-10-CM

## 2024-01-24 NOTE — TELEPHONE ENCOUNTER
Reason for call:   [x] Refill   [] Prior Auth  [] Other:     Office:   [x] PCP/Provider - Argelia Simmons, DO   [] Specialty/Provider -     Medication:     potassium citrate (UROCIT-K)       Dose/Frequency: 10 mEq, Oral, 2 times daily     Quantity: 180    Pharmacy:  [id number] [place], [place] - [address]     Does the patient have enough for 3 days?   [x] Yes   [] No - Send as HP to POD

## 2024-01-25 RX ORDER — POTASSIUM CITRATE 10 MEQ/1
10 TABLET, EXTENDED RELEASE ORAL 2 TIMES DAILY
Qty: 180 TABLET | Refills: 1 | Status: SHIPPED | OUTPATIENT
Start: 2024-01-25

## 2024-01-26 ENCOUNTER — RA CDI HCC (OUTPATIENT)
Dept: OTHER | Facility: HOSPITAL | Age: 79
End: 2024-01-26

## 2024-01-29 ENCOUNTER — OFFICE VISIT (OUTPATIENT)
Dept: FAMILY MEDICINE CLINIC | Facility: CLINIC | Age: 79
End: 2024-01-29
Payer: MEDICARE

## 2024-01-29 VITALS
HEART RATE: 88 BPM | SYSTOLIC BLOOD PRESSURE: 128 MMHG | OXYGEN SATURATION: 97 % | HEIGHT: 61 IN | DIASTOLIC BLOOD PRESSURE: 64 MMHG | TEMPERATURE: 98.1 F | WEIGHT: 112 LBS | RESPIRATION RATE: 16 BRPM | BODY MASS INDEX: 21.14 KG/M2

## 2024-01-29 DIAGNOSIS — E78.5 DYSLIPIDEMIA: ICD-10-CM

## 2024-01-29 DIAGNOSIS — M46.1 SACROILIITIS (HCC): ICD-10-CM

## 2024-01-29 DIAGNOSIS — K44.9 HIATAL HERNIA: ICD-10-CM

## 2024-01-29 DIAGNOSIS — I10 BENIGN ESSENTIAL HYPERTENSION: Primary | ICD-10-CM

## 2024-01-29 DIAGNOSIS — F11.20 CONTINUOUS OPIOID DEPENDENCE (HCC): ICD-10-CM

## 2024-01-29 PROCEDURE — 99214 OFFICE O/P EST MOD 30 MIN: CPT | Performed by: FAMILY MEDICINE

## 2024-01-29 RX ORDER — ATORVASTATIN CALCIUM 40 MG/1
40 TABLET, FILM COATED ORAL
Qty: 90 TABLET | Refills: 1 | Status: SHIPPED | OUTPATIENT
Start: 2024-01-29

## 2024-01-29 NOTE — ASSESSMENT & PLAN NOTE
Has decided to not get the hernia repaired at this time  She is not having current discomfort from it

## 2024-01-29 NOTE — PROGRESS NOTES
Name: Анна Leahy      : 1945      MRN: [id number]  Encounter Provider: Argelia Simmons DO  Encounter Date: 2024   Encounter department: [facility]    Assessment & Plan     1. Benign essential hypertension  -     CBC; Future; Expected date: 2024  -     Comprehensive metabolic panel; Future; Expected date: 2024  -     Lipid Panel with Direct LDL reflex; Future; Expected date: 2024    2. Continuous opioid dependence (HCC)  Assessment & Plan:  Managed by pain management - Dr. Kaur      3. Sacroiliitis (HCC)    4. Hiatal hernia    5. Dyslipidemia  -     atorvastatin (LIPITOR) 40 mg tablet; Take 1 tablet (40 mg total) by mouth daily at bedtime  -     Comprehensive metabolic panel; Future; Expected date: 2024  -     Lipid Panel with Direct LDL reflex; Future; Expected date: 2024           Subjective      HPI  Review of Systems    Current Outpatient Medications on File Prior to Visit   Medication Sig    alendronate (FOSAMAX) 70 mg tablet Take 1 tablet (70 mg total) by mouth every 7 days sundays    ALPRAZolam (XANAX) 0.25 mg tablet Take 1 tablet (0.25 mg total) by mouth daily at bedtime as needed for anxiety    amLODIPine (NORVASC) 5 mg tablet Take 1 tablet (5 mg total) by mouth every morning    Apoaequorin (PREVAGEN PO) Take by mouth    co-enzyme Q-10 30 MG capsule Take 200 mg by mouth daily    lidocaine (Lidoderm) 5 % Apply 1 patch topically daily Remove & Discard patch within 12 hours or as directed by MD    Melatonin 10 MG TABS Take by mouth    Multiple Vitamins-Minerals (CENTRUM SILVER 50+WOMEN PO) Take by mouth    olmesartan (BENICAR) 40 mg tablet Take 1 tablet (40 mg total) by mouth daily    omeprazole (PriLOSEC) 40 MG capsule Take 1 capsule (40 mg total) by mouth daily    potassium citrate (UROCIT-K) 10 mEq Take 1 tablet (10 mEq total) by mouth 2 (two) times a day    traMADol (ULTRAM) 50 mg tablet TAKE ONE TABLET BY MOUTH EVERY DAY AS NEEDED FOR PAIN. DO NOT  "DRIVE ON THIS MEDICATION    [DISCONTINUED] atorvastatin (LIPITOR) 20 mg tablet Take 1 tablet (20 mg total) by mouth daily at bedtime    famotidine (PEPCID) 20 mg tablet Take 1 tablet (20 mg total) by mouth daily (Patient not taking: Reported on 1/29/2024)    naloxone (NARCAN) 4 mg/0.1 mL nasal spray Administer 1 spray into a nostril. If no response after 2-3 minutes, give another dose in the other nostril using a new spray. (Patient not taking: Reported on 1/29/2024)       Objective     /64   Pulse 88   Temp 98.1 °F (36.7 °C) (Temporal)   Resp 16   Ht 5' 1\" (1.549 m)   Wt 50.8 kg (112 lb)   LMP  (LMP Unknown)   SpO2 97%   BMI 21.16 kg/m²     Physical Exam  Argelia Simmons DO    "

## 2024-01-29 NOTE — PROGRESS NOTES
Name: Анна Leahy      : 1945      MRN: [id number]  Encounter Provider: Argelia Simmons DO  Encounter Date: 2024   Encounter department: [facility]    Assessment & Plan       1. Benign essential hypertension  Assessment & Plan:  Blood pressure is well-controlled  Continue amlodipine 5 mg daily and olmesartan 40 mg daily    Orders:  -     CBC; Future; Expected date: 2024  -     Comprehensive metabolic panel; Future; Expected date: 2024  -     Lipid Panel with Direct LDL reflex; Future; Expected date: 2024    2. Continuous opioid dependence (HCC)  Assessment & Plan:  Managed by pain management - Dr. Kaur      3. Sacroiliitis (HCC)  Assessment & Plan:  Unchanged  Following with pain management      4. Hiatal hernia  Assessment & Plan:  Has decided to not get the hernia repaired at this time  She is not having current discomfort from it      5. Dyslipidemia  Assessment & Plan:  Not controlled  Dose of atorvastatin increased from 20 to 40 mg daily    Orders:  -     atorvastatin (LIPITOR) 40 mg tablet; Take 1 tablet (40 mg total) by mouth daily at bedtime  -     Comprehensive metabolic panel; Future; Expected date: 2024  -     Lipid Panel with Direct LDL reflex; Future; Expected date: 2024        Assessment & Plan  1. Back pain.  Managed by .     2. Hypercholesterolemia.  Her cholesterol is elevated. I will increase her atorvastatin from 20 to 40 mg.    Follow-up  The patient will follow up in 6 months for her wellness visit.       Subjective      History of Present Illness  The patient is a 78-year-old female who presents for evaluation of multiple medical concerns.    She has been doing well except for her back pain. She has been seeing Dr. Kaur, who has started her on medication. She is waiting for a room on [facility] open and the state of [place] is holding it up so that she can have the procedure done on her back. She goes once a month  to get an injection in her back. The injection only lasts for 3 to 4 days.    She did not get her hiatal hernia fixed. It is not bothering her. She did have a scope done, but the doctor never called her. She tried to call them, but she could not get through.    She has been eating a lot of sweets. She is taking atorvastatin every day. She denies any swelling in her legs.    Review of Systems    Current Outpatient Medications on File Prior to Visit   Medication Sig    alendronate (FOSAMAX) 70 mg tablet Take 1 tablet (70 mg total) by mouth every 7 days sundays    ALPRAZolam (XANAX) 0.25 mg tablet Take 1 tablet (0.25 mg total) by mouth daily at bedtime as needed for anxiety    amLODIPine (NORVASC) 5 mg tablet Take 1 tablet (5 mg total) by mouth every morning    Apoaequorin (PREVAGEN PO) Take by mouth    co-enzyme Q-10 30 MG capsule Take 200 mg by mouth daily    lidocaine (Lidoderm) 5 % Apply 1 patch topically daily Remove & Discard patch within 12 hours or as directed by MD    Melatonin 10 MG TABS Take by mouth    Multiple Vitamins-Minerals (CENTRUM SILVER 50+WOMEN PO) Take by mouth    olmesartan (BENICAR) 40 mg tablet Take 1 tablet (40 mg total) by mouth daily    omeprazole (PriLOSEC) 40 MG capsule Take 1 capsule (40 mg total) by mouth daily    potassium citrate (UROCIT-K) 10 mEq Take 1 tablet (10 mEq total) by mouth 2 (two) times a day    traMADol (ULTRAM) 50 mg tablet TAKE ONE TABLET BY MOUTH EVERY DAY AS NEEDED FOR PAIN. DO NOT DRIVE ON THIS MEDICATION    [DISCONTINUED] atorvastatin (LIPITOR) 20 mg tablet Take 1 tablet (20 mg total) by mouth daily at bedtime    famotidine (PEPCID) 20 mg tablet Take 1 tablet (20 mg total) by mouth daily (Patient not taking: Reported on 1/29/2024)    naloxone (NARCAN) 4 mg/0.1 mL nasal spray Administer 1 spray into a nostril. If no response after 2-3 minutes, give another dose in the other nostril using a new spray. (Patient not taking: Reported on 1/29/2024)       Objective     BP  "128/64   Pulse 88   Temp 98.1 °F (36.7 °C) (Temporal)   Resp 16   Ht 5' 1\" (1.549 m)   Wt 50.8 kg (112 lb)   LMP  (LMP Unknown)   SpO2 97%   BMI 21.16 kg/m²     Physical Exam  Ears are normal. No wax.  Lungs are clear to auscultation bilaterally  Heart regular rhythm no murmurs  Extremities no edema  Argelia Simmons, DO    "

## 2024-01-29 NOTE — PATIENT INSTRUCTIONS
Recent Results (from the past 672 hour(s))   CBC    Collection Time: 01/09/24  7:21 AM   Result Value Ref Range    WBC 5.59 4.31 - 10.16 Thousand/uL    RBC 5.00 3.81 - 5.12 Million/uL    Hemoglobin 15.4 11.5 - 15.4 g/dL    Hematocrit 47.5 (H) 34.8 - 46.1 %    MCV 95 82 - 98 fL    MCH 30.8 26.8 - 34.3 pg    MCHC 32.4 31.4 - 37.4 g/dL    RDW 13.2 11.6 - 15.1 %    Platelets 182 149 - 390 Thousands/uL    MPV 12.6 8.9 - 12.7 fL   Comprehensive metabolic panel    Collection Time: 01/09/24  7:21 AM   Result Value Ref Range    Sodium 143 135 - 147 mmol/L    Potassium 4.0 3.5 - 5.3 mmol/L    Chloride 106 96 - 108 mmol/L    CO2 29 21 - 32 mmol/L    ANION GAP 8 mmol/L    BUN 12 5 - 25 mg/dL    Creatinine 0.84 0.60 - 1.30 mg/dL    Glucose, Fasting 101 (H) 65 - 99 mg/dL    Calcium 9.5 8.4 - 10.2 mg/dL    AST 16 13 - 39 U/L    ALT 10 7 - 52 U/L    Alkaline Phosphatase 56 34 - 104 U/L    Total Protein 6.6 6.4 - 8.4 g/dL    Albumin 4.3 3.5 - 5.0 g/dL    Total Bilirubin 0.62 0.20 - 1.00 mg/dL    eGFR 66 ml/min/1.73sq m   Lipid Panel with Direct LDL reflex    Collection Time: 01/09/24  7:21 AM   Result Value Ref Range    Cholesterol 224 (H) See Comment mg/dL    Triglycerides 252 (H) See Comment mg/dL    HDL, Direct 65 >=50 mg/dL    LDL Calculated 109 (H) 0 - 100 mg/dL

## 2024-03-12 DIAGNOSIS — I10 BENIGN ESSENTIAL HYPERTENSION: ICD-10-CM

## 2024-03-12 RX ORDER — AMLODIPINE BESYLATE 5 MG/1
5 TABLET ORAL DAILY
Qty: 90 TABLET | Refills: 1 | Status: SHIPPED | OUTPATIENT
Start: 2024-03-12

## 2024-04-22 DIAGNOSIS — I10 BENIGN ESSENTIAL HYPERTENSION: ICD-10-CM

## 2024-04-22 RX ORDER — OLMESARTAN MEDOXOMIL 40 MG/1
40 TABLET ORAL DAILY
Qty: 90 TABLET | Refills: 1 | Status: SHIPPED | OUTPATIENT
Start: 2024-04-22

## 2024-04-22 NOTE — TELEPHONE ENCOUNTER
Reason for call:   [x] Refill   [] Prior Auth  [] Other:     Office:   [] PCP/Provider - [facility]  [] Specialty/Provider -     Medication: olmesartan (BENICAR) 40 mg tablet     Dose/Frequency: Take 1 tablet (40 mg total) by mouth daily     Quantity: 90    Pharmacy: [id number] [place], [place] - [address]      Does the patient have enough for 3 days?   [x] Yes   [] No - Send as HP to POD

## 2024-04-30 DIAGNOSIS — F41.9 ANXIETY: ICD-10-CM

## 2024-04-30 NOTE — TELEPHONE ENCOUNTER
Reason for call:   [x] Refill   [] Prior Auth  [] Other:     Office:   [x] PCP/Provider - emre zavala  [] Specialty/Provider -     Medication: ALPRAZolam (XANAX) 0.25 mg tablet     Dose/Frequency: Take 1 tablet (0.25 mg total) by mouth daily at bedtime as needed for anxiety     Quantity: #30    Pharmacy: [facility] [address] [place] [address] - [address] [phone]     Does the patient have enough for 3 days?   [x] Yes   [] No - Send as HP to POD

## 2024-05-02 RX ORDER — ALPRAZOLAM 0.25 MG/1
0.25 TABLET ORAL
Qty: 30 TABLET | Refills: 0 | Status: SHIPPED | OUTPATIENT
Start: 2024-05-02

## 2024-05-14 NOTE — TELEPHONE ENCOUNTER
This has been faxed  Show Aperture Variable?: Yes Render Post-Care Instructions In Note?: no Detail Level: Detailed Consent: The patient's consent was obtained including but not limited to risks of crusting, scabbing, blistering, scarring, darker or lighter pigmentary change, recurrence, incomplete removal and infection. Post-Care Instructions: I reviewed with the patient in detail post-care instructions. Patient is to wear sunprotection, and avoid picking at any of the treated lesions. Pt may apply Vaseline to crusted or scabbing areas. Duration Of Freeze Thaw-Cycle (Seconds): 0

## 2024-07-08 ENCOUNTER — APPOINTMENT (OUTPATIENT)
Dept: LAB | Facility: HOSPITAL | Age: 79
End: 2024-07-08
Payer: MEDICARE

## 2024-07-08 DIAGNOSIS — I10 BENIGN ESSENTIAL HYPERTENSION: ICD-10-CM

## 2024-07-08 DIAGNOSIS — E78.5 DYSLIPIDEMIA: ICD-10-CM

## 2024-07-08 LAB
ALBUMIN SERPL BCG-MCNC: 4.2 G/DL (ref 3.5–5)
ALP SERPL-CCNC: 58 U/L (ref 34–104)
ALT SERPL W P-5'-P-CCNC: 14 U/L (ref 7–52)
ANION GAP SERPL CALCULATED.3IONS-SCNC: 5 MMOL/L (ref 4–13)
AST SERPL W P-5'-P-CCNC: 16 U/L (ref 13–39)
BILIRUB SERPL-MCNC: 0.5 MG/DL (ref 0.2–1)
BUN SERPL-MCNC: 14 MG/DL (ref 5–25)
CALCIUM SERPL-MCNC: 9 MG/DL (ref 8.4–10.2)
CHLORIDE SERPL-SCNC: 107 MMOL/L (ref 96–108)
CHOLEST SERPL-MCNC: 186 MG/DL
CO2 SERPL-SCNC: 30 MMOL/L (ref 21–32)
CREAT SERPL-MCNC: 0.75 MG/DL (ref 0.6–1.3)
ERYTHROCYTE [DISTWIDTH] IN BLOOD BY AUTOMATED COUNT: 12.9 % (ref 11.6–15.1)
GFR SERPL CREATININE-BSD FRML MDRD: 76 ML/MIN/1.73SQ M
GLUCOSE P FAST SERPL-MCNC: 93 MG/DL (ref 65–99)
HCT VFR BLD AUTO: 43.7 % (ref 34.8–46.1)
HDLC SERPL-MCNC: 68 MG/DL
HGB BLD-MCNC: 13.9 G/DL (ref 11.5–15.4)
LDLC SERPL CALC-MCNC: 78 MG/DL (ref 0–100)
MCH RBC QN AUTO: 30.4 PG (ref 26.8–34.3)
MCHC RBC AUTO-ENTMCNC: 31.8 G/DL (ref 31.4–37.4)
MCV RBC AUTO: 96 FL (ref 82–98)
PLATELET # BLD AUTO: 173 THOUSANDS/UL (ref 149–390)
PMV BLD AUTO: 12 FL (ref 8.9–12.7)
POTASSIUM SERPL-SCNC: 4.1 MMOL/L (ref 3.5–5.3)
PROT SERPL-MCNC: 6.4 G/DL (ref 6.4–8.4)
RBC # BLD AUTO: 4.57 MILLION/UL (ref 3.81–5.12)
SODIUM SERPL-SCNC: 142 MMOL/L (ref 135–147)
TRIGL SERPL-MCNC: 202 MG/DL
WBC # BLD AUTO: 5.73 THOUSAND/UL (ref 4.31–10.16)

## 2024-07-08 PROCEDURE — 80061 LIPID PANEL: CPT

## 2024-07-08 PROCEDURE — 80053 COMPREHEN METABOLIC PANEL: CPT

## 2024-07-08 PROCEDURE — 85027 COMPLETE CBC AUTOMATED: CPT

## 2024-07-08 PROCEDURE — 36415 COLL VENOUS BLD VENIPUNCTURE: CPT

## 2024-07-19 DIAGNOSIS — E78.5 DYSLIPIDEMIA: ICD-10-CM

## 2024-07-19 RX ORDER — ATORVASTATIN CALCIUM 40 MG/1
40 TABLET, FILM COATED ORAL
Qty: 90 TABLET | Refills: 1 | Status: SHIPPED | OUTPATIENT
Start: 2024-07-19

## 2024-07-31 ENCOUNTER — OFFICE VISIT (OUTPATIENT)
Dept: FAMILY MEDICINE CLINIC | Facility: CLINIC | Age: 79
End: 2024-07-31
Payer: MEDICARE

## 2024-07-31 ENCOUNTER — TELEPHONE (OUTPATIENT)
Age: 79
End: 2024-07-31

## 2024-07-31 VITALS
HEART RATE: 88 BPM | RESPIRATION RATE: 18 BRPM | BODY MASS INDEX: 22.05 KG/M2 | SYSTOLIC BLOOD PRESSURE: 118 MMHG | WEIGHT: 116.8 LBS | TEMPERATURE: 97.5 F | HEIGHT: 61 IN | DIASTOLIC BLOOD PRESSURE: 72 MMHG

## 2024-07-31 DIAGNOSIS — Z00.00 MEDICARE ANNUAL WELLNESS VISIT, SUBSEQUENT: Primary | ICD-10-CM

## 2024-07-31 DIAGNOSIS — E78.5 DYSLIPIDEMIA: ICD-10-CM

## 2024-07-31 DIAGNOSIS — F11.20 CONTINUOUS OPIOID DEPENDENCE (HCC): ICD-10-CM

## 2024-07-31 DIAGNOSIS — I10 BENIGN ESSENTIAL HYPERTENSION: ICD-10-CM

## 2024-07-31 PROCEDURE — G0439 PPPS, SUBSEQ VISIT: HCPCS | Performed by: FAMILY MEDICINE

## 2024-07-31 PROCEDURE — 99214 OFFICE O/P EST MOD 30 MIN: CPT | Performed by: FAMILY MEDICINE

## 2024-07-31 PROCEDURE — 1123F ACP DISCUSS/DSCN MKR DOCD: CPT | Performed by: FAMILY MEDICINE

## 2024-07-31 NOTE — PATIENT INSTRUCTIONS
Recent Results (from the past 672 hour(s))   CBC    Collection Time: 07/08/24  6:35 AM   Result Value Ref Range    WBC 5.73 4.31 - 10.16 Thousand/uL    RBC 4.57 3.81 - 5.12 Million/uL    Hemoglobin 13.9 11.5 - 15.4 g/dL    Hematocrit 43.7 34.8 - 46.1 %    MCV 96 82 - 98 fL    MCH 30.4 26.8 - 34.3 pg    MCHC 31.8 31.4 - 37.4 g/dL    RDW 12.9 11.6 - 15.1 %    Platelets 173 149 - 390 Thousands/uL    MPV 12.0 8.9 - 12.7 fL   Comprehensive metabolic panel    Collection Time: 07/08/24  6:35 AM   Result Value Ref Range    Sodium 142 135 - 147 mmol/L    Potassium 4.1 3.5 - 5.3 mmol/L    Chloride 107 96 - 108 mmol/L    CO2 30 21 - 32 mmol/L    ANION GAP 5 4 - 13 mmol/L    BUN 14 5 - 25 mg/dL    Creatinine 0.75 0.60 - 1.30 mg/dL    Glucose, Fasting 93 65 - 99 mg/dL    Calcium 9.0 8.4 - 10.2 mg/dL    AST 16 13 - 39 U/L    ALT 14 7 - 52 U/L    Alkaline Phosphatase 58 34 - 104 U/L    Total Protein 6.4 6.4 - 8.4 g/dL    Albumin 4.2 3.5 - 5.0 g/dL    Total Bilirubin 0.50 0.20 - 1.00 mg/dL    eGFR 76 ml/min/1.73sq m   Lipid Panel with Direct LDL reflex    Collection Time: 07/08/24  6:35 AM   Result Value Ref Range    Cholesterol 186 See Comment mg/dL    Triglycerides 202 (H) See Comment mg/dL    HDL, Direct 68 >=50 mg/dL    LDL Calculated 78 0 - 100 mg/dL      Medicare Preventive Visit Patient Instructions  Thank you for completing your Welcome to Medicare Visit or Medicare Annual Wellness Visit today. Your next wellness visit will be due in one year (8/1/2025).  The screening/preventive services that you may require over the next 5-10 years are detailed below. Some tests may not apply to you based off risk factors and/or age. Screening tests ordered at today's visit but not completed yet may show as past due. Also, please note that scanned in results may not display below.  Preventive Screenings:  Service Recommendations Previous Testing/Comments   Colorectal Cancer Screening  * Colonoscopy    * Fecal Occult Blood Test  (FOBT)/Fecal Immunochemical Test (FIT)  * Fecal DNA/Cologuard Test  * Flexible Sigmoidoscopy Age: 45-75 years old   Colonoscopy: every 10 years (may be performed more frequently if at higher risk)  OR  FOBT/FIT: every 1 year  OR  Cologuard: every 3 years  OR  Sigmoidoscopy: every 5 years  Screening may be recommended earlier than age 45 if at higher risk for colorectal cancer. Also, an individualized decision between you and your healthcare provider will decide whether screening between the ages of 76-85 would be appropriate. Colonoscopy: 02/01/2022  FOBT/FIT: Not on file  Cologuard: Not on file  Sigmoidoscopy: Not on file          Breast Cancer Screening Age: 40+ years old  Frequency: every 1-2 years  Not required if history of left and right mastectomy Mammogram: Not on file        Cervical Cancer Screening Between the ages of 21-29, pap smear recommended once every 3 years.   Between the ages of 30-65, can perform pap smear with HPV co-testing every 5 years.   Recommendations may differ for women with a history of total hysterectomy, cervical cancer, or abnormal pap smears in past. Pap Smear: Not on file        Hepatitis C Screening Once for adults born between 1945 and 1965  More frequently in patients at high risk for Hepatitis C Hep C Antibody: 08/17/2020        Diabetes Screening 1-2 times per year if you're at risk for diabetes or have pre-diabetes Fasting glucose: 93 mg/dL (7/8/2024)  A1C: 5.6 % (5/18/2023)      Cholesterol Screening Once every 5 years if you don't have a lipid disorder. May order more often based on risk factors. Lipid panel: 07/08/2024          Other Preventive Screenings Covered by Medicare:  Abdominal Aortic Aneurysm (AAA) Screening: covered once if your at risk. You're considered to be at risk if you have a family history of AAA.  Lung Cancer Screening: covers low dose CT scan once per year if you meet all of the following conditions: (1) Age 55-77; (2) No signs or symptoms of lung  cancer; (3) Current smoker or have quit smoking within the last 15 years; (4) You have a tobacco smoking history of at least 20 pack years (packs per day multiplied by number of years you smoked); (5) You get a written order from a healthcare provider.  Glaucoma Screening: covered annually if you're considered high risk: (1) You have diabetes OR (2) Family history of glaucoma OR (3)  aged 50 and older OR (4)  American aged 65 and older  Osteoporosis Screening: covered every 2 years if you meet one of the following conditions: (1) You're estrogen deficient and at risk for osteoporosis based off medical history and other findings; (2) Have a vertebral abnormality; (3) On glucocorticoid therapy for more than 3 months; (4) Have primary hyperparathyroidism; (5) On osteoporosis medications and need to assess response to drug therapy.   Last bone density test (DXA Scan): 03/28/2023.  HIV Screening: covered annually if you're between the age of 15-65. Also covered annually if you are younger than 15 and older than 65 with risk factors for HIV infection. For pregnant patients, it is covered up to 3 times per pregnancy.    Immunizations:  Immunization Recommendations   Influenza Vaccine Annual influenza vaccination during flu season is recommended for all persons aged >= 6 months who do not have contraindications   Pneumococcal Vaccine   * Pneumococcal conjugate vaccine = PCV13 (Prevnar 13), PCV15 (Vaxneuvance), PCV20 (Prevnar 20)  * Pneumococcal polysaccharide vaccine = PPSV23 (Pneumovax) Adults 19-63 yo with certain risk factors or if 65+ yo  If never received any pneumonia vaccine: recommend Prevnar 20 (PCV20)  Give PCV20 if previously received 1 dose of PCV13 or PPSV23   Hepatitis B Vaccine 3 dose series if at intermediate or high risk (ex: diabetes, end stage renal disease, liver disease)   Respiratory syncytial virus (RSV) Vaccine - COVERED BY MEDICARE PART D  * RSVPreF3 (Arexvy) CDC recommends  that adults 60 years of age and older may receive a single dose of RSV vaccine using shared clinical decision-making (SCDM)   Tetanus (Td) Vaccine - COST NOT COVERED BY MEDICARE PART B Following completion of primary series, a booster dose should be given every 10 years to maintain immunity against tetanus. Td may also be given as tetanus wound prophylaxis.   Tdap Vaccine - COST NOT COVERED BY MEDICARE PART B Recommended at least once for all adults. For pregnant patients, recommended with each pregnancy.   Shingles Vaccine (Shingrix) - COST NOT COVERED BY MEDICARE PART B  2 shot series recommended in those 19 years and older who have or will have weakened immune systems or those 50 years and older     Health Maintenance Due:      Topic Date Due   • Breast Cancer Screening: Mammogram  Never done   • Hepatitis C Screening  Completed   • Colorectal Cancer Screening  Discontinued     Immunizations Due:      Topic Date Due   • COVID-19 Vaccine (7 - 2023-24 season) 09/01/2023   • Influenza Vaccine (1) 09/01/2024     Advance Directives   What are advance directives?  Advance directives are legal documents that state your wishes and plans for medical care. These plans are made ahead of time in case you lose your ability to make decisions for yourself. Advance directives can apply to any medical decision, such as the treatments you want, and if you want to donate organs.   What are the types of advance directives?  There are many types of advance directives, and each state has rules about how to use them. You may choose a combination of any of the following:  Living will:  This is a written record of the treatment you want. You can also choose which treatments you do not want, which to limit, and which to stop at a certain time. This includes surgery, medicine, IV fluid, and tube feedings.   Durable power of  for healthcare (DPAHC):  This is a written record that states who you want to make healthcare choices for  you when you are unable to make them for yourself. This person, called a proxy, is usually a family member or a friend. You may choose more than 1 proxy.  Do not resuscitate (DNR) order:  A DNR order is used in case your heart stops beating or you stop breathing. It is a request not to have certain forms of treatment, such as CPR. A DNR order may be included in other types of advance directives.  Medical directive:  This covers the care that you want if you are in a coma, near death, or unable to make decisions for yourself. You can list the treatments you want for each condition. Treatment may include pain medicine, surgery, blood transfusions, dialysis, IV or tube feedings, and a ventilator (breathing machine).  Values history:  This document has questions about your views, beliefs, and how you feel and think about life. This information can help others choose the care that you would choose.  Why are advance directives important?  An advance directive helps you control your care. Although spoken wishes may be used, it is better to have your wishes written down. Spoken wishes can be misunderstood, or not followed. Treatments may be given even if you do not want them. An advance directive may make it easier for your family to make difficult choices about your care.       © Copyright [employer] 2018 Information is for End User's use only and may not be sold, redistributed or otherwise used for commercial purposes. All illustrations and images included in CareNotes® are the copyrighted property of [employer] or [employer]

## 2024-07-31 NOTE — TELEPHONE ENCOUNTER
Patient just seen for appointment with Dr. Simmons. Patient forgot to ask for print out of her lab results.  She is requesting that they be mailed to her home    Please mail to:    [id number] [id number]      Please note once placed in mail.  Thank you!

## 2024-07-31 NOTE — PROGRESS NOTES
Ambulatory Visit  Name: Анна Leahy      : 1945      MRN: [id number]  Encounter Provider: Argelia Simmons DO  Encounter Date: 2024   Encounter department: [facility]    Assessment & Plan   1. Medicare annual wellness visit, subsequent  2. Benign essential hypertension  Assessment & Plan:  Blood pressure is well-controlled  Continue amlodipine 5 mg daily and olmesartan 40 mg daily  Orders:  -     CBC; Future; Expected date: 2025  -     Comprehensive metabolic panel; Future; Expected date: 2025  -     Lipid Panel with Direct LDL reflex; Future; Expected date: 2025  3. Dyslipidemia  Assessment & Plan:  Cholesterol has improved  Continue atorvastatin 40 mg daily  Orders:  -     Lipid Panel with Direct LDL reflex; Future; Expected date: 2025  4. Continuous opioid dependence (HCC)  Assessment & Plan:  Stable  Managed by pain management         Return in about 6 months (around 2025) for Next scheduled follow up.       Preventive health issues were discussed with patient, and age appropriate screening tests were ordered as noted in patient's After Visit Summary. Personalized health advice and appropriate referrals for health education or preventive services given if needed, as noted in patient's After Visit Summary.    History of Present Illness     Patient ports she continues to have ongoing back pain.  She is going to have a nerve ablation done.  She is anxious about the procedure.  She has had epidurals with no relief.  She continues to follow with pain management and is on tramadol.  She has not had any problems tolerating the medication.    She has been taking her cholesterol medicine regularly.       Patient Care Team:  Argelia Simmons DO as PCP - General (Family Medicine)  Romario Bernal DO (Inactive) (Family Medicine)    Review of Systems  Medical History Reviewed by provider this encounter:  Tobacco  Allergies  Meds  Problems  Med Hx  Surg Hx  Fam Hx        Annual Wellness Visit Questionnaire   Анна is here for her Subsequent Wellness visit.     Health Risk Assessment:   Patient rates overall health as good. Patient feels that their physical health rating is same. Patient is satisfied with their life. Eyesight was rated as same. Hearing was rated as same. Patient feels that their emotional and mental health rating is same. Patients states they are never, rarely angry. Patient states they are sometimes unusually tired/fatigued. Pain experienced in the last 7 days has been some. Patient's pain rating has been 3/10. Patient states that she has experienced no weight loss or gain in last 6 months.     Depression Screening:   PHQ-2 Score: 1      Fall Risk Screening:   In the past year, patient has experienced: no history of falling in past year      Urinary Incontinence Screening:   Patient has not leaked urine accidently in the last six months.     Home Safety:  Patient does not have trouble with stairs inside or outside of their home. Patient has working smoke alarms and has working carbon monoxide detector. Home safety hazards include: none.     Nutrition:   Current diet is Regular.     Medications:   Patient is currently taking over-the-counter supplements. OTC medications include: see medication list. Patient is able to manage medications.     Activities of Daily Living (ADLs)/Instrumental Activities of Daily Living (IADLs):   Walk and transfer into and out of bed and chair?: Yes  Dress and groom yourself?: Yes    Bathe or shower yourself?: Yes    Feed yourself? Yes  Do your laundry/housekeeping?: Yes  Manage your money, pay your bills and track your expenses?: Yes  Make your own meals?: Yes    Do your own shopping?: Yes    Previous Hospitalizations:   Any hospitalizations or ED visits within the last 12 months?: No      Advance Care Planning:   Living will: No    Durable POA for healthcare: No    Advanced directive counseling given: Yes    ACP document given: Yes     End of Life Decisions reviewed with patient: Yes    Provider agrees with end of life decisions: Yes      Cognitive Screening:   Provider or family/friend/caregiver concerned regarding cognition?: No    PREVENTIVE SCREENINGS      Cardiovascular Screening:    General: Screening Current      Diabetes Screening:     General: Screening Current      Colorectal Cancer Screening:     General: Screening Not Indicated      Breast Cancer Screening:     General: Screening Not Indicated      Cervical Cancer Screening:    General: Screening Not Indicated      Osteoporosis Screening:    General: Screening Not Indicated and History Osteoporosis      Abdominal Aortic Aneurysm (AAA) Screening:        General: Screening Not Indicated      Lung Cancer Screening:     General: Screening Not Indicated      Hepatitis C Screening:    General: Screening Current    Screening, Brief Intervention, and Referral to Treatment (SBIRT)    Screening  Typical number of drinks in a day: 1  Typical number of drinks in a week: 7  Interpretation: Low risk drinking behavior.    Single Item Drug Screening:  How often have you used an illegal drug (including marijuana) or a prescription medication for non-medical reasons in the past year? never    Single Item Drug Screen Score: 0  Interpretation: Negative screen for possible drug use disorder    Brief Intervention  Alcohol & drug use screenings were reviewed. No concerns regarding substance use disorder identified.     Review of Current Opioid Use  Opioid Risk Tool (ORT) Score: 0  Opioid Risk Tool (ORT) Interpretation: Score 0-3: Low risk for opioid misuse    Social Determinants of Health     Food Insecurity: No Food Insecurity (7/31/2024)    Hunger Vital Sign    • Worried About Running Out of Food in the Last Year: Never true    • Ran Out of Food in the Last Year: Never true   Transportation Needs: No Transportation Needs (7/31/2024)    PRAPARE - Transportation    • Lack of Transportation (Medical): No     "• Lack of Transportation (Non-Medical): No   Housing Stability: Low Risk  (7/31/2024)    Housing Stability Vital Sign    • Unable to Pay for Housing in the Last Year: No    • Number of Times Moved in the Last Year: 0    • Homeless in the Last Year: No   Utilities: Not At Risk (7/31/2024)    [facility] Utilities    • Threatened with loss of utilities: No     No results found.    Objective     /72   Pulse 88   Temp 97.5 °F (36.4 °C) (Tympanic)   Resp 18   Ht 5' 1\" (1.549 m)   Wt 53 kg (116 lb 12.8 oz)   LMP  (LMP Unknown)   BMI 22.07 kg/m²     Physical Exam  Vitals and nursing note reviewed.   Constitutional:       Appearance: She is well-developed.   HENT:      Head: Normocephalic and atraumatic.      Right Ear: External ear normal.      Left Ear: External ear normal.      Nose: Nose normal.   Cardiovascular:      Rate and Rhythm: Normal rate and regular rhythm.      Heart sounds: Normal heart sounds. No murmur heard.     No friction rub.   Pulmonary:      Effort: No respiratory distress.      Breath sounds: Normal breath sounds. No wheezing or rales.   Musculoskeletal:      Right lower leg: No edema.      Left lower leg: No edema.           "

## 2024-08-05 NOTE — TELEPHONE ENCOUNTER
Patient's boyfriend is calling to state that no lab results were received, only duplicate lab orders.  The patient wants results of the labs sent to her.  Thank you.

## 2024-08-08 ENCOUNTER — TELEPHONE (OUTPATIENT)
Age: 79
End: 2024-08-08

## 2024-08-08 NOTE — TELEPHONE ENCOUNTER
Patient called asking if her most recent lab results can be mailed to her home. Confirmed address

## 2024-08-09 ENCOUNTER — TELEPHONE (OUTPATIENT)
Age: 79
End: 2024-08-09

## 2024-08-09 NOTE — TELEPHONE ENCOUNTER
Patient called to schedule appointment for testing COVID + this morning. She is asking for a phone call visit with any provider. During our call, the call was disconnected. Please review with covering physicians to see if they are agreeable for a phone call visit. She is experiencing nasal congestion, weakness, fatigue.

## 2024-08-12 ENCOUNTER — HOSPITAL ENCOUNTER (EMERGENCY)
Facility: HOSPITAL | Age: 79
Discharge: HOME | End: 2024-08-12
Attending: EMERGENCY MEDICINE
Payer: MEDICARE

## 2024-08-12 VITALS
RESPIRATION RATE: 16 BRPM | DIASTOLIC BLOOD PRESSURE: 102 MMHG | SYSTOLIC BLOOD PRESSURE: 148 MMHG | TEMPERATURE: 97.2 F | OXYGEN SATURATION: 98 % | HEART RATE: 84 BPM

## 2024-08-12 DIAGNOSIS — M54.9 BACK PAIN: Primary | ICD-10-CM

## 2024-08-12 DIAGNOSIS — R10.13 EPIGASTRIC PAIN: ICD-10-CM

## 2024-08-12 PROCEDURE — 96372 THER/PROPH/DIAG INJ SC/IM: CPT

## 2024-08-12 PROCEDURE — 99283 EMERGENCY DEPT VISIT LOW MDM: CPT

## 2024-08-12 PROCEDURE — 99284 EMERGENCY DEPT VISIT MOD MDM: CPT | Performed by: EMERGENCY MEDICINE

## 2024-08-12 RX ORDER — METHOCARBAMOL 750 MG/1
750 TABLET, FILM COATED ORAL EVERY 6 HOURS PRN
Qty: 30 TABLET | Refills: 0 | Status: SHIPPED | OUTPATIENT
Start: 2024-08-12

## 2024-08-12 RX ORDER — HYDROMORPHONE HCL/PF 1 MG/ML
1 SYRINGE (ML) INJECTION ONCE
Status: COMPLETED | OUTPATIENT
Start: 2024-08-12 | End: 2024-08-12

## 2024-08-12 RX ADMIN — HYDROMORPHONE HYDROCHLORIDE 1 MG: 1 INJECTION, SOLUTION INTRAMUSCULAR; INTRAVENOUS; SUBCUTANEOUS at 18:10

## 2024-08-12 NOTE — ED PROVIDER NOTES
"History  Chief Complaint   Patient presents with    Back Pain     Pt with back pain starting this morning, hx of the same, gets injections and set to have a procedure for it.      Patient has a history of chronic back pain.  She has undergone multiple spinal injections and has an upcoming \"hot wire procedure\".  She states today shortly after waking when she moved from the supine to the standing position she felt pain in the lower back.  She states the pain is worse with certain movement and position.  It does not radiate to the lower extremities is not associated with numbness or weakness.  Does not radiate to the abdomen.  Patient had taken some Tylenol with improvement.  She arrives awake and alert and is not having pain now.  But states that she is afraid that when she moves she is going to have the pain again.        Prior to Admission Medications   Prescriptions Last Dose Informant Patient Reported? Taking?   ALPRAZolam (XANAX) 0.25 mg tablet   No No   Sig: Take 1 tablet (0.25 mg total) by mouth daily at bedtime as needed for anxiety   Apoaequorin (PREVAGEN PO)  Self Yes No   Sig: Take by mouth   Melatonin 10 MG TABS  Self Yes No   Sig: Take by mouth   Multiple Vitamins-Minerals (CENTRUM SILVER 50+WOMEN PO)  Self Yes No   Sig: Take by mouth   alendronate (FOSAMAX) 70 mg tablet  Self No No   Sig: Take 1 tablet (70 mg total) by mouth every 7 days sundays   amLODIPine (NORVASC) 5 mg tablet   No No   Sig: TAKE ONE TABLET BY MOUTH EVERY MORNING   atorvastatin (LIPITOR) 40 mg tablet   No No   Sig: TAKE ONE TABLET BY MOUTH EVERY DAY AT BEDTIME   co-enzyme Q-10 30 MG capsule  Self Yes No   Sig: Take 200 mg by mouth daily   famotidine (PEPCID) 20 mg tablet  Self No No   Sig: Take 1 tablet (20 mg total) by mouth daily   lidocaine (Lidoderm) 5 %  Self No No   Sig: Apply 1 patch topically daily Remove & Discard patch within 12 hours or as directed by MD   naloxone (NARCAN) 4 mg/0.1 mL nasal spray  Self No No   Sig: " Administer 1 spray into a nostril. If no response after 2-3 minutes, give another dose in the other nostril using a new spray.   Patient not taking: Reported on 1/29/2024   olmesartan (BENICAR) 40 mg tablet   No No   Sig: Take 1 tablet (40 mg total) by mouth daily   potassium citrate (UROCIT-K) 10 mEq  Self No No   Sig: Take 1 tablet (10 mEq total) by mouth 2 (two) times a day   traMADol (ULTRAM) 50 mg tablet  Self Yes No   Sig: TAKE ONE TABLET BY MOUTH EVERY DAY AS NEEDED FOR PAIN. DO NOT DRIVE ON THIS MEDICATION      Facility-Administered Medications: None       Past Medical History:   Diagnosis Date    Anxiety     Arthritis     left hip    Chronic kidney disease     chronic stones    Chronic narcotic dependence (HCC)     tramadol for hip    Colon polyp     Gallstones     GERD (gastroesophageal reflux disease)     H/O hiatal hernia     Headache     occ    Hyperlipidemia     Hypertension     Kidney stone     Low back pain     Lumbar disc disease     Nausea     at times    Poor appetite     Sacroiliitis (HCC)     Thickened endometrium     Wears dentures     full upper/ partial lower    Weight loss, unintentional     Since Nov 2021 20lb weight loss       Past Surgical History:   Procedure Laterality Date    APPENDECTOMY      BREAST SURGERY Left     excision cyst    COLONOSCOPY      CYSTOSCOPY      with stone extraction x 2    CYSTOSCOPY W/ RETROGRADES Left 09/17/2012    stent removal,     CYSTOSCOPY W/ URETEROSCOPY W/ LITHOTRIPSY Left 12/11/2006    Ca Phos 98%    CYSTOSCOPY W/ URETEROSCOPY W/ LITHOTRIPSY Left 08/12/2012    Ca ox di 60%, Ca ox mono 20%    ESOPHAGOGASTRODUODENOSCOPY      EXTRACORPOREAL SHOCK WAVE LITHOTRIPSY Left 4/13/2016    Procedure: LITHROTRIPSY EXTRACORPORAL SHOCKWAVE (ESWL);  Surgeon: Popeye Flores MD;  Location: [facility] MAIN OR;  Service:     POLYPECTOMY N/A 4/11/2022    Procedure: POLYPECTOMY;  Surgeon: Marian Ghosh MD;  Location:  MAIN OR;  Service: Gynecology    MA ARTHRP  ACETBLR/PROX FEM PROSTC AGRFT/ALGRFT Left 5/2/2022    Procedure: ARTHROPLASTY HIP TOTAL ANTERIOR - Left;  Surgeon: Timoteo Crandall DO;  Location: [facility] MAIN OR;  Service: Orthopedics    SC CYSTO/URETERO W/LITHOTRIPSY &INDWELL STENT INSRT Left 9/1/2016    Procedure: CYSTOSCOPY URETEROSCOPY WITH STONE EXTRACTION , RETROGRADE PYELOGRAM AND INSERTION STENT URETERAL;  Surgeon: Popeye Flores MD;  Location: [facility] MAIN OR;  Service: Urology    SC CYSTO/URETERO W/LITHOTRIPSY &INDWELL STENT INSRT Left 8/12/2016    Procedure: CYSTOSCOPY URETEROSCOPY WITH LITHOTRIPSY HOLMIUM LASER, RETROGRADE PYELOGRAM AND INSERTION STENT URETERAL;  Surgeon: Popeye Flores MD;  Location: [facility] MAIN OR;  Service: Urology    SC HYSTEROSCOPY BX ENDOMETRIUM&/POLYPC W/WO D&C N/A 4/11/2022    Procedure: DILATATION AND CURETTAGE (D&C) WITH HYSTEROSCOPY;  Surgeon: Marian Ghosh MD;  Location:  MAIN OR;  Service: Gynecology    TUBAL LIGATION      UPPER GASTROINTESTINAL ENDOSCOPY      URETERAL STENT PLACEMENT Left 08/31/2012    Narrowing of the left distal ureter for 3cm.      WISDOM TOOTH EXTRACTION         Family History   Problem Relation Age of Onset    No Known Problems Mother     Stroke Father         TIA    Parkinsonism Father     No Known Problems Sister     Pancreatic cancer Brother     Cancer Maternal Aunt     Cancer Maternal Uncle     Heart disease Maternal Uncle         Massive MI    No Known Problems Paternal Aunt     No Known Problems Paternal Uncle     No Known Problems Maternal Grandmother     No Known Problems Maternal Grandfather     No Known Problems Paternal Grandmother     No Known Problems Paternal Grandfather      I have reviewed and agree with the history as documented.    E-Cigarette/Vaping    E-Cigarette Use Never User      E-Cigarette/Vaping Substances    Nicotine No     THC No     CBD No     Flavoring No     Other No     Unknown No      Social History     Tobacco Use    Smoking status: Never     Passive exposure:  Never    Smokeless tobacco: Never   Vaping Use    Vaping status: Never Used   Substance Use Topics    Alcohol use: Yes     Comment: wine almost every night to sleep    Drug use: No       Review of Systems   Constitutional:  Negative for chills and fever.   HENT:  Negative for congestion.    Eyes:  Negative for visual disturbance.   Respiratory:  Negative for cough and shortness of breath.    Cardiovascular:  Negative for chest pain.   Gastrointestinal:  Negative for abdominal pain and vomiting.   Genitourinary:  Negative for decreased urine volume, difficulty urinating, dysuria, flank pain and pelvic pain.   Musculoskeletal:  Positive for back pain.   Skin:  Negative for color change and wound.   Neurological:  Negative for weakness, numbness and headaches.   Hematological:  Does not bruise/bleed easily.   Psychiatric/Behavioral:  Negative for confusion. The patient is nervous/anxious.    All other systems reviewed and are negative.      Physical Exam  Physical Exam  Vitals and nursing note reviewed.   Constitutional:       Appearance: Normal appearance.   HENT:      Head: Normocephalic.      Right Ear: External ear normal.      Left Ear: External ear normal.      Nose: Nose normal.      Mouth/Throat:      Mouth: Mucous membranes are moist.   Eyes:      Conjunctiva/sclera: Conjunctivae normal.   Cardiovascular:      Rate and Rhythm: Normal rate and regular rhythm.      Pulses: Normal pulses.   Pulmonary:      Effort: Pulmonary effort is normal.   Abdominal:      Palpations: Abdomen is soft.      Tenderness: There is no abdominal tenderness.   Musculoskeletal:         General: Tenderness present. No deformity. Normal range of motion.      Cervical back: Normal range of motion. No tenderness.      Right lower leg: No edema.      Left lower leg: No edema.      Comments: Paralumbar muscular tenderness present   Skin:     General: Skin is warm and dry.      Capillary Refill: Capillary refill takes less than 2 seconds.    Neurological:      General: No focal deficit present.      Mental Status: She is alert and oriented to person, place, and time.      Comments: No weakness in the lower extremities.  There is pain with SLR   Psychiatric:         Mood and Affect: Mood normal.         Behavior: Behavior normal.         Vital Signs  ED Triage Vitals [08/12/24 1721]   Temperature Pulse Respirations Blood Pressure SpO2   (!) 97.2 °F (36.2 °C) 84 16 (!) 148/102 98 %      Temp Source Heart Rate Source Patient Position - Orthostatic VS BP Location FiO2 (%)   Tympanic Monitor -- -- --      Pain Score       10 - Worst Possible Pain           Vitals:    08/12/24 1721   BP: (!) 148/102   Pulse: 84         Visual Acuity      ED Medications  Medications   HYDROmorphone (DILAUDID) injection 1 mg (1 mg Intramuscular Given 8/12/24 1810)       Diagnostic Studies  Results Reviewed       None                   No orders to display              Procedures  Procedures         ED Course                                               Medical Decision Making  Patient peers to have musculoskeletal back pain without sciatic component.  Will treat with muscle relaxers pain medication    Risk  Prescription drug management.                 Disposition  Final diagnoses:   Back pain     Time reflects when diagnosis was documented in both MDM as applicable and the Disposition within this note       Time User Action Codes Description Comment    8/12/2024  6:03 PM Keith Harris Add [M54.9] Back pain           ED Disposition       ED Disposition   Discharge    Condition   Stable    Date/Time   Mon Aug 12, 2024  6:03 PM    Comment   Анна Leahy discharge to home/self care.                   Follow-up Information       Follow up With Specialties Details Why Contact Info Additional Information    Argelia Simmons,  Family Medicine   [address] [id number] [phone]       [facility] Physical Therapy Schedule an  appointment as soon as possible for a visit   [phone]            Discharge Medication List as of 8/12/2024  6:04 PM        START taking these medications    Details   methocarbamol (Robaxin-750) 750 mg tablet Take 1 tablet (750 mg total) by mouth every 6 (six) hours as needed for muscle spasms, Starting Mon 8/12/2024, Normal           CONTINUE these medications which have NOT CHANGED    Details   alendronate (FOSAMAX) 70 mg tablet Take 1 tablet (70 mg total) by mouth every 7 days sundays, Starting Tue 1/23/2024, Normal      ALPRAZolam (XANAX) 0.25 mg tablet Take 1 tablet (0.25 mg total) by mouth daily at bedtime as needed for anxiety, Starting Thu 5/2/2024, Normal      amLODIPine (NORVASC) 5 mg tablet TAKE ONE TABLET BY MOUTH EVERY MORNING, Starting Tue 3/12/2024, Normal      Apoaequorin (PREVAGEN PO) Take by mouth, Historical Med      atorvastatin (LIPITOR) 40 mg tablet TAKE ONE TABLET BY MOUTH EVERY DAY AT BEDTIME, Starting Fri 7/19/2024, Normal      co-enzyme Q-10 30 MG capsule Take 200 mg by mouth daily, Historical Med      famotidine (PEPCID) 20 mg tablet Take 1 tablet (20 mg total) by mouth daily, Starting Tue 7/25/2023, Normal      lidocaine (Lidoderm) 5 % Apply 1 patch topically daily Remove & Discard patch within 12 hours or as directed by MD, Starting Tue 9/27/2022, Normal      Melatonin 10 MG TABS Take by mouth, Historical Med      Multiple Vitamins-Minerals (CENTRUM SILVER 50+WOMEN PO) Take by mouth, Historical Med      naloxone (NARCAN) 4 mg/0.1 mL nasal spray Administer 1 spray into a nostril. If no response after 2-3 minutes, give another dose in the other nostril using a new spray., Normal      olmesartan (BENICAR) 40 mg tablet Take 1 tablet (40 mg total) by mouth daily, Starting Mon 4/22/2024, Normal      potassium citrate (UROCIT-K) 10 mEq Take 1 tablet (10 mEq total) by mouth 2 (two) times a day, Starting Thu 1/25/2024, Normal      traMADol (ULTRAM) 50 mg tablet TAKE ONE TABLET  BY MOUTH EVERY DAY AS NEEDED FOR PAIN. DO NOT DRIVE ON THIS MEDICATION, Historical Med      omeprazole (PriLOSEC) 40 MG capsule Take 1 capsule (40 mg total) by mouth daily, Starting Fri 1/12/2024, Normal             No discharge procedures on file.    PDMP Review         Value Time User    PDMP Reviewed  Yes 5/2/2024 10:24 AM Pat Smith MD            ED Provider  Electronically Signed by             Keith Harris MD  08/13/24 7123

## 2024-08-13 RX ORDER — OMEPRAZOLE 40 MG/1
40 CAPSULE, DELAYED RELEASE ORAL DAILY
Qty: 90 CAPSULE | Refills: 1 | Status: SHIPPED | OUTPATIENT
Start: 2024-08-13

## 2024-09-24 DIAGNOSIS — I10 BENIGN ESSENTIAL HYPERTENSION: ICD-10-CM

## 2024-09-24 RX ORDER — POTASSIUM CITRATE 10 MEQ/1
10 TABLET, EXTENDED RELEASE ORAL 2 TIMES DAILY
Qty: 180 TABLET | Refills: 1 | Status: SHIPPED | OUTPATIENT
Start: 2024-09-24

## 2024-09-24 NOTE — TELEPHONE ENCOUNTER
Reason for call:   [x] Refill   [] Prior Auth  [] Other:     Office:   [x] PCP/Provider - [facility]   [] Specialty/Provider -     Medication:   potassium citrate (UROCIT-K) 10 mEq - Take 1 tablet (10 mEq total) by mouth 2 (two) times a day     Pharmacy:   [facility] [id number] [place], [place] - [address]     Does the patient have enough for 3 days?   [x] Yes   [] No - Send as HP to POD

## 2024-10-01 DIAGNOSIS — F41.9 ANXIETY: ICD-10-CM

## 2024-10-01 RX ORDER — ALPRAZOLAM 0.25 MG
0.25 TABLET ORAL
Qty: 30 TABLET | Refills: 0 | Status: SHIPPED | OUTPATIENT
Start: 2024-10-01

## 2024-10-01 NOTE — TELEPHONE ENCOUNTER
Reason for call:   [x] Refill   [] Prior Auth  [] Other:     Office:   [x] PCP/Provider - Replaced by [facility] cristhian  / kristian [facility]  [] Specialty/Provider -     Medication: ALPRAZolam (XANAX) 0.25 mg tablet     Dose/Frequency: Take 1 tablet (0.25 mg total) by mouth daily at bedtime as needed for anxiety     Quantity: 30    Pharmacy: [address] - [address]     Does the patient have enough for 3 days?   [x] Yes   [] No - Send as HP to POD

## 2024-10-14 DIAGNOSIS — M81.0 OSTEOPOROSIS OF MULTIPLE SITES: ICD-10-CM

## 2024-10-14 NOTE — TELEPHONE ENCOUNTER
Reason for call:   [x] Refill   [] Prior Auth  [] Other:     Office:   [x] PCP/Provider -   [] Specialty/Provider -     Medication: Alendronate 70 mg, take 1 tablet by mouth every 7 days       Pharmacy: [facility] [place]    Does the patient have enough for 3 days?   [x] Yes   [] No - Send as HP to POD

## 2024-10-16 RX ORDER — ALENDRONATE SODIUM 70 MG/1
70 TABLET ORAL
Qty: 12 TABLET | Refills: 3 | Status: SHIPPED | OUTPATIENT
Start: 2024-10-16

## 2024-10-21 DIAGNOSIS — I10 BENIGN ESSENTIAL HYPERTENSION: ICD-10-CM

## 2024-10-21 NOTE — TELEPHONE ENCOUNTER
Reason for call:   [x] Refill   [] Prior Auth  [] Other:     Office:   [x] PCP/Provider -  Argelia Simmons DO   [] Specialty/Provider -     Medication: olmesartan 40 mg    Dose/Frequency: 1 tab daily / 90 tabs      Pharmacy: [id number] [place], [place] - [address]     Does the patient have enough for 3 days?   [x] Yes   [] No - Send as HP to POD

## 2024-10-22 RX ORDER — OLMESARTAN MEDOXOMIL 40 MG/1
40 TABLET ORAL DAILY
Qty: 90 TABLET | Refills: 1 | Status: SHIPPED | OUTPATIENT
Start: 2024-10-22

## 2024-11-12 ENCOUNTER — APPOINTMENT (OUTPATIENT)
Dept: LAB | Facility: HOSPITAL | Age: 79
End: 2024-11-12
Payer: MEDICARE

## 2024-11-12 DIAGNOSIS — R53.83 FATIGUE, UNSPECIFIED TYPE: ICD-10-CM

## 2024-11-12 DIAGNOSIS — R06.02 SOB (SHORTNESS OF BREATH): ICD-10-CM

## 2024-11-12 DIAGNOSIS — Z79.899 ENCOUNTER FOR LONG-TERM (CURRENT) USE OF HIGH-RISK MEDICATION: ICD-10-CM

## 2024-11-12 DIAGNOSIS — E78.1 HYPERTRIGLYCERIDEMIA, FAMILIAL: ICD-10-CM

## 2024-11-12 DIAGNOSIS — E78.00 PURE HYPERCHOLESTEROLEMIA: ICD-10-CM

## 2024-11-12 LAB
BILIRUB DIRECT SERPL-MCNC: 0.09 MG/DL (ref 0–0.2)
CK SERPL-CCNC: 41 U/L (ref 26–192)
CREAT UR-MCNC: 98.7 MG/DL
MAGNESIUM SERPL-MCNC: 2.1 MG/DL (ref 1.9–2.7)
MICROALBUMIN UR-MCNC: 18.6 MG/L
MICROALBUMIN/CREAT 24H UR: 19 MG/G CREATININE (ref 0–30)
T4 SERPL-MCNC: 6.74 UG/DL (ref 6.09–12.23)
TSH SERPL DL<=0.05 MIU/L-ACNC: 2.08 UIU/ML (ref 0.45–4.5)
URATE SERPL-MCNC: 4.4 MG/DL (ref 2–7.5)

## 2024-11-12 PROCEDURE — 84443 ASSAY THYROID STIM HORMONE: CPT

## 2024-11-12 PROCEDURE — 36415 COLL VENOUS BLD VENIPUNCTURE: CPT

## 2024-11-12 PROCEDURE — 83735 ASSAY OF MAGNESIUM: CPT

## 2024-11-12 PROCEDURE — 82570 ASSAY OF URINE CREATININE: CPT

## 2024-11-12 PROCEDURE — 82043 UR ALBUMIN QUANTITATIVE: CPT

## 2024-11-12 PROCEDURE — 84550 ASSAY OF BLOOD/URIC ACID: CPT

## 2024-11-12 PROCEDURE — 84436 ASSAY OF TOTAL THYROXINE: CPT

## 2024-11-12 PROCEDURE — 82248 BILIRUBIN DIRECT: CPT

## 2024-11-12 PROCEDURE — 82550 ASSAY OF CK (CPK): CPT

## 2024-12-09 DIAGNOSIS — I10 BENIGN ESSENTIAL HYPERTENSION: ICD-10-CM

## 2024-12-09 NOTE — TELEPHONE ENCOUNTER
Reason for call:   [x] Refill   [] Prior Auth  [] Other:     Office:   [x] PCP/Provider - Argelia Simmons  [] Specialty/Provider -     Medication: Amlodipine     Dose/Frequency: 5 mg Q AM    Quantity: 90    Pharmacy: [facility] [place]     Does the patient have enough for 3 days?   [x] Yes   [] No - Send as HP to POD

## 2024-12-10 RX ORDER — AMLODIPINE BESYLATE 5 MG/1
5 TABLET ORAL DAILY
Qty: 90 TABLET | Refills: 1 | Status: SHIPPED | OUTPATIENT
Start: 2024-12-10

## 2025-02-05 ENCOUNTER — OFFICE VISIT (OUTPATIENT)
Dept: FAMILY MEDICINE CLINIC | Facility: CLINIC | Age: 80
End: 2025-02-05
Payer: MEDICARE

## 2025-02-05 VITALS
TEMPERATURE: 96 F | HEIGHT: 61 IN | HEART RATE: 88 BPM | WEIGHT: 120 LBS | BODY MASS INDEX: 22.66 KG/M2 | SYSTOLIC BLOOD PRESSURE: 126 MMHG | DIASTOLIC BLOOD PRESSURE: 78 MMHG | RESPIRATION RATE: 16 BRPM

## 2025-02-05 DIAGNOSIS — I10 BENIGN ESSENTIAL HYPERTENSION: ICD-10-CM

## 2025-02-05 DIAGNOSIS — F11.20 CONTINUOUS OPIOID DEPENDENCE (HCC): ICD-10-CM

## 2025-02-05 DIAGNOSIS — M46.1 SACROILIITIS (HCC): ICD-10-CM

## 2025-02-05 DIAGNOSIS — E78.5 DYSLIPIDEMIA: Primary | ICD-10-CM

## 2025-02-05 DIAGNOSIS — F41.9 ANXIETY: ICD-10-CM

## 2025-02-05 DIAGNOSIS — M81.0 OSTEOPOROSIS OF MULTIPLE SITES: ICD-10-CM

## 2025-02-05 PROCEDURE — G2211 COMPLEX E/M VISIT ADD ON: HCPCS | Performed by: FAMILY MEDICINE

## 2025-02-05 PROCEDURE — 99214 OFFICE O/P EST MOD 30 MIN: CPT | Performed by: FAMILY MEDICINE

## 2025-02-05 RX ORDER — POTASSIUM CITRATE 10 MEQ/1
10 TABLET, EXTENDED RELEASE ORAL 2 TIMES DAILY
Start: 2025-02-05

## 2025-02-05 NOTE — PROGRESS NOTES
"Name: Анна Leahy      : 1945      MRN: [id number]  Encounter Provider: Argelia Simmons DO  Encounter Date: 2025   Encounter department: [facility]  :  Assessment & Plan  Dyslipidemia  Stable   Continue atorvastatin 40 mg a day   Orders:  •  Comprehensive metabolic panel; Future  •  Lipid Panel with Direct LDL reflex; Future    Continuous opioid dependence (HCC)  Stable   Managed by pain management        Sacroiliitis (HCC)  Managed by pain management  Has MRI scheduled this week        Benign essential hypertension  Stable   Continue amlodipine 5 mg a day and Olmesartan 40 mg a day   Orders:  •  potassium citrate (UROCIT-K) 10 mEq; Take 1 tablet (10 mEq total) by mouth 2 (two) times a day  •  CBC; Future  •  Comprehensive metabolic panel; Future  •  Lipid Panel with Direct LDL reflex; Future    Osteoporosis of multiple sites  She stopped the medication   She didn't want to take it        Anxiety  Discussed risk of long term use of alprazolam   We discussed that it can increase risk of dementia  She is going slowly wean herself off.   I recommended that she start with taking 1 pill alternating with a half a pill every other night for a few weeks and she tolerates that well been going down to half a pill every night.  When she is on a half a pill she can either stop the medication or start using that every other night depending on how she is feeling and tolerating the taper       Return in about 6 months (around 2025) for Annual Wellness Visit.       History of Present Illness   She started with cold symptoms this morning.  She has some nasal congestion. She hasn't had a fever. Scratchy throat and feels like she has a cold.       Review of Systems    Objective   /78   Pulse 88   Temp (!) 96 °F (35.6 °C)   Resp 16   Ht 5' 1\" (1.549 m)   Wt 54.4 kg (120 lb)   LMP  (LMP Unknown)   BMI 22.67 kg/m²      Physical Exam  Vitals and nursing note reviewed.   Constitutional:       " Appearance: She is well-developed.   HENT:      Head: Normocephalic and atraumatic.      Right Ear: External ear normal.      Left Ear: External ear normal.   Cardiovascular:      Rate and Rhythm: Normal rate and regular rhythm.      Heart sounds: Normal heart sounds. No murmur heard.     No friction rub.   Pulmonary:      Effort: No respiratory distress.      Breath sounds: Normal breath sounds. No wheezing or rales.

## 2025-02-05 NOTE — ASSESSMENT & PLAN NOTE
Discussed risk of long term use of alprazolam   We discussed that it can increase risk of dementia  She is going slowly wean herself off.   I recommended that she start with taking 1 pill alternating with a half a pill every other night for a few weeks and she tolerates that well been going down to half a pill every night.  When she is on a half a pill she can either stop the medication or start using that every other night depending on how she is feeling and tolerating the taper

## 2025-02-05 NOTE — ASSESSMENT & PLAN NOTE
Stable   Continue amlodipine 5 mg a day and Olmesartan 40 mg a day   Orders:  •  potassium citrate (UROCIT-K) 10 mEq; Take 1 tablet (10 mEq total) by mouth 2 (two) times a day  •  CBC; Future  •  Comprehensive metabolic panel; Future  •  Lipid Panel with Direct LDL reflex; Future

## 2025-02-17 ENCOUNTER — HOSPITAL ENCOUNTER (OUTPATIENT)
Dept: RADIOLOGY | Facility: HOSPITAL | Age: 80
Discharge: HOME | End: 2025-02-17
Payer: MEDICARE

## 2025-02-17 DIAGNOSIS — M47.816 SPONDYLOSIS WITHOUT MYELOPATHY OR RADICULOPATHY, LUMBAR REGION: ICD-10-CM

## 2025-02-17 DIAGNOSIS — M43.06 LUMBAR SPONDYLOLYSIS: ICD-10-CM

## 2025-02-17 PROCEDURE — 72148 MRI LUMBAR SPINE W/O DYE: CPT

## 2025-02-19 DIAGNOSIS — I10 BENIGN ESSENTIAL HYPERTENSION: ICD-10-CM

## 2025-02-19 NOTE — TELEPHONE ENCOUNTER
"Script on 2/5/25 was set to \"print\".  Not a duplicate      Reason for call:   [x] Refill   [] Prior Auth  [] Other:     Office:   [x] PCP/Provider - Argelia Simmons, DO  [] Specialty/Provider -     Medication: potassium citrate (UROCIT-K) 10 mEq     Dose/Frequency: 10 mEq, Oral, 2 times daily     Quantity: 180    Pharmacy: [address] - [address]      Does the patient have enough for 3 days?   [x] Yes   [] No - Send as HP to POD   "

## 2025-02-20 RX ORDER — POTASSIUM CITRATE 10 MEQ/1
10 TABLET, EXTENDED RELEASE ORAL 2 TIMES DAILY
Qty: 180 TABLET | Refills: 0 | Status: SHIPPED | OUTPATIENT
Start: 2025-02-20

## 2025-02-21 DIAGNOSIS — R10.13 EPIGASTRIC PAIN: ICD-10-CM

## 2025-02-21 RX ORDER — OMEPRAZOLE 40 MG/1
40 CAPSULE, DELAYED RELEASE ORAL DAILY
Qty: 90 CAPSULE | Refills: 1 | Status: SHIPPED | OUTPATIENT
Start: 2025-02-21

## 2025-02-21 NOTE — TELEPHONE ENCOUNTER
Patient going on vacation next week and would like to hopefully have filled by then. Has 7 days of pills left.    Reason for call:   [x] Refill   [] Prior Auth  [] Other:     Office:   [x] PCP/Provider -   [] Specialty/Provider -     Medication: Omeprazole     Dose/Frequency: 40 mg capsule taken by mouth once daily     Quantity: 90    Pharmacy: [place] [address] [phone]     Does the patient have enough for 3 days?   [x] Yes   [] No - Send as HP to POD

## 2025-03-05 ENCOUNTER — TELEPHONE (OUTPATIENT)
Dept: GASTROENTEROLOGY | Facility: CLINIC | Age: 80
End: 2025-03-05

## 2025-03-05 NOTE — TELEPHONE ENCOUNTER
Lmm for pt to call back . She would be due for an ov to discuss a recall colon. Will send recall ltr. Sb

## 2025-03-13 ENCOUNTER — HOSPITAL ENCOUNTER (EMERGENCY)
Facility: HOSPITAL | Age: 80
Discharge: HOME | End: 2025-03-13
Attending: STUDENT IN AN ORGANIZED HEALTH CARE EDUCATION/TRAINING PROGRAM
Payer: MEDICARE

## 2025-03-13 ENCOUNTER — APPOINTMENT (EMERGENCY)
Dept: RADIOLOGY | Facility: HOSPITAL | Age: 80
End: 2025-03-13
Payer: MEDICARE

## 2025-03-13 VITALS
RESPIRATION RATE: 20 BRPM | BODY MASS INDEX: 22.66 KG/M2 | DIASTOLIC BLOOD PRESSURE: 66 MMHG | OXYGEN SATURATION: 96 % | WEIGHT: 120 LBS | HEIGHT: 61 IN | TEMPERATURE: 97 F | SYSTOLIC BLOOD PRESSURE: 127 MMHG | HEART RATE: 79 BPM

## 2025-03-13 DIAGNOSIS — R10.9 ABDOMINAL PAIN: Primary | ICD-10-CM

## 2025-03-13 DIAGNOSIS — K29.70 GASTRITIS: ICD-10-CM

## 2025-03-13 LAB
ALBUMIN SERPL BCG-MCNC: 4.1 G/DL (ref 3.5–5)
ALP SERPL-CCNC: 62 U/L (ref 34–104)
ALT SERPL W P-5'-P-CCNC: 11 U/L (ref 7–52)
ANION GAP SERPL CALCULATED.3IONS-SCNC: 13 MMOL/L (ref 4–13)
AST SERPL W P-5'-P-CCNC: 15 U/L (ref 13–39)
BACTERIA UR QL AUTO: NORMAL /HPF
BASOPHILS # BLD AUTO: 0.06 THOUSANDS/ÂΜL (ref 0–0.1)
BASOPHILS NFR BLD AUTO: 0 % (ref 0–1)
BILIRUB SERPL-MCNC: 0.56 MG/DL (ref 0.2–1)
BILIRUB UR QL STRIP: NEGATIVE
BUN SERPL-MCNC: 21 MG/DL (ref 5–25)
CALCIUM SERPL-MCNC: 9.1 MG/DL (ref 8.4–10.2)
CHLORIDE SERPL-SCNC: 106 MMOL/L (ref 96–108)
CLARITY UR: CLEAR
CO2 SERPL-SCNC: 19 MMOL/L (ref 21–32)
COLOR UR: YELLOW
CREAT SERPL-MCNC: 0.71 MG/DL (ref 0.6–1.3)
EOSINOPHIL # BLD AUTO: 0.05 THOUSAND/ÂΜL (ref 0–0.61)
EOSINOPHIL NFR BLD AUTO: 0 % (ref 0–6)
ERYTHROCYTE [DISTWIDTH] IN BLOOD BY AUTOMATED COUNT: 13.5 % (ref 11.6–15.1)
GFR SERPL CREATININE-BSD FRML MDRD: 80 ML/MIN/1.73SQ M
GLUCOSE SERPL-MCNC: 99 MG/DL (ref 65–140)
GLUCOSE UR STRIP-MCNC: NEGATIVE MG/DL
HCT VFR BLD AUTO: 44.1 % (ref 34.8–46.1)
HGB BLD-MCNC: 14.2 G/DL (ref 11.5–15.4)
HGB UR QL STRIP.AUTO: NEGATIVE
IMM GRANULOCYTES # BLD AUTO: 0.06 THOUSAND/UL (ref 0–0.2)
IMM GRANULOCYTES NFR BLD AUTO: 0 % (ref 0–2)
KETONES UR STRIP-MCNC: ABNORMAL MG/DL
LACTATE SERPL-SCNC: 1 MMOL/L (ref 0.5–2)
LEUKOCYTE ESTERASE UR QL STRIP: ABNORMAL
LIPASE SERPL-CCNC: 14 U/L (ref 11–82)
LYMPHOCYTES # BLD AUTO: 0.74 THOUSANDS/ÂΜL (ref 0.6–4.47)
LYMPHOCYTES NFR BLD AUTO: 6 % (ref 14–44)
MCH RBC QN AUTO: 30.9 PG (ref 26.8–34.3)
MCHC RBC AUTO-ENTMCNC: 32.2 G/DL (ref 31.4–37.4)
MCV RBC AUTO: 96 FL (ref 82–98)
MONOCYTES # BLD AUTO: 0.61 THOUSAND/ÂΜL (ref 0.17–1.22)
MONOCYTES NFR BLD AUTO: 5 % (ref 4–12)
NEUTROPHILS # BLD AUTO: 11.97 THOUSANDS/ÂΜL (ref 1.85–7.62)
NEUTS SEG NFR BLD AUTO: 89 % (ref 43–75)
NITRITE UR QL STRIP: NEGATIVE
NON-SQ EPI CELLS URNS QL MICRO: NORMAL /HPF
NRBC BLD AUTO-RTO: 0 /100 WBCS
PH UR STRIP.AUTO: 6.5 [PH]
PLATELET # BLD AUTO: 176 THOUSANDS/UL (ref 149–390)
PMV BLD AUTO: 13.2 FL (ref 8.9–12.7)
POTASSIUM SERPL-SCNC: 4.6 MMOL/L (ref 3.5–5.3)
PROT SERPL-MCNC: 6.4 G/DL (ref 6.4–8.4)
PROT UR STRIP-MCNC: ABNORMAL MG/DL
RBC # BLD AUTO: 4.6 MILLION/UL (ref 3.81–5.12)
RBC #/AREA URNS AUTO: NORMAL /HPF
SODIUM SERPL-SCNC: 138 MMOL/L (ref 135–147)
SP GR UR STRIP.AUTO: 1.02 (ref 1–1.03)
UROBILINOGEN UR STRIP-ACNC: <2 MG/DL
WBC # BLD AUTO: 13.49 THOUSAND/UL (ref 4.31–10.16)
WBC #/AREA URNS AUTO: NORMAL /HPF

## 2025-03-13 PROCEDURE — 85025 COMPLETE CBC W/AUTO DIFF WBC: CPT | Performed by: STUDENT IN AN ORGANIZED HEALTH CARE EDUCATION/TRAINING PROGRAM

## 2025-03-13 PROCEDURE — 99284 EMERGENCY DEPT VISIT MOD MDM: CPT

## 2025-03-13 PROCEDURE — 36415 COLL VENOUS BLD VENIPUNCTURE: CPT | Performed by: STUDENT IN AN ORGANIZED HEALTH CARE EDUCATION/TRAINING PROGRAM

## 2025-03-13 PROCEDURE — 96365 THER/PROPH/DIAG IV INF INIT: CPT

## 2025-03-13 PROCEDURE — 81001 URINALYSIS AUTO W/SCOPE: CPT | Performed by: STUDENT IN AN ORGANIZED HEALTH CARE EDUCATION/TRAINING PROGRAM

## 2025-03-13 PROCEDURE — 93005 ELECTROCARDIOGRAM TRACING: CPT

## 2025-03-13 PROCEDURE — 99285 EMERGENCY DEPT VISIT HI MDM: CPT | Performed by: STUDENT IN AN ORGANIZED HEALTH CARE EDUCATION/TRAINING PROGRAM

## 2025-03-13 PROCEDURE — 83605 ASSAY OF LACTIC ACID: CPT | Performed by: STUDENT IN AN ORGANIZED HEALTH CARE EDUCATION/TRAINING PROGRAM

## 2025-03-13 PROCEDURE — 83690 ASSAY OF LIPASE: CPT | Performed by: STUDENT IN AN ORGANIZED HEALTH CARE EDUCATION/TRAINING PROGRAM

## 2025-03-13 PROCEDURE — 96366 THER/PROPH/DIAG IV INF ADDON: CPT

## 2025-03-13 PROCEDURE — 80053 COMPREHEN METABOLIC PANEL: CPT | Performed by: STUDENT IN AN ORGANIZED HEALTH CARE EDUCATION/TRAINING PROGRAM

## 2025-03-13 PROCEDURE — 74177 CT ABD & PELVIS W/CONTRAST: CPT

## 2025-03-13 RX ORDER — ACETAMINOPHEN 10 MG/ML
1000 INJECTION, SOLUTION INTRAVENOUS ONCE
Status: COMPLETED | OUTPATIENT
Start: 2025-03-13 | End: 2025-03-13

## 2025-03-13 RX ADMIN — ACETAMINOPHEN 1000 MG: 10 INJECTION INTRAVENOUS at 15:37

## 2025-03-13 RX ADMIN — IOHEXOL 100 ML: 350 INJECTION, SOLUTION INTRAVENOUS at 17:21

## 2025-03-13 RX ADMIN — SODIUM CHLORIDE 1000 ML: 0.9 INJECTION, SOLUTION INTRAVENOUS at 15:37

## 2025-03-13 RX ADMIN — IOHEXOL 50 ML: 240 INJECTION, SOLUTION INTRATHECAL; INTRAVASCULAR; INTRAVENOUS; ORAL at 15:55

## 2025-03-13 NOTE — DISCHARGE INSTRUCTIONS
"You have been evaluated in the Emergency Department today for abdominal pain. Your evaluation was not suggestive of any emergent condition requiring medical intervention at this time. However, some abdominal problems make take more time to appear. Therefore, it is important for you to watch for any new symptoms or worsening of your current condition.      Please follow up with your primary care physician as soon as possible. If you do not have a primary doctor, you can call \"[facility]\" to schedule an appointment with one.     As discussed please follow-up with gastroenterology as soon as possible.  Referral sent.  Please call to schedule an appointment.    Return to the Emergency Department if you experience worsening or uncontrolled pain, fevers 100.4°F or greater, recurrent vomiting, inability to tolerate food or fluids by mouth, bloody stools or vomit, black or tarry stools, or any other concerning symptoms.    "

## 2025-03-13 NOTE — ED PROVIDER NOTES
Time reflects when diagnosis was documented in both MDM as applicable and the Disposition within this note       Time User Action Codes Description Comment    3/13/2025  6:38 PM Jorge Benavides Add [R10.9] Abdominal pain     3/13/2025  6:40 PM Jorge Benavides Add [K29.70] Gastritis           ED Disposition       ED Disposition   Discharge    Condition   Stable    Date/Time   Thu Mar 13, 2025  3:34 PM    Comment   Анна Leahy discharge to home/self care.                   Assessment & Plan       Medical Decision Making  Patient is a 79 y.o. female who presents to the ED for lower abdominal pain.  Patient is nontoxic, well-appearing.     Differential includes but is not limited to: Diverticulitis, colitis, enteritis.  Presentation not consistent with bowel obstruction, AAA.    Plan: Labs, repeat CT scan with p.o. contrast, reassess                   Amount and/or Complexity of Data Reviewed  Labs: ordered.  Radiology: ordered.    Risk  Prescription drug management.        ED Course as of 03/13/25 2125   Thu Mar 13, 2025   1925 Patient reevaluated.  Resting comfortably.  No new complaints.  Discussed results and findings.  Will discharge.  Discussed GI follow-up.  Return precautions discussed.  Patient verbalized understanding and agreed to plan of care.       Medications   acetaminophen (Ofirmev) injection 1,000 mg (0 mg Intravenous Stopped 3/13/25 1920)   sodium chloride 0.9 % bolus 1,000 mL (0 mL Intravenous Stopped 3/13/25 1920)   iohexol (OMNIPAQUE) 240 MG/ML solution 50 mL (50 mL Oral Given 3/13/25 1555)   iohexol (OMNIPAQUE) 350 MG/ML injection (MULTI-DOSE) 100 mL (100 mL Intravenous Given 3/13/25 1721)       ED Risk Strat Scores                            SBIRT 22yo+      Flowsheet Row Most Recent Value   Initial Alcohol Screen: US AUDIT-C     1. How often do you have a drink containing alcohol? 0 Filed at: 03/13/2025 6974   2. How many drinks containing alcohol do you have on a typical day you are drinking?  0  Filed at: 03/13/2025 9358   3a. Male UNDER 65: How often do you have five or more drinks on one occasion? 0 Filed at: 03/13/2025 7898   3b. FEMALE Any Age, or MALE 65+: How often do you have 4 or more drinks on one occassion? 0 Filed at: 03/13/2025 6337   Audit-C Score 0 Filed at: 03/13/2025 5965   KEYANA: How many times in the past year have you...    Used an illegal drug or used a prescription medication for non-medical reasons? Never Filed at: 03/13/2025 2174                            History of Present Illness       Chief Complaint   Patient presents with    Abdominal Pain     States having abd pain with nausea since yesterday. Was at a hospital in [place] and told they found nothing. She is still in pain        Past Medical History:   Diagnosis Date    Anxiety     Arthritis     left hip    Chronic kidney disease     chronic stones    Chronic narcotic dependence (HCC)     tramadol for hip    Colon polyp     Gallstones     GERD (gastroesophageal reflux disease)     H/O hiatal hernia     Headache     occ    Hyperlipidemia     Hypertension     Kidney stone     Low back pain     Lumbar disc disease     Nausea     at times    Poor appetite     Sacroiliitis (HCC)     Thickened endometrium     Wears dentures     full upper/ partial lower    Weight loss, unintentional     Since Nov 2021 20lb weight loss      Past Surgical History:   Procedure Laterality Date    APPENDECTOMY      BREAST SURGERY Left     excision cyst    COLONOSCOPY      CYSTOSCOPY      with stone extraction x 2    CYSTOSCOPY W/ RETROGRADES Left 09/17/2012    stent removal,     CYSTOSCOPY W/ URETEROSCOPY W/ LITHOTRIPSY Left 12/11/2006    Ca Phos 98%    CYSTOSCOPY W/ URETEROSCOPY W/ LITHOTRIPSY Left 08/12/2012    Ca ox di 60%, Ca ox mono 20%    ESOPHAGOGASTRODUODENOSCOPY      EXTRACORPOREAL SHOCK WAVE LITHOTRIPSY Left 4/13/2016    Procedure: LITHROTRIPSY EXTRACORPORAL SHOCKWAVE (ESWL);  Surgeon: Popeye Flores MD;  Location: [facility] MAIN OR;  Service:      POLYPECTOMY N/A 4/11/2022    Procedure: POLYPECTOMY;  Surgeon: Marian Ghosh MD;  Location:  MAIN OR;  Service: Gynecology    DC ARTHRP ACETBLR/PROX FEM PROSTC AGRFT/ALGRFT Left 5/2/2022    Procedure: ARTHROPLASTY HIP TOTAL ANTERIOR - Left;  Surgeon: Timoteo Crandall DO;  Location: [facility] MAIN OR;  Service: Orthopedics    DC CYSTO/URETERO W/LITHOTRIPSY &INDWELL STENT INSRT Left 9/1/2016    Procedure: CYSTOSCOPY URETEROSCOPY WITH STONE EXTRACTION , RETROGRADE PYELOGRAM AND INSERTION STENT URETERAL;  Surgeon: Popeye Flores MD;  Location: [facility] MAIN OR;  Service: Urology    DC CYSTO/URETERO W/LITHOTRIPSY &INDWELL STENT INSRT Left 8/12/2016    Procedure: CYSTOSCOPY URETEROSCOPY WITH LITHOTRIPSY HOLMIUM LASER, RETROGRADE PYELOGRAM AND INSERTION STENT URETERAL;  Surgeon: Popeye Flores MD;  Location: [facility] MAIN OR;  Service: Urology    DC HYSTEROSCOPY BX ENDOMETRIUM&/POLYPC W/WO D&C N/A 4/11/2022    Procedure: DILATATION AND CURETTAGE (D&C) WITH HYSTEROSCOPY;  Surgeon: Marian Ghosh MD;  Location:  MAIN OR;  Service: Gynecology    TUBAL LIGATION      UPPER GASTROINTESTINAL ENDOSCOPY      URETERAL STENT PLACEMENT Left 08/31/2012    Narrowing of the left distal ureter for 3cm.      WISDOM TOOTH EXTRACTION        Family History   Problem Relation Age of Onset    No Known Problems Mother     Stroke Father         TIA    Parkinsonism Father     No Known Problems Sister     Pancreatic cancer Brother     Cancer Maternal Aunt     Cancer Maternal Uncle     Heart disease Maternal Uncle         Massive MI    No Known Problems Paternal Aunt     No Known Problems Paternal Uncle     No Known Problems Maternal Grandmother     No Known Problems Maternal Grandfather     No Known Problems Paternal Grandmother     No Known Problems Paternal Grandfather       Social History     Tobacco Use    Smoking status: Never     Passive exposure: Never    Smokeless tobacco: Never   Vaping Use    Vaping status: Never Used   Substance  Use Topics    Alcohol use: Yes     Comment: wine almost every night to sleep    Drug use: No      E-Cigarette/Vaping    E-Cigarette Use Never User       E-Cigarette/Vaping Substances    Nicotine No     THC No     CBD No     Flavoring No     Other No     Unknown No       I have reviewed and agree with the history as documented.     79-year-old female, past medical history including anxiety, arthritis, hyperlipidemia, hypertension, who presents the emergency room for abdominal pain.  Started about 2 days ago.  Mainly lower abdomen.  Was seen at an outside emergency department 1 day ago.  CT scan and ultrasound performed which were negative.  She was discharged.  Reports still having the pain.  No modifying factors.  No associated symptoms.  Describes it as a crampy pain.  No fevers, vomiting, diarrhea.  No urinary symptoms.  No chest pain, shortness of breath.  No other complaints or concerns.            Abdominal Pain      Review of Systems   Gastrointestinal:  Positive for abdominal pain.   All other systems reviewed and are negative.          Objective       ED Triage Vitals   Temperature Pulse Blood Pressure Respirations SpO2 Patient Position - Orthostatic VS   03/13/25 1339 03/13/25 1341 03/13/25 1341 03/13/25 1339 03/13/25 1341 --   (!) 97 °F (36.1 °C) 89 116/66 18 97 %       Temp src Heart Rate Source BP Location FiO2 (%) Pain Score    -- -- -- -- --              Vitals      Date and Time Temp Pulse SpO2 Resp BP Pain Score FACES Pain Rating User   03/13/25 1900 -- 79 96 % 20 127/66 -- -- TOVA   03/13/25 1700 -- 75 96 % 20 137/63 -- -- TOVA   03/13/25 1600 -- 77 98 % 32 -- -- -- TOVA   03/13/25 1341 -- 89 97 % -- 116/66 -- -- LUC   03/13/25 1339 97 °F (36.1 °C) -- -- 18 -- -- -- LUC            Physical Exam  Vitals and nursing note reviewed.   Constitutional:       General: She is not in acute distress.     Appearance: She is well-developed. She is not ill-appearing, toxic-appearing or diaphoretic.   HENT:      Head:  Normocephalic and atraumatic.      Right Ear: External ear normal.      Left Ear: External ear normal.      Nose: Nose normal.   Eyes:      General: Lids are normal. No scleral icterus.  Cardiovascular:      Rate and Rhythm: Normal rate and regular rhythm.      Heart sounds: Normal heart sounds. No murmur heard.     No friction rub. No gallop.   Pulmonary:      Effort: Pulmonary effort is normal. No respiratory distress.      Breath sounds: Normal breath sounds. No wheezing or rales.   Abdominal:      Palpations: Abdomen is soft.      Tenderness: There is abdominal tenderness in the right lower quadrant, suprapubic area and left lower quadrant. There is no guarding or rebound.   Musculoskeletal:         General: No deformity. Normal range of motion.      Cervical back: Normal range of motion and neck supple.   Skin:     General: Skin is warm and dry.   Neurological:      General: No focal deficit present.      Mental Status: She is alert.   Psychiatric:         Mood and Affect: Mood normal.         Behavior: Behavior normal.         Results Reviewed       Procedure Component Value Units Date/Time    Urine Microscopic [[id number]]  (Normal) Collected: 03/13/25 1818    Lab Status: Final result Specimen: Urine, Clean Catch Updated: 03/13/25 1831     RBC, UA None Seen /hpf      WBC, UA 2-4 /hpf      Epithelial Cells Occasional /hpf      Bacteria, UA Occasional /hpf     UA w Reflex to Microscopic w Reflex to Culture [[id number]]  (Abnormal) Collected: 03/13/25 1818    Lab Status: Final result Specimen: Urine, Clean Catch Updated: 03/13/25 1823     Color, UA Yellow     Clarity, UA Clear     Specific Gravity, UA 1.020     pH, UA 6.5     Leukocytes, UA Small     Nitrite, UA Negative     Protein, UA 30 (1+) mg/dl      Glucose, UA Negative mg/dl      Ketones, UA 40 (2+) mg/dl      Urobilinogen, UA <2.0 mg/dl      Bilirubin, UA Negative     Occult Blood, UA Negative    Lactic acid, plasma (w/reflex if result > 2.0) [[id number]]   (Normal) Collected: 03/13/25 1539    Lab Status: Final result Specimen: Blood from Arm, Left Updated: 03/13/25 1616     LACTIC ACID 1.0 mmol/L     Narrative:      Result may be elevated if tourniquet was used during collection.    CMP [[id number]]  (Abnormal) Collected: 03/13/25 1539    Lab Status: Final result Specimen: Blood from Arm, Left Updated: 03/13/25 1611     Sodium 138 mmol/L      Potassium 4.6 mmol/L      Chloride 106 mmol/L      CO2 19 mmol/L      ANION GAP 13 mmol/L      BUN 21 mg/dL      Creatinine 0.71 mg/dL      Glucose 99 mg/dL      Calcium 9.1 mg/dL      AST 15 U/L      ALT 11 U/L      Alkaline Phosphatase 62 U/L      Total Protein 6.4 g/dL      Albumin 4.1 g/dL      Total Bilirubin 0.56 mg/dL      eGFR 80 ml/min/1.73sq m     Narrative:      National Kidney Disease Foundation guidelines for Chronic Kidney Disease (CKD):     Stage 1 with normal or high GFR (GFR > 90 mL/min/1.73 square meters)    Stage 2 Mild CKD (GFR = 60-89 mL/min/1.73 square meters)    Stage 3A Moderate CKD (GFR = 45-59 mL/min/1.73 square meters)    Stage 3B Moderate CKD (GFR = 30-44 mL/min/1.73 square meters)    Stage 4 Severe CKD (GFR = 15-29 mL/min/1.73 square meters)    Stage 5 End Stage CKD (GFR <15 mL/min/1.73 square meters)  Note: GFR calculation is accurate only with a steady state creatinine    Lipase [[id number]]  (Normal) Collected: 03/13/25 1539    Lab Status: Final result Specimen: Blood from Arm, Left Updated: 03/13/25 1611     Lipase 14 u/L     CBC and differential [[id number]]  (Abnormal) Collected: 03/13/25 1539    Lab Status: Final result Specimen: Blood from Arm, Left Updated: 03/13/25 1546     WBC 13.49 Thousand/uL      RBC 4.60 Million/uL      Hemoglobin 14.2 g/dL      Hematocrit 44.1 %      MCV 96 fL      MCH 30.9 pg      MCHC 32.2 g/dL      RDW 13.5 %      MPV 13.2 fL      Platelets 176 Thousands/uL      nRBC 0 /100 WBCs      Segmented % 89 %      Immature Grans % 0 %      Lymphocytes % 6 %      Monocytes % 5 %       Eosinophils Relative 0 %      Basophils Relative 0 %      Absolute Neutrophils 11.97 Thousands/µL      Absolute Immature Grans 0.06 Thousand/uL      Absolute Lymphocytes 0.74 Thousands/µL      Absolute Monocytes 0.61 Thousand/µL      Eosinophils Absolute 0.05 Thousand/µL      Basophils Absolute 0.06 Thousands/µL             CT Abdomen pelvis with contrast   Final Interpretation by Enrique Euceda MD (03/13 1829)      1.  Redemonstrated is a large hiatal hernia with an intrathoracic stomach. There is wall thickening in the region of the pylorus and first portion of the duodenum. Findings could be seen in the setting of gastritis or duodenitis.   2.  Unchanged appearance of the uterus, which was previously assessed with hysteroscopy in April 2022.   3.  Diverticulosis without evidence of diverticulitis.         Workstation performed: [id number]             ECG 12 Lead Documentation Only    Date/Time: 3/13/2025 10:45 PM    Performed by: Jorge Benavides DO  Authorized by: Jorge Benavides DO    ECG reviewed by me, the ED Provider: yes    Patient location:  ED  Interpretation:     Interpretation: non-specific    Rate:     ECG rate:  75    ECG rate assessment: normal    Rhythm:     Rhythm: sinus rhythm    Ectopy:     Ectopy: none    QRS:     QRS axis:  Normal  Conduction:     Conduction: normal    ST segments:     ST segments:  Non-specific  T waves:     T waves: normal        ED Medication and Procedure Management   Prior to Admission Medications   Prescriptions Last Dose Informant Patient Reported? Taking?   ALPRAZolam (XANAX) 0.25 mg tablet   No No   Sig: Take 1 tablet (0.25 mg total) by mouth daily at bedtime as needed for anxiety   Apoaequorin (PREVAGEN PO)  Self Yes No   Sig: Take by mouth   Patient taking differently: Take by mouth in the morning   Melatonin 10 MG TABS  Self Yes No   Sig: Take by mouth   Patient taking differently: Take by mouth PRN   Multiple Vitamins-Minerals (CENTRUM SILVER 50+WOMEN PO)   Self Yes No   Sig: Take by mouth   Patient taking differently: Take by mouth in the morning   amLODIPine (NORVASC) 5 mg tablet   No No   Sig: Take 1 tablet (5 mg total) by mouth daily   atorvastatin (LIPITOR) 40 mg tablet   No No   Sig: TAKE ONE TABLET BY MOUTH EVERY DAY AT BEDTIME   co-enzyme Q-10 30 MG capsule  Self Yes No   Sig: Take 200 mg by mouth daily   naloxone (NARCAN) 4 mg/0.1 mL nasal spray  Self No No   Sig: Administer 1 spray into a nostril. If no response after 2-3 minutes, give another dose in the other nostril using a new spray.   olmesartan (BENICAR) 40 mg tablet   No No   Sig: Take 1 tablet (40 mg total) by mouth daily   omeprazole (PriLOSEC) 40 MG capsule   No No   Sig: Take 1 capsule (40 mg total) by mouth daily   potassium citrate (UROCIT-K) 10 mEq   No No   Sig: Take 1 tablet (10 mEq total) by mouth 2 (two) times a day   traMADol (ULTRAM) 50 mg tablet  Self Yes No   Sig: TAKE ONE TABLET BY MOUTH EVERY DAY AS NEEDED FOR PAIN. DO NOT DRIVE ON THIS MEDICATION      Facility-Administered Medications: None     Discharge Medication List as of 3/13/2025  7:25 PM        CONTINUE these medications which have NOT CHANGED    Details   ALPRAZolam (XANAX) 0.25 mg tablet Take 1 tablet (0.25 mg total) by mouth daily at bedtime as needed for anxiety, Starting Tue 10/1/2024, Normal      amLODIPine (NORVASC) 5 mg tablet Take 1 tablet (5 mg total) by mouth daily, Starting Tue 12/10/2024, Normal      Apoaequorin (PREVAGEN PO) Take by mouth, Historical Med      atorvastatin (LIPITOR) 40 mg tablet TAKE ONE TABLET BY MOUTH EVERY DAY AT BEDTIME, Starting Fri 7/19/2024, Normal      co-enzyme Q-10 30 MG capsule Take 200 mg by mouth daily, Historical Med      Melatonin 10 MG TABS Take by mouth, Historical Med      Multiple Vitamins-Minerals (CENTRUM SILVER 50+WOMEN PO) Take by mouth, Historical Med      naloxone (NARCAN) 4 mg/0.1 mL nasal spray Administer 1 spray into a nostril. If no response after 2-3 minutes, give  another dose in the other nostril using a new spray., Normal      olmesartan (BENICAR) 40 mg tablet Take 1 tablet (40 mg total) by mouth daily, Starting Tue 10/22/2024, Normal      omeprazole (PriLOSEC) 40 MG capsule Take 1 capsule (40 mg total) by mouth daily, Starting Fri 2/21/2025, Normal      potassium citrate (UROCIT-K) 10 mEq Take 1 tablet (10 mEq total) by mouth 2 (two) times a day, Starting Thu 2/20/2025, Normal      traMADol (ULTRAM) 50 mg tablet TAKE ONE TABLET BY MOUTH EVERY DAY AS NEEDED FOR PAIN. DO NOT DRIVE ON THIS MEDICATION, Historical Med             ED SEPSIS DOCUMENTATION   Time reflects when diagnosis was documented in both MDM as applicable and the Disposition within this note       Time User Action Codes Description Comment    3/13/2025  6:38 PM Jorge Benavides [R10.9] Abdominal pain     3/13/2025  6:40 PM Jorge Benavides [K29.70] Gastritis                  Jorge Benavides,   03/13/25 2125       Jorge Benavides DO  03/13/25 2246

## 2025-03-14 LAB
ATRIAL RATE: 75 BPM
P AXIS: 72 DEGREES
PR INTERVAL: 170 MS
QRS AXIS: 24 DEGREES
QRSD INTERVAL: 72 MS
QT INTERVAL: 408 MS
QTC INTERVAL: 455 MS
T WAVE AXIS: 98 DEGREES
VENTRICULAR RATE: 75 BPM

## 2025-03-14 PROCEDURE — 93010 ELECTROCARDIOGRAM REPORT: CPT | Performed by: INTERNAL MEDICINE

## 2025-04-11 ENCOUNTER — TELEPHONE (OUTPATIENT)
Dept: FAMILY MEDICINE CLINIC | Facility: CLINIC | Age: 80
End: 2025-04-11

## 2025-04-11 NOTE — TELEPHONE ENCOUNTER
Patient called the RX Refill Line. Message is being forwarded to the office.     Patient is requesting to verify the strength of her Amlodipine. Unable to find it on her medication bottle. Verified what is on file and patient agreed and did not need any further assistance.

## 2025-04-21 ENCOUNTER — TELEPHONE (OUTPATIENT)
Dept: FAMILY MEDICINE CLINIC | Facility: CLINIC | Age: 80
End: 2025-04-21

## 2025-04-21 DIAGNOSIS — M81.0 OSTEOPOROSIS OF MULTIPLE SITES: Primary | ICD-10-CM

## 2025-04-21 DIAGNOSIS — I10 BENIGN ESSENTIAL HYPERTENSION: ICD-10-CM

## 2025-04-21 RX ORDER — OLMESARTAN MEDOXOMIL 40 MG/1
40 TABLET ORAL DAILY
Qty: 90 TABLET | Refills: 1 | Status: SHIPPED | OUTPATIENT
Start: 2025-04-21

## 2025-04-21 RX ORDER — ALENDRONATE SODIUM 70 MG/1
70 TABLET ORAL
Qty: 12 TABLET | Refills: 3 | Status: SHIPPED | OUTPATIENT
Start: 2025-04-21

## 2025-04-21 NOTE — TELEPHONE ENCOUNTER
Reason for call:   [x] Refill   [] Prior Auth  [] Other:     Office:   [x] PCP/Provider - Argelia Simmons, DO   [] Specialty/Provider -     Medication: olmesartan (BENICAR) 40 mg tablet     Dose/Frequency: 40 mg, Oral, Daily     Quantity: 90    Pharmacy: [facility] [address] [place] [address] -     Local Pharmacy   Does the patient have enough for 3 days?   [x] Yes   [] No - Send as HP to POD    Mail Away Pharmacy   Does the patient have enough for 10 days?   [] Yes   [] No - Send as HP to POD

## 2025-04-21 NOTE — TELEPHONE ENCOUNTER
Patient called the RX Refill Line. Message is being forwarded to the office.     Patient is requesting alendronate 70mg. This is on her inactive list. She would like it sent to [facility] in [place].    Please contact patient at [phone] with any questions.

## 2025-04-24 ENCOUNTER — TELEPHONE (OUTPATIENT)
Age: 80
End: 2025-04-24

## 2025-04-24 NOTE — TELEPHONE ENCOUNTER
Patient called in stating she received two different calls from a man and a women asking her questions and stating Dr. Simmons had ordered her a back brace. Patient wanted to see if this was correct but was confused because Dr. Silverman has been treating her for back pain. Did not see any notes in chart regarding patient getting a back brace and advised it was likely a scam and to disregard the call, if they call back to not share any personal information. Patient wanted to make Dr. Simmons aware and make sure she did not need this.    Please advise, thank you

## 2025-05-12 ENCOUNTER — TELEPHONE (OUTPATIENT)
Age: 80
End: 2025-05-12

## 2025-05-12 NOTE — TELEPHONE ENCOUNTER
This does not appear to be a legitimate call. Stated they were from [facility] [employer] and that they had faxed over an order for compression socks on 4/13 and 5/6. I called both the number on the caller id ([phone]) and the number I was given ([phone]) and neither were in use. Confirmed fax number they had was the correct one for this office. If a fax comes through in the next day or 2, please treat with caution.

## 2025-06-03 ENCOUNTER — TELEPHONE (OUTPATIENT)
Age: 80
End: 2025-06-03

## 2025-06-03 DIAGNOSIS — Z96.642 STATUS POST HIP REPLACEMENT, LEFT: Primary | ICD-10-CM

## 2025-06-03 RX ORDER — AMOXICILLIN 500 MG/1
2000 TABLET, FILM COATED ORAL
Qty: 4 TABLET | Refills: 2 | Status: SHIPPED | OUTPATIENT
Start: 2025-06-03 | End: 2025-09-01

## 2025-06-03 NOTE — TELEPHONE ENCOUNTER
Letter placed in chart, called and spoke with patient, relayed message about antibiotics.    Waldemar-patient stated she has another dental appt on 06/11/25, would like a script sent in to the [facility] in [place] for her next visit.

## 2025-06-03 NOTE — TELEPHONE ENCOUNTER
Caller: Анна    Doctor: Dr. Crandall    Reason for call: patient had MOLINA in 2022. Patient went to the dentist .  Dental office would like to know the protocol for antibiotics for POST OP Totals. Dentist did give patient the antibiotic . Dental office will also need a letter for their records as well  Please advise    Call back#: [phone]

## 2025-06-03 NOTE — LETTER
Samia 3, 2025     Patient: Анна Leahy  YOB: 1945  Date of Surgery:05/02/2022      To Whom it May Concern:    Анна Leahy is under my professional care. Анна was seen in my office on 04/21/2023. Анна had Total Left Hip Arthroplasty with me on 05/02/2022. Анна will need prophylaxis antibiotics for life.  I recommend Amoxicillin 4 500 mg tablets 60 minutes prior to any dental procedure or cleanings.    If you have any questions or concerns, please don't hesitate to call.         Sincerely,          Timoteo Crandall D.O.

## 2025-06-10 DIAGNOSIS — I10 BENIGN ESSENTIAL HYPERTENSION: ICD-10-CM

## 2025-06-10 RX ORDER — AMLODIPINE BESYLATE 5 MG/1
5 TABLET ORAL DAILY
Qty: 90 TABLET | Refills: 1 | Status: SHIPPED | OUTPATIENT
Start: 2025-06-10

## 2025-06-10 NOTE — TELEPHONE ENCOUNTER
Reason for call:   [x] Refill   [] Prior Auth  [] Other:     Office:   [x] PCP/Provider - [facility] - Argelia Simmons,    [] Specialty/Provider -     Medication: amLODIPine (NORVASC) 5 mg tablet     Dose/Frequency: Take 1 tablet (5 mg total) by mouth daily     Quantity: 90 tablet    Pharmacy: [address] [phone]    Local Pharmacy   Does the patient have enough for 3 days?   [x] Yes   [] No - Send as HP to POD    Mail Away Pharmacy   Does the patient have enough for 10 days?   [] Yes   [] No - Send as HP to POD

## 2025-07-07 DIAGNOSIS — I10 BENIGN ESSENTIAL HYPERTENSION: ICD-10-CM

## 2025-07-07 NOTE — TELEPHONE ENCOUNTER
Reason for call:   [x] Refill   [] Prior Auth  [] Other:     Office:   [x] PCP/Provider -   [] Specialty/Provider -     Medication: potassium citrate (UROCIT-K) 10 mEq Take 1 tablet (10 mEq total) by mouth 2 (two) times a day         Pharmacy: [facility] [address] [place], [place] - [address]      Local Pharmacy   Does the patient have enough for 3 days?   [x] Yes   [] No - Send as HP to POD    Mail Away Pharmacy   Does the patient have enough for 10 days?   [] Yes   [] No - Send as HP to POD

## 2025-07-08 DIAGNOSIS — I10 BENIGN ESSENTIAL HYPERTENSION: ICD-10-CM

## 2025-07-09 RX ORDER — POTASSIUM CITRATE 1080 MG/1
10 TABLET, EXTENDED RELEASE ORAL 2 TIMES DAILY
Qty: 180 TABLET | Refills: 0 | OUTPATIENT
Start: 2025-07-09

## 2025-07-09 RX ORDER — POTASSIUM CITRATE 1080 MG/1
10 TABLET, EXTENDED RELEASE ORAL 2 TIMES DAILY
Qty: 180 TABLET | Refills: 1 | Status: SHIPPED | OUTPATIENT
Start: 2025-07-09

## 2025-07-30 ENCOUNTER — TELEPHONE (OUTPATIENT)
Age: 80
End: 2025-07-30

## 2025-08-08 ENCOUNTER — OFFICE VISIT (OUTPATIENT)
Dept: FAMILY MEDICINE CLINIC | Facility: CLINIC | Age: 80
End: 2025-08-08
Payer: MEDICARE

## 2025-08-08 VITALS
DIASTOLIC BLOOD PRESSURE: 60 MMHG | HEART RATE: 95 BPM | WEIGHT: 116 LBS | SYSTOLIC BLOOD PRESSURE: 122 MMHG | HEIGHT: 61 IN | RESPIRATION RATE: 16 BRPM | OXYGEN SATURATION: 97 % | TEMPERATURE: 97.8 F | BODY MASS INDEX: 21.9 KG/M2

## 2025-08-08 DIAGNOSIS — E78.5 DYSLIPIDEMIA: ICD-10-CM

## 2025-08-08 DIAGNOSIS — Z00.00 MEDICARE ANNUAL WELLNESS VISIT, SUBSEQUENT: Primary | ICD-10-CM

## 2025-08-08 DIAGNOSIS — J30.9 ALLERGIC RHINITIS, UNSPECIFIED SEASONALITY, UNSPECIFIED TRIGGER: ICD-10-CM

## 2025-08-08 DIAGNOSIS — I10 BENIGN ESSENTIAL HYPERTENSION: ICD-10-CM

## 2025-08-08 PROBLEM — Z98.890 STATUS POST HYSTEROSCOPY: Status: RESOLVED | Noted: 2022-04-11 | Resolved: 2025-08-08

## 2025-08-08 PROBLEM — R11.0 NAUSEA: Status: RESOLVED | Noted: 2022-01-26 | Resolved: 2025-08-08

## 2025-08-08 PROBLEM — F11.20 CONTINUOUS OPIOID DEPENDENCE (HCC): Status: RESOLVED | Noted: 2022-03-17 | Resolved: 2025-08-08

## 2025-08-08 PROCEDURE — G0439 PPPS, SUBSEQ VISIT: HCPCS | Performed by: FAMILY MEDICINE

## 2025-08-08 PROCEDURE — G2211 COMPLEX E/M VISIT ADD ON: HCPCS | Performed by: FAMILY MEDICINE

## 2025-08-08 PROCEDURE — 99214 OFFICE O/P EST MOD 30 MIN: CPT | Performed by: FAMILY MEDICINE

## 2025-08-08 RX ORDER — AZELASTINE 1 MG/ML
1 SPRAY, METERED NASAL 2 TIMES DAILY
Qty: 30 ML | Refills: 5 | Status: SHIPPED | OUTPATIENT
Start: 2025-08-08

## 2025-08-11 ENCOUNTER — APPOINTMENT (OUTPATIENT)
Dept: LAB | Facility: HOSPITAL | Age: 80
End: 2025-08-11
Attending: FAMILY MEDICINE
Payer: MEDICARE

## 2025-08-11 ENCOUNTER — RESULTS FOLLOW-UP (OUTPATIENT)
Dept: FAMILY MEDICINE CLINIC | Facility: CLINIC | Age: 80
End: 2025-08-11

## (undated) DEVICE — FOOT SWITCH DRAPE: Brand: UNBRANDED

## (undated) DEVICE — ELECTRODE BLADE E-Z CLEAN 6.5IN -0014

## (undated) DEVICE — SUT ETHIBOND 2 V-37 30 IN MX69G

## (undated) DEVICE — GLOVE SRG BIOGEL 8.5

## (undated) DEVICE — TUBE MINI KAMVAC SUCTION 7310 7 LATEX FREE

## (undated) DEVICE — INTENDED FOR TISSUE SEPARATION, AND OTHER PROCEDURES THAT REQUIRE A SHARP SURGICAL BLADE TO PUNCTURE OR CUT.: Brand: BARD-PARKER ® CARBON RIB-BACK BLADES

## (undated) DEVICE — BIPOLAR SEALER 23-113-1 AQM 2.3: Brand: AQUAMANTYS™

## (undated) DEVICE — PAD CAST 4 IN COTTON NON STERILE

## (undated) DEVICE — TUBING SUCTION 5MM X 12 FT

## (undated) DEVICE — HANDPIECE SET WITH HIGH FLOW TIP AND SUCTION TUBE: Brand: INTERPULSE

## (undated) DEVICE — UNDER BUTTOCKS DRAPE W/FLUID CONTROL POUCH: Brand: CONVERTORS

## (undated) DEVICE — SUT VICRYL 0 CP-1 27 IN J267H

## (undated) DEVICE — DRAPE SHEET X-LG

## (undated) DEVICE — ADHESIVE SKIN CLSR DERMABOND NX

## (undated) DEVICE — 450 ML BOTTLE OF 0.05% CHLORHEXIDINE GLUCONATE IN 99.95% STERILE WATER FOR IRRIGATION, USP AND APPLICATOR.: Brand: IRRISEPT ANTIMICROBIAL WOUND LAVAGE

## (undated) DEVICE — DRAPE C-ARM X-RAY

## (undated) DEVICE — CAPIT HIP COP - ACTIS ONLY

## (undated) DEVICE — BETHLEHEM UNIVERSAL MINOR VAG: Brand: CARDINAL HEALTH

## (undated) DEVICE — SUT STRATAFIX SPIRAL 3-0 PGA/PCL 30 X 30 CM SXMD2B410

## (undated) DEVICE — OSCILLATING TIP SAW CARTRIDGE: Brand: PRECISION FALCON

## (undated) DEVICE — TISSUE REMOVAL SYSTEM FLUID MANAGEMENT ACCESSORIES: Brand: SYMPHION

## (undated) DEVICE — POSITIONER HANA TABLE PACK

## (undated) DEVICE — DRESSING MEPILEX AG BORDER POST-OP 4 X 8 IN

## (undated) DEVICE — GLOVE INDICATOR PI UNDERGLOVE SZ 8.5 BLUE

## (undated) DEVICE — 3M™ IOBAN™ 2 ANTIMICROBIAL INCISE DRAPE 6650EZ: Brand: IOBAN™ 2

## (undated) DEVICE — 3M™ STERI-DRAPE™ U-DRAPE 1015: Brand: STERI-DRAPE™

## (undated) DEVICE — SUT STRATAFIX SPIRAL 1-0  CT-1 30 X 30CM SXPD2B403

## (undated) DEVICE — PVC URETHRAL CATHETER: Brand: DOVER

## (undated) DEVICE — MAYO STAND COVER: Brand: CONVERTORS

## (undated) DEVICE — ARTHROSCOPY FLOOR MAT

## (undated) DEVICE — GLOVE SRG BIOGEL 8

## (undated) DEVICE — GLOVE SRG LF STRL BGL SKNSNS 6.5 PF

## (undated) DEVICE — CYSTO TUBING SINGLE IRRIGATION

## (undated) DEVICE — VEST SURGEON DISPOSABLE

## (undated) DEVICE — GLOVE INDICATOR PI UNDERGLOVE SZ 7.5 BLUE

## (undated) DEVICE — BASIC DOUBLE BASIN 2-LF: Brand: MEDLINE INDUSTRIES, INC.

## (undated) DEVICE — CHLORAPREP HI-LITE 26ML ORANGE

## (undated) DEVICE — GLOVE INDICATOR PI UNDERGLOVE SZ 7 BLUE

## (undated) DEVICE — PREP SURGICAL PURPREP 26ML

## (undated) DEVICE — ANTIBACTERIAL UNDYED BRAIDED (POLYGLACTIN 910), SYNTHETIC ABSORBABLE SUTURE: Brand: COATED VICRYL

## (undated) DEVICE — ADHESIVE SKIN HIGH VISCOSITY EXOFIN MICRO 0.5ML

## (undated) DEVICE — TISSUE REMOVAL SYSTEM RESECTING DEVICE: Brand: SYMPHION

## (undated) DEVICE — TIBURON SPLIT SHEET: Brand: CONVERTORS

## (undated) DEVICE — SKIN MARKER DUAL TIP WITH RULER CAP, FLEXIBLE RULER AND LABELS: Brand: DEVON

## (undated) DEVICE — PACK MAJOR ORTHO W/SPLITS PBDS

## (undated) DEVICE — TRAY FOLEY 16FR URIMETER SURESTEP

## (undated) DEVICE — INSTRUMENT POUCH: Brand: CONVERTORS

## (undated) DEVICE — ASTOUND SURGICAL GOWN, XXX LARGE, X-LONG: Brand: CONVERTORS

## (undated) DEVICE — HOOD: Brand: FLYTE, SURGICOOL